# Patient Record
Sex: MALE | Race: OTHER | NOT HISPANIC OR LATINO | URBAN - METROPOLITAN AREA
[De-identification: names, ages, dates, MRNs, and addresses within clinical notes are randomized per-mention and may not be internally consistent; named-entity substitution may affect disease eponyms.]

---

## 2017-06-15 ENCOUNTER — EMERGENCY (EMERGENCY)
Facility: HOSPITAL | Age: 77
LOS: 0 days | Discharge: HOME | End: 2017-06-15
Attending: EMERGENCY MEDICINE
Payer: MEDICARE

## 2017-06-15 VITALS
RESPIRATION RATE: 18 BRPM | TEMPERATURE: 98 F | WEIGHT: 179.9 LBS | DIASTOLIC BLOOD PRESSURE: 86 MMHG | OXYGEN SATURATION: 98 % | HEART RATE: 72 BPM | SYSTOLIC BLOOD PRESSURE: 150 MMHG | HEIGHT: 68 IN

## 2017-06-15 VITALS
DIASTOLIC BLOOD PRESSURE: 91 MMHG | OXYGEN SATURATION: 100 % | TEMPERATURE: 98 F | SYSTOLIC BLOOD PRESSURE: 142 MMHG | RESPIRATION RATE: 16 BRPM | HEART RATE: 72 BPM

## 2017-06-15 DIAGNOSIS — Z98.890 OTHER SPECIFIED POSTPROCEDURAL STATES: Chronic | ICD-10-CM

## 2017-06-15 DIAGNOSIS — I10 ESSENTIAL (PRIMARY) HYPERTENSION: ICD-10-CM

## 2017-06-15 DIAGNOSIS — R07.9 CHEST PAIN, UNSPECIFIED: ICD-10-CM

## 2017-06-15 DIAGNOSIS — Z87.891 PERSONAL HISTORY OF NICOTINE DEPENDENCE: ICD-10-CM

## 2017-06-15 DIAGNOSIS — Z85.46 PERSONAL HISTORY OF MALIGNANT NEOPLASM OF PROSTATE: ICD-10-CM

## 2017-06-15 DIAGNOSIS — E11.9 TYPE 2 DIABETES MELLITUS WITHOUT COMPLICATIONS: ICD-10-CM

## 2017-06-15 DIAGNOSIS — K46.9 UNSPECIFIED ABDOMINAL HERNIA WITHOUT OBSTRUCTION OR GANGRENE: ICD-10-CM

## 2017-06-15 DIAGNOSIS — E78.5 HYPERLIPIDEMIA, UNSPECIFIED: ICD-10-CM

## 2017-06-15 LAB
ALBUMIN SERPL ELPH-MCNC: 3.6 G/DL — SIGNIFICANT CHANGE UP (ref 3.3–5)
ALP SERPL-CCNC: 43 U/L — SIGNIFICANT CHANGE UP (ref 40–120)
ALT FLD-CCNC: 27 U/L — SIGNIFICANT CHANGE UP (ref 12–78)
ANION GAP SERPL CALC-SCNC: 7 MMOL/L — SIGNIFICANT CHANGE UP (ref 5–17)
ANISOCYTOSIS BLD QL: SLIGHT — SIGNIFICANT CHANGE UP
APTT BLD: 31.2 SEC — SIGNIFICANT CHANGE UP (ref 27.5–37.4)
AST SERPL-CCNC: 20 U/L — SIGNIFICANT CHANGE UP (ref 15–37)
BASOPHILS NFR BLD AUTO: 1 % — SIGNIFICANT CHANGE UP (ref 0–2)
BILIRUB SERPL-MCNC: 0.3 MG/DL — SIGNIFICANT CHANGE UP (ref 0.2–1.2)
BUN SERPL-MCNC: 19 MG/DL — SIGNIFICANT CHANGE UP (ref 7–23)
CALCIUM SERPL-MCNC: 8.5 MG/DL — SIGNIFICANT CHANGE UP (ref 8.5–10.1)
CHLORIDE SERPL-SCNC: 111 MMOL/L — HIGH (ref 96–108)
CK MB BLD-MCNC: 0.7 % — SIGNIFICANT CHANGE UP (ref 0–3.5)
CK MB BLD-MCNC: 0.8 % — SIGNIFICANT CHANGE UP (ref 0–3.5)
CK MB CFR SERPL CALC: 1.6 NG/ML — SIGNIFICANT CHANGE UP (ref 0.5–3.6)
CK MB CFR SERPL CALC: 1.7 NG/ML — SIGNIFICANT CHANGE UP (ref 0.5–3.6)
CK SERPL-CCNC: 206 U/L — SIGNIFICANT CHANGE UP (ref 26–308)
CK SERPL-CCNC: 228 U/L — SIGNIFICANT CHANGE UP (ref 26–308)
CO2 SERPL-SCNC: 27 MMOL/L — SIGNIFICANT CHANGE UP (ref 22–31)
CREAT SERPL-MCNC: 0.93 MG/DL — SIGNIFICANT CHANGE UP (ref 0.5–1.3)
D DIMER BLD IA.RAPID-MCNC: <150 NG/ML DDU — SIGNIFICANT CHANGE UP
EOSINOPHIL NFR BLD AUTO: 3 % — SIGNIFICANT CHANGE UP (ref 0–6)
GLUCOSE SERPL-MCNC: 136 MG/DL — HIGH (ref 70–99)
HCT VFR BLD CALC: 31.9 % — LOW (ref 39–50)
HGB BLD-MCNC: 10.4 G/DL — LOW (ref 13–17)
HYPOCHROMIA BLD QL: SLIGHT — SIGNIFICANT CHANGE UP
INR BLD: 0.86 RATIO — LOW (ref 0.88–1.16)
LYMPHOCYTES # BLD AUTO: 24 % — SIGNIFICANT CHANGE UP (ref 13–44)
MCHC RBC-ENTMCNC: 26.1 PG — LOW (ref 27–34)
MCHC RBC-ENTMCNC: 32.6 GM/DL — SIGNIFICANT CHANGE UP (ref 32–36)
MCV RBC AUTO: 80.1 FL — SIGNIFICANT CHANGE UP (ref 80–100)
MONOCYTES NFR BLD AUTO: 9 % — SIGNIFICANT CHANGE UP (ref 2–14)
NEUTROPHILS NFR BLD AUTO: 57 % — SIGNIFICANT CHANGE UP (ref 43–77)
NEUTS BAND # BLD: 5 % — SIGNIFICANT CHANGE UP (ref 0–8)
PLAT MORPH BLD: NORMAL — SIGNIFICANT CHANGE UP
PLATELET # BLD AUTO: 182 K/UL — SIGNIFICANT CHANGE UP (ref 150–400)
POLYCHROMASIA BLD QL SMEAR: SLIGHT — SIGNIFICANT CHANGE UP
POTASSIUM SERPL-MCNC: 3.9 MMOL/L — SIGNIFICANT CHANGE UP (ref 3.5–5.3)
POTASSIUM SERPL-SCNC: 3.9 MMOL/L — SIGNIFICANT CHANGE UP (ref 3.5–5.3)
PROT SERPL-MCNC: 6.7 GM/DL — SIGNIFICANT CHANGE UP (ref 6–8.3)
PROTHROM AB SERPL-ACNC: 9.3 SEC — LOW (ref 9.8–12.7)
RBC # BLD: 3.98 M/UL — LOW (ref 4.2–5.8)
RBC # FLD: 14 % — SIGNIFICANT CHANGE UP (ref 11–15)
RBC BLD AUTO: ABNORMAL
SODIUM SERPL-SCNC: 145 MMOL/L — SIGNIFICANT CHANGE UP (ref 135–145)
TROPONIN I SERPL-MCNC: <.015 NG/ML — SIGNIFICANT CHANGE UP (ref 0.01–0.04)
TROPONIN I SERPL-MCNC: <.015 NG/ML — SIGNIFICANT CHANGE UP (ref 0.01–0.04)
VARIANT LYMPHS # BLD: 1 % — SIGNIFICANT CHANGE UP (ref 0–6)
WBC # BLD: 3.5 K/UL — LOW (ref 3.8–10.5)
WBC # FLD AUTO: 3.5 K/UL — LOW (ref 3.8–10.5)

## 2017-06-15 PROCEDURE — 99285 EMERGENCY DEPT VISIT HI MDM: CPT

## 2017-06-15 PROCEDURE — 71010: CPT | Mod: 26

## 2017-06-15 PROCEDURE — 93971 EXTREMITY STUDY: CPT | Mod: 26,RT

## 2017-06-15 RX ORDER — SODIUM CHLORIDE 9 MG/ML
3 INJECTION INTRAMUSCULAR; INTRAVENOUS; SUBCUTANEOUS ONCE
Qty: 0 | Refills: 0 | Status: COMPLETED | OUTPATIENT
Start: 2017-06-15 | End: 2017-06-15

## 2017-06-15 RX ORDER — ASPIRIN/CALCIUM CARB/MAGNESIUM 324 MG
325 TABLET ORAL ONCE
Qty: 0 | Refills: 0 | Status: COMPLETED | OUTPATIENT
Start: 2017-06-15 | End: 2017-06-15

## 2017-06-15 RX ORDER — NITROGLYCERIN 6.5 MG
0.4 CAPSULE, EXTENDED RELEASE ORAL
Qty: 0 | Refills: 0 | Status: DISCONTINUED | OUTPATIENT
Start: 2017-06-15 | End: 2017-06-15

## 2017-06-15 RX ADMIN — SODIUM CHLORIDE 3 MILLILITER(S): 9 INJECTION INTRAMUSCULAR; INTRAVENOUS; SUBCUTANEOUS at 18:35

## 2017-06-15 RX ADMIN — Medication 0.4 MILLIGRAM(S): at 18:05

## 2017-06-15 RX ADMIN — Medication 325 MILLIGRAM(S): at 18:05

## 2017-06-15 NOTE — ED ADULT NURSE REASSESSMENT NOTE - NS ED NURSE REASSESS COMMENT FT1
Pt to be discharged home with follow up care. IV removed from pt R AC, catheter in-tact, site covered with a pressure dressing and bleeding controlled. Pt questions about discharge answered at time of discharge. Provided pt with results of his tests and lab diagnostics that were performed during visit. Pt states understanding as demonstrated by pt teach back. Educated pt about cardiac care and follow up instructions. Pt to follow up on discharge.

## 2017-06-15 NOTE — ED PROVIDER NOTE - PROGRESS NOTE DETAILS
Pt endorsed by Dr. Correia, present for eval of CP.  Pt last stress test 1y ago, WNL.  Initial labs & imaging WNL, pending repeat CE.  Repeat CE WNL.  Pt VSS in NAD.  Discussed results and outcome of testing with the patient, given copy as well.  Patient advised to please follow up with their primary care doctor within the next 24 hours and return to the Emergency Department for worsening symptoms or any other concerns.  Patient advised that their doctor may call  to follow up on the specific results of the tests performed today in the emergency department.

## 2017-06-15 NOTE — ED PROVIDER NOTE - PHYSICAL EXAMINATION
Gen: Alert, Well appearing. NAD    Head: NC, AT, PERRL, EOMI, normal lids/conjunctiva   ENT: Bilateral TM WNL, normal hearing, patent oropharynx without erythema/exudate, uvula midline  Neck: supple, no tenderness/meningismus/JVD   Pulm: Bilateral clear BS, normal resp effort, no wheeze/stridor/retractions  CV: RRR, no M/R/G, +dist pulses   Abd: soft, NT/ND, +BS, no guarding/rebound tenderness  Mskel: + mild rt edema to groin compared to left. pulses intact  Skin: no rash   Neuro: AAOx3, no sensory/motor deficits, CN 2-12 intact

## 2017-06-15 NOTE — ED PROVIDER NOTE - OBJECTIVE STATEMENT
76yo male with pmh HTN, HL, DM, + chronic RLE edema x 2 years (worked up, told to wear compressive stockings), Prostate CA treated with seeding, psh: inguinal hernia repair, presents with intermittent Lt CP, burning, that started 2 hours ago. No SOB, palptiations. Denies recent immobilization, surgery, long trips or plane rides, hormones/OCP, leg pain or swelling or family or personal history of blood clotting disorder. + reports h/o neg stress test and last one last year.     ROS: No fever/chills. No photophobia/eye pain/changes in vision, No ear pain/sore throat/dysphagia, + chest pain, no palpitations. No SOB/cough/stridor. No abdominal pain, N/V/D, no black/bloody bm. No dysuria/frequency/discharge, No headache. No Dizziness.  No rash.  No numbness/tingling/weakness.

## 2017-06-15 NOTE — ED PROVIDER NOTE - MEDICAL DECISION MAKING DETAILS
EKG wnl. initial labs normal. pending repeat CE, ekg . Patient signed out to incoming physician.  All decisions regarding the progression of care will be made at their discretion.

## 2019-03-04 ENCOUNTER — EMERGENCY (EMERGENCY)
Facility: HOSPITAL | Age: 79
LOS: 0 days | Discharge: ANOTHER HOSPITAL | End: 2019-03-04
Attending: EMERGENCY MEDICINE
Payer: MEDICARE

## 2019-03-04 ENCOUNTER — INPATIENT (INPATIENT)
Facility: HOSPITAL | Age: 79
LOS: 1 days | Discharge: HOME | DRG: 24 | End: 2019-03-06
Attending: PSYCHIATRY & NEUROLOGY | Admitting: PSYCHIATRY & NEUROLOGY
Payer: COMMERCIAL

## 2019-03-04 ENCOUNTER — RESULT REVIEW (OUTPATIENT)
Age: 79
End: 2019-03-04

## 2019-03-04 ENCOUNTER — TRANSCRIPTION ENCOUNTER (OUTPATIENT)
Age: 79
End: 2019-03-04

## 2019-03-04 ENCOUNTER — APPOINTMENT (OUTPATIENT)
Age: 79
End: 2019-03-04

## 2019-03-04 VITALS
HEART RATE: 124 BPM | WEIGHT: 164.91 LBS | OXYGEN SATURATION: 96 % | RESPIRATION RATE: 18 BRPM | DIASTOLIC BLOOD PRESSURE: 75 MMHG | SYSTOLIC BLOOD PRESSURE: 156 MMHG | TEMPERATURE: 98 F | HEIGHT: 68 IN

## 2019-03-04 VITALS
HEART RATE: 98 BPM | WEIGHT: 164.91 LBS | HEIGHT: 66 IN | OXYGEN SATURATION: 97 % | SYSTOLIC BLOOD PRESSURE: 130 MMHG | DIASTOLIC BLOOD PRESSURE: 70 MMHG | RESPIRATION RATE: 18 BRPM

## 2019-03-04 VITALS
TEMPERATURE: 99 F | SYSTOLIC BLOOD PRESSURE: 150 MMHG | HEART RATE: 80 BPM | RESPIRATION RATE: 18 BRPM | DIASTOLIC BLOOD PRESSURE: 94 MMHG | OXYGEN SATURATION: 98 %

## 2019-03-04 DIAGNOSIS — I10 ESSENTIAL (PRIMARY) HYPERTENSION: ICD-10-CM

## 2019-03-04 DIAGNOSIS — E78.5 HYPERLIPIDEMIA, UNSPECIFIED: ICD-10-CM

## 2019-03-04 DIAGNOSIS — Z98.890 OTHER SPECIFIED POSTPROCEDURAL STATES: Chronic | ICD-10-CM

## 2019-03-04 DIAGNOSIS — I63.9 CEREBRAL INFARCTION, UNSPECIFIED: ICD-10-CM

## 2019-03-04 DIAGNOSIS — I61.9 NONTRAUMATIC INTRACEREBRAL HEMORRHAGE, UNSPECIFIED: ICD-10-CM

## 2019-03-04 LAB
ALBUMIN SERPL ELPH-MCNC: 3.2 G/DL — LOW (ref 3.3–5)
ALBUMIN SERPL ELPH-MCNC: 3.9 G/DL — SIGNIFICANT CHANGE UP (ref 3.3–5)
ALP SERPL-CCNC: 292 U/L — HIGH (ref 40–120)
ALP SERPL-CCNC: 312 U/L — HIGH (ref 40–120)
ALT FLD-CCNC: 23 U/L — SIGNIFICANT CHANGE UP (ref 10–45)
ALT FLD-CCNC: 30 U/L — SIGNIFICANT CHANGE UP (ref 12–78)
ANION GAP SERPL CALC-SCNC: 15 MMOL/L — SIGNIFICANT CHANGE UP (ref 5–17)
ANION GAP SERPL CALC-SCNC: 16 MMOL/L — SIGNIFICANT CHANGE UP (ref 5–17)
ANION GAP SERPL CALC-SCNC: 8 MMOL/L — SIGNIFICANT CHANGE UP (ref 5–17)
APTT BLD: 27.9 SEC — LOW (ref 28.5–37)
APTT BLD: 27.9 SEC — SIGNIFICANT CHANGE UP (ref 27.5–36.3)
APTT BLD: 29 SEC — SIGNIFICANT CHANGE UP (ref 27.5–36.3)
AST SERPL-CCNC: 36 U/L — SIGNIFICANT CHANGE UP (ref 10–40)
AST SERPL-CCNC: 48 U/L — HIGH (ref 15–37)
BASOPHILS # BLD AUTO: 0 K/UL — SIGNIFICANT CHANGE UP (ref 0–0.2)
BASOPHILS # BLD AUTO: 0.03 K/UL — SIGNIFICANT CHANGE UP (ref 0–0.2)
BASOPHILS NFR BLD AUTO: 0.3 % — SIGNIFICANT CHANGE UP (ref 0–2)
BASOPHILS NFR BLD AUTO: 0.3 % — SIGNIFICANT CHANGE UP (ref 0–2)
BILIRUB SERPL-MCNC: 0.3 MG/DL — SIGNIFICANT CHANGE UP (ref 0.2–1.2)
BILIRUB SERPL-MCNC: 0.3 MG/DL — SIGNIFICANT CHANGE UP (ref 0.2–1.2)
BLD GP AB SCN SERPL QL: NEGATIVE — SIGNIFICANT CHANGE UP
BLD GP AB SCN SERPL QL: SIGNIFICANT CHANGE UP
BUN SERPL-MCNC: 17 MG/DL — SIGNIFICANT CHANGE UP (ref 7–23)
BUN SERPL-MCNC: 19 MG/DL — SIGNIFICANT CHANGE UP (ref 7–23)
BUN SERPL-MCNC: 19 MG/DL — SIGNIFICANT CHANGE UP (ref 7–23)
CALCIUM SERPL-MCNC: 8.6 MG/DL — SIGNIFICANT CHANGE UP (ref 8.5–10.1)
CALCIUM SERPL-MCNC: 8.8 MG/DL — SIGNIFICANT CHANGE UP (ref 8.4–10.5)
CALCIUM SERPL-MCNC: 9.4 MG/DL — SIGNIFICANT CHANGE UP (ref 8.4–10.5)
CHLORIDE SERPL-SCNC: 101 MMOL/L — SIGNIFICANT CHANGE UP (ref 96–108)
CHLORIDE SERPL-SCNC: 95 MMOL/L — LOW (ref 96–108)
CHLORIDE SERPL-SCNC: 97 MMOL/L — SIGNIFICANT CHANGE UP (ref 96–108)
CK MB BLD-MCNC: <1.3 % — SIGNIFICANT CHANGE UP (ref 0–3.5)
CK MB CFR SERPL CALC: <1 NG/ML — SIGNIFICANT CHANGE UP (ref 0.5–3.6)
CK SERPL-CCNC: 76 U/L — SIGNIFICANT CHANGE UP (ref 26–308)
CO2 SERPL-SCNC: 21 MMOL/L — LOW (ref 22–31)
CO2 SERPL-SCNC: 22 MMOL/L — SIGNIFICANT CHANGE UP (ref 22–31)
CO2 SERPL-SCNC: 25 MMOL/L — SIGNIFICANT CHANGE UP (ref 22–31)
CREAT SERPL-MCNC: 1.47 MG/DL — HIGH (ref 0.5–1.3)
CREAT SERPL-MCNC: 1.54 MG/DL — HIGH (ref 0.5–1.3)
CREAT SERPL-MCNC: 1.64 MG/DL — HIGH (ref 0.5–1.3)
EOSINOPHIL # BLD AUTO: 0 K/UL — SIGNIFICANT CHANGE UP (ref 0–0.5)
EOSINOPHIL # BLD AUTO: 0.05 K/UL — SIGNIFICANT CHANGE UP (ref 0–0.5)
EOSINOPHIL NFR BLD AUTO: 0.6 % — SIGNIFICANT CHANGE UP (ref 0–6)
EOSINOPHIL NFR BLD AUTO: 0.6 % — SIGNIFICANT CHANGE UP (ref 0–6)
GLUCOSE SERPL-MCNC: 71 MG/DL — SIGNIFICANT CHANGE UP (ref 70–99)
GLUCOSE SERPL-MCNC: 85 MG/DL — SIGNIFICANT CHANGE UP (ref 70–99)
GLUCOSE SERPL-MCNC: 93 MG/DL — SIGNIFICANT CHANGE UP (ref 70–99)
HCT VFR BLD CALC: 26.6 % — LOW (ref 39–50)
HCT VFR BLD CALC: 28.4 % — LOW (ref 39–50)
HCT VFR BLD CALC: 30.2 % — LOW (ref 39–50)
HGB BLD-MCNC: 8.7 G/DL — LOW (ref 13–17)
HGB BLD-MCNC: 9.4 G/DL — LOW (ref 13–17)
HGB BLD-MCNC: 9.8 G/DL — LOW (ref 13–17)
IMM GRANULOCYTES NFR BLD AUTO: 0.2 % — SIGNIFICANT CHANGE UP (ref 0–1.5)
INR BLD: 1.08 RATIO — SIGNIFICANT CHANGE UP (ref 0.88–1.16)
INR BLD: 1.12 RATIO — SIGNIFICANT CHANGE UP (ref 0.88–1.16)
INR BLD: 1.14 RATIO — SIGNIFICANT CHANGE UP (ref 0.88–1.16)
LYMPHOCYTES # BLD AUTO: 1.27 K/UL — SIGNIFICANT CHANGE UP (ref 1–3.3)
LYMPHOCYTES # BLD AUTO: 1.4 K/UL — SIGNIFICANT CHANGE UP (ref 1–3.3)
LYMPHOCYTES # BLD AUTO: 14.8 % — SIGNIFICANT CHANGE UP (ref 13–44)
LYMPHOCYTES # BLD AUTO: 16.9 % — SIGNIFICANT CHANGE UP (ref 13–44)
MAGNESIUM SERPL-MCNC: 1.8 MG/DL — SIGNIFICANT CHANGE UP (ref 1.6–2.6)
MCHC RBC-ENTMCNC: 25.3 PG — LOW (ref 27–34)
MCHC RBC-ENTMCNC: 26.4 PG — LOW (ref 27–34)
MCHC RBC-ENTMCNC: 27.5 PG — SIGNIFICANT CHANGE UP (ref 27–34)
MCHC RBC-ENTMCNC: 31.1 GM/DL — LOW (ref 32–36)
MCHC RBC-ENTMCNC: 32.8 GM/DL — SIGNIFICANT CHANGE UP (ref 32–36)
MCHC RBC-ENTMCNC: 34.3 GM/DL — SIGNIFICANT CHANGE UP (ref 32–36)
MCV RBC AUTO: 80.2 FL — SIGNIFICANT CHANGE UP (ref 80–100)
MCV RBC AUTO: 80.5 FL — SIGNIFICANT CHANGE UP (ref 80–100)
MCV RBC AUTO: 81.2 FL — SIGNIFICANT CHANGE UP (ref 80–100)
MONOCYTES # BLD AUTO: 0.7 K/UL — SIGNIFICANT CHANGE UP (ref 0–0.9)
MONOCYTES # BLD AUTO: 0.72 K/UL — SIGNIFICANT CHANGE UP (ref 0–0.9)
MONOCYTES NFR BLD AUTO: 8 % — SIGNIFICANT CHANGE UP (ref 2–14)
MONOCYTES NFR BLD AUTO: 8.4 % — SIGNIFICANT CHANGE UP (ref 2–14)
NEUTROPHILS # BLD AUTO: 6 K/UL — SIGNIFICANT CHANGE UP (ref 1.8–7.4)
NEUTROPHILS # BLD AUTO: 6.51 K/UL — SIGNIFICANT CHANGE UP (ref 1.8–7.4)
NEUTROPHILS NFR BLD AUTO: 74.2 % — SIGNIFICANT CHANGE UP (ref 43–77)
NEUTROPHILS NFR BLD AUTO: 75.7 % — SIGNIFICANT CHANGE UP (ref 43–77)
NRBC # BLD: 0 /100 WBCS — SIGNIFICANT CHANGE UP (ref 0–0)
PHOSPHATE SERPL-MCNC: 4.2 MG/DL — SIGNIFICANT CHANGE UP (ref 2.5–4.5)
PLATELET # BLD AUTO: 198 K/UL — SIGNIFICANT CHANGE UP (ref 150–400)
PLATELET # BLD AUTO: 228 K/UL — SIGNIFICANT CHANGE UP (ref 150–400)
PLATELET # BLD AUTO: 249 K/UL — SIGNIFICANT CHANGE UP (ref 150–400)
POTASSIUM SERPL-MCNC: 3.8 MMOL/L — SIGNIFICANT CHANGE UP (ref 3.5–5.3)
POTASSIUM SERPL-MCNC: 4.4 MMOL/L — SIGNIFICANT CHANGE UP (ref 3.5–5.3)
POTASSIUM SERPL-MCNC: 4.5 MMOL/L — SIGNIFICANT CHANGE UP (ref 3.5–5.3)
POTASSIUM SERPL-SCNC: 3.8 MMOL/L — SIGNIFICANT CHANGE UP (ref 3.5–5.3)
POTASSIUM SERPL-SCNC: 4.4 MMOL/L — SIGNIFICANT CHANGE UP (ref 3.5–5.3)
POTASSIUM SERPL-SCNC: 4.5 MMOL/L — SIGNIFICANT CHANGE UP (ref 3.5–5.3)
PROT SERPL-MCNC: 7.4 G/DL — SIGNIFICANT CHANGE UP (ref 6–8.3)
PROT SERPL-MCNC: 7.5 GM/DL — SIGNIFICANT CHANGE UP (ref 6–8.3)
PROTHROM AB SERPL-ACNC: 12.5 SEC — SIGNIFICANT CHANGE UP (ref 10–12.9)
PROTHROM AB SERPL-ACNC: 12.6 SEC — SIGNIFICANT CHANGE UP (ref 10–12.9)
PROTHROM AB SERPL-ACNC: 13.2 SEC — HIGH (ref 10–12.9)
RBC # BLD: 3.3 M/UL — LOW (ref 4.2–5.8)
RBC # BLD: 3.54 M/UL — LOW (ref 4.2–5.8)
RBC # BLD: 3.72 M/UL — LOW (ref 4.2–5.8)
RBC # FLD: 14.2 % — SIGNIFICANT CHANGE UP (ref 10.3–14.5)
RBC # FLD: 14.4 % — SIGNIFICANT CHANGE UP (ref 10.3–14.5)
RBC # FLD: 15 % — HIGH (ref 10.3–14.5)
RH IG SCN BLD-IMP: POSITIVE — SIGNIFICANT CHANGE UP
RH IG SCN BLD-IMP: POSITIVE — SIGNIFICANT CHANGE UP
SODIUM SERPL-SCNC: 132 MMOL/L — LOW (ref 135–145)
SODIUM SERPL-SCNC: 134 MMOL/L — LOW (ref 135–145)
SODIUM SERPL-SCNC: 134 MMOL/L — LOW (ref 135–145)
TROPONIN I SERPL-MCNC: 0.08 NG/ML — HIGH (ref 0.01–0.04)
WBC # BLD: 7.4 K/UL — SIGNIFICANT CHANGE UP (ref 3.8–10.5)
WBC # BLD: 8.1 K/UL — SIGNIFICANT CHANGE UP (ref 3.8–10.5)
WBC # BLD: 8.6 K/UL — SIGNIFICANT CHANGE UP (ref 3.8–10.5)
WBC # FLD AUTO: 7.4 K/UL — SIGNIFICANT CHANGE UP (ref 3.8–10.5)
WBC # FLD AUTO: 8.1 K/UL — SIGNIFICANT CHANGE UP (ref 3.8–10.5)
WBC # FLD AUTO: 8.6 K/UL — SIGNIFICANT CHANGE UP (ref 3.8–10.5)

## 2019-03-04 PROCEDURE — 88304 TISSUE EXAM BY PATHOLOGIST: CPT | Mod: 26

## 2019-03-04 PROCEDURE — 70460 CT HEAD/BRAIN W/DYE: CPT | Mod: 26

## 2019-03-04 PROCEDURE — 99222 1ST HOSP IP/OBS MODERATE 55: CPT

## 2019-03-04 PROCEDURE — 71045 X-RAY EXAM CHEST 1 VIEW: CPT | Mod: 26

## 2019-03-04 PROCEDURE — 70496 CT ANGIOGRAPHY HEAD: CPT | Mod: 26

## 2019-03-04 PROCEDURE — 99291 CRITICAL CARE FIRST HOUR: CPT

## 2019-03-04 PROCEDURE — 0042T: CPT

## 2019-03-04 PROCEDURE — 61645 PERQ ART M-THROMBECT &/NFS: CPT | Mod: LT

## 2019-03-04 PROCEDURE — 71045 X-RAY EXAM CHEST 1 VIEW: CPT | Mod: 26,77

## 2019-03-04 PROCEDURE — 70450 CT HEAD/BRAIN W/O DYE: CPT | Mod: 26

## 2019-03-04 PROCEDURE — 70498 CT ANGIOGRAPHY NECK: CPT | Mod: 26

## 2019-03-04 PROCEDURE — 99223 1ST HOSP IP/OBS HIGH 75: CPT

## 2019-03-04 RX ORDER — AMLODIPINE BESYLATE 2.5 MG/1
0 TABLET ORAL
Qty: 0 | Refills: 0 | COMMUNITY

## 2019-03-04 RX ORDER — TERAZOSIN HYDROCHLORIDE 10 MG/1
1 CAPSULE ORAL
Qty: 0 | Refills: 0 | COMMUNITY

## 2019-03-04 RX ORDER — OXYCODONE HYDROCHLORIDE 5 MG/1
10 TABLET ORAL EVERY 4 HOURS
Qty: 0 | Refills: 0 | Status: DISCONTINUED | OUTPATIENT
Start: 2019-03-04 | End: 2019-03-06

## 2019-03-04 RX ORDER — CHLORHEXIDINE GLUCONATE 213 G/1000ML
1 SOLUTION TOPICAL
Qty: 0 | Refills: 0 | Status: DISCONTINUED | OUTPATIENT
Start: 2019-03-04 | End: 2019-03-05

## 2019-03-04 RX ORDER — METOPROLOL TARTRATE 50 MG
12.5 TABLET ORAL
Qty: 0 | Refills: 0 | Status: DISCONTINUED | OUTPATIENT
Start: 2019-03-04 | End: 2019-03-06

## 2019-03-04 RX ORDER — GABAPENTIN 400 MG/1
0 CAPSULE ORAL
Qty: 0 | Refills: 0 | COMMUNITY

## 2019-03-04 RX ORDER — NICARDIPINE HYDROCHLORIDE 30 MG/1
5 CAPSULE, EXTENDED RELEASE ORAL
Qty: 40 | Refills: 0 | Status: DISCONTINUED | OUTPATIENT
Start: 2019-03-04 | End: 2019-03-05

## 2019-03-04 RX ORDER — SODIUM CHLORIDE 5 G/100ML
1000 INJECTION, SOLUTION INTRAVENOUS
Qty: 0 | Refills: 0 | Status: DISCONTINUED | OUTPATIENT
Start: 2019-03-04 | End: 2019-03-05

## 2019-03-04 RX ORDER — SODIUM CHLORIDE 9 MG/ML
500 INJECTION, SOLUTION INTRAVENOUS ONCE
Qty: 0 | Refills: 0 | Status: COMPLETED | OUTPATIENT
Start: 2019-03-04 | End: 2019-03-04

## 2019-03-04 RX ORDER — SODIUM CHLORIDE 9 MG/ML
1000 INJECTION INTRAMUSCULAR; INTRAVENOUS; SUBCUTANEOUS
Qty: 0 | Refills: 0 | Status: DISCONTINUED | OUTPATIENT
Start: 2019-03-04 | End: 2019-03-04

## 2019-03-04 RX ORDER — DEXMEDETOMIDINE HYDROCHLORIDE IN 0.9% SODIUM CHLORIDE 4 UG/ML
0.4 INJECTION INTRAVENOUS
Qty: 200 | Refills: 0 | Status: DISCONTINUED | OUTPATIENT
Start: 2019-03-04 | End: 2019-03-04

## 2019-03-04 RX ORDER — METOPROLOL TARTRATE 50 MG
0 TABLET ORAL
Qty: 0 | Refills: 0 | COMMUNITY

## 2019-03-04 RX ORDER — SODIUM CHLORIDE 9 MG/ML
1000 INJECTION, SOLUTION INTRAVENOUS
Qty: 0 | Refills: 0 | Status: DISCONTINUED | OUTPATIENT
Start: 2019-03-04 | End: 2019-03-04

## 2019-03-04 RX ORDER — ASPIRIN/CALCIUM CARB/MAGNESIUM 324 MG
1 TABLET ORAL
Qty: 0 | Refills: 0 | COMMUNITY

## 2019-03-04 RX ORDER — METFORMIN HYDROCHLORIDE 850 MG/1
1 TABLET ORAL
Qty: 0 | Refills: 0 | COMMUNITY

## 2019-03-04 RX ORDER — PANTOPRAZOLE SODIUM 20 MG/1
40 TABLET, DELAYED RELEASE ORAL DAILY
Qty: 0 | Refills: 0 | Status: DISCONTINUED | OUTPATIENT
Start: 2019-03-04 | End: 2019-03-04

## 2019-03-04 RX ORDER — ATORVASTATIN CALCIUM 80 MG/1
80 TABLET, FILM COATED ORAL AT BEDTIME
Qty: 0 | Refills: 0 | Status: DISCONTINUED | OUTPATIENT
Start: 2019-03-04 | End: 2019-03-06

## 2019-03-04 RX ORDER — GABAPENTIN 400 MG/1
100 CAPSULE ORAL
Qty: 0 | Refills: 0 | COMMUNITY

## 2019-03-04 RX ORDER — AMLODIPINE BESYLATE 2.5 MG/1
10 TABLET ORAL DAILY
Qty: 0 | Refills: 0 | Status: DISCONTINUED | OUTPATIENT
Start: 2019-03-04 | End: 2019-03-06

## 2019-03-04 RX ORDER — OXYCODONE HYDROCHLORIDE 5 MG/1
5 TABLET ORAL EVERY 4 HOURS
Qty: 0 | Refills: 0 | Status: DISCONTINUED | OUTPATIENT
Start: 2019-03-04 | End: 2019-03-06

## 2019-03-04 RX ORDER — LOSARTAN POTASSIUM 100 MG/1
0 TABLET, FILM COATED ORAL
Qty: 0 | Refills: 0 | COMMUNITY

## 2019-03-04 RX ORDER — CHLORHEXIDINE GLUCONATE 213 G/1000ML
15 SOLUTION TOPICAL
Qty: 0 | Refills: 0 | Status: DISCONTINUED | OUTPATIENT
Start: 2019-03-04 | End: 2019-03-04

## 2019-03-04 RX ORDER — BICALUTAMIDE 50 MG/1
1 TABLET, FILM COATED ORAL
Qty: 0 | Refills: 0 | COMMUNITY

## 2019-03-04 RX ADMIN — SODIUM CHLORIDE 500 MILLILITER(S): 9 INJECTION, SOLUTION INTRAVENOUS at 14:30

## 2019-03-04 RX ADMIN — SODIUM CHLORIDE 75 MILLILITER(S): 5 INJECTION, SOLUTION INTRAVENOUS at 19:25

## 2019-03-04 RX ADMIN — CHLORHEXIDINE GLUCONATE 1 APPLICATION(S): 213 SOLUTION TOPICAL at 22:22

## 2019-03-04 NOTE — DISCHARGE NOTE ADULT - CARE PLAN
Principal Discharge DX:	CVA (cerebral vascular accident)  Goal:	Management and Prevention of Recurrence  Assessment and plan of treatment:	Continue Aspirin 81mg by mouth daily  Follow-up with Stroke Nurse Practitioner as outpatient within 5 days  Follow-up with Vascular Neurology as outpatient   Will need to Repeat CT Head scan by 3/12 and possibly start Anticoagulation based on stability of CT scan of the head after discussion with Stroke Neurology outpatient

## 2019-03-04 NOTE — DISCHARGE NOTE ADULT - CARE PROVIDER_API CALL
Ness Malagon (NP; RN)  NP in Family Health  [address] [facility], [address]  [place] [address]  Phone: [phone]  Fax: [phone]  Follow Up Time:     Jerson Merlos)  Neurology; Vascular Neurology  [address] [facility], [address] [place] [id number]  Phone: [phone]  Fax: [phone]  Follow Up Time:

## 2019-03-04 NOTE — ED ADULT TRIAGE NOTE - CHIEF COMPLAINT QUOTE
ems states, " pt was with slurred on arrival , right side weakness, unable to follow commands , hemorrhagic stroke 2 weeks ago , hx liver cancer" md Thurman In triage, pt on asa

## 2019-03-04 NOTE — PROGRESS NOTE ADULT - ASSESSMENT
Summary: 79 yo RH gentleman with hx of "previous stroke" w/o residual deficits; hx of prostate CA, and recently diagnosed metastatic liver cancer presented to OSH w/ R sided hemiplegia, and was noted to have R sided IPH. Given right sided deficits, CT perfusion study was performed, which showed L M1 occlusion. LKN 3/4 6AM. Not a candidate for tPA. Transferred to [facility] for embolectomy. s/p L M1 embolectomy. Concern for b/l MCA strokes w/ hemorrhagic conversion on R side.     NEURO:  q1h neuro checks  Stroke core measures: A1c, LDL  Start Atorvastatin 80 qHS  CT head tomorrow AM  Sedation w/ Precedex gtt    CARDS:  -160  Nicardipine gtt as needed    PULM:  Intubated  Peridex  ABG, CXR    RENAL:   Hyponatremia   Will start 2% NS  BMP Q6H    GASTRO:  NPO  Bowel regimen  ---> Stress ulcer prophylaxis:  PPI    HEME:  monitor H/H    Will get b/l LE screening dopplers given hx of metastatic cancer  ---> DVT prophylaxis: SCDs, hold anticoagulation given IPH and fresh post embolectomy    ENDO:  Goal: euglycemia  F/u A1c    ID:  Monitor for fevers    Code status:  Full code  Disposition:  ICU    This patient was at high risk of neurologic deterioration and/or death due to: hemorrhagic conversion, seizures, metastatic disease    Time spent:  45 minutes Summary: 79 yo RH gentleman with hx of "previous stroke" w/o residual deficits; hx of prostate CA, and recently diagnosed metastatic liver cancer presented to OSH w/ R sided hemiplegia, and was noted to have R sided IPH. Given right sided deficits, CT perfusion study was performed, which showed L M1 occlusion. LKN 3/4 6AM. Not a candidate for tPA. Transferred to [facility] for embolectomy. s/p L M1 embolectomy. Concern for b/l MCA strokes w/ hemorrhagic conversion on R side.     NEURO:  q1h neuro checks  Stroke core measures: A1c, LDL  Start Atorvastatin 80 qHS  CT head tomorrow AM  Sedation w/ Precedex gtt    CARDS:  -160  Nicardipine gtt as needed    PULM:  Extubated post angio 2/2 to agitation  ABG, CXR    RENAL:   Hyponatremia   Will start 2% NS  BMP Q6H    GASTRO:  NPO  Bowel regimen  ---> Stress ulcer prophylaxis:  PPI    HEME:  monitor H/H    Will get b/l LE screening dopplers given hx of metastatic cancer  ---> DVT prophylaxis: SCDs, hold anticoagulation given IPH and fresh post embolectomy    ENDO:  Goal: euglycemia  F/u A1c    ID:  Monitor for fevers    Code status:  Full code  Disposition:  ICU    This patient was at high risk of neurologic deterioration and/or death due to: hemorrhagic conversion, seizures, metastatic disease    Time spent:  45 minutes Summary: 79 yo RH gentleman with hx of "previous stroke" w/o residual deficits; hx of prostate CA, and recently diagnosed metastatic liver cancer presented to OSH w/ R sided hemiplegia, and was noted to have R sided IPH. Given right sided deficits, CT perfusion study was performed, which showed L M1 occlusion. LKN 3/4 6AM. Not a candidate for tPA. Transferred to [facility] for embolectomy. s/p L M1 embolectomy. Concern for b/l MCA strokes w/ hemorrhagic conversion on R side.     NEURO:  q1h neuro checks  Stroke core measures: A1c, LDL  Start Atorvastatin 80 qHS  CT head tomorrow AM  Sedation w/ Precedex gtt    CARDS:  -160mmHg given intracerebral hemorrhage   Nicardipine gtt as needed    PULM:    Wean to extubate  ABG, CXR    RENAL:   Hyponatremia   Will start 2% NS for cerebral edema  BMP Q6H    GASTRO:  NPO  Bowel regimen  ---> Stress ulcer prophylaxis:  PPI    HEME:  monitor H/H    Will get b/l LE screening dopplers given hx of metastatic cancer  ---> DVT prophylaxis: SCDs, hold anticoagulation given IPH and fresh post embolectomy  High risk of VTE on admission given history of malignancy    ENDO:  Goal: euglycemia  F/u A1c    ID:  Monitor for fevers    Code status:  Full code  Disposition:  ICU    This patient was at high risk of neurologic deterioration and/or death due to: hemorrhagic conversion, seizures, metastatic disease    Time spent:  45 minutes

## 2019-03-04 NOTE — ED ADULT NURSE NOTE - NSIMPLEMENTINTERV_GEN_ALL_ED
Implemented All Fall with Harm Risk Interventions:  [place] to call system. Call bell, personal items and telephone within reach. Instruct patient to call for assistance. Room bathroom lighting operational. Non-slip footwear when patient is off stretcher. Physically safe environment: no spills, clutter or unnecessary equipment. Stretcher in lowest position, wheels locked, appropriate side rails in place. Provide visual cue, wrist band, yellow gown, etc. Monitor gait and stability. Monitor for mental status changes and reorient to person, place, and time. Review medications for side effects contributing to fall risk. Reinforce activity limits and safety measures with patient and family. Provide visual clues: red socks.

## 2019-03-04 NOTE — PROGRESS NOTE ADULT - SUBJECTIVE AND OBJECTIVE BOX
HPI: 79 yo RH gentleman with hx of "previous stroke" w/o residual deficits; prostate CA, and recently (Feb 2019) informed about "stage 4 Liver CA" seeing rad-onc Dr. Weeks ([phone]) presents from OSH w/ R hemiplegia and R ICH. As per family, pt woke up in his usual state at 6AM on 3/4; then was seen again by son at 10AM with slurred speech and not moving right side, activated EMS. Pt was then transferred to [facility] as stroke rescue for R hemiplegia. On arrival pt found awake alert, severe dysarthria, intermittent moderate-severe aphasia, following some basic commands, but impaired repetition, comprehension, R facial droop, R promise-neglect, R hemiplegia. Pt unable to provide hx or ROS. Family adamantly state, pt is completely independent of his ADLs prior to today. Repeat CTH showing R sided ICH, and CTA/P with proximal L M1 occlusion. Pt excluded from tPA given R sided hemorrhage. Pt is deemed endovascular candidate and after in-depth discussion with pt's family and HCP Maria Victoria; pt was taken to urgent embolectomy of LM1 occlusion. Attempts at obtaining more information regarding pt's malignancy with Dr. Weeks's office were made. Pt to be taken to NSCU post intervention. NIHSS =18, MRS =1 (04 Mar 2019 15:59)    On admission, the patient was:  GCS:  ICH score:  NIHSS: 15    *** HIGH RISK OF DVT PRESENT ON ADMISSION ***    VITALS:  T(C): , Max: 37.1 (03-04-19 @ 12:20)  HR:  (77 - 124)  BP:  (130/70 - 158/111)  ABP: --  RR:  (18 - 18)  SpO2:  (96% - 99%)  Wt(kg): --      LABS:  Na: 132 (03-04 @ 13:37), 134 (03-04 @ 11:46)  K: 4.4 (03-04 @ 13:37), 4.5 (03-04 @ 11:46)  Cl: 95 (03-04 @ 13:37), 101 (03-04 @ 11:46)  CO2: 22 (03-04 @ 13:37), 25 (03-04 @ 11:46)  BUN: 19 (03-04 @ 13:37), 19 (03-04 @ 11:46)  Cr: 1.54 (03-04 @ 13:37), 1.64 (03-04 @ 11:46)  Glu: 85(03-04 @ 13:37), 93(03-04 @ 11:46)    Hgb: 9.8 (03-04 @ 13:37), 9.4 (03-04 @ 11:46)  Hct: 28.4 (03-04 @ 13:37), 30.2 (03-04 @ 11:46)  WBC: 8.1 (03-04 @ 13:37), 8.60 (03-04 @ 11:46)  Plt: 228 (03-04 @ 13:37), 249 (03-04 @ 11:46)  PT: 12.5 (03-04 @ 13:37)  INR: 1.08 (03-04 @ 13:37)  aPTT: 27.9 (03-04 @ 13:37), PT: 12.6 (03-04 @ 11:46)  INR: 1.12 (03-04 @ 11:46)  aPTT: 27.9 (03-04 @ 11:46)    IMAGING:   Recent imaging studies were reviewed.    MEDICATIONS:  lactated ringers. 1000 milliLiter(s) IV Continuous <Continuous>  sodium chloride 0.9%. 1000 milliLiter(s) IV Continuous <Continuous>    EXAMINATION:  General:  calm  HEENT:  MMM  Neuro:  awake, alert, oriented x 3, follows commands, moves all extremities  Cards:  RRR  Respiratory:  no respiratory distress  Adomen:  soft  Extremities:  no edema  Skin:  warm/dry HPI: 77 yo RH gentleman with hx of "previous stroke" w/o residual deficits; prostate CA, and recently (Feb 2019) informed about "stage 4 Liver CA" seeing rad-onc Dr. Weeks ([phone]) presents from OSH w/ R hemiplegia and R ICH. As per family, pt woke up in his usual state at 6AM on 3/4; then was seen again by son at 10AM with slurred speech and not moving right side, activated EMS. Pt was then transferred to [facility] as stroke rescue for R hemiplegia. On arrival pt found awake alert, severe dysarthria, intermittent moderate-severe aphasia, following some basic commands, but impaired repetition, comprehension, R facial droop, R promise-neglect, R hemiplegia. Pt unable to provide hx or ROS. Family adamantly state, pt is completely independent of his ADLs prior to today. Repeat CTH showing R sided ICH, and CTA/P with proximal L M1 occlusion. Pt excluded from tPA given R sided hemorrhage. Pt is deemed endovascular candidate and after in-depth discussion with pt's family and HCP Maria Victoria; pt was taken to urgent embolectomy of LM1 occlusion. Attempts at obtaining more information regarding pt's malignancy with Dr. Weeks's office were made. Pt to be taken to NSCU post intervention. NIHSS =18, MRS =1 (04 Mar 2019 15:59)    On admission, the patient was:  GCS: 11 [id number]  ICH score:  NIHSS: 15    *** HIGH RISK OF DVT PRESENT ON ADMISSION ***    VITALS:  T(C): , Max: 37.1 (03-04-19 @ 12:20)  HR:  (77 - 124)  BP:  (130/70 - 158/111)  ABP: --  RR:  (18 - 18)  SpO2:  (96% - 99%)  Wt(kg): --      LABS:  Na: 132 (03-04 @ 13:37), 134 (03-04 @ 11:46)  K: 4.4 (03-04 @ 13:37), 4.5 (03-04 @ 11:46)  Cl: 95 (03-04 @ 13:37), 101 (03-04 @ 11:46)  CO2: 22 (03-04 @ 13:37), 25 (03-04 @ 11:46)  BUN: 19 (03-04 @ 13:37), 19 (03-04 @ 11:46)  Cr: 1.54 (03-04 @ 13:37), 1.64 (03-04 @ 11:46)  Glu: 85(03-04 @ 13:37), 93(03-04 @ 11:46)    Hgb: 9.8 (03-04 @ 13:37), 9.4 (03-04 @ 11:46)  Hct: 28.4 (03-04 @ 13:37), 30.2 (03-04 @ 11:46)  WBC: 8.1 (03-04 @ 13:37), 8.60 (03-04 @ 11:46)  Plt: 228 (03-04 @ 13:37), 249 (03-04 @ 11:46)  PT: 12.5 (03-04 @ 13:37)  INR: 1.08 (03-04 @ 13:37)  aPTT: 27.9 (03-04 @ 13:37), PT: 12.6 (03-04 @ 11:46)  INR: 1.12 (03-04 @ 11:46)  aPTT: 27.9 (03-04 @ 11:46)    IMAGING:   Recent imaging studies were reviewed.    MEDICATIONS:  lactated ringers. 1000 milliLiter(s) IV Continuous <Continuous>  sodium chloride 0.9%. 1000 milliLiter(s) IV Continuous <Continuous>    EXAMINATION:  General:  Agitated, intubated  HEENT:  MMM  Neuro: EO spontaneously. Moving all extremities spontaneously AG (except RLE - restrained post angio),   Cards:  RRR  Respiratory:  CTAB. Anterior auscultation only.   Abdomen:  soft  Extremities:  no edema  Skin:  warm/dry HPI: 77 yo RH gentleman with hx of "previous stroke" w/o residual deficits; prostate CA, and recently (Feb 2019) informed about "stage 4 Liver CA" seeing rad-onc Dr. Weeks ([phone]) presents from OSH w/ R hemiplegia and R ICH. As per family, pt woke up in his usual state at 6AM on 3/4; then was seen again by son at 10AM with slurred speech and not moving right side, activated EMS. Pt was then transferred to [facility] as stroke rescue for R hemiplegia. On arrival pt found awake alert, severe dysarthria, intermittent moderate-severe aphasia, following some basic commands, but impaired repetition, comprehension, R facial droop, R promise-neglect, R hemiplegia. Pt unable to provide hx or ROS. Family adamantly state, pt is completely independent of his ADLs prior to today. Repeat CTH showing R sided ICH, and CTA/P with proximal L M1 occlusion. Pt excluded from tPA given R sided hemorrhage. Pt is deemed endovascular candidate and after in-depth discussion with pt's family and HCP Maria Victoria; pt was taken to urgent embolectomy of LM1 occlusion. Attempts at obtaining more information regarding pt's malignancy with Dr. Weeks's office were made. Pt to be taken to NSCU post intervention. NIHSS =18, MRS =1 (04 Mar 2019 15:59)    On admission, the patient was:  GCS: 11 [id number]  ICH score: 1  NIHSS: 15    *** HIGH RISK OF DVT PRESENT ON ADMISSION ***    VITALS:  T(C): , Max: 37.1 (03-04-19 @ 12:20)  HR:  (77 - 124)  BP:  (130/70 - 158/111)  ABP: --  RR:  (18 - 18)  SpO2:  (96% - 99%)  Wt(kg): --      LABS:  Na: 132 (03-04 @ 13:37), 134 (03-04 @ 11:46)  K: 4.4 (03-04 @ 13:37), 4.5 (03-04 @ 11:46)  Cl: 95 (03-04 @ 13:37), 101 (03-04 @ 11:46)  CO2: 22 (03-04 @ 13:37), 25 (03-04 @ 11:46)  BUN: 19 (03-04 @ 13:37), 19 (03-04 @ 11:46)  Cr: 1.54 (03-04 @ 13:37), 1.64 (03-04 @ 11:46)  Glu: 85(03-04 @ 13:37), 93(03-04 @ 11:46)    Hgb: 9.8 (03-04 @ 13:37), 9.4 (03-04 @ 11:46)  Hct: 28.4 (03-04 @ 13:37), 30.2 (03-04 @ 11:46)  WBC: 8.1 (03-04 @ 13:37), 8.60 (03-04 @ 11:46)  Plt: 228 (03-04 @ 13:37), 249 (03-04 @ 11:46)  PT: 12.5 (03-04 @ 13:37)  INR: 1.08 (03-04 @ 13:37)  aPTT: 27.9 (03-04 @ 13:37), PT: 12.6 (03-04 @ 11:46)  INR: 1.12 (03-04 @ 11:46)  aPTT: 27.9 (03-04 @ 11:46)    IMAGING:   Recent imaging studies were reviewed.    MEDICATIONS:  lactated ringers. 1000 milliLiter(s) IV Continuous <Continuous>  sodium chloride 0.9%. 1000 milliLiter(s) IV Continuous <Continuous>    EXAMINATION:  General:  Agitated, intubated  HEENT:  MMM  Neuro: EO spontaneously. Moving all extremities spontaneously AG (except RLE - restrained post angio),   Cards:  RRR  Respiratory:  CTAB. Anterior auscultation only.   Abdomen:  soft  Extremities:  no edema  Skin:  warm/dry HPI: 77 yo RH gentleman with hx of "previous stroke" w/o residual deficits; prostate CA, and recently (Feb 2019) informed about "stage 4 Liver CA" seeing rad-onc Dr. Weeks ([phone]) presents from OSH w/ R hemiplegia and R ICH. As per family, pt woke up in his usual state at 6AM on 3/4; then was seen again by son at 10AM with slurred speech and not moving right side, activated EMS. Pt was then transferred to [facility] as stroke rescue for R hemiplegia. On arrival pt found awake alert, severe dysarthria, intermittent moderate-severe aphasia, following some basic commands, but impaired repetition, comprehension, R facial droop, R promise-neglect, R hemiplegia. Pt unable to provide hx or ROS. Family adamantly state, pt is completely independent of his ADLs prior to today. Repeat CTH showing R sided ICH, and CTA/P with proximal L M1 occlusion. Pt excluded from tPA given R sided hemorrhage. Pt is deemed endovascular candidate and after in-depth discussion with pt's family and HCP Maria Victoria; pt was taken to urgent embolectomy of LM1 occlusion. Attempts at obtaining more information regarding pt's malignancy with Dr. Weeks's office were made. Pt to be taken to NSCU post intervention. NIHSS =18, MRS =1 (04 Mar 2019 15:59)    On admission, the patient was:  GCS: 11 [id number]  ICH score: 1  NIHSS: 18    *** HIGH RISK OF DVT PRESENT ON ADMISSION ***    VITALS:  T(C): , Max: 37.1 (03-04-19 @ 12:20)  HR:  (77 - 124)  BP:  (130/70 - 158/111)  ABP: --  RR:  (18 - 18)  SpO2:  (96% - 99%)  Wt(kg): --      LABS:  Na: 132 (03-04 @ 13:37), 134 (03-04 @ 11:46)  K: 4.4 (03-04 @ 13:37), 4.5 (03-04 @ 11:46)  Cl: 95 (03-04 @ 13:37), 101 (03-04 @ 11:46)  CO2: 22 (03-04 @ 13:37), 25 (03-04 @ 11:46)  BUN: 19 (03-04 @ 13:37), 19 (03-04 @ 11:46)  Cr: 1.54 (03-04 @ 13:37), 1.64 (03-04 @ 11:46)  Glu: 85(03-04 @ 13:37), 93(03-04 @ 11:46)    Hgb: 9.8 (03-04 @ 13:37), 9.4 (03-04 @ 11:46)  Hct: 28.4 (03-04 @ 13:37), 30.2 (03-04 @ 11:46)  WBC: 8.1 (03-04 @ 13:37), 8.60 (03-04 @ 11:46)  Plt: 228 (03-04 @ 13:37), 249 (03-04 @ 11:46)  PT: 12.5 (03-04 @ 13:37)  INR: 1.08 (03-04 @ 13:37)  aPTT: 27.9 (03-04 @ 13:37), PT: 12.6 (03-04 @ 11:46)  INR: 1.12 (03-04 @ 11:46)  aPTT: 27.9 (03-04 @ 11:46)    IMAGING:   Recent imaging studies were reviewed.    MEDICATIONS:  lactated ringers. 1000 milliLiter(s) IV Continuous <Continuous>  sodium chloride 0.9%. 1000 milliLiter(s) IV Continuous <Continuous>    EXAMINATION:  General:  Agitated, intubated  HEENT:  MMM  Neuro: EO spontaneously. Moving all extremities spontaneously AG (except RLE - restrained post angio),   Cards:  RRR  Respiratory:  CTAB. Anterior auscultation only.   Abdomen:  soft  Extremities:  no edema  Skin:  warm/dry

## 2019-03-04 NOTE — ED PROVIDER NOTE - OBJECTIVE STATEMENT
78M hx CVA transferred from an OSH for an intraparenchymal hemorrhage. His son noticed that he had slurred speech starting around 10 am today, then R sided weakness, diagnosed with a R intraparenchymal hemorrhage and transferred here for further evaluation.    PMH: HTN, HLD, DM, prostate ca, stage IV liver ca  All: NKDA

## 2019-03-04 NOTE — ED ADULT NURSE NOTE - OBJECTIVE STATEMENT
Pt is a 79 yo M who was transferred from [facility] c/o ICH. Per EMS, pt developed slurred speech around 10am this morning, followed by rt sided weakness. Dx with R intraparenchymal hemorrhage at [facility] and transferred for further evaluation.  Hx ischemic stroke Jan 2019, no residual weakness. On arrival, pt alert, PERRL, has expressive aphasia, rt facial droop, rt side paralysis, rt side neglect. IV r AC 20g NS lock at [facility].

## 2019-03-04 NOTE — CHART NOTE - NSCHARTNOTEFT_GEN_A_CORE
CAPRINI SCORE [CLOT] Score on Admission for     AGE RELATED RISK FACTORS                                                       MOBILITY RELATED FACTORS  [ ] Age 41-60 years                                            (1 Point)                  [ ] Bed rest                                                        (1 Point)  [ ] Age: 61-74 years                                           (2 Points)                 [ ] Plaster cast                                                   (2 Points)  [x ] Age= 75 years                                              (3 Points)                 [ ] Bed bound for more than 72 hours                 (2 Points)    DISEASE RELATED RISK FACTORS                                               GENDER SPECIFIC FACTORS  [ ] Edema in the lower extremities                       (1 Point)                  [ ] Pregnancy                                                     (1 Point)  [ ] Varicose veins                                               (1 Point)                  [ ] Post-partum < 6 weeks                                   (1 Point)             [ ] BMI > 25 Kg/m2                                            (1 Point)                  [ ] Hormonal therapy  or oral contraception          (1 Point)                 [ ] Sepsis (in the previous month)                        (1 Point)                  [ ] History of pregnancy complications                 (1 point)  [ ] Pneumonia or serious lung disease                                               [ ] Unexplained or recurrent                     (1 Point)           (in the previous month)                               (1 Point)  [ ] Abnormal pulmonary function test                     (1 Point)                 SURGERY RELATED RISK FACTORS (include planned surgeries)  [ ] Acute myocardial infarction                              (1 Point)                 [ ]  Section                                             (1 Point)  [ ] Congestive heart failure (in the previous month)  (1 Point)         [ ] Minor surgery                                                  (1 Point)   [ ] Inflammatory bowel disease                             (1 Point)                 [ ] Arthroscopic surgery                                        (2 Points)  [ ] Central venous access                                      (2 Points)                [x ] General surgery lasting more than 45 minutes   (2 Points)       [x ] Stroke (in the previous month)                          (5 Points)               [ ] Elective arthroplasty                                         (5 Points)            [x ] current or past malignancy                              (2 Points)                                                                                                       HEMATOLOGY RELATED FACTORS                                                 TRAUMA RELATED RISK FACTORS  [ ] Prior episodes of VTE                                     (3 Points)                [ ] Fracture of the hip, pelvis, or leg                       (5 Points)  [ ] Positive family history for VTE                         (3 Points)                 [ ] Acute spinal cord injury (in the previous month)  (5 Points)  [ ] Prothrombin 19411 A                                     (3 Points)                 [ ] Paralysis  (less than 1 month)                             (5 Points)  [ ] Factor V Leiden                                             (3 Points)                  [ ] Multiple Trauma within 1 month                        (5 Points)  [ ] Lupus anticoagulants                                     (3 Points)                                                           [ ] Anticardiolipin antibodies                               (3 Points)                                                       [ ] High homocysteine in the blood                      (3 Points)                                             [ ] Other congenital or acquired thrombophilia      (3 Points)                                                [ ] Heparin induced thrombocytopenia                  (3 Points)                                          Total Score [    12      ]    Risk:  Very low 0   Low 1 to 2   Moderate 3 to 4   High =5       VTE Prophylasix Recommednations:  [x ] mechanical pneumatic compression devices                                      [ ] contraindicated: _____________________  [ ] chemo prophylasix                                                                                   [ x] contraindicated ___hemorraghic stroke__________________    **** HIGH LIKELIHOOD DVT PRESENT ON ADMISSION  [x ] (please order LE dopplers within 24 hours of admission) CAPRINI SCORE [CLOT] Score on Admission for     AGE RELATED RISK FACTORS                                                       MOBILITY RELATED FACTORS  [ ] Age 41-60 years                                            (1 Point)                  [ ] Bed rest                                                        (1 Point)  [ ] Age: 61-74 years                                           (2 Points)                 [ ] Plaster cast                                                   (2 Points)  [x ] Age= 75 years                                              (3 Points)                 [ ] Bed bound for more than 72 hours                 (2 Points)    DISEASE RELATED RISK FACTORS                                               GENDER SPECIFIC FACTORS  [ ] Edema in the lower extremities                       (1 Point)                  [ ] Pregnancy                                                     (1 Point)  [ ] Varicose veins                                               (1 Point)                  [ ] Post-partum < 6 weeks                                   (1 Point)             [ ] BMI > 25 Kg/m2                                            (1 Point)                  [ ] Hormonal therapy  or oral contraception          (1 Point)                 [ ] Sepsis (in the previous month)                        (1 Point)                  [ ] History of pregnancy complications                 (1 point)  [ ] Pneumonia or serious lung disease                                               [ ] Unexplained or recurrent                     (1 Point)           (in the previous month)                               (1 Point)  [ ] Abnormal pulmonary function test                     (1 Point)                 SURGERY RELATED RISK FACTORS (include planned surgeries)  [ ] Acute myocardial infarction                              (1 Point)                 [ ]  Section                                             (1 Point)  [ ] Congestive heart failure (in the previous month)  (1 Point)         [ ] Minor surgery                                                  (1 Point)   [ ] Inflammatory bowel disease                             (1 Point)                 [ ] Arthroscopic surgery                                        (2 Points)  [ ] Central venous access                                      (2 Points)                [x ] General surgery lasting more than 45 minutes   (2 Points)       [x ] Stroke (in the previous month)                          (5 Points)               [ ] Elective arthroplasty                                         (5 Points)            [x ] current or past malignancy                              (2 Points)                                                                                                       HEMATOLOGY RELATED FACTORS                                                 TRAUMA RELATED RISK FACTORS  [ ] Prior episodes of VTE                                     (3 Points)                [ ] Fracture of the hip, pelvis, or leg                       (5 Points)  [ ] Positive family history for VTE                         (3 Points)                 [ ] Acute spinal cord injury (in the previous month)  (5 Points)  [ ] Prothrombin 41254 A                                     (3 Points)                 [ ] Paralysis  (less than 1 month)                             (5 Points)  [ ] Factor V Leiden                                             (3 Points)                  [ ] Multiple Trauma within 1 month                        (5 Points)  [ ] Lupus anticoagulants                                     (3 Points)                                                           [ ] Anticardiolipin antibodies                               (3 Points)                                                       [ ] High homocysteine in the blood                      (3 Points)                                             [ ] Other congenital or acquired thrombophilia      (3 Points)                                                [ ] Heparin induced thrombocytopenia                  (3 Points)                                          Total Score [    12      ]    Risk:  Very low 0   Low 1 to 2   Moderate 3 to 4   High =5       VTE Prophylasix Recommednations:  [x ] mechanical pneumatic compression devices                                      [ ] contraindicated: _____________________  [ ] chemo prophylasix                                                                                   [ x] contraindicated ___hemorraghic stroke_. Stroke attending._Note-In my opinion, prior hemorrhagic infarct does not contraindicate chemical DVT prophylaxis________________    **** HIGH LIKELIHOOD DVT PRESENT ON ADMISSION  [x ] (please order LE dopplers within 24 hours of admission)

## 2019-03-04 NOTE — H&P ADULT - ATTENDING COMMENTS
History as above .  Exam. Alert and attentive; speech overall fluent, but occasionally with subtle word finding pauses; followed crossed commands; mild right central facial palsy; possibly subtle dysarthria; right-sided drift of arm and leg; remainder of neurologic exam was nonfocal.  Initial CT head (3/4/19) to my eye showed possible subtle low density in the left MCA distribution. There was a subacute small RMCA infarct in the posterior frontal region with dense hemorrhagic transformation (probably PH1 or 2). CTA neck and head (3/4/19) to my eye showed a proximal-mid left M1 occlusion. Repeat CT head (3/5/19) to my eye showed possible small acute infarction in the left MCA distribution involving the posterior insular region.   Impression. In 1/19 he had a stroke but the details are not all currently available. He has a history of stage IV liver carcinoma. On 3/4/19 he developed global aphasia and right hemiplegia, due to left MCA infarction with left M1 occlusion. His successive strokes are consistent with embolic stroke of unknown source, including the possibilities of marantic endocarditis and possible hypercoagulability of malignancy. Despite a limited life expectancy, he was treated with endovascular thrombectomy to maximize his quality of life. Plan. MRI brain; TTE; for now, aspirin and chemical DVT prophylaxis; will try to establish how old his right MCA hemorrhagic infarct actually is. If the hemorrhagic component is already one month old or more, then almost definitely it will be safe to change aspirin to full dose Lovenox indefinitely. If the hemorrhagic component is more recent, we may have to delay Lovenox for an additional week or 2; PT/OT/speech therapy; may be ready for discharge within the next 24 hours or so.

## 2019-03-04 NOTE — ED ADULT NURSE NOTE - NSIMPLEMENTINTERV_GEN_ALL_ED
Implemented All Fall with Harm Risk Interventions:  [place] to call system. Call bell, personal items and telephone within reach. Instruct patient to call for assistance. Room bathroom lighting operational. Non-slip footwear when patient is off stretcher. Physically safe environment: no spills, clutter or unnecessary equipment. Stretcher in lowest position, wheels locked, appropriate side rails in place. Provide visual cue, wrist band, yellow gown, etc. Monitor gait and stability. Monitor for mental status changes and reorient to person, place, and time. Review medications for side effects contributing to fall risk. Reinforce activity limits and safety measures with patient and family. Provide visual clues: red socks.

## 2019-03-04 NOTE — DISCHARGE NOTE ADULT - PLAN OF CARE
Management and Prevention of Recurrence Continue Aspirin 81mg by mouth daily  Follow-up with Stroke Nurse Practitioner as outpatient within 5 days  Follow-up with Vascular Neurology as outpatient   Will need to Repeat CT Head scan by 3/12 and possibly start Anticoagulation based on stability of CT scan of the head after discussion with Stroke Neurology outpatient

## 2019-03-04 NOTE — CHART NOTE - NSCHARTNOTEFT_GEN_A_CORE
Interventional Neuro Radiology  Pre-Procedure Note PA-C    This is a 78y ____ hand dominant Male      HPI:      Allergies: No Known Allergies      PAST MEDICAL & SURGICAL HISTORY:  HLD (hyperlipidemia)  HTN (hypertension)  Liver cancer: stage 4  Prostate cancer  Ischemic stroke: 01/2019  HLD (hyperlipidemia)  HTN (hypertension)  Hernia  DM (diabetes mellitus)  Prostate CA  H/O hernia repair      Social History:     FAMILY HISTORY:      Current Medications: lactated ringers. 1000 milliLiter(s) IV Continuous <Continuous>      CBC                        9.8    8.1   )-----------( 228      ( 04 Mar 2019 13:37 )             28.4       BMP    132<L>  |  95<L>  |  19  ----------------------------<  85  4.4   |  22  |  1.54<H>    Ca    9.4      04 Mar 2019 13:37    Blood Bank: 0 positive available         Assessment/Plan:     This is a 78y  year old right  hand dominant Male  presents with   Patient presents to neuro-IR for selective cerebral angiography.   Procedure, goals, risks, benefits and alternatives  were discussed with patient and patient's family.  All questions were answered.  Risks include but are not limited to stroke, vessel injury, hemorrhage, and or right  groin hematoma.  Patient demonstrates understanding  of all risks involved with this procedure and wishes to continue.   Appropriate  content was obtained from patient and consent is in the patient's chart. Interventional Neuro Radiology  Pre-Procedure Note PA-C    This is a 78 year old male            Allergies: No Known Allergies  PMHX: hyperlipidemia, hypertension, Liver cancer: stage 4,prostate cancer, Ischemic stroke: 01/2019, hernia, diabetes mellitus, prostate CA  PSHX: hernia repair  Social History:   FAMILY HISTORY:  Current Medications: lactated ringers. 1000 milliLiter(s) IV Continuous       CBC                        9.8    8.1   )-----------( 228                  28.4       BMP    132<L>  |  95<L>  |  19  ----------------------------<  85  4.4   |  22  |  1.54<H>    Blood Bank: 0 positive available     Assessment/Plan:   This is a 78 year old right hand dominant male with a left MCA occlusion  Procedure, goals, risks, benefits and alternatives were discussed with patient and patient's daughter. All questions were answered. Risks include but are not limited to stroke, vessel injury, hemorrhage, and or right groin hematoma. Patient and patient's daughter demonstrates understanding of all risks involved with this procedure and wishes to continue. Appropriate content was obtained from patient's daughter and consent is in the patient's chart. Interventional Neuro Radiology  Pre-Procedure Note PA-C    This is a 78 year old right hand dominant male with a past medical history significant for hypertension, hyperlipidemia, liver cancer recently diagnosed, prostate cancer, DM, who presented to an outside emergency room with right hemiplegia and a right Intracranial hemorrhage. (which excludes patient from TPA). Patient was transported directly to Neuro IR for a selective cerebral angiography and stroke intervention.     Neuro exam: awake, alert, dysarthric, right hemineglect and right hemiplegia     Allergies: No Known Allergies  PMHX: hyperlipidemia, hypertension, Liver cancer: stage 4,prostate cancer, Ischemic stroke: 01/2019, hernia, diabetes mellitus, prostate CA  PSHX: hernia repair  Social History: non-smoker   FAMILY HISTORY: non-contributory   Current Medications: lactated ringers. 1000 milliLiter(s) IV     CBC                        9.8    8.1   )-----------( 228                  28.4       BMP    132<L>  |  95<L>  |  19  ----------------------------<  85  4.4   |  22  |  1.54<H>    Blood Bank: 0 positive available     Assessment/Plan:   This is a 78 year old right hand dominant male with a left MCA occlusion, (excluded from TPA due to right intracranial hemorrhage) patient was transported to Neuro IR for a selective cerebral angiography and stroke intervention. Procedure   goals, risks, benefits and alternatives were discussed with patient and patient's daughter. All questions were answered. Risks include but are not limited to stroke, vessel injury, hemorrhage, and or right groin hematoma. Patient and patient's daughter demonstrates understanding of all risks involved with this procedure and wishes to continue. Appropriate content was obtained from patient's daughter and consent is in the patient's chart.

## 2019-03-04 NOTE — DISCHARGE NOTE ADULT - MEDICATION SUMMARY - MEDICATIONS TO TAKE
I will START or STAY ON the medications listed below when I get home from the hospital:    aspirin 81 mg oral tablet  -- 1 tab(s) by mouth once a day  -- Indication: For Stroke    terazosin 5 mg oral tablet  -- 1 tab(s) by mouth once a day (at bedtime)  -- Indication: For Home med    gabapentin  -- 100 milligram(s) by mouth once a day x knee pain  -- Indication: For Home Med    carBAMazepine 100 mg oral tablet, chewable  -- 1 tab(s) by mouth 2 times a day  -- Indication: For Home Med    metFORMIN 1000 mg oral tablet  -- 1 tab(s) by mouth 2 times a day  -- Indication: For Diabetes    Lipitor 40 mg oral tablet  -- 1 tab(s) by mouth once a day  -- Indication: For Hyperlipidemia    losartan-hydrochlorothiazide 100mg-12.5mg oral tablet  -- 1 tab(s) by mouth once a day  -- Indication: For Hypertension    Metoprolol Tartrate 25 mg oral tablet  -- 0.5 tab(s) by mouth 2 times a day  -- Indication: For Hypertension    amLODIPine 10 mg oral tablet  -- 1 tab(s) by mouth once a day   -- It is very important that you take or use this exactly as directed.  Do not skip doses or discontinue unless directed by your doctor.  Some non-prescription drugs may aggravate your condition.  Read all labels carefully.  If a warning appears, check with your doctor before taking.    -- Indication: For Hypertension    ferrous sulfate 325 mg (65 mg elemental iron) oral tablet  -- 1 tab(s) by mouth 3 times a day  -- Indication: For Iron deficiency anemia

## 2019-03-04 NOTE — ED ADULT NURSE NOTE - PMH
Ischemic stroke  01/2019  Liver cancer  stage 4  Prostate cancer HLD (hyperlipidemia)    HTN (hypertension)    Ischemic stroke  01/2019  Liver cancer  stage 4  Prostate cancer

## 2019-03-04 NOTE — DISCHARGE NOTE ADULT - OTHER SIGNIFICANT FINDINGS
CT Head No Cont (03.05.19)  Status post left M1 thrombectomy, right posterior frontal and anterior   parietal intraparenchymal hemorrhage with edema is unchanged, there is   redemonstration of volume loss and microvascular ischemic change, left   cerebral hemisphere demonstrates no evidence for mass effect or midline   shift. Basal cisterns remain patent.    CT Head No Cont/CT Head&Neck w/ IV Cont (03.04.19)  Evolving left MCA infarct with infarct volume of approximately 86 ml,   without hemorrhagic transformation with abrupt occlusion of the left M1   middle cerebral artery segment and absence of distal MCA branches.    Posterior right frontal and anterior parietal 2 x 2.5 cm AP by TR   intraparenchymal hemorrhage, possibly right MCA infarct with hemorrhagic   transmission.    Stenosis of the distal intracranial vasculature, tortuous extracranial   vessels likely reflecting hypertension with atherosclerotic vascular   calcification without ICA hemodynamically significant origin stenosis.    Abnormal bony sclerosis in the C2 vertebral body concerning for osseous   metastasis with linear fracture deformity concerning for pathologic   fracture at the base of the odontoid.    Degenerative change with a rotary dextrocurvature and multilevel cervical   foraminal narrowing, partially seen nodular opacities and bilateral   pleural effusions dedicated chest CT may better assess.     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    CT Head No Cont (03.04.19)  Acute intraparenchymal hemorrhage centered in the right parietal lobe   measuring approximately 3.4 cm TR x 1.5 cm AP x 1.6 cm cc. There is a   small amount of surrounding vasogenic edema. There is no significant mass   effect, midline shift, or herniation pattern present.  Subcentimeter   focus of hypoattenuation within the right cerebellum, may reflect a   chronic lacunar infarct. No hydrocephalus.

## 2019-03-04 NOTE — ED PROVIDER NOTE - PHYSICAL EXAMINATION
Neuro: Alert, not answering questions appropriately, does not follow most commands or follows with inappropriate actions, slurred speech, dense R hemiplegia with no effort against gravity, left side with good effort against gravity.

## 2019-03-04 NOTE — PHARMACOTHERAPY INTERVENTION NOTE - COMMENTS
Obtained medication list from patient's daughter and patient's local pharmacy in [place]. Confirmed last taken and doses of medication with patient's daughter. Some medications were not recently filled at patient's local pharmacy in [place] and so daughter will bring in all the medications tomorrow for clarification. Updated medication history Obtained medication list from patient's daughter and patient's local pharmacy in [place]. Confirmed last taken and doses of medication with patient's daughter. Some medications were not recently filled at patient's local pharmacy in [place] (Metformin and bicalutamide) and not sure if he was still taking these drugs. Patient did not have refills for amlodipine and so had ran out and did not take drug this morning. Patient's daughter will bring in medications tomorrow for clarification. Obtained medication list from patient's daughter and patient's local pharmacy in [place]. Confirmed last taken and doses of medication with patient's daughter. Some medications were not recently filled at patient's local pharmacy in [place] (Metformin and bicalutamide) and not sure if he was still taking these drugs. Patient did not have refills for amlodipine and so had ran out and did not take drug this morning. Patient's daughter will bring in medications tomorrow for clarification.    Carbamazepine was stopped 2 days prior to admission

## 2019-03-04 NOTE — ED ADULT NURSE NOTE - PAIN RATING/NUMBER SCALE (0-10): ACTIVITY
History     Chief Complaint:  Flank pain     HPI   Tavo Santana is a 17 year old male who presents with flank pain. The patient states that he was diagnosed with a kidney stone on 05/04/18 via CT, results below. He states that he did not definitively visualize the stone, however he believes he passed it and may have seen it in the bottom of his toilet approximately three weeks ago. Since then he reports being pain free. However, 3 days ago the patient states that he felt mild throbbing in the left flank. The pain subsided, but returned this morning. The patient states that the location and type of pain present today is the same as when he was passing the kidney stone, but the pain is much less at only a 3 on the pain scale.The patient denies any pain during urination, but states he has been urinating more frequently possibly because he is drinking more water. The patient also notes being cold today but does not recall a fever.     CT Abdomen/Pelvis 05/04/2018  There is a 0.5 cm left ureterovesical junction stone  causing moderate hydronephrosis.  AMADO CLAIRE MD    Allergies:  No Known Allergies     Medications:    Colace  Osptretinoin  Methylphenidate  Oxycodone  Phenylephrine  Pseudoephedrine  Tamsulosin    Past Medical History:    Kidney Stone    Past Surgical History:    History reviewed. No pertinent surgical history.    Family History:    Family history reviewed. No pertinent family history,     Social History:  The patient was accompanied to the ED by father.  Smoking Status: Never Smoker  Smokeless Tobacco: Current user  Alcohol Use: no  Marital Status:  Single      Review of Systems   Constitutional: Positive for chills. Negative for fever.   Genitourinary: Positive for flank pain (left) and frequency. Negative for dysuria.   All other systems reviewed and are negative.    Physical Exam     Patient Vitals for the past 24 hrs:   BP Temp Temp src Pulse Resp SpO2 Height Weight   06/06/18 1404 122/53  "98.8  F (37.1  C) Temporal 74 16 97 % 1.558 m (5' 1.32\") 70.3 kg (155 lb)     Physical Exam  Eye:  Pupils are equal, round, and reactive.  Extraocular movements intact.    ENT:  No rhinorrhea.  Moist mucus membranes.  Normal tongue and tonsil.    Cardiac:  Regular rate and rhythm.  No murmurs, gallops, or rubs.    Pulmonary:  Clear to auscultation bilaterally.  No wheezes, rales, or rhonchi.    Abdomen:  Positive bowel sounds.  Abdomen is soft and non-distended, without focal tenderness.  Mild left CVA tenderness.    Musculoskeletal:  Normal movement of all extremities without evidence for deficit.    Skin:  Warm and dry without rashes.    Neurologic:  Non-focal exam without asymmetric weakness or numbness.     Psychiatric:  Normal affect with appropriate interaction with examiner.      Emergency Department Course     Imaging:  Radiology findings were communicated with the patient and father who voiced understanding of the findings.    Retroperitoneal Ultrasound  No hydronephrosis. No specific abnormality is seen.  KHUSHBU GRANADOS MD  Reading per radiology    Laboratory:  Laboratory findings were communicated with the patient and father who voiced understanding of the findings.    UA with Microscopic: pH 7.5 (H), o/w WNL    Emergency Department Course:    1412 Nursing notes and vitals reviewed.    1415  The patient provided a urine sample here in the emergency department. This was sent for laboratory testing, findings above.    1425 I performed an exam of the patient as documented above.     1438 The patient was sent for a Retroperitoneal US while in the emergency department, results above.     1533 I personally reviewed the imaging and laboratory results with the patient and his father and answered all related questions prior to discharge.    Impression & Plan      Medical Decision Making:  Tavo Santana is a 17 year old male who presents to the emergency department today for evaluation of left-sided flank pain.  " The patient has had one prior kidney stone in the past month, and when he felt a similar twinge of pain in his left flank, he became concerned that this could represent a new stone.  CT from this past month did not show any further stones in the kidney.  He has no hematuria.  His ultrasound shows no hydronephrosis.  At this point, I do not feel that this is likely to represent a significant ureterolithiasis.  Patient's pain was resolved without intervention and at this juncture, he will be discharged home with continued outpatient follow-up.  We did discuss obtaining a CT, though all parties feel that the radiation exposure is a greater risk than the likelihood of missing a stone.  He was invited back to our facility at any point for worsening of his pain or other emergent concerns.    Diagnosis:    ICD-10-CM    1. Left flank pain R10.9      Disposition:   The patient is discharged to home.    Scribe Disclosure:  I, Mira Bee, am serving as a scribe at 2:30 PM on 6/6/2018 to document services personally performed by Trierweiler, Chad A, MD based on my observations and the provider's statements to me.   EMERGENCY DEPARTMENT       Trierweiler, Chad A, MD  06/07/18 1885     0

## 2019-03-04 NOTE — H&P ADULT - NSHPPHYSICALEXAM_GEN_ALL_CORE
PHYSICAL EXAMINATION:  General: appears malnourished but, in no acute distress.  Eyes: Conjunctiva and sclera clear.  Neck: Supple, nontender  Skin: no rash, no edema noted  Lung: no respiratory distress noted  Neurologic:  - Mental Status:  Alert, awake, R hemineglect, severe dysarthria, intermittent mod-severe aphasia, follows some basic commands, (gripped hand but did not show 2 fingers), impaired comprehension, repetition  - Cranial Nerves II-XII:  blink to threat b/l, dense R facial droop,    - Motor:  0/5 on RUE/RLE; Spont moving on left.   Normal muscle bulk and tone throughout. No myoclonus or tremor.  - Sensory:  does not grimace on R  - Coordination:  unable to assess  - Gait:   unable to assess

## 2019-03-04 NOTE — DISCHARGE NOTE ADULT - HOSPITAL COURSE
79 y/o RH  male with hx of "previous stroke" w/o residual deficits; prostate CA, and recently (Feb 2019) informed about "stage 4 Liver CA" presented from OSH w/ R hemiplegia and R ICH as transfer to [facility] for stroke rescue. Initial CT head demonstrated possible subtle low density in the left MCA distribution. There was a subacute small R MCA infarct in the posterior frontal region with dense hemorrhagic transformation. CTA neck and head demonstrated a proximal-mid left M1 occlusion. On admission NIHSS =18, MRS =1. Patient s/p mechanical thrombectomy with TICI score of 3. Post-thrombectomy CT head stable. Patient started on Aspirin for secondary stroke prevention and Lipitor 80mg (w/ ). TTE demonstrated mild-moderate TR. Patient tolerated diet and assessed by PT/OT who recommended home with outpatient PT. Patient remained medically and clinically stable and is cleared for discharge home with outpatient follow-up with Stroke Neurology and Primary Care Doctor.    Impression: Global aphasia and right hemiplegia, due to left MCA infarction with left M1 occlusion. His successive strokes are consistent with embolic stroke of unknown source, including the possibilities of marantic endocarditis and possible hypercoagulability of malignancy.     Patient advised to and should:   Continue Aspirin 81mg by mouth daily  Follow-up with Stroke Nurse Practitioner as outpatient within 5 days  Follow-up with Vascular Neurology as outpatient   Will need to Repeat CT Head scan by 3/12 and possibly start Anticoagulation based on stability of CT scan of the head after discussion with Stroke Neurology outpatient 79 y/o RH  male with hx of "previous stroke" w/o residual deficits; prostate CA, and recently (Feb 2019) informed about "stage 4 Liver CA" presented from OSH w/ R hemiplegia and R ICH as transfer to [facility] for stroke rescue. Initial CT head demonstrated possible subtle low density in the left MCA distribution. There was a subacute small R MCA infarct in the posterior frontal region with dense hemorrhagic transformation. CTA neck and head demonstrated a proximal-mid left M1 occlusion. On admission NIHSS =18, MRS =1. Patient s/p mechanical thrombectomy with TICI score of 3. Post-thrombectomy CT head stable. Patient started on Aspirin for secondary stroke prevention and Lipitor 80mg (w/ ). TTE demonstrated mild-moderate TR. Patient tolerated diet and assessed by PT/OT who recommended home with outpatient PT. Patient remained medically and clinically stable and is cleared for discharge home with outpatient follow-up with Stroke Neurology and Primary Care Doctor.    Impression: Global aphasia and right hemiplegia, due to left MCA infarction with left M1 occlusion. His successive strokes are consistent with embolic stroke of unknown source, including the possibilities of marantic endocarditis and possible hypercoagulability of malignancy.     Patient advised to and should:   Continue Aspirin 81mg by mouth daily  Follow-up with Stroke Nurse Practitioner as outpatient within 5 days  - no need for high intensity statin as etiology of infarct nonatherosclerotic    PT/OT recommended no needs   Follow-up with Vascular Neurology as outpatient   Will need to Repeat CT Head scan by 3/12 and possibly start Anticoagulation based on stability of CT scan of the head after discussion with Stroke Neurology outpatient 77 y/o RH  male with hx of "previous stroke" in 1/19, details were difficult to ascertain from patient but w/o residual deficits; prostate CA, and recently (Feb 2019) informed about "stage 4 Liver CA" presented from OSH w/ R hemiplegia and R ICH as transfer to [facility] for stroke rescue. Initial CT head demonstrated possible subtle low density in the left MCA distribution. There was a subacute small R MCA infarct in the posterior frontal region with dense hemorrhagic transformation (PH1). CTA neck and head demonstrated a proximal-mid left M1 occlusion. On admission NIHSS =18, MRS =1. Patient s/p mechanical thrombectomy with TICI score of 3. Post-thrombectomy CT head stable. Patient started on Aspirin for secondary stroke prevention and Lipitor 80mg (w/ ). TTE demonstrated mild-moderate TR. Patient tolerated diet and assessed by PT/OT who recommended home with outpatient PT. Patient remained medically and clinically stable and is cleared for discharge home with outpatient follow-up with Stroke Neurology and Primary Care Doctor.    Impression: Global aphasia and right hemiplegia, due to left MCA infarction with left M1 occlusion, with significant improvement following endovascular thrombectomy. His successive strokes are consistent with embolic stroke of unknown source, including the possibilities of marantic endocarditis and possible hypercoagulability of malignancy.     Patient advised to and should:   Continue Aspirin 81mg by mouth daily  Follow-up with Stroke Nurse Practitioner as outpatient within 5 days  - no need for high intensity statin as etiology of infarct nonatherosclerotic    PT/OT recommended no needs   Follow-up with Vascular Neurology as outpatient   Will need to Repeat CT Head scan by about  3/12/19  and possibly start full dose Lovenox based on stability of CT scan of the head after discussion with Stroke Neurology outpatient

## 2019-03-04 NOTE — ED PROVIDER NOTE - PROGRESS NOTE DETAILS
Jonathan Weil, PGY2 - neurology evaluated the patient, recommend stat CTA and CT perfusion as the patient's right sided deficits do not match with the R intraparenchymal bleed, suggesting another simultaneous process. CT aware and will take the patient next. Elevated Cr from outside labs noted, risk/benefit analysis favors immediate imaging w/ contrast. Jonathan Weil, PGY2 - perfusion CT indicates L MCA stroke. Neurology determining if the patient is a candidate for intervention, requesting nsg eval given atypical presentation with both ischemic and hemorrhagic infarcts in distinct vascular territories.

## 2019-03-04 NOTE — ED ADULT NURSE REASSESSMENT NOTE - NS ED NURSE REASSESS COMMENT FT1
@1530 Pt transferred to IR. Physician in attendance.  [facility]20 [facility] Report given to VANGIE Stoll. @1530 Pt transferred to IR. Physician in attendance. RN not advised.  [facility] Report given to VANGIE Stoll.

## 2019-03-04 NOTE — ED ADULT NURSE NOTE - OBJECTIVE STATEMENT
pt BIBA for sudden onset of slurred speech and right side weakness. last normal 10 am. pt code stroke. sinus rhythm on monitor. aphasic at this time with right side immobile.

## 2019-03-04 NOTE — H&P ADULT - PMH
DM (diabetes mellitus)    Hernia    HLD (hyperlipidemia)    HLD (hyperlipidemia)    HTN (hypertension)    HTN (hypertension)    Ischemic stroke  01/2019  Liver cancer  stage 4  Prostate CA    Prostate cancer

## 2019-03-04 NOTE — ED ADULT NURSE REASSESSMENT NOTE - NS ED NURSE REASSESS COMMENT FT1
@1400 In Ct scan, Dr. Pennington, neurology at bedside.  1410 CT scan completed.  1412 CT results to Dr. Pennington.

## 2019-03-04 NOTE — ED PROVIDER NOTE - ATTENDING CONTRIBUTION TO CARE
Dr. Herrera (Attending Physician)  Pt. pw acute right sided weakness found to have right ICH; however not c/w neuro deficits, Will cta and CT perfusion since likely left MCA distribution.  Stroke team evaluated

## 2019-03-04 NOTE — CONSULT NOTE ADULT - ATTENDING COMMENTS
Pt seen and examined with resident.  Agree with resident evaluation and assessment.  Pt is s/p mechanical thrombectomy this afternoon for left embolic MCA stroke.  He was previously plegic on right.  In ICU, pt awake on ventilator, asking for tube to be removed, GALINDO with good strength.  CT today with no radiographic evidence of left MCA infarct, and with small right MCA distal hemorrhagic embolic infarct with no significant mass effect.  Pt for close observation to determine if pt will have future surgical needs.  No surgery required at present with improved exam after thrombectomy.  Family at bedside.

## 2019-03-04 NOTE — H&P ADULT - NSHPLABSRESULTS_GEN_ALL_CORE
9.8    8.1   )-----------( 228      ( 04 Mar 2019 13:37 )             28.4   03-04    132<L>  |  95<L>  |  19  ----------------------------<  85  4.4   |  22  |  1.54<H>    Ca    9.4      04 Mar 2019 13:37    TPro  7.4  /  Alb  3.9  /  TBili  0.3  /  DBili  x   /  AST  36  /  ALT  23  /  AlkPhos  292<H>  03-04    < from: CT Angio Neck w/ IV Cont (03.04.19 @ 14:32) >    IMPRESSION:    HEAD CT:    Evolving left MCA infarct with infarct volume of approximately 86 ml,   without hemorrhagic transformation with abrupt occlusion of the left M1   middle cerebral artery segment and absence of distal MCA branches.    Posterior right frontal and anterior parietal 2 x 2.5 cm AP by TR   intraparenchymal hemorrhage, possibly right MCA infarct with hemorrhagic   transmission.    Stenosis of the distal intracranial vasculature, tortuous extracranial   vessels likely reflecting hypertension with atherosclerotic vascular   calcification without ICA hemodynamically significant origin stenosis.    Abnormal bony sclerosis in the C2 vertebral body concerning for osseous   metastasis with linear fracture deformity concerning for pathologic   fracture at the base of the odontoid.    Degenerative change with a rotary dextro curvature and multilevel cervical   foraminal narrowing, partially seen nodular opacities and bilateral   pleural effusions dedicated chest CT may better assess.     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    < end of copied text >

## 2019-03-04 NOTE — CONSULT NOTE ADULT - SUBJECTIVE AND OBJECTIVE BOX
p ([id number])     HPI:  78M hx CVA transferred from an OSH for an intraparenchymal hemorrhage. His son noticed that he had slurred speech starting around 10 am today, then R sided weakness, diagnosed with a R intraparenchymal hemorrhage and transferred here for further evaluation.    PAST MEDICAL HISTORY   HLD (hyperlipidemia)  HTN (hypertension)  Liver cancer  Prostate cancer  Ischemic stroke    PAST SURGICAL HISTORY         MEDICATIONS:  Antibiotics:    Neuro:    Anticoagulation:    Other:  lactated ringers Bolus 500 milliLiter(s) IV Bolus once  lactated ringers. 1000 milliLiter(s) IV Continuous <Continuous>      SOCIAL HISTORY:   Occupation:   Marital Status:     FAMILY HISTORY:      REVIEW OF SYSTEMS:  Check here if all are normal other than Neurological []  General:  Eyes:  ENT:  Cardiac:  Respiratory:  GI:  Musculoskeletal:   Skin:  Neurologic:   Psychiatric:     PHYSICAL EXAMINATION:   T(C): 36.7 (03-04-19 @ 12:46), Max: 36.7 (03-04-19 @ 12:46)  HR: 77 (03-04-19 @ 13:08) (77 - 124)  BP: 158/103 (03-04-19 @ 13:08) (154/100 - 158/111)  RR: 18 (03-04-19 @ 12:46) (18 - 18)  SpO2: 99% (03-04-19 @ 13:08) (96% - 99%)  Wt(kg): --Height (cm): 172.72 (03-04 @ 12:46)  Weight (kg): 74.8 (03-04 @ 12:46)    General Examination:     Alert, not answering questions appropriately, does not follow most commands or follows with inappropriate actions, slurred speech, dense R hemiplegia with no effort against gravity, left side with good effort against gravity.      Neurologic Examination:             Higher functions                 Normal []               Abnormal:      Cranial Nerves (ii-xii)           Normal []              Abnormal:     Motor Exam                       Normal []              Abnormal:                               Sensory Exam                   Normal []              Abnormal:    Reflexes                            Normal []              Abnormal:     Coordination                      Normal []              Abnormal:    Other:     LABS:                        9.8    8.1   )-----------( 228      ( 04 Mar 2019 13:37 )             28.4     03-04    132<L>  |  95<L>  |  19  ----------------------------<  85  4.4   |  22  |  1.54<H>    Ca    9.4      04 Mar 2019 13:37    TPro  7.4  /  Alb  3.9  /  TBili  0.3  /  DBili  x   /  AST  36  /  ALT  23  /  AlkPhos  292<H>  03-04    PT/INR - ( 04 Mar 2019 13:37 )   PT: 12.5 sec;   INR: 1.08 ratio         PTT - ( 04 Mar 2019 13:37 )  PTT:27.9 sec      RADIOLOGY & ADDITIONAL STUDIES: p ([id number])     HPI: 78 yr old M h/o CVA earlier this year transferred from an OSH for an intraparenchymal hemorrhage. His son noticed that he had slurred speech starting around 10 am today, then R sided weakness, diagnosed with a R intraparenchymal hemorrhage and transferred here for further evaluation. He denies HA, nausea, vomiting. He is on ASA.     PAST MEDICAL HISTORY   HLD (hyperlipidemia)  HTN (hypertension)  Liver cancer  Prostate cancer  Ischemic stroke    PAST SURGICAL HISTORY         MEDICATIONS:  Antibiotics:    Neuro:    Anticoagulation:    Other:  lactated ringers Bolus 500 milliLiter(s) IV Bolus once  lactated ringers. 1000 milliLiter(s) IV Continuous <Continuous>      SOCIAL HISTORY:   Occupation:   Marital Status:     FAMILY HISTORY:      PHYSICAL EXAMINATION:   T(C): 36.7 (03-04-19 @ 12:46), Max: 36.7 (03-04-19 @ 12:46)  HR: 77 (03-04-19 @ 13:08) (77 - 124)  BP: 158/103 (03-04-19 @ 13:08) (154/100 - 158/111)  RR: 18 (03-04-19 @ 12:46) (18 - 18)  SpO2: 99% (03-04-19 @ 13:08) (96% - 99%)  Wt(kg): --Height (cm): 172.72 (03-04 @ 12:46)  Weight (kg): 74.8 (03-04 @ 12:46)    General Examination:       Neurologic Examination:           AOx1  Aphasic   FC intermittently   PERRL  R facial  R side nothing   L side spont    LABS:                        9.8    8.1   )-----------( 228      ( 04 Mar 2019 13:37 )             28.4     03-04    132<L>  |  95<L>  |  19  ----------------------------<  85  4.4   |  22  |  1.54<H>    Ca    9.4      04 Mar 2019 13:37    TPro  7.4  /  Alb  3.9  /  TBili  0.3  /  DBili  x   /  AST  36  /  ALT  23  /  AlkPhos  292<H>  03-04    PT/INR - ( 04 Mar 2019 13:37 )   PT: 12.5 sec;   INR: 1.08 ratio         PTT - ( 04 Mar 2019 13:37 )  PTT:27.9 sec      RADIOLOGY & ADDITIONAL STUDIES:

## 2019-03-04 NOTE — DISCHARGE NOTE ADULT - MEDICATION SUMMARY - MEDICATIONS TO STOP TAKING
I will STOP taking the medications listed below when I get home from the hospital:    aspirin 325 mg oral tablet  -- 1 tab(s) by mouth once a day    simvastatin 20 mg oral tablet  -- 1 tab(s) by mouth once a day (at bedtime)

## 2019-03-04 NOTE — ED PROVIDER NOTE - OBJECTIVE STATEMENT
78 years old male by ems with daughter and son c/o slurred speech right side weakness at 10:00 am this morning last seen ok 6:00 am this morning. Daughter sts pt has a hx of cva without deficits in Jan 2019 in [place] Pt just moved here

## 2019-03-04 NOTE — CHART NOTE - NSCHARTNOTEFT_GEN_A_CORE
Interventional Neuro- Radiology   Procedure Note PA-NOAH    Procedure: Selective Cerebral Angiography   Pre- Procedure Diagnosis:  Post- Procedure Diagnosis:    : Dr Jerson Merlos  Physician Assistant: Marina Esquivel PA-C    Nurse:  Radiologic Tech:  Anesthesiologist:  Sheath:    I/Os: EBL less than 10cc  IV fluids:     cc  Urine output     cc        Contrast Omnipaque 240  33cc        Antibiotics:    Vitals: BP  164/73       HR 98      Spo2 99%       Preliminary Report:  Using a 4 Fr short/long sheath to the right groin under MAC sedation via   left vertebral artery,  left common carotid artery, left external carotid artery, right vertebral artery,  right common carotid artery, right external carotid artery  a selective cerebral angiography was performed and  demonstrates ________. ( Official note to follow).  Patient tolerated procedure well, hemodynamically stable, no change in neurological status compared to baseline.  Results discussed with neurosurgery, patient and patient's  family.  Right groin sheath was removed,  manual compression held to hemostasis  for  21 minutes, no active bleeding, no hematoma, quick clot and safeguard balloon dressing applied at _____h. Interventional Neuro- Radiology   Procedure Note PA-C    Procedure: Selective Cerebral Angiography and stroke intervention   Pre- Procedure Diagnosis:  Left MCA infarction   Post- Procedure Diagnosis: Left MCA infarction, distal M1 s/post thrombectomy TICI 3 reperfusion     : Dr Jerson Merlos  Physician Assistant: Marina Esquivel PA-C    Nurse:                    Erin Hawkins RN  Radiologic Tech:    Sonja Ramey LRT  Anesthesiologist:    Dr Anita Jones  Sheath:                  6 Pashto Neuron Max    I/Os: EBL less than 10cc  IV fluids: 900cc  Urine output due to void  Contrast Omnipaque 240  33cc        Antibiotics: Ancef 2 grams         Nicardipine 3mg Left ICA     Vitals: BP  164/73       HR 98      Spo2 99%       Preliminary Report:  Using a 6 Pashto Neuron Max sheath to the right groin under general sedation via left common carotid artery, left internal carotid artery a selective cerebral angiography was performed and demonstrated a left MCA, distal M1 occlusion. Patient underwent successful stroke intervention with Solumbra technique with a TICI 3 resultant. Official note to follow.  Patient tolerated procedure well, hemodynamically stable, no change in neurological status compared to baseline. Results discussed with neuro ICU team and patient's family. Right groin sheath was removed, a star close device and manual compression held to hemostasis for 20 minutes, no active bleeding, no hematoma, quick clot and safeguard balloon dressing applied at 1640. Disposition Neuro ICU bed 2. Stat labs at 1730 hours and 2330 hours.

## 2019-03-04 NOTE — H&P ADULT - ASSESSMENT
77 yo RH gentleman with hx of "previous stroke" w/o residual deficits; prostate CA, and recently (Feb 2019) informed about "stage 4 Liver CA" seeing rad-onc Dr. Weeks ([phone]) presents from OSH w/ R hemiplegia and R ICH as stroke rescue. Found to have R hemiplegia with proximal L M1 occlusion; CTP with mismatch of 86ml; taken to emergent endovascular thrombectomy after stroke team's in depth discussion with interventional team and pt's family. Pt will be under NSCU care after procedure.     [] Admit to NSCU , signed out to NSCU team    [x] NSx on-board - no acute surgical intervention  [] Repeat CTH in 24hrs or prn for worsening mental status or neuro exam  [] q1 neuro checks, vitals  [] hold ASA, lovenox, use mechanical DVT prophylaxis for now  [] Telemetry Monitoring  [] MRI brain w/ and w/o cont to look for underlying lesions, malformations.   [] HgA1c, Lipid profile  [] PT/OT  [] TTE  [] obtain collateral regarding pt's malignancies and PMHx    Care as per NSCU    d/w stroke attending Dr. Raya 79 yo RH gentleman with hx of "previous stroke" w/o residual deficits; prostate CA, and recently (Feb 2019) informed about "stage 4 Liver CA" seeing rad-onc Dr. Weeks ([phone]) presents from OSH w/ R hemiplegia and R ICH as stroke rescue. Found to have R hemiplegia with proximal L M1 occlusion; CTP with mismatch of 86ml; taken to emergent endovascular thrombectomy after stroke team's in depth discussion with interventional team and pt's family. Pt will be under NSCU care after procedure. NIHSS =18, MRS =1    Impression: R ICH likely hemorrhagic conversion of rMCA ischemic stroke; and simultaneous LMCA stroke manifesting with R hemiplegia/ neglect and dysarthria; etiology is unknown at this time but likely hypercoagulable state from underlying malignancy(s)    [] Admit to NSCU , signed out to NSCU team    [x] NSx on-board - no acute surgical intervention  [] Repeat CTH in 24hrs or prn for worsening mental status or neuro exam  [] q1 neuro checks, vitals  [] hold ASA, lovenox, use mechanical DVT prophylaxis for now  [] Telemetry Monitoring  [] MRI brain w/ and w/o cont to look for underlying lesions, malformations.   [] HgA1c, Lipid profile  [] PT/OT once stable  [] TTE  [] obtain collateral regarding pt's malignancies and PMHx    Care as per NSCU    d/w stroke attending Dr. Raya  signed out to NSCU attending Dr. Keith and fellow.

## 2019-03-04 NOTE — ED PROVIDER NOTE - PROGRESS NOTE DETAILS
Transfer cent is notified and accepts pt by Dr. Raya neuro. Dr. Carmen ed attending is also notified and accepts pt.

## 2019-03-04 NOTE — H&P ADULT - HISTORY OF PRESENT ILLNESS
77 yo RH gentleman with hx of "previous stroke" w/o residual deficits; prostate CA, and recently (Feb 2019) informed about "stage 4 Liver CA" seeing rad-onc Dr. Weeks ([phone]) presents from OSH w/ R hemiplegia and R ICH. As per family, pt woke up in his usual state at 6AM on 3/4; then was seen again by son at 10AM with slurred speech and not moving right side, activated EMS. Pt was then transferred to [facility] as stroke rescue for R hemiplegia. On arrival pt found awake alert, severe dysarthria, intermittent moderate-severe aphasia, following some basic commands, but impaired repetition, comprehension, R facial droop, R promise-neglect, R hemiplegia. Pt unable to provide hx or ROS. Family adamantly state, pt is completely independent of his ADLs prior to today. Repeat CTH showing R sided ICH, and CTA/P with proximal L M1 occlusion 79 yo RH gentleman with hx of "previous stroke" w/o residual deficits; prostate CA, and recently (Feb 2019) informed about "stage 4 Liver CA" seeing rad-onc Dr. Weeks ([phone]) presents from OSH w/ R hemiplegia and R ICH. As per family, pt woke up in his usual state at 6AM on 3/4; then was seen again by son at 10AM with slurred speech and not moving right side, activated EMS. Pt was then transferred to [facility] as stroke rescue for R hemiplegia. On arrival pt found awake alert, severe dysarthria, intermittent moderate-severe aphasia, following some basic commands, but impaired repetition, comprehension, R facial droop, R promise-neglect, R hemiplegia. Pt unable to provide hx or ROS. Family adamantly state, pt is completely independent of his ADLs prior to today. Repeat CTH showing R sided ICH, and CTA/P with proximal L M1 occlusion. Pt excluded from tPA given R sided hemorrhage. Pt is deemed endovascular candidate and after in-depth discussion with pt's family and HCP Maria Victoira; pt was taken to urgent embolectomy of LM1 occlusion. Attempts at obtaining more information regarding pt's malignancy with Dr. Weeks's office were made. Pt to be taken to NSCU after admitted under stroke Neurology.   NIHSS =18, MRS =1 79 yo RH gentleman with hx of "previous stroke" w/o residual deficits; prostate CA, and recently (Feb 2019) informed about "stage 4 Liver CA" seeing rad-onc Dr. Weeks ([phone]) presents from OSH w/ R hemiplegia and R ICH. As per family, pt woke up in his usual state at 6AM on 3/4; then was seen again by son at 10AM with slurred speech and not moving right side, activated EMS. Pt was then transferred to [facility] as stroke rescue for R hemiplegia. On arrival pt found awake alert, severe dysarthria, intermittent moderate-severe aphasia, following some basic commands, but impaired repetition, comprehension, R facial droop, R promise-neglect, R hemiplegia. Pt unable to provide hx or ROS. Family adamantly state, pt is completely independent of his ADLs prior to today. Repeat CTH showing R sided ICH, and CTA/P with proximal L M1 occlusion. Pt excluded from tPA given R sided hemorrhage. Pt is deemed endovascular candidate and after in-depth discussion with pt's family and HCP Maria Victoria; pt was taken to urgent embolectomy of LM1 occlusion. Attempts at obtaining more information regarding pt's malignancy with Dr. Weeks's office were made. Pt to be taken to NSCU after admitted under stroke Neurology. NIHSS =18, MRS =1

## 2019-03-04 NOTE — CONSULT NOTE ADULT - ASSESSMENT
Ti Balderrama  78M with small R hemorrhagic conversion of stroke and evolving L stroke, not no any AC.  - stroke neurology  - repeat CTH in 4h and again in 24h  - keppra 500mg BID  - if all repeat imaging stable, no further neurosurgical intervention, repeat prn Ti Balderrama:  78 yr old M h/o CVA earlier this year transferred from an OSH for an intraparenchymal hemorrhage. His son noticed that he had slurred speech starting around 10 am today, then R sided weakness, diagnosed with a R intraparenchymal hemorrhage and transferred here for further evaluation. He is on ASA.     Plan:  - No acute neurosurgical intervention at this time  - stroke neurology evaluation and management   - Hold ASA  - repeat CTH in 4h and again in 24h  - Keppra 500mg BID  - if all repeat imaging stable, no further neurosurgical intervention, repeat prn, can follow-up in the office with Dr. Mlaloy 1-2 weeks after discharge

## 2019-03-05 LAB
ANION GAP SERPL CALC-SCNC: 15 MMOL/L — SIGNIFICANT CHANGE UP (ref 5–17)
ANION GAP SERPL CALC-SCNC: 16 MMOL/L — SIGNIFICANT CHANGE UP (ref 5–17)
ANION GAP SERPL CALC-SCNC: 16 MMOL/L — SIGNIFICANT CHANGE UP (ref 5–17)
APTT BLD: 27.5 SEC — SIGNIFICANT CHANGE UP (ref 27.5–36.3)
APTT BLD: >200 SEC — CRITICAL HIGH (ref 27.5–36.3)
BASOPHILS # BLD AUTO: 0 K/UL — SIGNIFICANT CHANGE UP (ref 0–0.2)
BASOPHILS NFR BLD AUTO: 0.2 % — SIGNIFICANT CHANGE UP (ref 0–2)
BUN SERPL-MCNC: 14 MG/DL — SIGNIFICANT CHANGE UP (ref 7–23)
BUN SERPL-MCNC: 15 MG/DL — SIGNIFICANT CHANGE UP (ref 7–23)
BUN SERPL-MCNC: 16 MG/DL — SIGNIFICANT CHANGE UP (ref 7–23)
CALCIUM SERPL-MCNC: 8.6 MG/DL — SIGNIFICANT CHANGE UP (ref 8.4–10.5)
CALCIUM SERPL-MCNC: 8.7 MG/DL — SIGNIFICANT CHANGE UP (ref 8.4–10.5)
CALCIUM SERPL-MCNC: 8.9 MG/DL — SIGNIFICANT CHANGE UP (ref 8.4–10.5)
CHLORIDE SERPL-SCNC: 100 MMOL/L — SIGNIFICANT CHANGE UP (ref 96–108)
CHLORIDE SERPL-SCNC: 104 MMOL/L — SIGNIFICANT CHANGE UP (ref 96–108)
CHLORIDE SERPL-SCNC: 98 MMOL/L — SIGNIFICANT CHANGE UP (ref 96–108)
CHOLEST SERPL-MCNC: 217 MG/DL — HIGH (ref 10–199)
CO2 SERPL-SCNC: 21 MMOL/L — LOW (ref 22–31)
CREAT SERPL-MCNC: 1.44 MG/DL — HIGH (ref 0.5–1.3)
CREAT SERPL-MCNC: 1.47 MG/DL — HIGH (ref 0.5–1.3)
CREAT SERPL-MCNC: 1.5 MG/DL — HIGH (ref 0.5–1.3)
EOSINOPHIL # BLD AUTO: 0 K/UL — SIGNIFICANT CHANGE UP (ref 0–0.5)
EOSINOPHIL NFR BLD AUTO: 0.4 % — SIGNIFICANT CHANGE UP (ref 0–6)
GLUCOSE BLDC GLUCOMTR-MCNC: 101 MG/DL — HIGH (ref 70–99)
GLUCOSE BLDC GLUCOMTR-MCNC: 104 MG/DL — HIGH (ref 70–99)
GLUCOSE BLDC GLUCOMTR-MCNC: 113 MG/DL — HIGH (ref 70–99)
GLUCOSE BLDC GLUCOMTR-MCNC: 124 MG/DL — HIGH (ref 70–99)
GLUCOSE BLDC GLUCOMTR-MCNC: 62 MG/DL — LOW (ref 70–99)
GLUCOSE BLDC GLUCOMTR-MCNC: 89 MG/DL — SIGNIFICANT CHANGE UP (ref 70–99)
GLUCOSE SERPL-MCNC: 58 MG/DL — LOW (ref 70–99)
GLUCOSE SERPL-MCNC: 64 MG/DL — LOW (ref 70–99)
GLUCOSE SERPL-MCNC: 78 MG/DL — SIGNIFICANT CHANGE UP (ref 70–99)
HBA1C BLD-MCNC: 6.3 % — HIGH (ref 4–5.6)
HCT VFR BLD CALC: 26.2 % — LOW (ref 39–50)
HDLC SERPL-MCNC: 50 MG/DL — SIGNIFICANT CHANGE UP
HGB BLD-MCNC: 9 G/DL — LOW (ref 13–17)
INR BLD: 1.1 RATIO — SIGNIFICANT CHANGE UP (ref 0.88–1.16)
INR BLD: 1.39 RATIO — HIGH (ref 0.88–1.16)
LIPID PNL WITH DIRECT LDL SERPL: 132 MG/DL — HIGH
LYMPHOCYTES # BLD AUTO: 0.7 K/UL — LOW (ref 1–3.3)
LYMPHOCYTES # BLD AUTO: 8 % — LOW (ref 13–44)
MAGNESIUM SERPL-MCNC: 1.8 MG/DL — SIGNIFICANT CHANGE UP (ref 1.6–2.6)
MCHC RBC-ENTMCNC: 27.4 PG — SIGNIFICANT CHANGE UP (ref 27–34)
MCHC RBC-ENTMCNC: 34.2 GM/DL — SIGNIFICANT CHANGE UP (ref 32–36)
MCV RBC AUTO: 80.3 FL — SIGNIFICANT CHANGE UP (ref 80–100)
MONOCYTES # BLD AUTO: 0.6 K/UL — SIGNIFICANT CHANGE UP (ref 0–0.9)
MONOCYTES NFR BLD AUTO: 6.6 % — SIGNIFICANT CHANGE UP (ref 2–14)
NEUTROPHILS # BLD AUTO: 7.2 K/UL — SIGNIFICANT CHANGE UP (ref 1.8–7.4)
NEUTROPHILS NFR BLD AUTO: 84.8 % — HIGH (ref 43–77)
PHOSPHATE SERPL-MCNC: 4.2 MG/DL — SIGNIFICANT CHANGE UP (ref 2.5–4.5)
PLATELET # BLD AUTO: 203 K/UL — SIGNIFICANT CHANGE UP (ref 150–400)
POTASSIUM SERPL-MCNC: 3.8 MMOL/L — SIGNIFICANT CHANGE UP (ref 3.5–5.3)
POTASSIUM SERPL-MCNC: 3.8 MMOL/L — SIGNIFICANT CHANGE UP (ref 3.5–5.3)
POTASSIUM SERPL-MCNC: 3.9 MMOL/L — SIGNIFICANT CHANGE UP (ref 3.5–5.3)
POTASSIUM SERPL-SCNC: 3.8 MMOL/L — SIGNIFICANT CHANGE UP (ref 3.5–5.3)
POTASSIUM SERPL-SCNC: 3.8 MMOL/L — SIGNIFICANT CHANGE UP (ref 3.5–5.3)
POTASSIUM SERPL-SCNC: 3.9 MMOL/L — SIGNIFICANT CHANGE UP (ref 3.5–5.3)
PROTHROM AB SERPL-ACNC: 12.7 SEC — SIGNIFICANT CHANGE UP (ref 10–12.9)
PROTHROM AB SERPL-ACNC: 16.1 SEC — HIGH (ref 10–12.9)
RBC # BLD: 3.27 M/UL — LOW (ref 4.2–5.8)
RBC # FLD: 14.3 % — SIGNIFICANT CHANGE UP (ref 10.3–14.5)
SODIUM SERPL-SCNC: 135 MMOL/L — SIGNIFICANT CHANGE UP (ref 135–145)
SODIUM SERPL-SCNC: 137 MMOL/L — SIGNIFICANT CHANGE UP (ref 135–145)
SODIUM SERPL-SCNC: 140 MMOL/L — SIGNIFICANT CHANGE UP (ref 135–145)
T3 SERPL-MCNC: 75 NG/DL — LOW (ref 80–200)
T4 AB SER-ACNC: 8.2 UG/DL — SIGNIFICANT CHANGE UP (ref 4.6–12)
TOTAL CHOLESTEROL/HDL RATIO MEASUREMENT: 4.3 RATIO — SIGNIFICANT CHANGE UP (ref 3.4–9.6)
TRIGL SERPL-MCNC: 173 MG/DL — HIGH (ref 10–149)
TSH SERPL-MCNC: 2.09 UIU/ML — SIGNIFICANT CHANGE UP (ref 0.27–4.2)
WBC # BLD: 8.5 K/UL — SIGNIFICANT CHANGE UP (ref 3.8–10.5)
WBC # FLD AUTO: 8.5 K/UL — SIGNIFICANT CHANGE UP (ref 3.8–10.5)

## 2019-03-05 PROCEDURE — 93306 TTE W/DOPPLER COMPLETE: CPT | Mod: 26

## 2019-03-05 PROCEDURE — 70450 CT HEAD/BRAIN W/O DYE: CPT | Mod: 26

## 2019-03-05 PROCEDURE — 99231 SBSQ HOSP IP/OBS SF/LOW 25: CPT

## 2019-03-05 PROCEDURE — 93970 EXTREMITY STUDY: CPT | Mod: 26

## 2019-03-05 PROCEDURE — 99233 SBSQ HOSP IP/OBS HIGH 50: CPT

## 2019-03-05 RX ORDER — ENOXAPARIN SODIUM 100 MG/ML
40 INJECTION SUBCUTANEOUS DAILY
Qty: 0 | Refills: 0 | Status: DISCONTINUED | OUTPATIENT
Start: 2019-03-05 | End: 2019-03-06

## 2019-03-05 RX ORDER — INSULIN LISPRO 100/ML
VIAL (ML) SUBCUTANEOUS EVERY 6 HOURS
Qty: 0 | Refills: 0 | Status: DISCONTINUED | OUTPATIENT
Start: 2019-03-05 | End: 2019-03-06

## 2019-03-05 RX ORDER — NICARDIPINE HYDROCHLORIDE 30 MG/1
5 CAPSULE, EXTENDED RELEASE ORAL
Qty: 40 | Refills: 0 | Status: DISCONTINUED | OUTPATIENT
Start: 2019-03-05 | End: 2019-03-05

## 2019-03-05 RX ORDER — METFORMIN HYDROCHLORIDE 850 MG/1
0 TABLET ORAL
Qty: 0 | Refills: 0 | COMMUNITY

## 2019-03-05 RX ORDER — ASPIRIN/CALCIUM CARB/MAGNESIUM 324 MG
81 TABLET ORAL DAILY
Qty: 0 | Refills: 0 | Status: DISCONTINUED | OUTPATIENT
Start: 2019-03-05 | End: 2019-03-06

## 2019-03-05 RX ORDER — FERROUS SULFATE 325(65) MG
325 TABLET ORAL THREE TIMES A DAY
Qty: 0 | Refills: 0 | Status: DISCONTINUED | OUTPATIENT
Start: 2019-03-05 | End: 2019-03-06

## 2019-03-05 RX ORDER — LOSARTAN POTASSIUM 100 MG/1
100 TABLET, FILM COATED ORAL DAILY
Qty: 0 | Refills: 0 | Status: DISCONTINUED | OUTPATIENT
Start: 2019-03-05 | End: 2019-03-06

## 2019-03-05 RX ORDER — GABAPENTIN 400 MG/1
100 CAPSULE ORAL DAILY
Qty: 0 | Refills: 0 | Status: DISCONTINUED | OUTPATIENT
Start: 2019-03-05 | End: 2019-03-06

## 2019-03-05 RX ORDER — CARBAMAZEPINE 200 MG
100 TABLET ORAL
Qty: 0 | Refills: 0 | Status: DISCONTINUED | OUTPATIENT
Start: 2019-03-05 | End: 2019-03-06

## 2019-03-05 RX ORDER — BICALUTAMIDE 50 MG/1
0 TABLET, FILM COATED ORAL
Qty: 0 | Refills: 0 | COMMUNITY

## 2019-03-05 RX ORDER — HYDROCHLOROTHIAZIDE 25 MG
12.5 TABLET ORAL DAILY
Qty: 0 | Refills: 0 | Status: DISCONTINUED | OUTPATIENT
Start: 2019-03-05 | End: 2019-03-06

## 2019-03-05 RX ORDER — DEXTROSE 50 % IN WATER 50 %
25 SYRINGE (ML) INTRAVENOUS ONCE
Qty: 0 | Refills: 0 | Status: COMPLETED | OUTPATIENT
Start: 2019-03-05 | End: 2019-03-05

## 2019-03-05 RX ADMIN — NICARDIPINE HYDROCHLORIDE 25 MG/HR: 30 CAPSULE, EXTENDED RELEASE ORAL at 03:44

## 2019-03-05 RX ADMIN — Medication 100 MILLIGRAM(S): at 21:47

## 2019-03-05 RX ADMIN — Medication 81 MILLIGRAM(S): at 19:42

## 2019-03-05 RX ADMIN — Medication 12.5 MILLIGRAM(S): at 18:11

## 2019-03-05 RX ADMIN — Medication 325 MILLIGRAM(S): at 21:46

## 2019-03-05 RX ADMIN — ATORVASTATIN CALCIUM 80 MILLIGRAM(S): 80 TABLET, FILM COATED ORAL at 21:46

## 2019-03-05 RX ADMIN — Medication 325 MILLIGRAM(S): at 15:53

## 2019-03-05 RX ADMIN — LOSARTAN POTASSIUM 100 MILLIGRAM(S): 100 TABLET, FILM COATED ORAL at 12:38

## 2019-03-05 RX ADMIN — ENOXAPARIN SODIUM 40 MILLIGRAM(S): 100 INJECTION SUBCUTANEOUS at 19:43

## 2019-03-05 RX ADMIN — GABAPENTIN 100 MILLIGRAM(S): 400 CAPSULE ORAL at 12:38

## 2019-03-05 RX ADMIN — Medication 12.5 MILLIGRAM(S): at 18:10

## 2019-03-05 RX ADMIN — Medication 25 MILLILITER(S): at 04:06

## 2019-03-05 NOTE — PROGRESS NOTE ADULT - ASSESSMENT
78M with stage IV liver cancer, recently diagnosed, and recent stroke in right MCA territory with hemorrhagic conversion, now with left MCA distal M1 occlusion, successfully treated with endovascular therapy, POD#1, doing well, and back to near normal exam. NIHSS improved from 18 to 1.  1. Continue neuro checks, may liberalize frequency and transfer to stroke unit.  2. 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 Discussed with Dr. Libman timing and decision of heparin drip first then transition to Lovenox, or just start Lovenox. Risks and benefits discussed with family.  3. PT/OT evals. Passed dysphagia. Transition to puree and then regular as tolerated. Check CT head. No MRI as he has an AICD.

## 2019-03-05 NOTE — PROGRESS NOTE ADULT - ASSESSMENT
Summary: 77 yo RH gentleman with hx of "previous stroke" w/o residual deficits; hx of prostate CA, and recently diagnosed metastatic liver cancer presented to OSH w/ R sided hemiplegia, and was noted to have R sided IPH. Given right sided deficits, CT perfusion study was performed, which showed L M1 occlusion. LKN 3/4 6AM. Not a candidate for tPA. Transferred to [facility] for embolectomy. s/p L M1 embolectomy. Concern for b/l MCA strokes w/ hemorrhagic conversion on R side.     NEURO:  q1h neuro checks  Stroke core measures: A1c, LDL  Start Atorvastatin 80 qHS  CT head tomorrow AM  Sedation w/ Precedex gtt    CARDS:  -160mmHg given intracerebral hemorrhage   Nicardipine gtt as needed    PULM:    Wean to extubate  ABG, CXR    RENAL:   Hyponatremia   Will start 2% NS for cerebral edema  BMP Q6H    GASTRO:  NPO  Bowel regimen  ---> Stress ulcer prophylaxis:  PPI    HEME:  monitor H/H    Will get b/l LE screening dopplers given hx of metastatic cancer  ---> DVT prophylaxis: SCDs, hold anticoagulation given IPH and fresh post embolectomy  High risk of VTE on admission given history of malignancy    ENDO:  Goal: euglycemia  F/u A1c    ID:  Monitor for fevers    Code status:  Full code  Disposition:  ICU    This patient was at high risk of neurologic deterioration and/or death due to: hemorrhagic conversion, seizures, metastatic disease    Time spent:  45 minutes Summary: 79 yo RH gentleman with hx of "previous stroke" w/o residual deficits; hx of prostate CA, and recently diagnosed metastatic liver cancer presented to OSH w/ R sided hemiplegia, and was noted to have R sided IPH. Given right sided deficits, CT perfusion study was performed, which showed L M1 occlusion. LKN 3/4 6AM. Not a candidate for tPA. Transferred to [facility] for embolectomy. s/p L M1 embolectomy. Concern for b/l MCA strokes w/ hemorrhagic conversion on R side.     NEURO:  q1h neuro checks  Stroke core measures: A1c, LDL  Start Atorvastatin 80 qHS  CT head AM      CARDS:  -160mmHg given intracerebral hemorrhage   ?Liberalize BP  Continued on home medications of amlodipine 10 and metoprolol 12.5 BID  Nicardipine gtt as needed    PULM:    Extubated 3/4    RENAL:   Goal: Eunatremia  On 2% NS for cerebral edema  BMP Q6H    GASTRO:  Dysphagia screen and advance diet as tolerated  Bowel regimen  ---> Stress ulcer prophylaxis:  Not indicated    HEME:    Will get b/l LE screening dopplers given hx of metastatic cancer  ---> DVT prophylaxis: SCDs, hold anticoagulation given IPH and fresh post embolectomy  High risk of VTE on admission given history of malignancy    ENDO:  Goal: euglycemia  F/u A1c    ID:  Monitor for fevers    Code status:  Full code  Disposition:  ICU    This patient was at high risk of neurologic deterioration and/or death due to: hemorrhagic conversion, seizures, metastatic disease    Time spent:  45 minutes Summary: 77 yo RH gentleman with hx of "previous stroke" w/o residual deficits; hx of prostate CA, and recently diagnosed metastatic liver cancer presented to OSH w/ R sided hemiplegia, and was noted to have R sided IPH. Given right sided deficits, CT perfusion study was performed, which showed L M1 occlusion. LKN 3/4 6AM. Not a candidate for tPA. Transferred to [facility] for embolectomy. s/p L M1 embolectomy. Concern for b/l MCA strokes w/ hemorrhagic conversion on R side.     NEURO:  q1h neuro checks  Stroke core measures: A1c, LDL  Start Atorvastatin 80 qHS  CT head AM      CARDS:  -160mmHg given intracerebral hemorrhage   ?Liberalize BP  Continued on home medications of amlodipine 10 and metoprolol 12.5 BID  Nicardipine gtt as needed    PULM:    Extubated 3/4    RENAL:   ?SUMAN vs CKD  - Monitor  Goal: Eunatremia  On 2% NS for cerebral edema  BMP Q6H    GASTRO:  Dysphagia screen and advance diet as tolerated  Bowel regimen  ---> Stress ulcer prophylaxis:  Not indicated    HEME:    Will get b/l LE screening dopplers given hx of metastatic cancer  ---> DVT prophylaxis: SCDs, hold anticoagulation given IPH and fresh post embolectomy  High risk of VTE on admission given history of malignancy    ENDO:  Goal: euglycemia  F/u A1c    ID:  Monitor for fevers    Code status:  Full code  Disposition:  ICU    This patient was at high risk of neurologic deterioration and/or death due to: hemorrhagic conversion, seizures, metastatic disease    Time spent:  45 minutes Summary: 77 yo RH gentleman with hx of "previous stroke" w/o residual deficits; hx of prostate CA, and recently diagnosed metastatic liver cancer presented to OSH w/ R sided hemiplegia, and was noted to have R sided IPH. Given right sided deficits, CT perfusion study was performed, which showed L M1 occlusion. LKN 3/4 6AM. Not a candidate for tPA. Transferred to [facility] for embolectomy. s/p L M1 embolectomy. Concern for b/l MCA strokes w/ hemorrhagic conversion on R side.     NEURO:  q1h neuro checks  Stroke core measures: A1c 6.3,   On Atorvastatin 80 qHS  CT head AM - stable heme  MRI when able  OOB/PT/OT    CARDS:    -180  Continued on home medications of amlodipine 10 and metoprolol 12.5 BID  Will restart home meds of losartan 100 daily and HCTZ 12.5 daily  Wean off nicardipine gtt    PULM:    Extubated 3/4    RENAL:   ?SUMAN vs CKD  - Monitor  Goal: Eunatremia  DC 2% and florinef    GASTRO:  Advance diet as tolerated  Bowel regimen  ---> Stress ulcer prophylaxis:  Not indicated    HEME:    Will get b/l LE screening dopplers given hx of metastatic cancer  ---> DVT prophylaxis: SCDs, hold anticoagulation given IPH and fresh post embolectomy  High risk of VTE on admission given history of malignancy    ENDO:  Goal: euglycemia  A1c 6.3    ID:  Monitor for fevers    Code status:  Full code  Disposition:  ICU    This patient was at high risk of neurologic deterioration and/or death due to: hemorrhagic conversion, seizures, metastatic disease    Time spent:  45 minutes Summary: 77 yo RH gentleman with hx of "previous stroke" w/o residual deficits; hx of prostate CA, and recently diagnosed metastatic liver cancer presented to OSH w/ R sided hemiplegia, and was noted to have R sided IPH. Given right sided deficits, CT perfusion study was performed, which showed L M1 occlusion. LKN 3/4 6AM. Not a candidate for tPA. Transferred to [facility] for embolectomy. s/p L M1 embolectomy. Concern for b/l MCA strokes w/ hemorrhagic conversion on R side.     NEURO:  q2h neuro checks  Stroke core measures: A1c 6.3,   On Atorvastatin 80 qHS  CT head AM - stable heme  Restart home med of Carbamazepine 100 BID  MRI when able  OOB/PT/OT    CARDS:    -180  Continued on home medications of amlodipine 10 and metoprolol 12.5 BID  Will restart home meds of losartan 100 daily and HCTZ 12.5 daily  Wean off nicardipine gtt    PULM:    Extubated 3/4  Incentive spirometry if able    RENAL:   ?SUMAN vs CKD  - Monitor  Goal: Eunatremia  DC 2% and florinef    GASTRO:  Advance diet as tolerated  Bowel regimen  ---> Stress ulcer prophylaxis:  Not indicated    HEME:    Will get b/l LE screening dopplers given hx of metastatic cancer  ---> DVT prophylaxis: SCDs, hold anticoagulation given IPH and fresh post embolectomy  High risk of VTE on admission given history of malignancy    ENDO:  Goal: euglycemia  A1c 6.3    ID:  Monitor for fevers    Code status:  Full code  Disposition:  Stroke floor    This patient was at high risk of neurologic deterioration and/or death due to: hemorrhagic conversion, seizures, metastatic disease    Time spent:  45 minutes Summary: 77 yo RH gentleman with hx of "previous stroke" w/o residual deficits; hx of prostate CA, and recently diagnosed metastatic liver cancer presented to OSH w/ R sided hemiplegia, and was noted to have R sided IPH. Given right sided deficits, CT perfusion study was performed, which showed L M1 occlusion. LKN 3/4 6AM. Not a candidate for tPA. Transferred to [facility] for embolectomy. s/p L M1 embolectomy. Concern for b/l MCA strokes w/ hemorrhagic conversion on R side.     NEURO:  q2h neuro checks  Stroke core measures: A1c 6.3,   On Atorvastatin 80 qHS  CT head AM - stable heme  Restart home med of Carbamazepine 100 BID  MRI when able  OOB/PT/OT    CARDS:    -180  Continued on home medications of amlodipine 10 and metoprolol 12.5 BID  Will restart home meds of losartan 100 daily and HCTZ 12.5 daily  Wean off nicardipine gtt    PULM:    Extubated 3/4  Incentive spirometry if able    RENAL:   ?SUMAN vs CKD  - Monitor  Goal: Eunatremia  DC 2% and florinef    GASTRO:  Advance diet as tolerated  Bowel regimen  ---> Stress ulcer prophylaxis:  Not indicated    HEME:    Will get b/l LE screening dopplers given hx of metastatic cancer  ---> DVT prophylaxis: SCDs, hold anticoagulation given IPH and fresh post embolectomy  High risk of VTE on admission given history of malignancy    ENDO:  Goal: euglycemia  A1c 6.3    ID:  Monitor for fevers    Code status:  Full code  Disposition:  Stroke floor

## 2019-03-05 NOTE — PROGRESS NOTE ADULT - SUBJECTIVE AND OBJECTIVE BOX
HPI: 77 yo RH gentleman with hx of "previous stroke" w/o residual deficits; prostate CA, and recently (Feb 2019) informed about "stage 4 Liver CA" seeing rad-onc Dr. Weeks ([phone]) presents from OSH w/ R hemiplegia and R ICH. As per family, pt woke up in his usual state at 6AM on 3/4; then was seen again by son at 10AM with slurred speech and not moving right side, activated EMS. Pt was then transferred to [facility] as stroke rescue for R hemiplegia. On arrival pt found awake alert, severe dysarthria, intermittent moderate-severe aphasia, following some basic commands, but impaired repetition, comprehension, R facial droop, R promise-neglect, R hemiplegia. Pt unable to provide hx or ROS. Family adamantly state, pt is completely independent of his ADLs prior to today. Repeat CTH showing R sided ICH, and CTA/P with proximal L M1 occlusion. Pt excluded from tPA given R sided hemorrhage. Pt is deemed endovascular candidate and after in-depth discussion with pt's family and HCP Maria Victoria; pt was taken to urgent embolectomy of LM1 occlusion. Attempts at obtaining more information regarding pt's malignancy with Dr. Weeks's office were made. Pt to be taken to NSCU post intervention. NIHSS =18, MRS =1 (04 Mar 2019 15:59)    On admission, the patient was:  GCS: 11 [id number]  ICH score: 1  NIHSS: 18          EXAMINATION:  General:  Agitated, intubated  HEENT:  MMM  Neuro: EO spontaneously. Moving all extremities spontaneously AG   Cards:  RRR  Respiratory:  CTAB. Anterior auscultation only.   Abdomen:  soft  Extremities:  no edema  Skin:  warm/dry HPI: 77 yo RH gentleman with hx of "previous stroke" w/o residual deficits; prostate CA, and recently (Feb 2019) informed about "stage 4 Liver CA" seeing rad-onc Dr. Weeks ([phone]) presents from OSH w/ R hemiplegia and R ICH. As per family, pt woke up in his usual state at 6AM on 3/4; then was seen again by son at 10AM with slurred speech and not moving right side, activated EMS. Pt was then transferred to [facility] as stroke rescue for R hemiplegia. On arrival pt found awake alert, severe dysarthria, intermittent moderate-severe aphasia, following some basic commands, but impaired repetition, comprehension, R facial droop, R promise-neglect, R hemiplegia. Pt unable to provide hx or ROS. Family adamantly state, pt is completely independent of his ADLs prior to today. Repeat CTH showing R sided ICH, and CTA/P with proximal L M1 occlusion. Pt excluded from tPA given R sided hemorrhage. Pt is deemed endovascular candidate and after in-depth discussion with pt's family and HCP Maria Victoria; pt was taken to urgent embolectomy of LM1 occlusion. Attempts at obtaining more information regarding pt's malignancy with Dr. Weeks's office were made. Pt to be taken to NSCU post intervention. NIHSS =18, MRS =1 (04 Mar 2019 15:59)    On admission, the patient was:  GCS: 11 [id number]  ICH score: 1  NIHSS: 18    HOSPITAL COURSE:  3/4: s/p L M1 embolectomy. Extubated emergently in NSICU 2/2 to agitation.     VITALS/DATA/ORDERS: [x] Reviewed      EXAMINATION:  General: Well developed, well nourished man in NAD.   HEENT:  MMM  Neuro: EO spontaneously. Moving all extremities spontaneously AG   Cards:  RRR  Respiratory:  CTAB. Anterior auscultation only.   Abdomen:  soft  Extremities:  no edema  Skin:  warm/dry HPI: 77 yo RH gentleman with hx of "previous stroke" w/o residual deficits; prostate CA, and recently (Feb 2019) informed about "stage 4 Liver CA" seeing rad-onc Dr. Weeks ([phone]) presents from OSH w/ R hemiplegia and R ICH. As per family, pt woke up in his usual state at 6AM on 3/4; then was seen again by son at 10AM with slurred speech and not moving right side, activated EMS. Pt was then transferred to [facility] as stroke rescue for R hemiplegia. On arrival pt found awake alert, severe dysarthria, intermittent moderate-severe aphasia, following some basic commands, but impaired repetition, comprehension, R facial droop, R promise-neglect, R hemiplegia. Pt unable to provide hx or ROS. Family adamantly state, pt is completely independent of his ADLs prior to today. Repeat CTH showing R sided ICH, and CTA/P with proximal L M1 occlusion. Pt excluded from tPA given R sided hemorrhage. Pt is deemed endovascular candidate and after in-depth discussion with pt's family and HCP Maria Victoria; pt was taken to urgent embolectomy of LM1 occlusion. Attempts at obtaining more information regarding pt's malignancy with Dr. Weeks's office were made. Pt to be taken to NSCU post intervention. NIHSS =18, MRS =1 (04 Mar 2019 15:59)    On admission, the patient was:  GCS: 11 [id number]  ICH score: 1  NIHSS: 18    HOSPITAL COURSE:  3/4: s/p L M1 embolectomy. Extubated emergently in NSICU 2/2 to agitation.     VITALS/DATA/ORDERS: [x] Reviewed      EXAMINATION:  General: Well developed, well nourished man in NAD.   HEENT:  MMM  Neuro: EO spontaneously, awake, alert, fully oriented, fluent speech, able to name, follows, PERRL, face symmetric, mild dysarthria, right arm drift with 4+/5 strength, full strength on the left, right leg knee extension 4+/5 but otherwise 5/5, left leg 5/5  Cards:  RRR  Respiratory:  CTAB. Anterior auscultation only.   Abdomen:  soft  Extremities:  no edema  Skin:  warm/dry

## 2019-03-05 NOTE — PROGRESS NOTE ADULT - SUBJECTIVE AND OBJECTIVE BOX
The patient was seen and examined by me at around 8am. Doing well. No complaint.    ICU Vital Signs Last 24 Hrs  T(C): 36.8 (05 Mar 2019 07:00), Max: 36.8 (05 Mar 2019 03:00)  T(F): 98.2 (05 Mar 2019 07:00), Max: 98.2 (05 Mar 2019 03:00)  HR: 99 (05 Mar 2019 14:30) (78 - 116)  BP: --  BP(mean): --  ABP: 151/67 (05 Mar 2019 14:30) (93/50 - 180/92)  ABP(mean): 96 (05 Mar 2019 14:30) (64 - 103)  RR: 21 (05 Mar 2019 14:30) (13 - 29)  SpO2: 99% (05 Mar 2019 14:30) (90% - 100%)    Neuro: Awake and alert, oriented x3, fluent, normal naming and repetition, follows 3 step commands, mild dysarthria. Extraocular movements intact, no nystagmus, visual fields full, face symmetric, tongue midline. No drift, 5/5 power x 4 extremities. Normal sensation to LT. Normal finger-to-nose and rapid alternating movements.  Ext: No groin hematoma, c/d/i. 2+DP/PT pulses.                          9.0    8.5   )-----------( 203      ( 04 Mar 2019 23:42 )             26.2   03-05    140  |  104  |  14  ----------------------------<  78  3.9   |  21<L>  |  1.50<H>    Ca    8.6      05 Mar 2019 10:04  Phos  4.2     03-04  Mg     1.8     03-04    TPro  7.4  /  Alb  3.9  /  TBili  0.3  /  DBili  x   /  AST  36  /  ALT  23  /  AlkPhos  292<H>  03-04

## 2019-03-05 NOTE — PROGRESS NOTE ADULT - SUBJECTIVE AND OBJECTIVE BOX
Vital Signs Last 24 Hrs  T(C): 36.6 (04 Mar 2019 23:00), Max: 37.1 (04 Mar 2019 12:20)  T(F): 97.9 (04 Mar 2019 23:00), Max: 98.7 (04 Mar 2019 12:20)  HR: 93 (05 Mar 2019 00:00) (77 - 124)  BP: 158/103 (04 Mar 2019 16:00) (130/70 - 158/111)  BP(mean): --  RR: 20 (05 Mar 2019 00:00) (15 - 21)  SpO2: 92% (05 Mar 2019 00:00) (92% - 100%)    EXAM  Awake, Ox1 (name), perseverates, mild WFDs, FC, LUE 5/5, RUE 4+/5, LLE 5/5, RLE 4+/5

## 2019-03-05 NOTE — PHARMACOTHERAPY INTERVENTION NOTE - COMMENTS
per patient's family members patient takes metformin. The bicalutamide he took once and not anymore. Informed them it was a chemotherapy agent.

## 2019-03-06 VITALS
DIASTOLIC BLOOD PRESSURE: 80 MMHG | SYSTOLIC BLOOD PRESSURE: 146 MMHG | OXYGEN SATURATION: 95 % | RESPIRATION RATE: 17 BRPM | TEMPERATURE: 98 F | HEART RATE: 75 BPM

## 2019-03-06 LAB
ANION GAP SERPL CALC-SCNC: 14 MMOL/L — SIGNIFICANT CHANGE UP (ref 5–17)
BASOPHILS # BLD AUTO: 0 K/UL — SIGNIFICANT CHANGE UP (ref 0–0.2)
BASOPHILS NFR BLD AUTO: 0.2 % — SIGNIFICANT CHANGE UP (ref 0–2)
BUN SERPL-MCNC: 14 MG/DL — SIGNIFICANT CHANGE UP (ref 7–23)
CALCIUM SERPL-MCNC: 8.7 MG/DL — SIGNIFICANT CHANGE UP (ref 8.4–10.5)
CHLORIDE SERPL-SCNC: 101 MMOL/L — SIGNIFICANT CHANGE UP (ref 96–108)
CO2 SERPL-SCNC: 21 MMOL/L — LOW (ref 22–31)
CREAT SERPL-MCNC: 1.6 MG/DL — HIGH (ref 0.5–1.3)
EOSINOPHIL # BLD AUTO: 0.1 K/UL — SIGNIFICANT CHANGE UP (ref 0–0.5)
EOSINOPHIL NFR BLD AUTO: 1.7 % — SIGNIFICANT CHANGE UP (ref 0–6)
GLUCOSE BLDC GLUCOMTR-MCNC: 106 MG/DL — HIGH (ref 70–99)
GLUCOSE BLDC GLUCOMTR-MCNC: 112 MG/DL — HIGH (ref 70–99)
GLUCOSE BLDC GLUCOMTR-MCNC: 112 MG/DL — HIGH (ref 70–99)
GLUCOSE SERPL-MCNC: 105 MG/DL — HIGH (ref 70–99)
HCT VFR BLD CALC: 24.5 % — LOW (ref 39–50)
HGB BLD-MCNC: 8.7 G/DL — LOW (ref 13–17)
LYMPHOCYTES # BLD AUTO: 1 K/UL — SIGNIFICANT CHANGE UP (ref 1–3.3)
LYMPHOCYTES # BLD AUTO: 17.2 % — SIGNIFICANT CHANGE UP (ref 13–44)
MCHC RBC-ENTMCNC: 28.9 PG — SIGNIFICANT CHANGE UP (ref 27–34)
MCHC RBC-ENTMCNC: 35.6 GM/DL — SIGNIFICANT CHANGE UP (ref 32–36)
MCV RBC AUTO: 81.2 FL — SIGNIFICANT CHANGE UP (ref 80–100)
MONOCYTES # BLD AUTO: 0.5 K/UL — SIGNIFICANT CHANGE UP (ref 0–0.9)
MONOCYTES NFR BLD AUTO: 9.4 % — SIGNIFICANT CHANGE UP (ref 2–14)
NEUTROPHILS # BLD AUTO: 4 K/UL — SIGNIFICANT CHANGE UP (ref 1.8–7.4)
NEUTROPHILS NFR BLD AUTO: 71.5 % — SIGNIFICANT CHANGE UP (ref 43–77)
PLATELET # BLD AUTO: 197 K/UL — SIGNIFICANT CHANGE UP (ref 150–400)
POTASSIUM SERPL-MCNC: 3.9 MMOL/L — SIGNIFICANT CHANGE UP (ref 3.5–5.3)
POTASSIUM SERPL-SCNC: 3.9 MMOL/L — SIGNIFICANT CHANGE UP (ref 3.5–5.3)
RBC # BLD: 3.01 M/UL — LOW (ref 4.2–5.8)
RBC # FLD: 14.2 % — SIGNIFICANT CHANGE UP (ref 10.3–14.5)
SODIUM SERPL-SCNC: 136 MMOL/L — SIGNIFICANT CHANGE UP (ref 135–145)
WBC # BLD: 5.6 K/UL — SIGNIFICANT CHANGE UP (ref 3.8–10.5)
WBC # FLD AUTO: 5.6 K/UL — SIGNIFICANT CHANGE UP (ref 3.8–10.5)

## 2019-03-06 PROCEDURE — 97161 PT EVAL LOW COMPLEX 20 MIN: CPT

## 2019-03-06 PROCEDURE — C1769: CPT

## 2019-03-06 PROCEDURE — 85610 PROTHROMBIN TIME: CPT

## 2019-03-06 PROCEDURE — 70496 CT ANGIOGRAPHY HEAD: CPT

## 2019-03-06 PROCEDURE — C1887: CPT

## 2019-03-06 PROCEDURE — 86900 BLOOD TYPING SEROLOGIC ABO: CPT

## 2019-03-06 PROCEDURE — 93306 TTE W/DOPPLER COMPLETE: CPT

## 2019-03-06 PROCEDURE — C1757: CPT

## 2019-03-06 PROCEDURE — 94002 VENT MGMT INPAT INIT DAY: CPT

## 2019-03-06 PROCEDURE — 84443 ASSAY THYROID STIM HORMONE: CPT

## 2019-03-06 PROCEDURE — 84480 ASSAY TRIIODOTHYRONINE (T3): CPT

## 2019-03-06 PROCEDURE — 88304 TISSUE EXAM BY PATHOLOGIST: CPT

## 2019-03-06 PROCEDURE — C1760: CPT

## 2019-03-06 PROCEDURE — C1894: CPT

## 2019-03-06 PROCEDURE — 84436 ASSAY OF TOTAL THYROXINE: CPT

## 2019-03-06 PROCEDURE — 85027 COMPLETE CBC AUTOMATED: CPT

## 2019-03-06 PROCEDURE — 86901 BLOOD TYPING SEROLOGIC RH(D): CPT

## 2019-03-06 PROCEDURE — 86850 RBC ANTIBODY SCREEN: CPT

## 2019-03-06 PROCEDURE — 84484 ASSAY OF TROPONIN QUANT: CPT

## 2019-03-06 PROCEDURE — 82962 GLUCOSE BLOOD TEST: CPT

## 2019-03-06 PROCEDURE — 93970 EXTREMITY STUDY: CPT

## 2019-03-06 PROCEDURE — 70450 CT HEAD/BRAIN W/O DYE: CPT

## 2019-03-06 PROCEDURE — 83036 HEMOGLOBIN GLYCOSYLATED A1C: CPT

## 2019-03-06 PROCEDURE — 76380 CAT SCAN FOLLOW-UP STUDY: CPT | Mod: XU

## 2019-03-06 PROCEDURE — 83735 ASSAY OF MAGNESIUM: CPT

## 2019-03-06 PROCEDURE — 97166 OT EVAL MOD COMPLEX 45 MIN: CPT

## 2019-03-06 PROCEDURE — 80053 COMPREHEN METABOLIC PANEL: CPT

## 2019-03-06 PROCEDURE — 84100 ASSAY OF PHOSPHORUS: CPT

## 2019-03-06 PROCEDURE — 61645 PERQ ART M-THROMBECT &/NFS: CPT

## 2019-03-06 PROCEDURE — 80048 BASIC METABOLIC PNL TOTAL CA: CPT

## 2019-03-06 PROCEDURE — 99233 SBSQ HOSP IP/OBS HIGH 50: CPT

## 2019-03-06 PROCEDURE — 71045 X-RAY EXAM CHEST 1 VIEW: CPT

## 2019-03-06 PROCEDURE — 0042T: CPT

## 2019-03-06 PROCEDURE — 85730 THROMBOPLASTIN TIME PARTIAL: CPT

## 2019-03-06 PROCEDURE — 70498 CT ANGIOGRAPHY NECK: CPT

## 2019-03-06 PROCEDURE — 99291 CRITICAL CARE FIRST HOUR: CPT

## 2019-03-06 PROCEDURE — 80061 LIPID PANEL: CPT

## 2019-03-06 RX ORDER — ATORVASTATIN CALCIUM 80 MG/1
1 TABLET, FILM COATED ORAL
Qty: 30 | Refills: 0
Start: 2019-03-06 | End: 2019-04-04

## 2019-03-06 RX ORDER — ASPIRIN/CALCIUM CARB/MAGNESIUM 324 MG
1 TABLET ORAL
Qty: 0 | Refills: 0 | COMMUNITY

## 2019-03-06 RX ORDER — AMLODIPINE BESYLATE 2.5 MG/1
1 TABLET ORAL
Qty: 30 | Refills: 0 | OUTPATIENT
Start: 2019-03-06 | End: 2019-04-04

## 2019-03-06 RX ORDER — CARBAMAZEPINE 200 MG
1 TABLET ORAL
Qty: 0 | Refills: 0 | COMMUNITY

## 2019-03-06 RX ORDER — ASPIRIN/CALCIUM CARB/MAGNESIUM 324 MG
1 TABLET ORAL
Qty: 30 | Refills: 0 | OUTPATIENT
Start: 2019-03-06 | End: 2019-04-04

## 2019-03-06 RX ORDER — SIMVASTATIN 20 MG/1
1 TABLET, FILM COATED ORAL
Qty: 0 | Refills: 0 | COMMUNITY

## 2019-03-06 RX ORDER — AMLODIPINE BESYLATE 2.5 MG/1
1 TABLET ORAL
Qty: 30 | Refills: 0
Start: 2019-03-06

## 2019-03-06 RX ORDER — ATORVASTATIN CALCIUM 80 MG/1
1 TABLET, FILM COATED ORAL
Qty: 30 | Refills: 0 | OUTPATIENT
Start: 2019-03-06 | End: 2019-04-04

## 2019-03-06 RX ORDER — AMLODIPINE BESYLATE 2.5 MG/1
10 TABLET ORAL
Qty: 0 | Refills: 0 | COMMUNITY

## 2019-03-06 RX ORDER — ASPIRIN/CALCIUM CARB/MAGNESIUM 324 MG
1 TABLET ORAL
Qty: 30 | Refills: 0
Start: 2019-03-06 | End: 2019-04-04

## 2019-03-06 RX ORDER — CARBAMAZEPINE 200 MG
1 TABLET ORAL
Qty: 0 | Refills: 0 | DISCHARGE
Start: 2019-03-06

## 2019-03-06 RX ADMIN — Medication 100 MILLIGRAM(S): at 18:02

## 2019-03-06 RX ADMIN — Medication 12.5 MILLIGRAM(S): at 05:43

## 2019-03-06 RX ADMIN — Medication 325 MILLIGRAM(S): at 05:43

## 2019-03-06 RX ADMIN — Medication 325 MILLIGRAM(S): at 13:00

## 2019-03-06 RX ADMIN — Medication 12.5 MILLIGRAM(S): at 18:02

## 2019-03-06 RX ADMIN — Medication 81 MILLIGRAM(S): at 13:00

## 2019-03-06 RX ADMIN — Medication 100 MILLIGRAM(S): at 05:43

## 2019-03-06 RX ADMIN — LOSARTAN POTASSIUM 100 MILLIGRAM(S): 100 TABLET, FILM COATED ORAL at 05:43

## 2019-03-06 RX ADMIN — AMLODIPINE BESYLATE 10 MILLIGRAM(S): 2.5 TABLET ORAL at 05:43

## 2019-03-06 RX ADMIN — GABAPENTIN 100 MILLIGRAM(S): 400 CAPSULE ORAL at 13:00

## 2019-03-06 RX ADMIN — ENOXAPARIN SODIUM 40 MILLIGRAM(S): 100 INJECTION SUBCUTANEOUS at 13:00

## 2019-03-06 NOTE — OCCUPATIONAL THERAPY INITIAL EVALUATION ADULT - ADDITIONAL COMMENTS
Pt lives in prvt home 2-[address], bed/bath on first floor, pta independent in all adl's, without device.

## 2019-03-06 NOTE — PHYSICAL THERAPY INITIAL EVALUATION ADULT - THERAPY FREQUENCY, PT EVAL
Pt has no acute skilled PT needs at this time, D/C from program, ambulation aide will follow up appropriately, pt verbalized understanding.

## 2019-03-06 NOTE — PROGRESS NOTE ADULT - SUBJECTIVE AND OBJECTIVE BOX
Patient seen and examined by me. Doing well.    Vital Signs Last 24 Hrs  T(C): 36.8 (06 Mar 2019 07:21), Max: 36.9 (05 Mar 2019 11:00)  T(F): 98.2 (06 Mar 2019 07:21), Max: 98.4 (05 Mar 2019 11:00)  HR: 61 (06 Mar 2019 06:00) (61 - 101)  BP: 153/84 (06 Mar 2019 06:00) (130/97 - 157/92)  BP(mean): 1 (06 Mar 2019 06:00) (1 - 108)  RR: 16 (06 Mar 2019 06:00) (13 - 29)  SpO2: 96% (06 Mar 2019 06:00) (90% - 100%)    Neuro: Awake and alert, oriented x3, fluent, normal naming and repetition, follows 3 step commands. Extraocular movements intact, no nystagmus, visual fields full, face symmetric, tongue midline. Slight right arm drift, 5/5 power x 4 extremities. Normal sensation to LT. Normal finger-to-nose and rapid alternating movements.  Ext: R groin no hematoma, C/D/I, + DP                          8.7    5.6   )-----------( 197      ( 06 Mar 2019 02:52 )             24.5   03-06    136  |  101  |  14  ----------------------------<  105<H>  3.9   |  21<L>  |  1.60<H>    Ca    8.7      06 Mar 2019 02:52  Phos  4.2     03-04  Mg     1.8     03-04    TPro  7.4  /  Alb  3.9  /  TBili  0.3  /  DBili  x   /  AST  36  /  ALT  23  /  AlkPhos  292<H>  03-04

## 2019-03-06 NOTE — OCCUPATIONAL THERAPY INITIAL EVALUATION ADULT - PERTINENT HX OF CURRENT PROBLEM, REHAB EVAL
79 yo RH gentleman with hx of "previous stroke" w/o residual deficits; prostate CA, and recently (Feb 2019) informed about "stage 4 Liver CA" seeing rad-onc Dr. Weeks ([phone]) presents from OSH w/ R hemiplegia and R ICH. As per family, pt woke up in his usual state at 6AM on 3/4; then was seen again by son at 10AM with slurred speech and not moving right side, activated EMS. Pt was then transferred to [facility] as stroke rescue for R hemiplegia.

## 2019-03-06 NOTE — DIETITIAN INITIAL EVALUATION ADULT. - ADHERENCE
good/Tries to watch carbohydrate intake, eats 3 meals/day, checks BG 2 times/day, range in 120s, states A1c usually around 6.3, current A1c 6.3. declined written diet education.

## 2019-03-06 NOTE — PROGRESS NOTE ADULT - ASSESSMENT
78M with stage IV liver cancer, recently diagnosed, and recent stroke in right MCA territory with hemorrhagic conversion, now with left MCA distal M1 occlusion, successfully treated with endovascular therapy, POD#2, doing well, and back to near normal exam. NIHSS improved from 18 to 1. Patient has no complaints  1. May transfer out of stroke unit  2. 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 Will need heparin drip first then transition to Lovenox, or just start Lovenox. Risks and benefits discussed with family.  3. OOB as tolerated

## 2019-03-06 NOTE — PROGRESS NOTE ADULT - ATTENDING COMMENTS
Pt seen and examined with resident this afternoon.  He is awake and conversant, GALINDO well.  CT today with no change in right temporoparietal bleed and no left infarction.  Pt is doing well.  No neurosurgical intervention at this time.  Await pathology of thrombus.
Good cough, good tidal volumes, extubated.    No stridor, good air entry.    Dysarthric but following commands, moves all limbs at least antigravity (RLE restrained due to groin puncture); no groin hematoma, good distal pulses    Updated daughter and son
SUMMARY:   78 year-old right-handed [place] man with stroke risk factors of prior stroke without residual deficits and metastatic malignancy who was last seen well on 3/4/19 at 6AM and then subsequently found to have slurred speech and right sided weakness by son at 10AM. He was taken to [facility] where he was noted to have severe dysarthria, intermittent moderate-severe aphasia, right facial droop, right promise-neglect and right hemiplegia. Imaging revealed small right frontal and anterior parietal hemorrhage. He was transferred to [facility] ED, where imaging revealed stable right-sided hemorrhage, left M1 occlusion and significant perfusion mismatch. He was excluded from IV tPA administration due to intracerebral hemorrhage but taken for endovascular thrombectomy with resultant TICI 3 flow.     ADMISSION SCORES:  NIHSS =1 8, MRS = 1, ICH = 1    EXAMINATION:  Awake, alert, fully oriented, fluent speech, able to name, follows, PERRL, face symmetric, mild dysarthria, right arm drift with 4+/5 strength, full strength on the left, right leg knee extension 4+/5 but otherwise 5/5    R frontal/anterior parietal intracerebral hemorrhage and left M1 clot status post thrombectomy  - Stroke core measures/work-up: optimize cholesterol and glycemic control; TTE; telemetry; MRI when able  - Secondary stroke prevention: statin, ASA when cleared by stroke  - PT/OT/OOB/mobilize  - Passed dysphagia screen, advance diet as tolerated  - -180mmHg as intracerebral hemorrhage stable  - Oncology f/u  - High risk for VTE on admission given metastatic cancer, start VTE chemoprophylaxis as intracerebral hemorrhage stable    Updated patient, daughter and son at bedside in patient's preferred language of English.
agree with above; ROS otherwise negative

## 2019-03-06 NOTE — PHYSICAL THERAPY INITIAL EVALUATION ADULT - PRECAUTIONS/LIMITATIONS, REHAB EVAL
Repeat CTH showing R sided ICH, and CTA/P with proximal L M1 occlusion. Pt excluded from tPA given R sided hemorrhage. Pt is deemed endovascular candidate and after in-depth discussion with pt's family and HCP Maria Victoria; pt was taken to urgent embolectomy of LM1 occlusion./no known precautions/limitations

## 2019-03-06 NOTE — DIETITIAN INITIAL EVALUATION ADULT. - PERTINENT MEDS FT
MEDICATIONS  (STANDING):  amLODIPine   Tablet 10 milliGRAM(s) Oral daily  aspirin  chewable 81 milliGRAM(s) Oral daily  atorvastatin 80 milliGRAM(s) Oral at bedtime  carBAMazepine Chewable 100 milliGRAM(s) Chew two times a day  enoxaparin Injectable 40 milliGRAM(s) SubCutaneous daily  ferrous    sulfate 325 milliGRAM(s) Oral three times a day  gabapentin 100 milliGRAM(s) Oral daily  hydrochlorothiazide 12.5 milliGRAM(s) Oral daily  insulin lispro (HumaLOG) corrective regimen sliding scale   SubCutaneous every 6 hours  losartan 100 milliGRAM(s) Oral daily  metoprolol tartrate 12.5 milliGRAM(s) Oral two times a day    MEDICATIONS  (PRN):  oxyCODONE    IR 5 milliGRAM(s) Oral every 4 hours PRN Moderate Pain (4 - 6)  oxyCODONE    IR 10 milliGRAM(s) Oral every 4 hours PRN Severe Pain (7 - 10)

## 2019-03-06 NOTE — PROGRESS NOTE ADULT - ASSESSMENT
77 yo RH gentleman with hx of "previous stroke" w/o residual deficits; prostate CA, and recently (Feb 2019) informed about "stage 4 Liver CA" seeing rad-onc Dr. Weeks ([phone]) presented from OSH w/ R hemiplegia and R ICH as stroke rescue. Initial CT head (3/4/19) to my eye showed possible subtle low density in the left MCA distribution. There was a subacute small RMCA infarct in the posterior frontal region with dense hemorrhagic transformation (probably PH1 or 2). CTA neck and head (3/4/19) to my eye showed a proximal-mid left M1 occlusion. Repeat CT head (3/5/19) to my eye showed possible small acute infarction in the left MCA distribution involving the posterior insular region.  He was successfully treated with endovascular therapy.On admission NIHSS =18, MRS =1.    Impression. In 1/19 he had a stroke but the details are not all currently available. He has a history of stage IV liver carcinoma. On 3/4/19 he developed global aphasia and right hemiplegia, due to left MCA infarction with left M1 occlusion. His successive strokes are consistent with embolic stroke of unknown source, including the possibilities of marantic endocarditis and possible hypercoagulability of malignancy.    NEURO: Neurologically? Continue close monitoring for neurologic deterioration, permissive HTN to gradual normotension, - on atorvastatin 80mg QHS , MRI deferred. Physical therapy/OT evaluations pending    ANTITHROMBOTIC THERAPY:     PULMONARY: CXR clear, protecting airway, saturating well     CARDIOVASCULAR: check TTE, cardiac monitoring                              SBP goal:     GASTROINTESTINAL:  dysphagia screen       Diet:     RENAL: BUN/Cr stable, good urine output      Na Goal: Greater than 135     Jackson:    HEMATOLOGY: H/H stable, Platelets normal      DVT ppx: Heparin s.c [] LMWH []     ID: afebrile, no leukocytosis     OTHER:     DISPOSITION: Rehab or home depending on PT eval once stable and workup is complete      CORE MEASURES:        Admission NIHSS: 18     TPA: [] YES [x] NO      LDL/HDL: 132/50     Depression Screen: p     Statin Therapy: yes     Dysphagia Screen: [x] PASS [] FAIL     Smoking [] YES [x] NO      Afib [] YES [x] NO     Stroke Education [x] YES [] NO 77 yo RH gentleman with hx of "previous stroke" w/o residual deficits; prostate CA, and recently (Feb 2019) informed about "stage 4 Liver CA" seeing rad-onc Dr. Weeks ([phone]) presented from OSH w/ R hemiplegia and R ICH as stroke rescue. Initial CT head (3/4/19) to my eye showed possible subtle low density in the left MCA distribution. There was a subacute small RMCA infarct in the posterior frontal region with dense hemorrhagic transformation (probably PH1 or 2). CTA neck and head (3/4/19) to my eye showed a proximal-mid left M1 occlusion. Repeat CT head (3/5/19) to my eye showed possible small acute infarction in the left MCA distribution involving the posterior insular region.  He was successfully treated with endovascular therapy.On admission NIHSS =18, MRS =1.    Impression. In 1/19 he had a stroke but the details are not all currently available. He has a history of stage IV liver carcinoma. On 3/4/19 he developed global aphasia and right hemiplegia, due to left MCA infarction with left M1 occlusion. His successive strokes are consistent with embolic stroke of unknown source, including the possibilities of marantic endocarditis and possible hypercoagulability of malignancy.    NEURO: Neurologically? Continue close monitoring for neurologic deterioration, permissive HTN to gradual normotension, - on atorvastatin 80mg QHS , MRI deferred. Physical therapy/OT evaluations pending    ANTITHROMBOTIC THERAPY If the hemorrhagic component is already one month old or more, then almost definitely it will be safe to change aspirin to full dose Lovenox indefinitely. If the hemorrhagic component is more recent, we may have to delay Lovenox for an additional week or 2    PULMONARY: CXR unchanged mild pulmonary edema with trace left pleural effusion, protecting airway, saturating well     CARDIOVASCULAR: TTE EF 65% Mild-moderate tricuspid regurgitation, small pericardial effusion, cardiac monitoring                              SBP goal: Permissive HTN to gradual normotension    GASTROINTESTINAL:  dysphagia screen passed     Diet: Regular    RENAL: BUN/Cr elevated likely SUMAN, good urine output      Na Goal: Greater than 135     Jackson: no    HEMATOLOGY: H/H shows anemia without acute change, Platelets 197     DVT ppx: LMWH    ID: afebrile, no leukocytosis     OTHER:     DISPOSITION: Rehab or home depending on PT eval once stable and workup is complete    CORE MEASURES:        Admission NIHSS: 18     TPA: [] YES [x] NO      LDL/HDL: 132/50     Depression Screen: p     Statin Therapy: yes     Dysphagia Screen: [x] PASS [] FAIL     Smoking [] YES [x] NO      Afib [] YES [x] NO     Stroke Education [x] YES [] NO 79 yo RH gentleman with hx of "previous stroke" w/o residual deficits; prostate CA, and recently (Feb 2019) informed about "stage 4 Liver CA" seeing rad-onc Dr. Weeks ([phone]) presented from OSH w/ R hemiplegia and R ICH as stroke rescue. Initial CT head (3/4/19) to my eye showed possible subtle low density in the left MCA distribution. There was a subacute small RMCA infarct in the posterior frontal region with dense hemorrhagic transformation (probably PH1 or 2). CTA neck and head (3/4/19) to my eye showed a proximal-mid left M1 occlusion. Repeat CT head (3/5/19) to my eye showed possible small acute infarction in the left MCA distribution involving the posterior insular region.  He was successfully treated with endovascular therapy.On admission NIHSS =18, MRS =1.    Impression. In 1/19 he had a stroke but the details are not all currently available. He has a history of stage IV liver carcinoma. On 3/4/19 he developed global aphasia and right hemiplegia, due to left MCA infarction with left M1 occlusion. His successive strokes are consistent with embolic stroke of unknown source, including the possibilities of marantic endocarditis and possible hypercoagulability of malignancy.    NEURO: Neurologically without acute change, permissive HTN to gradual normotension, - on atorvastatin 40mg QHS no need for high intensity statin as etiology nonatherosclerotic, MRI deferred as would not . Physical therapy/OT recommended home with no needs.    ANTITHROMBOTIC THERAPY: ASA for now, plan to start full dose Lovenox in setting of hypercoagulable state pending repeat stable CTH 1 week from admission on 3/12    PULMONARY: CXR unchanged mild pulmonary edema with trace left pleural effusion, protecting airway, saturating well     CARDIOVASCULAR: TTE EF 65% Mild-moderate tricuspid regurgitation, small pericardial effusion, cardiac monitoring                              SBP goal: Permissive HTN to gradual normotension    GASTROINTESTINAL:  dysphagia screen passed     Diet: Regular    RENAL: BUN/Cr elevated likely SUMAN, good urine output      Na Goal: Greater than 135     Jackson: no    HEMATOLOGY: H/H shows anemia without acute change, Platelets 197     DVT ppx: LMWH    ID: afebrile, no leukocytosis     OTHER: Plan endorsed to patient and family at bedside in detail, all questions and concerns addressed.     DISPOSITION: Home with close outpatient follow up.     CORE MEASURES:        Admission NIHSS: 18     TPA: [] YES [x] NO      LDL/HDL: 132/50     Depression Screen: 0     Statin Therapy: yes     Dysphagia Screen: [x] PASS [] FAIL     Smoking [] YES [x] NO      Afib [] YES [x] NO     Stroke Education [x] YES [] NO 77 yo RH gentleman with hx of "previous stroke" w/o residual deficits, probably occurred in mid 1/19 with vague visual and gait difficulty; prostate CA, and recently (Feb 2019) informed about "stage 4 Liver CA" seeing rad-onc Dr. Weeks ([phone]) presented from OSH w/ R hemiplegia and and probably subacute RMCA hemorrhagic infarct (probably corresponding to his stroke in 1/19) as stroke rescue. Initial CT head (3/4/19)  showed possible subtle low density in the left MCA distribution. There was a subacute small RMCA infarct in the posterior frontal region with dense hemorrhagic transformation (probably PH1). CTA neck and head (3/4/19) to my eye showed a proximal-mid left M1 occlusion. Repeat CT head (3/5/19) to my eye showed possible small acute infarction in the left MCA distribution involving the posterior insular region.  He was successfully treated with endovascular therapy. On admission NIHSS =18, MRS =1.    Impression. In 1/19 he had a stroke, probably RMCA but the details are not all currently available. He has a history of stage IV liver carcinoma. On 3/4/19 he developed global aphasia and right hemiplegia, due to left MCA infarction with left M1 occlusion. His successive strokes are consistent with embolic stroke of unknown source, including the possibilities of marantic endocarditis and possible hypercoagulability of malignancy.    NEURO: Neurologically without acute change and doing well, permissive HTN to gradual normotension, - on atorvastatin 40mg QHS no need for high intensity statin as etiology nonatherosclerotic, MRI deferred as would not . Physical therapy/OT recommended home with no needs.    ANTITHROMBOTIC THERAPY: ASA for now, plan to start full dose Lovenox in setting of hypercoagulable state pending repeat stable CTH (given what appears to be a subacute RMCA hemorrhagic infarct) 1 week from admission on 3/12    PULMONARY: CXR unchanged mild pulmonary edema with trace left pleural effusion, protecting airway, saturating well     CARDIOVASCULAR: TTE EF 65% Mild-moderate tricuspid regurgitation, small pericardial effusion, cardiac monitoring                              SBP goal: Permissive HTN to gradual normotension    GASTROINTESTINAL:  dysphagia screen passed     Diet: Regular    RENAL: BUN/Cr elevated likely SUMAN, good urine output      Na Goal: Greater than 135     Jackson: no    HEMATOLOGY: H/H shows anemia without acute change, Platelets 197     DVT ppx: LMWH    ID: afebrile, no leukocytosis     OTHER: Plan endorsed to patient and family at bedside in detail, all questions and concerns addressed.     DISPOSITION: Home with close outpatient follow up.     CORE MEASURES:        Admission NIHSS: 18     TPA: [] YES [x] NO      LDL/HDL: 132/50     Depression Screen: 0     Statin Therapy: yes     Dysphagia Screen: [x] PASS [] FAIL     Smoking [] YES [x] NO      Afib [] YES [x] NO     Stroke Education [x] YES [] NO

## 2019-03-06 NOTE — PROGRESS NOTE ADULT - SUBJECTIVE AND OBJECTIVE BOX
THE PATIENT WAS SEEN AND EXAMINED BY ME WITH THE HOUSESTAFF AND STROKE TEAM DURING MORNING ROUNDS.   HPI:  79 yo RH gentleman with hx of "previous stroke" w/o residual deficits; prostate CA, and recently (Feb 2019) informed about "stage 4 Liver CA" seeing rad-onc Dr. Weeks ([phone]) presented from OSH w/ R hemiplegia and R ICH. As per family, pt woke up in his usual state at 6AM on 3/4; then was seen again by son at 10AM with slurred speech and not moving right side, activated EMS. Pt was then transferred to [facility] as stroke rescue for R hemiplegia. On arrival pt found awake alert, severe dysarthria, intermittent moderate-severe aphasia, following some basic commands, but impaired repetition, comprehension, R facial droop, R promise-neglect, R hemiplegia. Pt unable to provide hx or ROS. Family adamantly state, pt is completely independent of his ADLs prior to today. Repeat CTH showing R sided ICH, and CTA/P with proximal L M1 occlusion. Pt excluded from tPA given R sided hemorrhage.  left MCA distal M1 occlusion, successfully treated with endovascular therapy,Pt to be taken to [facility] after admitted under stroke Neurology. NIHSS =18, MRS =1.    SUBJECTIVE: No events overnight.  No new neurologic complaints.      amLODIPine   Tablet 10 milliGRAM(s) Oral daily  aspirin  chewable 81 milliGRAM(s) Oral daily  atorvastatin 80 milliGRAM(s) Oral at bedtime  carBAMazepine Chewable 100 milliGRAM(s) Chew two times a day  enoxaparin Injectable 40 milliGRAM(s) SubCutaneous daily  ferrous    sulfate 325 milliGRAM(s) Oral three times a day  gabapentin 100 milliGRAM(s) Oral daily  hydrochlorothiazide 12.5 milliGRAM(s) Oral daily  insulin lispro (HumaLOG) corrective regimen sliding scale   SubCutaneous every 6 hours  losartan 100 milliGRAM(s) Oral daily  metoprolol tartrate 12.5 milliGRAM(s) Oral two times a day  oxyCODONE    IR 5 milliGRAM(s) Oral every 4 hours PRN  oxyCODONE    IR 10 milliGRAM(s) Oral every 4 hours PRN    PHYSICAL EXAM:   Vital Signs Last 24 Hrs  T(C): 36.8 (06 Mar 2019 07:21), Max: 36.9 (05 Mar 2019 11:00)  T(F): 98.2 (06 Mar 2019 07:21), Max: 98.4 (05 Mar 2019 11:00)  HR: 61 (06 Mar 2019 06:00) (61 - 101)  BP: 153/84 (06 Mar 2019 06:00) (130/97 - 157/92)  BP(mean): 1 (06 Mar 2019 06:00) (1 - 108)  RR: 16 (06 Mar 2019 06:00) (13 - 29)  SpO2: 96% (06 Mar 2019 06:00) (90% - 100%)    General: No acute distress  HEENT: EOM intact, visual fields full  Abdomen: Soft, nontender, nondistended   Extremities: No edema    NEUROLOGICAL EXAM:  Mental status: Awake, alert, oriented x3, no aphasia, no neglect, normal memory   Cranial Nerves: No facial asymmetry, no nystagmus, no dysarthria,  tongue midline  Motor exam: Normal tone, no drift, 5/5 RUE, 5/5 RLE, 5/5 LUE, 5/5 LLE, normal fine finger movements.  Sensation: Intact to light touch   Coordination/ Gait: No dysmetria, RJ intact and symmetric bilaterally    LABS:                        8.7    5.6   )-----------( 197      ( 06 Mar 2019 02:52 )             24.5    03-06    136  |  101  |  14  ----------------------------<  105<H>  3.9   |  21<L>  |  1.60<H>    Ca    8.7      06 Mar 2019 02:52  Phos  4.2     03-04  Mg     1.8     03-04    TPro  7.4  /  Alb  3.9  /  TBili  0.3  /  DBili  x   /  AST  36  /  ALT  23  /  AlkPhos  292<H>  03-04  PT/INR - ( 05 Mar 2019 03:50 )   PT: 12.7 sec;   INR: 1.10 ratio         PTT - ( 05 Mar 2019 03:50 )  PTT:27.5 sec  Hemoglobin A1C, Whole Blood: 6.3 % (03-04 @ 22:39)      IMAGING: Reviewed by me. THE PATIENT WAS SEEN AND EXAMINED BY ME WITH THE HOUSESTAFF AND STROKE TEAM DURING MORNING ROUNDS.   HPI:  77 yo RH gentleman with hx of "previous stroke" w/o residual deficits; prostate CA, and recently (Feb 2019) informed about "stage 4 Liver CA" seeing rad-onc Dr. Weeks ([phone]) presented from OSH w/ R hemiplegia and R ICH. As per family, pt woke up in his usual state at 6AM on 3/4; then was seen again by son at 10AM with slurred speech and not moving right side, activated EMS. Pt was then transferred to [facility] as stroke rescue for R hemiplegia. On arrival pt found awake alert, severe dysarthria, intermittent moderate-severe aphasia, following some basic commands, but impaired repetition, comprehension, R facial droop, R promise-neglect, R hemiplegia. Pt unable to provide hx or ROS. Family adamantly state, pt is completely independent of his ADLs prior to today. Repeat CTH showing R sided ICH, and CTA/P showed left MCA distal M1 occlusion. He was successfully treated with endovascular therapy. NIHSS =18, MRS =1.    SUBJECTIVE: No events overnight.  No new neurologic complaints.      amLODIPine   Tablet 10 milliGRAM(s) Oral daily  aspirin  chewable 81 milliGRAM(s) Oral daily  atorvastatin 80 milliGRAM(s) Oral at bedtime  carBAMazepine Chewable 100 milliGRAM(s) Chew two times a day  enoxaparin Injectable 40 milliGRAM(s) SubCutaneous daily  ferrous    sulfate 325 milliGRAM(s) Oral three times a day  gabapentin 100 milliGRAM(s) Oral daily  hydrochlorothiazide 12.5 milliGRAM(s) Oral daily  insulin lispro (HumaLOG) corrective regimen sliding scale   SubCutaneous every 6 hours  losartan 100 milliGRAM(s) Oral daily  metoprolol tartrate 12.5 milliGRAM(s) Oral two times a day  oxyCODONE    IR 5 milliGRAM(s) Oral every 4 hours PRN  oxyCODONE    IR 10 milliGRAM(s) Oral every 4 hours PRN    PHYSICAL EXAM:   Vital Signs Last 24 Hrs  T(C): 36.8 (06 Mar 2019 07:21), Max: 36.9 (05 Mar 2019 11:00)  T(F): 98.2 (06 Mar 2019 07:21), Max: 98.4 (05 Mar 2019 11:00)  HR: 61 (06 Mar 2019 06:00) (61 - 101)  BP: 153/84 (06 Mar 2019 06:00) (130/97 - 157/92)  BP(mean): 1 (06 Mar 2019 06:00) (1 - 108)  RR: 16 (06 Mar 2019 06:00) (13 - 29)  SpO2: 96% (06 Mar 2019 06:00) (90% - 100%)    General: No acute distress  HEENT: EOM intact, visual fields full  Abdomen: Soft, nontender, nondistended   Extremities: No edema    NEUROLOGICAL EXAM:  Mental status: Alert and attentive; speech overall fluent, but occasionally with subtle word finding pauses, followed crossed commands  Cranial Nerves: mild right central facial palsy; possibly subtle dysarthria, no nystagmus, tongue midline  Motor exam: Right-sided drift of arm and leg, 5/5 LUE, 5/5 LLE  Sensation: Intact to light touch   Coordination/ Gait: No dysmetria, gait deferred  LABS:                        8.7    5.6   )-----------( 197      ( 06 Mar 2019 02:52 )             24.5    03-06    136  |  101  |  14  ----------------------------<  105<H>  3.9   |  21<L>  |  1.60<H>    Ca    8.7      06 Mar 2019 02:52  Phos  4.2     03-04  Mg     1.8     03-04    TPro  7.4  /  Alb  3.9  /  TBili  0.3  /  DBili  x   /  AST  36  /  ALT  23  /  AlkPhos  292<H>  03-04  PT/INR - ( 05 Mar 2019 03:50 )   PT: 12.7 sec;   INR: 1.10 ratio      PTT - ( 05 Mar 2019 03:50 )  PTT:27.5 sec  Hemoglobin A1C, Whole Blood: 6.3 % (03-04 @ 22:39)    IMAGING: Reviewed by me.     CT Head No Cont (03.05.19)  Status post left M1 thrombectomy, right posterior frontal and anterior   parietal intraparenchymal hemorrhage with edema is unchanged, there is   redemonstration of volume loss and microvascular ischemic change, left   cerebral hemisphere demonstrates no evidence for mass effect or midline   shift. Basal cisterns remain patent.    CT Head No Cont/CT Head&Neck w/ IV Cont (03.04.19)  Evolving left MCA infarct with infarct volume of approximately 86 ml,   without hemorrhagic transformation with abrupt occlusion of the left M1   middle cerebral artery segment and absence of distal MCA branches.    Posterior right frontal and anterior parietal 2 x 2.5 cm AP by TR   intraparenchymal hemorrhage, possibly right MCA infarct with hemorrhagic   transmission.    Stenosis of the distal intracranial vasculature, tortuous extracranial   vessels likely reflecting hypertension with atherosclerotic vascular   calcification without ICA hemodynamically significant origin stenosis.    Abnormal bony sclerosis in the C2 vertebral body concerning for osseous   metastasis with linear fracture deformity concerning for pathologic   fracture at the base of the odontoid.    Degenerative change with a rotary dextrocurvature and multilevel cervical   foraminal narrowing, partially seen nodular opacities and bilateral   pleural effusions dedicated chest CT may better assess.     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    CT Head No Cont (03.04.19)  Acute intraparenchymal hemorrhage centered in the right parietal lobe   measuring approximately 3.4 cm TR x 1.5 cm AP x 1.6 cm cc. There is a   small amount of surrounding vasogenic edema. There is no significant mass   effect, midline shift, or herniation pattern present.  Subcentimeter   focus of hypoattenuation within the right cerebellum, may reflect a   chronic lacunar infarct. No hydrocephalus. THE PATIENT WAS SEEN AND EXAMINED BY ME WITH THE HOUSESTAFF AND STROKE TEAM DURING MORNING ROUNDS.   HPI:  79 yo RH gentleman with hx of "previous stroke" w/o residual deficits; prostate CA, and recently (Feb 2019) informed about "stage 4 Liver CA" seeing rad-onc Dr. Weeks ([phone]) presented from OSH w/ R hemiplegia and R ICH. As per family, pt woke up in his usual state at 6AM on 3/4; then was seen again by son at 10AM with slurred speech and not moving right side, activated EMS. Pt was then transferred to [facility] as stroke rescue for R hemiplegia. On arrival pt found awake alert, severe dysarthria, intermittent moderate-severe aphasia, following some basic commands, but impaired repetition, comprehension, R facial droop, R promise-neglect, R hemiplegia. Pt unable to provide hx or ROS. Family adamantly state, pt is completely independent of his ADLs prior to today. Repeat CTH showing R sided ICH, and CTA/P showed left MCA distal M1 occlusion. He was successfully treated with endovascular therapy. NIHSS =18, MRS =1.    SUBJECTIVE: No events overnight.  No new neurologic complaints.      amLODIPine   Tablet 10 milliGRAM(s) Oral daily  aspirin  chewable 81 milliGRAM(s) Oral daily  atorvastatin 80 milliGRAM(s) Oral at bedtime  carBAMazepine Chewable 100 milliGRAM(s) Chew two times a day  enoxaparin Injectable 40 milliGRAM(s) SubCutaneous daily  ferrous    sulfate 325 milliGRAM(s) Oral three times a day  gabapentin 100 milliGRAM(s) Oral daily  hydrochlorothiazide 12.5 milliGRAM(s) Oral daily  insulin lispro (HumaLOG) corrective regimen sliding scale   SubCutaneous every 6 hours  losartan 100 milliGRAM(s) Oral daily  metoprolol tartrate 12.5 milliGRAM(s) Oral two times a day  oxyCODONE    IR 5 milliGRAM(s) Oral every 4 hours PRN  oxyCODONE    IR 10 milliGRAM(s) Oral every 4 hours PRN    PHYSICAL EXAM:   Vital Signs Last 24 Hrs  T(C): 36.8 (06 Mar 2019 07:21), Max: 36.9 (05 Mar 2019 11:00)  T(F): 98.2 (06 Mar 2019 07:21), Max: 98.4 (05 Mar 2019 11:00)  HR: 61 (06 Mar 2019 06:00) (61 - 101)  BP: 153/84 (06 Mar 2019 06:00) (130/97 - 157/92)  BP(mean): 1 (06 Mar 2019 06:00) (1 - 108)  RR: 16 (06 Mar 2019 06:00) (13 - 29)  SpO2: 96% (06 Mar 2019 06:00) (90% - 100%)    General: No acute distress  HEENT: EOM intact, visual fields full  Abdomen: Soft, nontender, nondistended   Extremities: No edema    NEUROLOGICAL EXAM:  Mental status: Alert and attentive; speech overall fluent, followed crossed commands  Cranial Nerves: mild right central facial palsy; possibly subtle dysarthria, no nystagmus, tongue midline  Motor exam: Right-sided drift of arm and leg, 5/5 LUE, 5/5 LLE  Sensation: Intact to light touch   Coordination/ Gait: No dysmetria, gait deferred  LABS:                        8.7    5.6   )-----------( 197      ( 06 Mar 2019 02:52 )             24.5    03-06    136  |  101  |  14  ----------------------------<  105<H>  3.9   |  21<L>  |  1.60<H>    Ca    8.7      06 Mar 2019 02:52  Phos  4.2     03-04  Mg     1.8     03-04    TPro  7.4  /  Alb  3.9  /  TBili  0.3  /  DBili  x   /  AST  36  /  ALT  23  /  AlkPhos  292<H>  03-04  PT/INR - ( 05 Mar 2019 03:50 )   PT: 12.7 sec;   INR: 1.10 ratio      PTT - ( 05 Mar 2019 03:50 )  PTT:27.5 sec  Hemoglobin A1C, Whole Blood: 6.3 % (03-04 @ 22:39)    IMAGING: Reviewed by me.     CT Head No Cont (03.05.19)  Status post left M1 thrombectomy, right posterior frontal and anterior   parietal intraparenchymal hemorrhage with edema is unchanged, there is   redemonstration of volume loss and microvascular ischemic change, left   cerebral hemisphere demonstrates no evidence for mass effect or midline   shift. Basal cisterns remain patent.    CT Head No Cont/CT Head&Neck w/ IV Cont (03.04.19)  Evolving left MCA infarct with infarct volume of approximately 86 ml,   without hemorrhagic transformation with abrupt occlusion of the left M1   middle cerebral artery segment and absence of distal MCA branches.    Posterior right frontal and anterior parietal 2 x 2.5 cm AP by TR   intraparenchymal hemorrhage, possibly right MCA infarct with hemorrhagic   transmission.    Stenosis of the distal intracranial vasculature, tortuous extracranial   vessels likely reflecting hypertension with atherosclerotic vascular   calcification without ICA hemodynamically significant origin stenosis.    Abnormal bony sclerosis in the C2 vertebral body concerning for osseous   metastasis with linear fracture deformity concerning for pathologic   fracture at the base of the odontoid.    Degenerative change with a rotary dextrocurvature and multilevel cervical   foraminal narrowing, partially seen nodular opacities and bilateral   pleural effusions dedicated chest CT may better assess.     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    CT Head No Cont (03.04.19)  Acute intraparenchymal hemorrhage centered in the right parietal lobe   measuring approximately 3.4 cm TR x 1.5 cm AP x 1.6 cm cc. There is a   small amount of surrounding vasogenic edema. There is no significant mass   effect, midline shift, or herniation pattern present.  Subcentimeter   focus of hypoattenuation within the right cerebellum, may reflect a   chronic lacunar infarct. No hydrocephalus. THE PATIENT WAS SEEN AND EXAMINED BY ME WITH THE HOUSESTAFF AND STROKE TEAM DURING MORNING ROUNDS.   HPI:  79 yo RH gentleman with hx of "previous stroke" w/o residual deficits; prostate CA, and recently (Feb 2019) informed about "stage 4 Liver CA" seeing rad-onc Dr. Weeks ([phone]) presented from OSH w/ R hemiplegia and R ICH. As per family, pt woke up in his usual state at 6AM on 3/4; then was seen again by son at 10AM with slurred speech and not moving right side, activated EMS. Pt was then transferred to [facility] as stroke rescue for R hemiplegia. On arrival pt found awake alert, severe dysarthria, intermittent moderate-severe aphasia, following some basic commands, but impaired repetition, comprehension, R facial droop, R promise-neglect, R hemiplegia. Pt unable to provide hx or ROS. Family adamantly state, pt is completely independent of his ADLs prior to today. Repeat CTH showing R sided hemorrhagic infarct, and CTA/P showed left MCA distal M1 occlusion. He was successfully treated with endovascular therapy. NIHSS =18, MRS =1.    SUBJECTIVE: No events overnight.  No new neurologic complaints.      amLODIPine   Tablet 10 milliGRAM(s) Oral daily  aspirin  chewable 81 milliGRAM(s) Oral daily  atorvastatin 80 milliGRAM(s) Oral at bedtime  carBAMazepine Chewable 100 milliGRAM(s) Chew two times a day  enoxaparin Injectable 40 milliGRAM(s) SubCutaneous daily  ferrous    sulfate 325 milliGRAM(s) Oral three times a day  gabapentin 100 milliGRAM(s) Oral daily  hydrochlorothiazide 12.5 milliGRAM(s) Oral daily  insulin lispro (HumaLOG) corrective regimen sliding scale   SubCutaneous every 6 hours  losartan 100 milliGRAM(s) Oral daily  metoprolol tartrate 12.5 milliGRAM(s) Oral two times a day  oxyCODONE    IR 5 milliGRAM(s) Oral every 4 hours PRN  oxyCODONE    IR 10 milliGRAM(s) Oral every 4 hours PRN    PHYSICAL EXAM:   Vital Signs Last 24 Hrs  T(C): 36.8 (06 Mar 2019 07:21), Max: 36.9 (05 Mar 2019 11:00)  T(F): 98.2 (06 Mar 2019 07:21), Max: 98.4 (05 Mar 2019 11:00)  HR: 61 (06 Mar 2019 06:00) (61 - 101)  BP: 153/84 (06 Mar 2019 06:00) (130/97 - 157/92)  BP(mean): 1 (06 Mar 2019 06:00) (1 - 108)  RR: 16 (06 Mar 2019 06:00) (13 - 29)  SpO2: 96% (06 Mar 2019 06:00) (90% - 100%)    General: No acute distress  HEENT: EOM intact, visual fields full  Abdomen: Soft, nontender, nondistended   Extremities: No edema    NEUROLOGICAL EXAM:  Mental status: Alert and attentive; speech overall fluent, followed crossed commands  Cranial Nerves: mild right central facial palsy; possibly subtle dysarthria, no nystagmus, tongue midline  Motor exam: Right-sided drift of arm and leg, 5/5 LUE, 5/5 LLE  Sensation: Intact to light touch   Coordination/ Gait: No dysmetria, gait deferred  LABS:                        8.7    5.6   )-----------( 197      ( 06 Mar 2019 02:52 )             24.5    03-06    136  |  101  |  14  ----------------------------<  105<H>  3.9   |  21<L>  |  1.60<H>    Ca    8.7      06 Mar 2019 02:52  Phos  4.2     03-04  Mg     1.8     03-04    TPro  7.4  /  Alb  3.9  /  TBili  0.3  /  DBili  x   /  AST  36  /  ALT  23  /  AlkPhos  292<H>  03-04  PT/INR - ( 05 Mar 2019 03:50 )   PT: 12.7 sec;   INR: 1.10 ratio      PTT - ( 05 Mar 2019 03:50 )  PTT:27.5 sec  Hemoglobin A1C, Whole Blood: 6.3 % (03-04 @ 22:39)    IMAGING: Reviewed by me.     CT Head No Cont (03.05.19)  Status post left M1 thrombectomy, right posterior frontal and anterior   parietal intraparenchymal hemorrhage with edema is unchanged, there is   redemonstration of volume loss and microvascular ischemic change, left   cerebral hemisphere demonstrates no evidence for mass effect or midline   shift. Basal cisterns remain patent.    CT Head No Cont/CT Head&Neck w/ IV Cont (03.04.19)  Evolving left MCA infarct with infarct volume of approximately 86 ml,   without hemorrhagic transformation with abrupt occlusion of the left M1   middle cerebral artery segment and absence of distal MCA branches.    Posterior right frontal and anterior parietal 2 x 2.5 cm AP by TR   intraparenchymal hemorrhage, possibly right MCA infarct with hemorrhagic   transmission.    Stenosis of the distal intracranial vasculature, tortuous extracranial   vessels likely reflecting hypertension with atherosclerotic vascular   calcification without ICA hemodynamically significant origin stenosis.    Abnormal bony sclerosis in the C2 vertebral body concerning for osseous   metastasis with linear fracture deformity concerning for pathologic   fracture at the base of the odontoid.    Degenerative change with a rotary dextrocurvature and multilevel cervical   foraminal narrowing, partially seen nodular opacities and bilateral   pleural effusions dedicated chest CT may better assess.     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    CT Head No Cont (03.04.19)  Acute intraparenchymal hemorrhage centered in the right parietal lobe   measuring approximately 3.4 cm TR x 1.5 cm AP x 1.6 cm cc. There is a   small amount of surrounding vasogenic edema. There is no significant mass   effect, midline shift, or herniation pattern present.  Subcentimeter   focus of hypoattenuation within the right cerebellum, may reflect a   chronic lacunar infarct. No hydrocephalus.

## 2019-03-06 NOTE — DIETITIAN INITIAL EVALUATION ADULT. - OTHER INFO
Pt seen for:   Adm dx:     GI issues:   Last BM:     Food allergies/Intolerances:    Vit/supplement PTA: Pt seen for: Stroke Unit length of stay   Adm dx:  ICH, M1 occlusion S/P thrombectomy  GI issues: no N/V  Last BM: 3/4    Food allergies/Intolerances: none   Vit/supplement PTA: NKFA

## 2019-03-06 NOTE — PHYSICAL THERAPY INITIAL EVALUATION ADULT - ADDITIONAL COMMENTS
PTA pt lived in [place], came to [place] for health care services. Pt will be D/C to son's home, 3 steps to enter +HR, no steps inside, family will always be around to assist as needed, pt was ambulating I without AD, owns RW from prior CVA however does not use.

## 2019-03-06 NOTE — PHARMACOTHERAPY INTERVENTION NOTE - COMMENTS
patient's daughter and son made aware regarding new medication atorvastatin replacement of simvastatin. Daughter educated regarding side effects of muscle pain, avoiding grapefruit juice and following up for blood work to evaluate whether efficacious. Also to follow up with prescriber regarding need for carbamazepine since unclear if therapy is working.

## 2019-03-06 NOTE — OCCUPATIONAL THERAPY INITIAL EVALUATION ADULT - PRECAUTIONS/LIMITATIONS, REHAB EVAL
continue: Repeat CTH showing R sided ICH, and CTA/P with proximal L M1 occlusion. Pt excluded from tPA given R sided hemorrhage. Pt is deemed endovascular candidate and after in-depth discussion with pt's family and HCP Maria Victoria; pt was taken to urgent embolectomy of LM1 occlusion./no known precautions/limitations

## 2019-03-06 NOTE — PHYSICAL THERAPY INITIAL EVALUATION ADULT - PERTINENT HX OF CURRENT PROBLEM, REHAB EVAL
Pt is a 77 yo RH gentleman with hx of "previous stroke" w/o residual deficits; prostate CA, and recently (Feb 2019) informed about "stage 4 Liver CA" seeing rad-onc Dr. Weeks ([phone]) presents from OSH w/ R hemiplegia and R ICH. As per family, pt woke up in his usual state at 6AM on 3/4; then was seen again by son at 10AM with slurred speech and not moving right side, activated EMS. Pt was then transferred to [facility] as stroke rescue for R hemiplegia.

## 2019-03-06 NOTE — DIETITIAN INITIAL EVALUATION ADULT. - ENERGY NEEDS
Ht: 68"  Wt: 164  BMI: 25.1 kg/m2   IBW:154  (+/-10%)     within IBW  Edema: none         Skin: no pressure injuries documented

## 2019-03-07 LAB — SURGICAL PATHOLOGY STUDY: SIGNIFICANT CHANGE UP

## 2019-03-08 ENCOUNTER — OTHER (OUTPATIENT)
Age: 79
End: 2019-03-08

## 2019-03-08 ENCOUNTER — APPOINTMENT (OUTPATIENT)
Dept: NEUROLOGY | Facility: CLINIC | Age: 79
End: 2019-03-08

## 2019-03-08 PROBLEM — Z00.00 ENCOUNTER FOR PREVENTIVE HEALTH EXAMINATION: Status: ACTIVE | Noted: 2019-03-08

## 2019-03-08 PROBLEM — I10 ESSENTIAL (PRIMARY) HYPERTENSION: Chronic | Status: ACTIVE | Noted: 2019-03-04

## 2019-03-08 PROBLEM — C22.9 MALIGNANT NEOPLASM OF LIVER, NOT SPECIFIED AS PRIMARY OR SECONDARY: Chronic | Status: ACTIVE | Noted: 2019-03-04

## 2019-03-10 ENCOUNTER — RESULT CHARGE (OUTPATIENT)
Age: 79
End: 2019-03-10

## 2019-03-11 ENCOUNTER — APPOINTMENT (OUTPATIENT)
Dept: NEUROLOGY | Facility: CLINIC | Age: 79
End: 2019-03-11
Payer: MEDICARE

## 2019-03-11 VITALS
HEART RATE: 104 BPM | WEIGHT: 152 LBS | BODY MASS INDEX: 24.43 KG/M2 | HEIGHT: 66 IN | SYSTOLIC BLOOD PRESSURE: 120 MMHG | DIASTOLIC BLOOD PRESSURE: 75 MMHG

## 2019-03-11 DIAGNOSIS — Z87.891 PERSONAL HISTORY OF NICOTINE DEPENDENCE: ICD-10-CM

## 2019-03-11 PROCEDURE — 99496 TRANSJ CARE MGMT HIGH F2F 7D: CPT

## 2019-03-11 RX ORDER — METOPROLOL TARTRATE 25 MG/1
25 TABLET, FILM COATED ORAL
Refills: 0 | Status: ACTIVE | COMMUNITY

## 2019-03-11 RX ORDER — METFORMIN HYDROCHLORIDE 1000 MG/1
1000 TABLET, COATED ORAL
Refills: 0 | Status: ACTIVE | COMMUNITY

## 2019-03-11 RX ORDER — ATORVASTATIN CALCIUM 40 MG/1
40 TABLET, FILM COATED ORAL
Qty: 30 | Refills: 0 | Status: ACTIVE | COMMUNITY
Start: 2019-03-07

## 2019-03-11 RX ORDER — AMLODIPINE BESYLATE 10 MG/1
10 TABLET ORAL
Qty: 30 | Refills: 0 | Status: ACTIVE | COMMUNITY
Start: 2019-03-07

## 2019-03-11 RX ORDER — CARBAMAZEPINE 100 MG/1
100 TABLET, CHEWABLE ORAL
Refills: 0 | Status: ACTIVE | COMMUNITY

## 2019-03-12 ENCOUNTER — OTHER (OUTPATIENT)
Age: 79
End: 2019-03-12

## 2019-03-14 ENCOUNTER — OUTPATIENT (OUTPATIENT)
Dept: OUTPATIENT SERVICES | Facility: HOSPITAL | Age: 79
LOS: 1 days | End: 2019-03-14
Payer: COMMERCIAL

## 2019-03-14 ENCOUNTER — APPOINTMENT (OUTPATIENT)
Dept: CT IMAGING | Facility: CLINIC | Age: 79
End: 2019-03-14
Payer: MEDICARE

## 2019-03-14 ENCOUNTER — OTHER (OUTPATIENT)
Age: 79
End: 2019-03-14

## 2019-03-14 DIAGNOSIS — Z98.890 OTHER SPECIFIED POSTPROCEDURAL STATES: Chronic | ICD-10-CM

## 2019-03-14 DIAGNOSIS — Z00.8 ENCOUNTER FOR OTHER GENERAL EXAMINATION: ICD-10-CM

## 2019-03-14 PROCEDURE — 70450 CT HEAD/BRAIN W/O DYE: CPT

## 2019-03-14 PROCEDURE — 70450 CT HEAD/BRAIN W/O DYE: CPT | Mod: 26

## 2019-03-19 ENCOUNTER — APPOINTMENT (OUTPATIENT)
Dept: NEUROLOGY | Facility: CLINIC | Age: 79
End: 2019-03-19
Payer: MEDICARE

## 2019-03-19 VITALS
HEART RATE: 78 BPM | RESPIRATION RATE: 15 BRPM | DIASTOLIC BLOOD PRESSURE: 71 MMHG | BODY MASS INDEX: 25.71 KG/M2 | SYSTOLIC BLOOD PRESSURE: 108 MMHG | TEMPERATURE: 97.8 F | HEIGHT: 66 IN | OXYGEN SATURATION: 94 % | WEIGHT: 160 LBS

## 2019-03-19 PROCEDURE — 99215 OFFICE O/P EST HI 40 MIN: CPT

## 2019-03-19 NOTE — REVIEW OF SYSTEMS
[Depression] : depression [Fever] : no fever [Chills] : no chills [Feeling Poorly] : not feeling poorly [Feeling Tired] : not feeling tired [Suicidal] : not suicidal [Anxiety] : no anxiety [Confused or Disoriented] : no confusion [Memory Lapses or Loss] : no memory loss [Decr. Concentrating Ability] : no decrease in concentrating ability [Difficulty with Language] : no ~M difficulty with language [Changed Thought Patterns] : no change in thought patterns [Repeating Questions] : no repeated questioning about recent events [Facial Weakness] : no facial weakness [Arm Weakness] : no arm weakness [Hand Weakness] : no hand weakness [Leg Weakness] : no leg weakness [Poor Coordination] : good coordination [Difficulty Writing] : no difficulty writing [Difficulties in Speech] : no speech difficulties [Numbness] : no numbness [Tingling] : no tingling [Seizures] : no convulsions [Dizziness] : no dizziness [Fainting] : no fainting [Lightheadedness] : no lightheadedness [Vertigo] : no vertigo [Tension Headache] : no tension-type headache [Difficulty Walking] : no difficulty walking [Inability to Walk] : able to walk [Ataxia] : no ataxia [Earache] : no earache [Loss Of Hearing] : no hearing loss [Chest Pain] : no chest pain [Palpitations] : no palpitations [Shortness Of Breath] : no shortness of breath [Wheezing] : no wheezing [Cough] : no cough [Abdominal Pain] : no abdominal pain [Vomiting] : no vomiting [Constipation] : no constipation [Diarrhea] : no diarrhea [Dysuria] : no dysuria [Incontinence] : no incontinence [Joint Pain] : no joint pain [Joint Swelling] : no joint swelling [Joint Stiffness] : no joint stiffness [Easy Bleeding] : no tendency for easy bleeding [Easy Bruising] : no tendency for easy bruising

## 2019-03-19 NOTE — PHYSICAL EXAM
[General Appearance - Alert] : alert [General Appearance - Well Nourished] : well nourished [General Appearance - Well Developed] : well developed [Oriented To Time, Place, And Person] : oriented to person, place, and time [Mood] : the mood was normal [Affect] : the affect was normal [Person] : oriented to person [Place] : oriented to place [Time] : oriented to time [Concentration Intact] : normal concentrating ability [Visual Intact] : visual attention was ~T not ~L decreased [Naming Objects] : no difficulty naming common objects [Repeating Phrases] : no difficulty repeating a phrase [Fluency] : fluency intact [Writing A Sentence] : no difficulty writing a sentence [Comprehension] : comprehension intact [Reading] : reading intact [Past History] : adequate knowledge of personal past history [Cranial Nerves Optic (II)] : visual acuity intact bilaterally,  visual fields full to confrontation, pupils equal round and reactive to light [Cranial Nerves Oculomotor (III)] : extraocular motion intact [Cranial Nerves Trigeminal (V)] : facial sensation intact symmetrically [Cranial Nerves Facial (VII)] : face symmetrical [Cranial Nerves Vestibulocochlear (VIII)] : hearing was intact bilaterally [Cranial Nerves Glossopharyngeal (IX)] : tongue and palate midline [Cranial Nerves Accessory (XI - Cranial And Spinal)] : head turning and shoulder shrug symmetric [Cranial Nerves Hypoglossal (XII)] : there was no tongue deviation with protrusion [Motor Tone] : muscle tone was normal in all four extremities [Motor Strength] : muscle strength was normal in all four extremities [No Muscle Atrophy] : normal bulk in all four extremities [Motor Handedness Right-Handed] : the patient is right hand dominant [Sensation Tactile Decrease] : light touch was intact [Balance] : balance was intact [Extraocular Movements] : extraocular movements were intact [Full Visual Field] : full visual field [Hearing Threshold Finger Rub Not Borden] : hearing was normal [Neck Appearance] : the appearance of the neck was normal [] : no respiratory distress [Heart Rate And Rhythm] : heart rate was normal and rhythm regular [Abdomen Soft] : soft [Abnormal Walk] : normal gait [Involuntary Movements] : no involuntary movements were seen [Musculoskeletal - Swelling] : no joint swelling seen [Skin Color & Pigmentation] : normal skin color and pigmentation [Skin Turgor] : normal skin turgor [Paresis Pronator Drift Right-Sided] : no pronator drift on the right [Paresis Pronator Drift Left-Sided] : no pronator drift on the left [Motor Strength Upper Extremities Bilaterally] : strength was normal in both upper extremities [Motor Strength Lower Extremities Bilaterally] : strength was normal in both lower extremities [Dysdiadochokinesia Bilaterally] : not present [Coordination - Dysmetria Impaired Finger-to-Nose Bilateral] : not present

## 2019-03-19 NOTE — DISCUSSION/SUMMARY
[FreeTextEntry1] : Mr. Balderrama is a 78 year old man with hx of "previous stroke" w/o residual deficits; prostate CA, and recently (Feb 2019) informed about "stage 4 Liver CA" now 2 weeks s/p Left MCA infarction, distal M1 s/post thrombectomy TICI 3 with full neurological recovery. His hospital course was uncomplicated and he was discharged home on ASA with the plan to start him on FD Lovenox in the setting of hypercoagulable state given his recent diagnosis of liver cancer. His repeat CT scan on 3/14 was stable and we will start him on Lovenox today and discontinue the ASA. We discussed the risks of increasing his ICH on Lovenox versus the risk of recurrent stroke off Lovenox given his malignancy. Lovenox teaching done with patient and son. I will see him again in the office in 3 weeks after another repeat CT scan. If he is stable we will change him from 60 mg bid to 90 mg SQ daily. All of their questions and concerns were addressed.

## 2019-03-19 NOTE — HISTORY OF PRESENT ILLNESS
[FreeTextEntry1] : Mr. Balderrama is a 78 year old man with hx of "previous stroke" w/o residual deficits; prostate CA, and recently (Feb 2019) informed about "stage 4 Liver CA" seeing rad-onc Dr. Weeks ([phone]), who presented from an OSH w/ R hemiplegia and R ICH. As per family, pt woke up in his usual state at 6AM on 3/4; then was seen again by son at 10AM with slurred speech and not moving right side. He activated EMS. Pt was then transferred to [facility] as stroke rescue for R hemiplegia. Repeat CTH showed R sided hemorrhagic infarct with hemorrhagic conversion, and CTA/P showed left MCA distal M1 occlusion. He was successfully treated with endovascular therapy and his neurological exam improved to normal baseline. Patient was discharged to home on 3/6. He comes in today for a follow up visit. He is doing exceptionally well with no residual deficits. He is currently on ASA for secondary stroke prevention. He denies any interval TIA/Stroke symptoms. 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 CT head from 3/14/19 shows a resolving right posterior frontal hemorrhagic transformation of a chronic ischemic stroke and no left sided stroke.

## 2019-04-01 ENCOUNTER — FORM ENCOUNTER (OUTPATIENT)
Age: 79
End: 2019-04-01

## 2019-04-02 ENCOUNTER — APPOINTMENT (OUTPATIENT)
Dept: NEUROLOGY | Facility: CLINIC | Age: 79
End: 2019-04-02
Payer: MEDICARE

## 2019-04-02 ENCOUNTER — OUTPATIENT (OUTPATIENT)
Dept: OUTPATIENT SERVICES | Facility: HOSPITAL | Age: 79
LOS: 1 days | End: 2019-04-02
Payer: COMMERCIAL

## 2019-04-02 VITALS
RESPIRATION RATE: 16 BRPM | SYSTOLIC BLOOD PRESSURE: 131 MMHG | OXYGEN SATURATION: 96 % | HEIGHT: 66 IN | DIASTOLIC BLOOD PRESSURE: 81 MMHG | BODY MASS INDEX: 25.71 KG/M2 | TEMPERATURE: 97.7 F | WEIGHT: 160 LBS | HEART RATE: 107 BPM

## 2019-04-02 DIAGNOSIS — I63.511 CEREBRAL INFARCTION DUE TO UNSPECIFIED OCCLUSION OR STENOSIS OF RIGHT MIDDLE CEREBRAL ARTERY: ICD-10-CM

## 2019-04-02 DIAGNOSIS — Z98.890 OTHER SPECIFIED POSTPROCEDURAL STATES: Chronic | ICD-10-CM

## 2019-04-02 PROCEDURE — 70450 CT HEAD/BRAIN W/O DYE: CPT | Mod: 26

## 2019-04-02 PROCEDURE — 70450 CT HEAD/BRAIN W/O DYE: CPT

## 2019-04-02 PROCEDURE — 99215 OFFICE O/P EST HI 40 MIN: CPT

## 2019-04-02 NOTE — REVIEW OF SYSTEMS
[Fever] : no fever [Chills] : no chills [Feeling Poorly] : not feeling poorly [Feeling Tired] : not feeling tired [Suicidal] : not suicidal [Anxiety] : no anxiety [Depression] : no depression [Confused or Disoriented] : no confusion [Memory Lapses or Loss] : no memory loss [Decr. Concentrating Ability] : no decrease in concentrating ability [Difficulty with Language] : no ~M difficulty with language [Changed Thought Patterns] : no change in thought patterns [Repeating Questions] : no repeated questioning about recent events [Facial Weakness] : no facial weakness [Arm Weakness] : no arm weakness [Hand Weakness] : no hand weakness [Leg Weakness] : no leg weakness [Poor Coordination] : good coordination [Difficulty Writing] : no difficulty writing [Difficulties in Speech] : no speech difficulties [Numbness] : no numbness [Tingling] : no tingling [Seizures] : no convulsions [Dizziness] : no dizziness [Fainting] : no fainting [Lightheadedness] : no lightheadedness [Vertigo] : no vertigo [Tension Headache] : no tension-type headache [Difficulty Walking] : no difficulty walking [Inability to Walk] : able to walk [Ataxia] : no ataxia [Eye Pain] : no eye pain [Earache] : no earache [Loss Of Hearing] : no hearing loss [Chest Pain] : no chest pain [Palpitations] : no palpitations [Shortness Of Breath] : no shortness of breath [Wheezing] : no wheezing [Cough] : no cough [Abdominal Pain] : no abdominal pain [Vomiting] : no vomiting [Dysuria] : no dysuria [Incontinence] : no incontinence [Joint Swelling] : no joint swelling [Joint Stiffness] : no joint stiffness [Easy Bleeding] : no tendency for easy bleeding [Easy Bruising] : no tendency for easy bruising

## 2019-04-02 NOTE — HISTORY OF PRESENT ILLNESS
[FreeTextEntry1] : Mr. Balderrama is a 78 year old man with hx of "previous stroke" w/o residual deficits; prostate CA, and recently (Feb 2019) informed about "stage 4 Liver CA" seeing rad-onc Dr. Weeks ([phone]), who presented from an OSH w/ R hemiplegia and R ICH. As per family, pt woke up in his usual state at 6AM on 3/4; then was seen again by son at 10AM with slurred speech and not moving right side. He activated EMS. Pt was then transferred to [facility] as stroke rescue for R hemiplegia. Repeat CTH showed R sided hemorrhagic infarct with hemorrhagic conversion, and CTA/P showed left MCA distal M1 occlusion. He was successfully treated with endovascular therapy and his neurological exam improved to normal baseline. Patient was discharged to home on 3/6. He comes in today for a follow up visit. He is doing exceptionally well with no residual deficits. He is currently on Lovenox 60 mg BID for stroke prevention. He denies any interval TIA/Stroke symptoms. Of note he started treatment for his liver cancer and doing well.

## 2019-04-02 NOTE — PHYSICAL EXAM
[General Appearance - Alert] : alert [General Appearance - Well Nourished] : well nourished [General Appearance - Well Developed] : well developed [Oriented To Time, Place, And Person] : oriented to person, place, and time [Affect] : the affect was normal [Mood] : the mood was normal [Person] : oriented to person [Place] : oriented to place [Time] : oriented to time [Short Term Intact] : short term memory intact [Concentration Intact] : normal concentrating ability [Visual Intact] : visual attention was ~T not ~L decreased [Naming Objects] : no difficulty naming common objects [Repeating Phrases] : no difficulty repeating a phrase [Writing A Sentence] : no difficulty writing a sentence [Fluency] : fluency intact [Comprehension] : comprehension intact [Reading] : reading intact [Past History] : adequate knowledge of personal past history [Cranial Nerves Optic (II)] : visual acuity intact bilaterally,  visual fields full to confrontation, pupils equal round and reactive to light [Cranial Nerves Oculomotor (III)] : extraocular motion intact [Cranial Nerves Trigeminal (V)] : facial sensation intact symmetrically [Cranial Nerves Facial (VII)] : face symmetrical [Cranial Nerves Vestibulocochlear (VIII)] : hearing was intact bilaterally [Cranial Nerves Glossopharyngeal (IX)] : tongue and palate midline [Cranial Nerves Accessory (XI - Cranial And Spinal)] : head turning and shoulder shrug symmetric [Cranial Nerves Hypoglossal (XII)] : there was no tongue deviation with protrusion [Motor Tone] : muscle tone was normal in all four extremities [Motor Strength] : muscle strength was normal in all four extremities [No Muscle Atrophy] : normal bulk in all four extremities [Motor Handedness Right-Handed] : the patient is right hand dominant [Sensation Tactile Decrease] : light touch was intact [Balance] : balance was intact [Extraocular Movements] : extraocular movements were intact [Optic Disc Abnormality] : the optic disc were normal in size and color [Full Visual Field] : full visual field [Neck Appearance] : the appearance of the neck was normal [] : no respiratory distress [Heart Rate And Rhythm] : heart rate was normal and rhythm regular [Edema] : there was no peripheral edema [Abdomen Soft] : soft [Abnormal Walk] : normal gait [Involuntary Movements] : no involuntary movements were seen [Musculoskeletal - Swelling] : no joint swelling seen [Skin Color & Pigmentation] : normal skin color and pigmentation [Skin Turgor] : normal skin turgor [Paresis Pronator Drift Right-Sided] : no pronator drift on the right [Paresis Pronator Drift Left-Sided] : no pronator drift on the left [Motor Strength Upper Extremities Bilaterally] : strength was normal in both upper extremities [Motor Strength Lower Extremities Bilaterally] : strength was normal in both lower extremities [Romberg's Sign] : Romberg's sign was negtive [Dysdiadochokinesia Bilaterally] : not present [Coordination - Dysmetria Impaired Finger-to-Nose Bilateral] : not present

## 2019-04-10 ENCOUNTER — OUTPATIENT (OUTPATIENT)
Dept: OUTPATIENT SERVICES | Facility: HOSPITAL | Age: 79
LOS: 1 days | End: 2019-04-10
Payer: MEDICARE

## 2019-04-10 ENCOUNTER — APPOINTMENT (OUTPATIENT)
Dept: INTERVENTIONAL RADIOLOGY/VASCULAR | Facility: HOSPITAL | Age: 79
End: 2019-04-10
Payer: MEDICARE

## 2019-04-10 ENCOUNTER — RESULT REVIEW (OUTPATIENT)
Age: 79
End: 2019-04-10

## 2019-04-10 DIAGNOSIS — Z98.890 OTHER SPECIFIED POSTPROCEDURAL STATES: Chronic | ICD-10-CM

## 2019-04-10 PROCEDURE — 88341 IMHCHEM/IMCYTCHM EA ADD ANTB: CPT

## 2019-04-10 PROCEDURE — 76942 ECHO GUIDE FOR BIOPSY: CPT | Mod: 26

## 2019-04-10 PROCEDURE — 88342 IMHCHEM/IMCYTCHM 1ST ANTB: CPT

## 2019-04-10 PROCEDURE — 47000 NEEDLE BIOPSY OF LIVER PERQ: CPT

## 2019-04-10 PROCEDURE — 88173 CYTOPATH EVAL FNA REPORT: CPT

## 2019-04-10 PROCEDURE — 99152 MOD SED SAME PHYS/QHP 5/>YRS: CPT

## 2019-04-10 PROCEDURE — 88307 TISSUE EXAM BY PATHOLOGIST: CPT

## 2019-04-10 PROCEDURE — 88305 TISSUE EXAM BY PATHOLOGIST: CPT

## 2019-04-10 PROCEDURE — 76942 ECHO GUIDE FOR BIOPSY: CPT

## 2019-04-12 LAB
NON-GYNECOLOGICAL CYTOLOGY STUDY: SIGNIFICANT CHANGE UP
SURGICAL PATHOLOGY STUDY: SIGNIFICANT CHANGE UP

## 2019-05-21 ENCOUNTER — APPOINTMENT (OUTPATIENT)
Dept: NEUROLOGY | Facility: CLINIC | Age: 79
End: 2019-05-21
Payer: MEDICARE

## 2019-05-21 VITALS
BODY MASS INDEX: 22.5 KG/M2 | DIASTOLIC BLOOD PRESSURE: 66 MMHG | HEIGHT: 66 IN | WEIGHT: 140 LBS | HEART RATE: 86 BPM | SYSTOLIC BLOOD PRESSURE: 100 MMHG

## 2019-05-21 DIAGNOSIS — E78.5 HYPERLIPIDEMIA, UNSPECIFIED: ICD-10-CM

## 2019-05-21 DIAGNOSIS — Z78.9 OTHER SPECIFIED HEALTH STATUS: ICD-10-CM

## 2019-05-21 DIAGNOSIS — Z83.3 FAMILY HISTORY OF DIABETES MELLITUS: ICD-10-CM

## 2019-05-21 DIAGNOSIS — Z83.438 FAMILY HISTORY OF OTHER DISORDER OF LIPOPROTEIN METABOLISM AND OTHER LIPIDEMIA: ICD-10-CM

## 2019-05-21 DIAGNOSIS — I63.411 CEREBRAL INFARCTION DUE TO EMBOLISM OF RIGHT MIDDLE CEREBRAL ARTERY: ICD-10-CM

## 2019-05-21 DIAGNOSIS — Z82.49 FAMILY HISTORY OF ISCHEMIC HEART DISEASE AND OTHER DISEASES OF THE CIRCULATORY SYSTEM: ICD-10-CM

## 2019-05-21 DIAGNOSIS — Z82.3 FAMILY HISTORY OF STROKE: ICD-10-CM

## 2019-05-21 DIAGNOSIS — I10 ESSENTIAL (PRIMARY) HYPERTENSION: ICD-10-CM

## 2019-05-21 PROCEDURE — 99215 OFFICE O/P EST HI 40 MIN: CPT

## 2019-05-21 RX ORDER — ASPIRIN 81 MG/1
81 TABLET, COATED ORAL
Refills: 0 | Status: DISCONTINUED | COMMUNITY
End: 2019-05-21

## 2019-05-21 RX ORDER — FERROUS SULFATE 220 (44)/5
220 (44 FE) SOLUTION, ORAL ORAL
Refills: 0 | Status: DISCONTINUED | COMMUNITY
End: 2019-05-21

## 2019-05-21 RX ORDER — ONDANSETRON 8 MG/1
8 TABLET ORAL
Qty: 40 | Refills: 0 | Status: ACTIVE | COMMUNITY
Start: 2019-03-20

## 2019-05-21 RX ORDER — GABAPENTIN 100 MG/1
100 CAPSULE ORAL
Refills: 0 | Status: DISCONTINUED | COMMUNITY
End: 2019-05-21

## 2019-05-21 RX ORDER — LOSARTAN POTASSIUM 100 MG/1
100 TABLET, FILM COATED ORAL
Refills: 0 | Status: DISCONTINUED | COMMUNITY
End: 2019-05-21

## 2019-05-31 ENCOUNTER — APPOINTMENT (OUTPATIENT)
Dept: HEART AND VASCULAR | Facility: CLINIC | Age: 79
End: 2019-05-31
Payer: MEDICARE

## 2019-05-31 DIAGNOSIS — I38 ENDOCARDITIS, VALVE UNSPECIFIED: ICD-10-CM

## 2019-05-31 PROCEDURE — 99204 OFFICE O/P NEW MOD 45 MIN: CPT

## 2019-05-31 NOTE — PHYSICAL EXAM
[FreeTextEntry1] : General exam: Sitting in the chair and does not appear to be in distress\par Carotid bruits: None\par CVS: S1-S2 present\par RS: CTA B\par Skin: No lesion noted on visible skin \par \par Neuro exam: \par MS: Alert, awake and is oriented to time, place and person with normal attention span, normal recent and remote memory\par Language: Fluent speech with intact comprehension, with intact naming and repetition, no right-left confusion, no finger agnosia and no apraxia. Normal fund of knowledge. No dysarthria. \par Cr.N.: Pupils bilaterally 3-4 mm in size, equal, round and reactive to light, fundus examination was performed but I was unable to locate the discs due to technical difficulties including poor fixation and small-sized pupils, intact visual fields to confrontation, extraocular movements intact without any diplopia, no ptosis, no nystagmus, face appears to be symmetric with intact facial sensations, no hearing loss to rubbing fingers, tongue is in the midline, uvula elevates in the midline and without any drooping of the soft palate, normal shrugging of the shoulders bilaterally.\par \par Motor: \par Tone - Normal\par Bulk - No atrophy\par Power - 5/5 all over without any pronator drift; except RUE 5-/5 with slightly reduced fine finger movements on the right\par DTRs - +2 bilaterally but slightly brisker on the right\par Plantars: Bilaterally flexor\par Sensory: Intact to light touch, no sensory extinction. \par Cerebellar: No ataxia on finger-nose-finger and knee-heel-shin testing, as well as without any dysdiadochokinesia\par Gait: Normal casual gait with normal stride and length and was able to perform toe, heel and tandem walking with minimal assistance\par Romberg's sign: Negative

## 2019-05-31 NOTE — HISTORY OF PRESENT ILLNESS
[FreeTextEntry1] : 80 Y/O R handed man with vascular risk factors of HTN, DM II, HPLD and stage IV liver CA - on radiation is seen in the vascular neurology office for evaluation and management of stroke leading to hospital admission in 3/19.\par \par On 3/4/19, he initially presented to OSH for acute onset of right side weakness and language disturbance. He was not a candidate for IV tPA as CT brain on admission showed R MCA distribution with hemorrhagic transformation. He was transferred to [facility] and was treated with mechanical embolectomy. Reports to be on aspirin before his stroke in 3/19. Denies any family history of stroke at young age. Denies any personal or family history of DVT/PEs. He was discharged home after work up in 3/19. He was evaluated by Dr. Merlos as outpatient and was started on therapeutic anticoagulation with s/c Lovenox. \par \par Currently he denies any focal neurological symptoms or deficits but reports to be generalized weakness. His current post-stroke mRS is reported to be 3 as he need some assistance in performing his ADLs - contributed by generalized weakness. Denies any episodes of focal neurological symptoms concerning for stroke or TIA since his discharge from the hospital. Reports compliance with his medications and denies any significant side effects. \par \par ROS: All negative except documented in the history of present illness. \par \par Workup:\par CT brain (3/4/19): Acute to early subacute right MCA distribution infarct with hemorrhagic transformation, no early changes of ischemia in the left MCA distribution\par CTA head and neck and CT perfusion (3/4/19): Left MCA distal M1 occlusion with good laptomeningeal collateral circulation distal to the occlusion on this single phase CTA. CT perfusion did not show any evidence of cord infarct and showed 86 mL of minimally hypoperfused brain tissue with mismatch ratio being infinite\par CT brain (3/5/19): Expected evolution of right MCA distribution infarct with stable hemorrhagic transformation\par CT brain (4/2/19): Subacute left MCA distribution infarct involving basal ganglia, expected evolution and significant resolution of right MCA distribution infarct with hemorrhagic transformation\par LDL: 132 (3/19)\par HbA1c: 6.3 (3/19)\par TTE: Normal mitral valve, normal left atrium, normal left medical systolic function, no segmental wall motion abnormalities\par \par Home medications:\par Reviewed with the patient/available family member and updated as appropriate.

## 2019-05-31 NOTE — REVIEW OF SYSTEMS
[Recent Weight Loss (___ Lbs)] : recent [unfilled] ~Ulb weight loss [As Noted in HPI] : as noted in HPI [Negative] : Heme/Lymph [Suicidal] : not suicidal [Anxiety] : no anxiety [Depression] : no depression [FreeTextEntry2] : Weight loss - 20-30 lbs in last 6 months  [de-identified] : Denies any homicidal ideation

## 2019-06-02 RX ORDER — ENOXAPARIN SODIUM 100 MG/ML
90 INJECTION SUBCUTANEOUS
Qty: 0 | Refills: 0 | DISCHARGE
Start: 2019-06-02

## 2019-06-03 ENCOUNTER — FORM ENCOUNTER (OUTPATIENT)
Age: 79
End: 2019-06-03

## 2019-06-03 VITALS
DIASTOLIC BLOOD PRESSURE: 86 MMHG | OXYGEN SATURATION: 98 % | TEMPERATURE: 98 F | SYSTOLIC BLOOD PRESSURE: 143 MMHG | HEART RATE: 81 BPM | RESPIRATION RATE: 16 BRPM

## 2019-06-03 RX ORDER — TERAZOSIN HYDROCHLORIDE 10 MG/1
1 CAPSULE ORAL
Qty: 0 | Refills: 0 | DISCHARGE

## 2019-06-03 RX ORDER — FERROUS SULFATE 325(65) MG
1 TABLET ORAL
Qty: 0 | Refills: 0 | DISCHARGE

## 2019-06-03 RX ORDER — GABAPENTIN 400 MG/1
100 CAPSULE ORAL
Qty: 0 | Refills: 0 | DISCHARGE

## 2019-06-03 RX ORDER — CHLORHEXIDINE GLUCONATE 213 G/1000ML
1 SOLUTION TOPICAL ONCE
Refills: 0 | Status: DISCONTINUED | OUTPATIENT
Start: 2019-06-04 | End: 2019-06-05

## 2019-06-03 RX ORDER — LOSARTAN/HYDROCHLOROTHIAZIDE 100MG-25MG
1 TABLET ORAL
Qty: 0 | Refills: 0 | DISCHARGE

## 2019-06-03 NOTE — H&P ADULT - NSICDXPASTMEDICALHX_GEN_ALL_CORE_FT
PAST MEDICAL HISTORY:  DM (diabetes mellitus)     Hernia     HLD (hyperlipidemia)     HLD (hyperlipidemia)     HTN (hypertension)     HTN (hypertension)     Ischemic stroke 01/2019    Liver cancer stage 4    Prostate CA     Prostate cancer

## 2019-06-03 NOTE — H&P ADULT - HISTORY OF PRESENT ILLNESS
79 y/o  male with a PMhx of HTN, DM (diet controlled), hyperlipidemia, prostate CA, stage 4 Liver CA (radiation therapy completed 5/24, Oncologist: Dr. Weeks), recent left MCA distribution stroke 3/2019 and asymptomatic/incidental right MCA distribution infarct with hemorrhagic transformation s/p endovascular thrombectomy 3/2019 (on Lovenox daily—last dose 6/2/19) presents for chemo-infusion under general anesthesia. Patient reports he is doing well overall, admits to occasional nausea and dyspepsia after meals but denies any abdominal pain, fever, chills, chest pain, SOB, weakness, diarrhea.    Patient now presents for chemo-infusion under general anesthesia.     Of note: Cardiology to be consulted morning of procedure for clearance. Echo ordered per discussion with JOSE Mills. During CVA work up; patient’s Echo 4/19 revealed Normal mitral valve, normal left atrium, normal left medical systolic function, no segmental wall motion abnormalities. Per note of Neurologist Dr. 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

## 2019-06-03 NOTE — H&P ADULT - NSHPLABSRESULTS_GEN_ALL_CORE
10.8   3.30  )-----------( 111      ( 04 Jun 2019 07:50 )             34.2   06-04    133<L>  |  96  |  13  ----------------------------<  114<H>  4.8   |  26  |  1.43<H>    Ca    9.9      04 Jun 2019 07:50    TPro  8.1  /  Alb  4.3  /  TBili  0.2  /  DBili  x   /  AST  28  /  ALT  13  /  AlkPhos  240<H>  06-04  PT/INR - ( 04 Jun 2019 07:50 )   PT: 10.5 sec;   INR: 0.93          PTT - ( 04 Jun 2019 07:50 )  PTT:31.5 sec

## 2019-06-03 NOTE — H&P ADULT - RS GEN PE MLT RESP DETAILS PC
no rhonchi/respirations non-labored/breath sounds equal/good air movement/clear to auscultation bilaterally/no subcutaneous emphysema/airway patent/normal/no chest wall tenderness/no intercostal retractions/no rales/no wheezes

## 2019-06-03 NOTE — H&P ADULT - ASSESSMENT
77 y/o  male with a PMhx of HTN, DM (diet controlled), hyperlipidemia, prostate CA, stage 4 Liver CA (radiation therapy completed 5/24, Oncologist: Dr. Weeks), recent left MCA distribution stroke 3/2019 and asymptomatic/incidental right MCA distribution infarct with hemorrhagic transformation s/p endovascular thrombectomy 3/2019 (on Lovenox daily—last dose 6/2/19) presents for chemo-infusion under general anesthesia. Patient reports he is doing well overall, admits to occasional nausea and dyspepsia after meals but denies any abdominal pain, fever, chills, chest pain, SOB, weakness, diarrhea.  Patient now presents for chemo-infusion under general anesthesia  Pt. to get cardiac clearance prior to procedure. ECHO to be performed prior to procedure.  Pt. to be consented by Dr. Jimenez;s team. 77 y/o  male with a PMhx of HTN, DM (diet controlled), hyperlipidemia, prostate CA, stage 4 Liver CA (radiation therapy completed 5/24, Oncologist: Dr. Weeks), recent left MCA distribution stroke 3/2019 and asymptomatic/incidental right MCA distribution infarct with hemorrhagic transformation s/p endovascular thrombectomy 3/2019 (on Lovenox daily—last dose 6/2/19) presents for chemo-infusion under general anesthesia. Patient reports he is doing well overall, admits to occasional nausea and dyspepsia after meals but denies any abdominal pain, fever, chills, chest pain, SOB, weakness, diarrhea.  Patient now presents for chemo-infusion under general anesthesia  Pt. to get cardiac clearance prior to procedure. ECHO to be performed prior to procedure.  Pt. to be consented by Dr. Jimenez;s team.   CKD stage 3; Cr. 1.43 today; Cr. 1.6 03/2019; IV NS @ 75 cc/hr pre- procedure. 77 y/o  male with a PMhx of HTN, DM (diet controlled), hyperlipidemia, prostate CA, stage 4 Liver CA (radiation therapy completed 5/24, Oncologist: Dr. Weeks), recent left MCA distribution stroke 3/2019 and asymptomatic/incidental right MCA distribution infarct with hemorrhagic transformation s/p endovascular thrombectomy 3/2019 (on Lovenox daily—last dose 6/2/19) presents for chemo-infusion under general anesthesia. Patient reports he is doing well overall, admits to occasional nausea and dyspepsia after meals but denies any abdominal pain, fever, chills, chest pain, SOB, weakness, diarrhea.  Patient now presents for chemo-infusion under general anesthesia  Pt. to get cardiac clearance prior to procedure. EKG: NSR @ 74 bpm; RBBB; non-specific ST-T changes. Echocardiogram from this morning reviewed: EF 71%, trace AI, mild MR, mild TR; pt. is cleared by cardiology Dr. Glez for catheter guided chemo-infusion without the need of any further cardiac work up  Pt. to be consented by Dr. Jimenez;s team.   CKD stage 3; Cr. 1.43 today; Cr. 1.6 03/2019; IV NS @ 75 cc/hr pre- procedure.   Of note: pt. stopped taking metoprolol 12.5 mg daily 1 week ago as he ran out of meds.

## 2019-06-04 ENCOUNTER — INPATIENT (INPATIENT)
Facility: HOSPITAL | Age: 79
LOS: 0 days | Discharge: HOME | DRG: 847 | End: 2019-06-05
Attending: RADIOLOGY | Admitting: RADIOLOGY
Payer: MEDICARE

## 2019-06-04 DIAGNOSIS — Z98.890 OTHER SPECIFIED POSTPROCEDURAL STATES: Chronic | ICD-10-CM

## 2019-06-04 PROBLEM — I38 ENDOCARDITIS, VALVE: Status: ACTIVE | Noted: 2019-06-04

## 2019-06-04 LAB
ALBUMIN SERPL ELPH-MCNC: 4.3 G/DL — SIGNIFICANT CHANGE UP (ref 3.3–5)
ALP SERPL-CCNC: 240 U/L — HIGH (ref 40–120)
ALT FLD-CCNC: 13 U/L — SIGNIFICANT CHANGE UP (ref 10–45)
ANION GAP SERPL CALC-SCNC: 11 MMOL/L — SIGNIFICANT CHANGE UP (ref 5–17)
APTT BLD: 31.5 SEC — SIGNIFICANT CHANGE UP (ref 27.5–36.3)
AST SERPL-CCNC: 28 U/L — SIGNIFICANT CHANGE UP (ref 10–40)
BILIRUB SERPL-MCNC: 0.2 MG/DL — SIGNIFICANT CHANGE UP (ref 0.2–1.2)
BUN SERPL-MCNC: 13 MG/DL — SIGNIFICANT CHANGE UP (ref 7–23)
CALCIUM SERPL-MCNC: 9.9 MG/DL — SIGNIFICANT CHANGE UP (ref 8.4–10.5)
CHLORIDE SERPL-SCNC: 96 MMOL/L — SIGNIFICANT CHANGE UP (ref 96–108)
CO2 SERPL-SCNC: 26 MMOL/L — SIGNIFICANT CHANGE UP (ref 22–31)
CREAT SERPL-MCNC: 1.43 MG/DL — HIGH (ref 0.5–1.3)
GLUCOSE BLDC GLUCOMTR-MCNC: 155 MG/DL — HIGH (ref 70–99)
GLUCOSE BLDC GLUCOMTR-MCNC: 172 MG/DL — HIGH (ref 70–99)
GLUCOSE BLDC GLUCOMTR-MCNC: 198 MG/DL — HIGH (ref 70–99)
GLUCOSE SERPL-MCNC: 114 MG/DL — HIGH (ref 70–99)
HCT VFR BLD CALC: 34.2 % — LOW (ref 39–50)
HGB BLD-MCNC: 10.8 G/DL — LOW (ref 13–17)
INR BLD: 0.93 — SIGNIFICANT CHANGE UP (ref 0.88–1.16)
MCHC RBC-ENTMCNC: 27.6 PG — SIGNIFICANT CHANGE UP (ref 27–34)
MCHC RBC-ENTMCNC: 31.6 GM/DL — LOW (ref 32–36)
MCV RBC AUTO: 87.2 FL — SIGNIFICANT CHANGE UP (ref 80–100)
NRBC # BLD: 0 /100 WBCS — SIGNIFICANT CHANGE UP (ref 0–0)
PLATELET # BLD AUTO: 111 K/UL — LOW (ref 150–400)
POTASSIUM SERPL-MCNC: 4.8 MMOL/L — SIGNIFICANT CHANGE UP (ref 3.5–5.3)
POTASSIUM SERPL-SCNC: 4.8 MMOL/L — SIGNIFICANT CHANGE UP (ref 3.5–5.3)
PROT SERPL-MCNC: 8.1 G/DL — SIGNIFICANT CHANGE UP (ref 6–8.3)
PROTHROM AB SERPL-ACNC: 10.5 SEC — SIGNIFICANT CHANGE UP (ref 10–12.9)
RBC # BLD: 3.92 M/UL — LOW (ref 4.2–5.8)
RBC # FLD: 17 % — HIGH (ref 10.3–14.5)
SODIUM SERPL-SCNC: 133 MMOL/L — LOW (ref 135–145)
WBC # BLD: 3.3 K/UL — LOW (ref 3.8–10.5)
WBC # FLD AUTO: 3.3 K/UL — LOW (ref 3.8–10.5)

## 2019-06-04 PROCEDURE — 75726 ARTERY X-RAYS ABDOMEN: CPT | Mod: 26

## 2019-06-04 PROCEDURE — 93306 TTE W/DOPPLER COMPLETE: CPT | Mod: 26

## 2019-06-04 PROCEDURE — 36248 INS CATH ABD/L-EXT ART ADDL: CPT

## 2019-06-04 PROCEDURE — 93010 ELECTROCARDIOGRAM REPORT: CPT

## 2019-06-04 PROCEDURE — 99214 OFFICE O/P EST MOD 30 MIN: CPT

## 2019-06-04 PROCEDURE — 75774 ARTERY X-RAY EACH VESSEL: CPT | Mod: 26

## 2019-06-04 PROCEDURE — 36247 INS CATH ABD/L-EXT ART 3RD: CPT

## 2019-06-04 RX ORDER — GEMCITABINE 38 MG/ML
1000 INJECTION, SOLUTION INTRAVENOUS ONCE
Refills: 0 | Status: DISCONTINUED | OUTPATIENT
Start: 2019-06-04 | End: 2019-06-05

## 2019-06-04 RX ORDER — SODIUM CHLORIDE 9 MG/ML
1000 INJECTION INTRAMUSCULAR; INTRAVENOUS; SUBCUTANEOUS
Refills: 0 | Status: DISCONTINUED | OUTPATIENT
Start: 2019-06-04 | End: 2019-06-05

## 2019-06-04 RX ORDER — DEXAMETHASONE 0.5 MG/5ML
16 ELIXIR ORAL ONCE
Refills: 0 | Status: DISCONTINUED | OUTPATIENT
Start: 2019-06-04 | End: 2019-06-05

## 2019-06-04 RX ORDER — OXALIPLATIN 5 MG/ML
100 INJECTION, SOLUTION INTRAVENOUS ONCE
Refills: 0 | Status: DISCONTINUED | OUTPATIENT
Start: 2019-06-04 | End: 2019-06-05

## 2019-06-04 RX ORDER — AMLODIPINE BESYLATE 2.5 MG/1
10 TABLET ORAL DAILY
Refills: 0 | Status: DISCONTINUED | OUTPATIENT
Start: 2019-06-04 | End: 2019-06-05

## 2019-06-04 RX ORDER — GLUCAGON INJECTION, SOLUTION 0.5 MG/.1ML
1 INJECTION, SOLUTION SUBCUTANEOUS ONCE
Refills: 0 | Status: DISCONTINUED | OUTPATIENT
Start: 2019-06-04 | End: 2019-06-05

## 2019-06-04 RX ORDER — ONDANSETRON 8 MG/1
4 TABLET, FILM COATED ORAL EVERY 4 HOURS
Refills: 0 | Status: DISCONTINUED | OUTPATIENT
Start: 2019-06-04 | End: 2019-06-05

## 2019-06-04 RX ORDER — MORPHINE SULFATE 50 MG/1
4 CAPSULE, EXTENDED RELEASE ORAL EVERY 4 HOURS
Refills: 0 | Status: DISCONTINUED | OUTPATIENT
Start: 2019-06-04 | End: 2019-06-05

## 2019-06-04 RX ORDER — SODIUM CHLORIDE 9 MG/ML
1000 INJECTION, SOLUTION INTRAVENOUS
Refills: 0 | Status: DISCONTINUED | OUTPATIENT
Start: 2019-06-04 | End: 2019-06-05

## 2019-06-04 RX ORDER — DEXTROSE 50 % IN WATER 50 %
25 SYRINGE (ML) INTRAVENOUS ONCE
Refills: 0 | Status: DISCONTINUED | OUTPATIENT
Start: 2019-06-04 | End: 2019-06-05

## 2019-06-04 RX ORDER — ONDANSETRON 8 MG/1
16 TABLET, FILM COATED ORAL ONCE
Refills: 0 | Status: DISCONTINUED | OUTPATIENT
Start: 2019-06-04 | End: 2019-06-05

## 2019-06-04 RX ORDER — INSULIN LISPRO 100/ML
VIAL (ML) SUBCUTANEOUS
Refills: 0 | Status: DISCONTINUED | OUTPATIENT
Start: 2019-06-04 | End: 2019-06-05

## 2019-06-04 RX ORDER — PACLITAXEL 100 MG/20ML
100 INJECTION, POWDER, LYOPHILIZED, FOR SUSPENSION INTRAVENOUS ONCE
Refills: 0 | Status: DISCONTINUED | OUTPATIENT
Start: 2019-06-04 | End: 2019-06-04

## 2019-06-04 RX ORDER — METOPROLOL TARTRATE 50 MG
0.5 TABLET ORAL
Qty: 0 | Refills: 0 | DISCHARGE

## 2019-06-04 RX ORDER — DEXTROSE 50 % IN WATER 50 %
15 SYRINGE (ML) INTRAVENOUS ONCE
Refills: 0 | Status: DISCONTINUED | OUTPATIENT
Start: 2019-06-04 | End: 2019-06-05

## 2019-06-04 RX ORDER — DOXAZOSIN MESYLATE 4 MG
4 TABLET ORAL AT BEDTIME
Refills: 0 | Status: DISCONTINUED | OUTPATIENT
Start: 2019-06-04 | End: 2019-06-05

## 2019-06-04 RX ORDER — METOPROLOL TARTRATE 50 MG
12.5 TABLET ORAL
Refills: 0 | Status: DISCONTINUED | OUTPATIENT
Start: 2019-06-04 | End: 2019-06-05

## 2019-06-04 RX ORDER — CEFAZOLIN SODIUM 1 G
1000 VIAL (EA) INJECTION EVERY 8 HOURS
Refills: 0 | Status: DISCONTINUED | OUTPATIENT
Start: 2019-06-04 | End: 2019-06-05

## 2019-06-04 RX ORDER — BENZOCAINE AND MENTHOL 5; 1 G/100ML; G/100ML
1 LIQUID ORAL
Refills: 0 | Status: DISCONTINUED | OUTPATIENT
Start: 2019-06-04 | End: 2019-06-05

## 2019-06-04 RX ORDER — DEXTROSE 50 % IN WATER 50 %
12.5 SYRINGE (ML) INTRAVENOUS ONCE
Refills: 0 | Status: DISCONTINUED | OUTPATIENT
Start: 2019-06-04 | End: 2019-06-05

## 2019-06-04 RX ORDER — PACLITAXEL 100 MG/20ML
100 INJECTION, POWDER, LYOPHILIZED, FOR SUSPENSION INTRAVENOUS ONCE
Refills: 0 | Status: DISCONTINUED | OUTPATIENT
Start: 2019-06-04 | End: 2019-06-05

## 2019-06-04 RX ORDER — CARBAMAZEPINE 200 MG
100 TABLET ORAL
Refills: 0 | Status: DISCONTINUED | OUTPATIENT
Start: 2019-06-04 | End: 2019-06-05

## 2019-06-04 RX ORDER — FLUOROURACIL 50 MG/ML
1000 INJECTION, SOLUTION INTRAVENOUS ONCE
Refills: 0 | Status: DISCONTINUED | OUTPATIENT
Start: 2019-06-04 | End: 2019-06-05

## 2019-06-04 RX ORDER — ATORVASTATIN CALCIUM 80 MG/1
40 TABLET, FILM COATED ORAL AT BEDTIME
Refills: 0 | Status: DISCONTINUED | OUTPATIENT
Start: 2019-06-04 | End: 2019-06-05

## 2019-06-04 RX ADMIN — ATORVASTATIN CALCIUM 40 MILLIGRAM(S): 80 TABLET, FILM COATED ORAL at 21:16

## 2019-06-04 RX ADMIN — Medication 4 MILLIGRAM(S): at 21:20

## 2019-06-04 RX ADMIN — Medication 12.5 MILLIGRAM(S): at 21:15

## 2019-06-04 RX ADMIN — SODIUM CHLORIDE 75 MILLILITER(S): 9 INJECTION INTRAMUSCULAR; INTRAVENOUS; SUBCUTANEOUS at 17:12

## 2019-06-04 RX ADMIN — Medication 1: at 18:31

## 2019-06-04 RX ADMIN — Medication 100 MILLIGRAM(S): at 21:15

## 2019-06-04 RX ADMIN — Medication 1: at 22:38

## 2019-06-04 RX ADMIN — BENZOCAINE AND MENTHOL 1 LOZENGE: 5; 1 LIQUID ORAL at 22:37

## 2019-06-04 RX ADMIN — Medication 100 MILLIGRAM(S): at 22:38

## 2019-06-04 NOTE — PHYSICAL EXAM
[Alert] : alert [No Acute Distress] : no acute distress [PERRL] : pupils equal, round and reactive to light [No Neck Mass] : no neck mass was observed [Normal Hearing] : hearing was normal [No Respiratory Distress] : no respiratory distress [Normal Rate] : heart rate was normal  [Regular Rhythm] : with a regular rhythm [Normal Bowel Sounds] : normal bowel sounds [No Edema] : there was no peripheral edema [Not Tender] : non-tender [Not Distended] : not distended [No Rash] : no rash [No Skin Lesions] : no skin lesions [No Sensory Deficits] : the sensory exam was normal to light touch and pinprick [Oriented x3] : oriented to person, place, and time [de-identified] : walks with cane

## 2019-06-04 NOTE — CONSULT NOTE ADULT - SUBJECTIVE AND OBJECTIVE BOX
Patient is a 79y old  Male who presents with a chief complaint of Chemo infusion (03 Jun 2019 11:55)    77 y/o M PMHx as listed below presents for an elective catheter directed chemo-infusion by Dr. Jimenez today.   Cardiology consulted for pre-op risk stratification.     Patient's cardiac risk factors are: HTN, HLD and DM.   He was recently told that he had a heart attack which was never worked up. This was in [place] around Dec 2018. His family members note that the reason he did not go for a cardiac cath is because of his known CKD at the time. Since then he has had 2 strokes. Second stroke treated at [facility], on Lovenox, no tpA required thrombectomy at the time, followed by Dr. Merlos.     In terms of his physical activity, he does not feel inhibited by SOB or chest pain. Denies any history of angina. He can walk 2 flights of stairs. Occassional he gets right knee pain but does not stop for anginal equivalent symptoms. He had an ECHO in April which was within normal limits with moderate TR and pericardial thickening. He is ordered for an echo today as well.       PAST MEDICAL & SURGICAL HISTORY:  HLD (hyperlipidemia)  HTN (hypertension)  Liver cancer: stage 4  Prostate cancer  Ischemic stroke: 01/2019  HLD (hyperlipidemia)  HTN (hypertension)  Hernia  DM (diabetes mellitus)  Prostate CA  H/O hernia repair    Allergies    No Known Allergies    Intolerances        MEDICATIONS  (STANDING):  chlorhexidine 4% Liquid 1 Application(s) Topical once  sodium chloride 0.9%. 1000 milliLiter(s) (75 mL/Hr) IV Continuous <Continuous>    MEDICATIONS  (PRN):      REVIEW OF SYSTEMS:  12 point ROS negative except for that stated in the HPI    PHYSICAL EXAM:  Vitals past 24 Hours: T(C): 36.7 (06-03-19 @ 11:55), Max: 36.7 (06-03-19 @ 11:55)  HR: 81 (06-03-19 @ 11:55) (81 - 81)  BP: 143/86 (06-03-19 @ 11:55) (143/86 - 143/86)  RR: 16 (06-03-19 @ 11:55) (16 - 16)  SpO2: 98% (06-03-19 @ 11:55) (98% - 98%)	    Daily     Daily     GEN: Alert and awake, no acute distress, cachectic, frail   HEENT: Moist mucous membranes  Neck: elevated JVD pulsation   Cardiovascular: Regular rate and rhythm, +S1 S2. No murmurs, rubs, or gallops appreciated  Respiratory: Lungs clear to auscultation bilaterally  Gastrointestinal:  Soft, non-tender, non-distended, normoactive bowel sounds  Skin: No rashes, No ecchymoses, No cyanosis  Neurologic: Non-focal, alert and oriented x3.   Extremities: No clubbing, cyanosis or edema. Warm, well-perfused extremities.   Vascular: Radial and dorsalis pedis pulses 2+ bilaterally    I&O's Detail        LABS:                        10.8   3.30  )-----------( 111      ( 04 Jun 2019 07:50 )             34.2     06-04    133<L>  |  96  |  13  ----------------------------<  114<H>  4.8   |  26  |  1.43<H>    Ca    9.9      04 Jun 2019 07:50    TPro  8.1  /  Alb  4.3  /  TBili  0.2  /  DBili  x   /  AST  28  /  ALT  13  /  AlkPhos  240<H>  06-04        PT/INR - ( 04 Jun 2019 07:50 )   PT: 10.5 sec;   INR: 0.93          PTT - ( 04 Jun 2019 07:50 )  PTT:31.5 sec    I&O's Summary      CARDIAC DIAGNOSTIC TESTING:    ECG: right bundle branch block, normal sinus rhythm     TELEMETRY: nil     ECHO: pending       RADIOLOGY & ADDITIONAL STUDIES:  nil     ASSESSMENT/PLAN:  77 y/o M PMHx of HLD, HTN, DM presents for catheter guided chemo-infusion. Cards consulted for pre-op risk stratification.     #Pre- op risk stratification   - RCRI Class 1 risk   - Patient is a low risk candidate for low risk procedures (however, these risk score calculators likely underestimate the predicted risk espcially is this patient with known metastatic disease)   - ECHO pending   - may proceed to procedure without any further intervention   - keep patient euvolemic and normotensive during procedure       #HTN  - Can continue Amlodipine 10 mg daily and MTP 12.5 mg daily on discharge     Discussed and rounded with Dr. Glez   Thank you for the consult   Please call us with any further concerns.     Jovita Rollins MD   Cardiology consult fellow Patient is a 79y old  Male who presents with a chief complaint of Chemo infusion (03 Jun 2019 11:55)    79 y/o M PMHx as listed below presents for an elective catheter directed chemo-infusion by Dr. Jimenez today.   Cardiology consulted for pre-op risk stratification.     Patient's cardiac risk factors are: HTN, HLD and DM.   He was recently told that he had a heart attack which was never worked up. This was in [place] around Dec 2018. His family members note that the reason he did not go for a cardiac cath is because of his known CKD at the time. Since then he has had 2 strokes. Second stroke treated at [facility], on Lovenox, no tpA required thrombectomy at the time, followed by Dr. Merlos.     In terms of his physical activity, he does not feel inhibited by SOB or chest pain. Denies any history of angina. He can walk 2 flights of stairs. Occassional he gets right knee pain but does not stop for anginal equivalent symptoms. He had an ECHO in April which was within normal limits with moderate TR and pericardial thickening. He is ordered for an echo today as well.       PAST MEDICAL & SURGICAL HISTORY:  HLD (hyperlipidemia)  HTN (hypertension)  Liver cancer: stage 4  Prostate cancer  Ischemic stroke: 01/2019  HLD (hyperlipidemia)  HTN (hypertension)  Hernia  DM (diabetes mellitus)  Prostate CA  H/O hernia repair    Allergies    No Known Allergies    Intolerances        MEDICATIONS  (STANDING):  chlorhexidine 4% Liquid 1 Application(s) Topical once  sodium chloride 0.9%. 1000 milliLiter(s) (75 mL/Hr) IV Continuous <Continuous>    MEDICATIONS  (PRN):      REVIEW OF SYSTEMS:  12 point ROS negative except for that stated in the HPI    PHYSICAL EXAM:  Vitals past 24 Hours: T(C): 36.7 (06-03-19 @ 11:55), Max: 36.7 (06-03-19 @ 11:55)  HR: 81 (06-03-19 @ 11:55) (81 - 81)  BP: 143/86 (06-03-19 @ 11:55) (143/86 - 143/86)  RR: 16 (06-03-19 @ 11:55) (16 - 16)  SpO2: 98% (06-03-19 @ 11:55) (98% - 98%)	    Daily     Daily     GEN: Alert and awake, no acute distress, cachectic, frail   HEENT: Moist mucous membranes  Neck: elevated JVD pulsation   Cardiovascular: Regular rate and rhythm, +S1 S2. No murmurs, rubs, or gallops appreciated  Respiratory: Lungs clear to auscultation bilaterally  Gastrointestinal:  Soft, non-tender, non-distended, normoactive bowel sounds  Skin: No rashes, No ecchymoses, No cyanosis  Neurologic: Non-focal, alert and oriented x3.   Extremities: No clubbing, cyanosis or edema. Warm, well-perfused extremities.   Vascular: Radial and dorsalis pedis pulses 2+ bilaterally    I&O's Detail        LABS:                        10.8   3.30  )-----------( 111      ( 04 Jun 2019 07:50 )             34.2     06-04    133<L>  |  96  |  13  ----------------------------<  114<H>  4.8   |  26  |  1.43<H>    Ca    9.9      04 Jun 2019 07:50    TPro  8.1  /  Alb  4.3  /  TBili  0.2  /  DBili  x   /  AST  28  /  ALT  13  /  AlkPhos  240<H>  06-04        PT/INR - ( 04 Jun 2019 07:50 )   PT: 10.5 sec;   INR: 0.93          PTT - ( 04 Jun 2019 07:50 )  PTT:31.5 sec    I&O's Summary      CARDIAC DIAGNOSTIC TESTING:    ECG: right bundle branch block, normal sinus rhythm     TELEMETRY: nil     ECHO: pending       RADIOLOGY & ADDITIONAL STUDIES:  nil     ASSESSMENT/PLAN:  79 y/o M PMHx of HLD, HTN, DM presents for catheter guided chemo-infusion. Cards consulted for pre-op risk stratification.     #Pre- op risk stratification   - RCRI Class 1 risk   - Patient is a low risk candidate for low risk procedures (however, these risk score calculators likely underestimate the predicted risk espcially is this patient with known metastatic disease)   - ECHO WNL - EF 71%, no other gross abnormalities   - may proceed to procedure without any further intervention   - keep patient euvolemic and normotensive during procedure       #HTN  - Can continue Amlodipine 10 mg daily and MTP 12.5 mg daily on discharge     Discussed and rounded with Dr. Glez   Thank you for the consult   Please call us with any further concerns.     Jovita Rollins MD   Cardiology consult fellow

## 2019-06-04 NOTE — ASSESSMENT
[FreeTextEntry1] : This is a 79-year-old male with a history of metastatic prostate cancer referred by Adriana Daugherty and Desi. He currently has multifocal metastatic disease in the liver, possibly perihepatic nodes and a questionable lesion in the clavicle. He has had radiosurgery treatment by Dr. Weeks. He is referred for consideration of chemotherapy infusion of the liver. He has a recent PET/CT scan which does show multifocal disease within both lobes of the liver. He has a history of marantic endocarditis as well as a right parietal stroke with secondary hemorrhage and MCA clot retrieval at [facility] earlier this year and is currently on Lovenox. I spoke with Dr Daugherty while the patient was here in the office and agrees with the treatment plan. He has had recent blood work done which we will retrieve and he will need cardiology clearance either from his PMD or when he arrives here for treatment. We will attempt to arrange it for next week or the week after.

## 2019-06-04 NOTE — REASON FOR VISIT
[Consultation] : a consultation visit [FreeTextEntry1] : prostate CA with liver mets [Family Member] : family member

## 2019-06-04 NOTE — CONSULT NOTE ADULT - ATTENDING COMMENTS
77 y/o  male with a PMH of HTN, DM (diet controlled), hyperlipidemia, prostate CA, stage 4 Liver CA, recent left MCA distribution stroke 3/2019 and asymptomatic/incidental right MCA distribution infarct with hemorrhagic transformation s/p endovascular thrombectomy 3/2019 (on Lovenox daily-last dose 6/2/19) presents for chemo-infusion under general anesthesia.    Examined the patient this morning at bedside with fellow.    Echocardiogram from this morning reviewed: EF 71%, trace AI, mild MR, mild TR  EKG: SR, RBBB  Labs reviewed: Creatinine baseline 1.4-1.5    Agree with plan as above    -The patient reports good exercise tolerance (reports she can climb 2 flights of stairs without any anginal symptoms)   -echo is wnl  -would proceed with catheter guided chemo-infusion without the need of any frurther cardiac work up  -while the patient is a low risk patient for a low risk procedure, the RCRI score likely underestimates the risk of this patient with metastatic disease and previous CVAs  -maintain the patient euvolemic and normotensive during the procedure 79 y/o  male with a PMH of HTN, DM (diet controlled), hyperlipidemia, prostate CA, stage 4 Liver CA, recent left MCA distribution stroke 3/2019 and asymptomatic/incidental right MCA distribution infarct with hemorrhagic transformation s/p endovascular thrombectomy 3/2019 (on Lovenox daily-last dose 6/2/19) presents for chemo-infusion under general anesthesia.    Examined the patient this morning at bedside with fellow.    Echocardiogram from this morning reviewed: EF 71%, trace AI, mild MR, mild TR  EKG: SR, RBBB  Labs reviewed: Creatinine baseline 1.4-1.5    Agree with plan as above    -The patient reports good exercise tolerance (reports she can climb 2 flights of stairs without any anginal symptoms)   -echo is wnl  -would proceed with catheter guided chemo-infusion without the need of any further cardiac work up  -while the patient is a low risk patient for a low risk procedure, the RCRI score likely underestimates the risk of this patient with metastatic disease and previous CVAs  -maintain the patient euvolemic and normotensive during the procedure  -continue betablocker perioperatively

## 2019-06-05 ENCOUNTER — TRANSCRIPTION ENCOUNTER (OUTPATIENT)
Age: 79
End: 2019-06-05

## 2019-06-05 VITALS
HEART RATE: 87 BPM | RESPIRATION RATE: 17 BRPM | SYSTOLIC BLOOD PRESSURE: 120 MMHG | DIASTOLIC BLOOD PRESSURE: 69 MMHG | OXYGEN SATURATION: 98 %

## 2019-06-05 LAB
ALBUMIN SERPL ELPH-MCNC: 3.8 G/DL — SIGNIFICANT CHANGE UP (ref 3.3–5)
ALP SERPL-CCNC: 210 U/L — HIGH (ref 40–120)
ALT FLD-CCNC: 13 U/L — SIGNIFICANT CHANGE UP (ref 10–45)
ANION GAP SERPL CALC-SCNC: 9 MMOL/L — SIGNIFICANT CHANGE UP (ref 5–17)
AST SERPL-CCNC: 24 U/L — SIGNIFICANT CHANGE UP (ref 10–40)
BILIRUB SERPL-MCNC: 0.2 MG/DL — SIGNIFICANT CHANGE UP (ref 0.2–1.2)
BUN SERPL-MCNC: 11 MG/DL — SIGNIFICANT CHANGE UP (ref 7–23)
CALCIUM SERPL-MCNC: 9.8 MG/DL — SIGNIFICANT CHANGE UP (ref 8.4–10.5)
CHLORIDE SERPL-SCNC: 97 MMOL/L — SIGNIFICANT CHANGE UP (ref 96–108)
CO2 SERPL-SCNC: 27 MMOL/L — SIGNIFICANT CHANGE UP (ref 22–31)
CREAT SERPL-MCNC: 1.54 MG/DL — HIGH (ref 0.5–1.3)
GLUCOSE BLDC GLUCOMTR-MCNC: 115 MG/DL — HIGH (ref 70–99)
GLUCOSE BLDC GLUCOMTR-MCNC: 159 MG/DL — HIGH (ref 70–99)
GLUCOSE BLDC GLUCOMTR-MCNC: 236 MG/DL — HIGH (ref 70–99)
GLUCOSE SERPL-MCNC: 108 MG/DL — HIGH (ref 70–99)
HBA1C BLD-MCNC: 5.8 % — HIGH (ref 4–5.6)
HCT VFR BLD CALC: 29.9 % — LOW (ref 39–50)
HGB BLD-MCNC: 9.5 G/DL — LOW (ref 13–17)
MCHC RBC-ENTMCNC: 27.8 PG — SIGNIFICANT CHANGE UP (ref 27–34)
MCHC RBC-ENTMCNC: 31.8 GM/DL — LOW (ref 32–36)
MCV RBC AUTO: 87.4 FL — SIGNIFICANT CHANGE UP (ref 80–100)
NRBC # BLD: 0 /100 WBCS — SIGNIFICANT CHANGE UP (ref 0–0)
PLATELET # BLD AUTO: 108 K/UL — LOW (ref 150–400)
POTASSIUM SERPL-MCNC: 4.1 MMOL/L — SIGNIFICANT CHANGE UP (ref 3.5–5.3)
POTASSIUM SERPL-SCNC: 4.1 MMOL/L — SIGNIFICANT CHANGE UP (ref 3.5–5.3)
PROT SERPL-MCNC: 7.1 G/DL — SIGNIFICANT CHANGE UP (ref 6–8.3)
RBC # BLD: 3.42 M/UL — LOW (ref 4.2–5.8)
RBC # FLD: 17.1 % — HIGH (ref 10.3–14.5)
SODIUM SERPL-SCNC: 133 MMOL/L — LOW (ref 135–145)
WBC # BLD: 4.03 K/UL — SIGNIFICANT CHANGE UP (ref 3.8–10.5)
WBC # FLD AUTO: 4.03 K/UL — SIGNIFICANT CHANGE UP (ref 3.8–10.5)

## 2019-06-05 PROCEDURE — 80053 COMPREHEN METABOLIC PANEL: CPT

## 2019-06-05 PROCEDURE — 36415 COLL VENOUS BLD VENIPUNCTURE: CPT

## 2019-06-05 PROCEDURE — 82962 GLUCOSE BLOOD TEST: CPT

## 2019-06-05 PROCEDURE — C1769: CPT

## 2019-06-05 PROCEDURE — C1887: CPT

## 2019-06-05 PROCEDURE — 85027 COMPLETE CBC AUTOMATED: CPT

## 2019-06-05 PROCEDURE — 83036 HEMOGLOBIN GLYCOSYLATED A1C: CPT

## 2019-06-05 PROCEDURE — 85610 PROTHROMBIN TIME: CPT

## 2019-06-05 PROCEDURE — 93306 TTE W/DOPPLER COMPLETE: CPT

## 2019-06-05 PROCEDURE — 85730 THROMBOPLASTIN TIME PARTIAL: CPT

## 2019-06-05 PROCEDURE — C1894: CPT

## 2019-06-05 PROCEDURE — 93005 ELECTROCARDIOGRAM TRACING: CPT

## 2019-06-05 RX ORDER — METOPROLOL TARTRATE 50 MG
0.5 TABLET ORAL
Qty: 0 | Refills: 0 | DISCHARGE

## 2019-06-05 RX ORDER — TERAZOSIN HYDROCHLORIDE 10 MG/1
1 CAPSULE ORAL
Qty: 30 | Refills: 3
Start: 2019-06-05 | End: 2019-10-02

## 2019-06-05 RX ORDER — CEPHALEXIN 500 MG
1 CAPSULE ORAL
Qty: 28 | Refills: 0
Start: 2019-06-05 | End: 2019-06-11

## 2019-06-05 RX ORDER — METOPROLOL TARTRATE 50 MG
0.5 TABLET ORAL
Qty: 30 | Refills: 3
Start: 2019-06-05 | End: 2019-10-02

## 2019-06-05 RX ORDER — TERAZOSIN HYDROCHLORIDE 10 MG/1
0 CAPSULE ORAL
Qty: 0 | Refills: 0 | DISCHARGE

## 2019-06-05 RX ADMIN — Medication 100 MILLIGRAM(S): at 06:32

## 2019-06-05 RX ADMIN — Medication 2: at 12:28

## 2019-06-05 RX ADMIN — Medication 12.5 MILLIGRAM(S): at 05:38

## 2019-06-05 RX ADMIN — AMLODIPINE BESYLATE 10 MILLIGRAM(S): 2.5 TABLET ORAL at 05:38

## 2019-06-05 RX ADMIN — Medication 100 MILLIGRAM(S): at 05:38

## 2019-06-05 NOTE — DISCHARGE NOTE PROVIDER - NSDCACTIVITY_GEN_ALL_CORE
Do not drive or operate machinery/Walking - Outdoors allowed/Stairs allowed/No heavy lifting/straining/Walking - Indoors allowed/Showering allowed

## 2019-06-05 NOTE — CONSULT NOTE ADULT - SUBJECTIVE AND OBJECTIVE BOX
The patient feels generally well.  The patient was seen and examined on  and today  and the chemo orders were written on .    The patient comes in today for recommendations on the treatment of a multifocal hepatocellular cancer which is widely metastatic.    History of Present Illness: The patient was well until the fall of last year when he began to have symptoms of low energy and weight loss.  The patient noticed that his right leg was swollen.  The patient ultimately went on to have 2 strokes and heart attack in December and at the time of his evaluation there was a large number of tumors in the patient's liver.  The patient had a stroke year and at that time he was placed on Lovenox.  The patient elected to see Dr. Weeks for radiosurgery to the liver.  The patient has a history of heavy drinking in the past.    Review of Systems: the patient denies headache, visual changes, hearing loss or tinnitus, nosebleeds or nasal discharge, oral sores, oral lesions or poor dental health.  the patient denies skin rashes, lesions, pruritus or alteration in nail bed shaped or integrity.  the patient denies arthralgias, myalgias, tremors, numbness, paresthesias.  the patient denies shortness of breath, cardiac palpitations or chest pain on exertion, cough or orthostasis.  the patient denies dysuria, hematuria, urinary frequency, scrotal or penile rashes.    Past Medical History: The patient was treated for localized prostate cancer with therapeutic radiation therapy in .  Patient has been treated for hypertension for 30 years.  The patient has had symptoms of prostatism independent of his cancer since .  The patient was treated for a inguinal right hernia in     Family History: The patient's mother  at age 93 from complications of stroke.  The patient's father  at age 93 from complications of old age.  The patient's brother  at age 96 of complications of prostate cancer.  The patient had a sister who  in her 90s of liver problems.  The patient has 5 children 1 of whom has diabetes, hypertension and elevated cholesterol.    Social History: [Tobacco: Former smoker (60 pk yrs / 0 yrs quit)  Start Date: 2019    End Date: 2019]    Allergies: No Known Allergies    Medications: No Active Meds    Physical Examination: Wt: 146 lb  Ht/Ln: 67 in  BMI: 22.9  BP: 110/65  Pulse: 89  RR: 18  Temp: 97.8F  Sat: 98  Examination of the head ears eyes nose and throat demonstrates mucous membrane and facial pallor but no other abnormality.  Examination of the neck reveals no palpable lymphadenopathy, jugular venous distention or thyroidomegaly.  Lungs are clear to percussion and auscultation.  The heart sounds are regular with no auscultatory evidence for murmur, rub or gallop.  The patient has mild to minimal truncal obesity with no palpable or percussible hepatomegaly or splenomegaly.  The liver measures 7 cm and 5 cm in the anterior axillary and midclavicular lines respectively.  The patient's genitalia are anatomically normal except for the testes which are largely atrophic.  The patient has edema of the right leg greater than the left leg with increased venous pattern.  Neurologically there is no gross abnormality in higher cortical, cerebellar, motor or sensory functions.  The patient has no rash.

## 2019-06-05 NOTE — CONSULT NOTE ADULT - ASSESSMENT
The patient tolerated his regional chemotherapy well.  The patient's liver has diminished in size substantially just since the exam yesterday.

## 2019-06-05 NOTE — DISCHARGE NOTE PROVIDER - NSDCCPCAREPLAN_GEN_ALL_CORE_FT
PRINCIPAL DISCHARGE DIAGNOSIS  Diagnosis: Liver cancer  Assessment and Plan of Treatment: You underwent a chemoinfusion of the liver. Avoid strenuous activity or heavy lifting for the next five days. Do not take a bath or swim for the next five days; you may shower. For any bleeding or hematoma formation (hardened blood collection under the skin) at the access site of right groin please hold pressure and go to the emergency room. Please follow up with Dr. Jimenez in 4 weeks. You can restart your lovenox today.      SECONDARY DISCHARGE DIAGNOSES  Diagnosis: DM (diabetes mellitus)  Assessment and Plan of Treatment: Please continue medications as listed for diabetes. Maintain a low carbohydrate, low sugar diet. Check for your blood sugars regularly.    Diagnosis: HLD (hyperlipidemia)  Assessment and Plan of Treatment: Please continue medications as prescribed to keep your cholesterol low. High cholesterol contributes to heart disease.    Diagnosis: HTN (hypertension)  Assessment and Plan of Treatment: Please continue medications as listed to keep your blood pressure controlled. For blood pressure that is too high or too low please see your doctor or go to the emergency room as necessary. PRINCIPAL DISCHARGE DIAGNOSIS  Diagnosis: Liver cancer  Assessment and Plan of Treatment: You underwent a chemoinfusion of the liver. Avoid strenuous activity or heavy lifting for the next five days. Do not take a bath or swim for the next five days; you may shower. For any bleeding or hematoma formation (hardened blood collection under the skin) at the access site of right groin please hold pressure and go to the emergency room. Please follow up with Dr. Jimenez in 4 weeks.  You were given Keflex 500mg 4 times daily for 7 days for antibiotics coverage.  You can restart your lovenox today. Please follow up with Dr. Daugherty on June 10th, 2019 in office      SECONDARY DISCHARGE DIAGNOSES  Diagnosis: DM (diabetes mellitus)  Assessment and Plan of Treatment: Please continue medications as listed for diabetes. Maintain a low carbohydrate, low sugar diet. Check for your blood sugars regularly.    Diagnosis: HLD (hyperlipidemia)  Assessment and Plan of Treatment: Please continue medications as prescribed to keep your cholesterol low. High cholesterol contributes to heart disease.    Diagnosis: HTN (hypertension)  Assessment and Plan of Treatment: Please continue medications as listed to keep your blood pressure controlled. For blood pressure that is too high or too low please see your doctor or go to the emergency room as necessary.

## 2019-06-05 NOTE — CONSULT NOTE ADULT - ATTENDING COMMENTS
The patient should see me next week after his discharge today and since he is going to see his radiation therapist on Monday he can see me on that date.

## 2019-06-05 NOTE — DISCHARGE NOTE NURSING/CASE MANAGEMENT/SOCIAL WORK - NSDCDPATPORTLINK_GEN_ALL_CORE
You can access the [facility] Patient Portal, offered by [facility], by registering with the following website: [email or web address]

## 2019-06-05 NOTE — CONSULT NOTE ADULT - CONSULT REASON
Elderly man with multifocal hepatocellular carcinoma or cholangiocarcinoma for regional chemoperfusion.

## 2019-06-05 NOTE — DISCHARGE NOTE PROVIDER - HOSPITAL COURSE
77yo male with a PMhx of HTN, DM (diet controlled), hyperlipidemia, prostate CA, stage 4 Liver CA (radiation therapy completed 5/24, Oncologist: Dr. Weeks), recent left MCA distribution stroke 3/2019 and asymptomatic/incidental right MCA distribution infarct with hemorrhagic transformation s/p endovascular thrombectomy 3/2019 (on Lovenox daily—last dose 6/2/19) presents for chemo-infusion under general anesthesia. Patient reports he is doing well overall, admits to occasional nausea and dyspepsia after meals but denies any abdominal pain, fever, chills, chest pain, SOB, weakness, diarrhea. Patient now presents for chemo-infusion under general anesthesia. Of note, during CVA work up; patient’s Echo 4/19 revealed Normal mitral valve, normal left atrium, normal left medical systolic function, no segmental wall motion abnormalities. Per note of Neurologist Dr. Merlos’s note in [employer], Embolic stroke likely due to marantic endocarditis versus secondary hypercoagulable state attributed by metastatic malignancy. Echo performed in house on 6/4 revealed LVEF 71%, LA and RA normal, tricuspid annular plane systolic excursion is 22mm, trace AR, mild MR, mild TR, no echo evidence for pulmHTN, aortic root measures 4cm at the sinuses, borderline aortic root dilatation, small pericardial effusion noted with no echocardiographic evidence for cardiac tamponade. Patient was evaluated by cardiology prior to chemoinfusion who cleared him for the procedure. Patient underwent hepatic chemo infusion on 6/4 with right groin access that was manually compressed. Patient was seen and examined this AM and was asymptomatic without complaints. VSS, labs and tele reviewed. Patient is cleared for discharge per Dr. Jimenez. He has been given appropriate discharge instructions including medication regimen, access site management and follow up. Medications have been e-prescribed to patient's preferred pharmacy.            Temp 98.7F, HR 80bpm, /80, RR 18, O2 sat 96% RA    Gen: NAD, A&O x 3    Cards: RRR, clear S1 and S2 without murmur    Pulm: CTA b/l without w/r/r    Abd: BS x 4, soft, NT    Right groin: no hematoma or ooze, femoral pulse 2+ without bruit, DP/PT 2+    Ext: No LE edema or ulcerations b/l

## 2019-06-11 DIAGNOSIS — I12.9 HYPERTENSIVE CHRONIC KIDNEY DISEASE WITH STAGE 1 THROUGH STAGE 4 CHRONIC KIDNEY DISEASE, OR UNSPECIFIED CHRONIC KIDNEY DISEASE: ICD-10-CM

## 2019-06-11 DIAGNOSIS — I27.20 PULMONARY HYPERTENSION, UNSPECIFIED: ICD-10-CM

## 2019-06-11 DIAGNOSIS — E78.5 HYPERLIPIDEMIA, UNSPECIFIED: ICD-10-CM

## 2019-06-11 DIAGNOSIS — Z51.11 ENCOUNTER FOR ANTINEOPLASTIC CHEMOTHERAPY: ICD-10-CM

## 2019-06-11 DIAGNOSIS — I08.3 COMBINED RHEUMATIC DISORDERS OF MITRAL, AORTIC AND TRICUSPID VALVES: ICD-10-CM

## 2019-06-11 DIAGNOSIS — Z87.891 PERSONAL HISTORY OF NICOTINE DEPENDENCE: ICD-10-CM

## 2019-06-11 DIAGNOSIS — E11.22 TYPE 2 DIABETES MELLITUS WITH DIABETIC CHRONIC KIDNEY DISEASE: ICD-10-CM

## 2019-06-11 DIAGNOSIS — Z85.46 PERSONAL HISTORY OF MALIGNANT NEOPLASM OF PROSTATE: ICD-10-CM

## 2019-06-11 DIAGNOSIS — N18.3 CHRONIC KIDNEY DISEASE, STAGE 3 (MODERATE): ICD-10-CM

## 2019-06-11 DIAGNOSIS — C78.7 SECONDARY MALIGNANT NEOPLASM OF LIVER AND INTRAHEPATIC BILE DUCT: ICD-10-CM

## 2019-06-11 DIAGNOSIS — Z86.73 PERSONAL HISTORY OF TRANSIENT ISCHEMIC ATTACK (TIA), AND CEREBRAL INFARCTION WITHOUT RESIDUAL DEFICITS: ICD-10-CM

## 2019-06-11 DIAGNOSIS — I45.10 UNSPECIFIED RIGHT BUNDLE-BRANCH BLOCK: ICD-10-CM

## 2019-06-17 ENCOUNTER — APPOINTMENT (OUTPATIENT)
Dept: NEUROLOGY | Facility: CLINIC | Age: 79
End: 2019-06-17
Payer: MEDICARE

## 2019-06-17 PROCEDURE — 93886 INTRACRANIAL COMPLETE STUDY: CPT

## 2019-06-17 PROCEDURE — 93880 EXTRACRANIAL BILAT STUDY: CPT

## 2019-06-17 PROCEDURE — 93892 TCD EMBOLI DETECT W/O INJ: CPT

## 2019-07-06 ENCOUNTER — INPATIENT (INPATIENT)
Facility: HOSPITAL | Age: 79
LOS: 3 days | Discharge: ANOTHER HOSPITAL | End: 2019-07-10
Attending: INTERNAL MEDICINE | Admitting: INTERNAL MEDICINE
Payer: MEDICARE

## 2019-07-06 VITALS
RESPIRATION RATE: 19 BRPM | OXYGEN SATURATION: 94 % | TEMPERATURE: 98 F | DIASTOLIC BLOOD PRESSURE: 69 MMHG | SYSTOLIC BLOOD PRESSURE: 115 MMHG | HEART RATE: 118 BPM | WEIGHT: 141.1 LBS | HEIGHT: 68 IN

## 2019-07-06 DIAGNOSIS — Z98.890 OTHER SPECIFIED POSTPROCEDURAL STATES: Chronic | ICD-10-CM

## 2019-07-06 DIAGNOSIS — C61 MALIGNANT NEOPLASM OF PROSTATE: ICD-10-CM

## 2019-07-06 DIAGNOSIS — D64.9 ANEMIA, UNSPECIFIED: ICD-10-CM

## 2019-07-06 DIAGNOSIS — C22.0 LIVER CELL CARCINOMA: ICD-10-CM

## 2019-07-06 DIAGNOSIS — E78.1 PURE HYPERGLYCERIDEMIA: ICD-10-CM

## 2019-07-06 DIAGNOSIS — M25.561 PAIN IN RIGHT KNEE: ICD-10-CM

## 2019-07-06 DIAGNOSIS — E11.9 TYPE 2 DIABETES MELLITUS WITHOUT COMPLICATIONS: ICD-10-CM

## 2019-07-06 DIAGNOSIS — I10 ESSENTIAL (PRIMARY) HYPERTENSION: ICD-10-CM

## 2019-07-06 DIAGNOSIS — R06.02 SHORTNESS OF BREATH: ICD-10-CM

## 2019-07-06 LAB
ALBUMIN SERPL ELPH-MCNC: 2.7 G/DL — LOW (ref 3.3–5)
ALP SERPL-CCNC: 284 U/L — HIGH (ref 40–120)
ALT FLD-CCNC: 32 U/L — SIGNIFICANT CHANGE UP (ref 12–78)
ANION GAP SERPL CALC-SCNC: 9 MMOL/L — SIGNIFICANT CHANGE UP (ref 5–17)
APPEARANCE UR: CLEAR — SIGNIFICANT CHANGE UP
APTT BLD: 31.5 SEC — SIGNIFICANT CHANGE UP (ref 28.5–37)
AST SERPL-CCNC: 36 U/L — SIGNIFICANT CHANGE UP (ref 15–37)
BACTERIA # UR AUTO: ABNORMAL
BILIRUB SERPL-MCNC: 0.2 MG/DL — SIGNIFICANT CHANGE UP (ref 0.2–1.2)
BILIRUB UR-MCNC: NEGATIVE — SIGNIFICANT CHANGE UP
BLD GP AB SCN SERPL QL: SIGNIFICANT CHANGE UP
BUN SERPL-MCNC: 18 MG/DL — SIGNIFICANT CHANGE UP (ref 7–23)
CALCIUM SERPL-MCNC: 8.2 MG/DL — LOW (ref 8.5–10.1)
CHLORIDE SERPL-SCNC: 98 MMOL/L — SIGNIFICANT CHANGE UP (ref 96–108)
CO2 SERPL-SCNC: 25 MMOL/L — SIGNIFICANT CHANGE UP (ref 22–31)
COLOR SPEC: YELLOW — SIGNIFICANT CHANGE UP
CREAT SERPL-MCNC: 1.47 MG/DL — HIGH (ref 0.5–1.3)
CRP SERPL-MCNC: 9.03 MG/DL — HIGH (ref 0–0.4)
DIFF PNL FLD: ABNORMAL
EPI CELLS # UR: SIGNIFICANT CHANGE UP
ERYTHROCYTE [SEDIMENTATION RATE] IN BLOOD: 104 MM/HR — HIGH (ref 0–20)
GLUCOSE BLDC GLUCOMTR-MCNC: 135 MG/DL — HIGH (ref 70–99)
GLUCOSE BLDC GLUCOMTR-MCNC: 153 MG/DL — HIGH (ref 70–99)
GLUCOSE SERPL-MCNC: 256 MG/DL — HIGH (ref 70–99)
GLUCOSE UR QL: 100 MG/DL
HCT VFR BLD CALC: 21 % — CRITICAL LOW (ref 39–50)
HGB BLD-MCNC: 6.9 G/DL — CRITICAL LOW (ref 13–17)
INR BLD: 0.97 RATIO — SIGNIFICANT CHANGE UP (ref 0.88–1.16)
KETONES UR-MCNC: NEGATIVE — SIGNIFICANT CHANGE UP
LACTATE SERPL-SCNC: 3.5 MMOL/L — HIGH (ref 0.7–2)
LEUKOCYTE ESTERASE UR-ACNC: ABNORMAL
LIDOCAIN IGE QN: 353 U/L — SIGNIFICANT CHANGE UP (ref 73–393)
MCHC RBC-ENTMCNC: 28.6 PG — SIGNIFICANT CHANGE UP (ref 27–34)
MCHC RBC-ENTMCNC: 32.9 GM/DL — SIGNIFICANT CHANGE UP (ref 32–36)
MCV RBC AUTO: 87.1 FL — SIGNIFICANT CHANGE UP (ref 80–100)
NITRITE UR-MCNC: NEGATIVE — SIGNIFICANT CHANGE UP
NRBC # BLD: 0 /100 WBCS — SIGNIFICANT CHANGE UP (ref 0–0)
NT-PROBNP SERPL-SCNC: 1482 PG/ML — HIGH (ref 0–450)
PH UR: 6.5 — SIGNIFICANT CHANGE UP (ref 5–8)
PLATELET # BLD AUTO: 60 K/UL — LOW (ref 150–400)
POTASSIUM SERPL-MCNC: 3.8 MMOL/L — SIGNIFICANT CHANGE UP (ref 3.5–5.3)
POTASSIUM SERPL-SCNC: 3.8 MMOL/L — SIGNIFICANT CHANGE UP (ref 3.5–5.3)
PROT SERPL-MCNC: 6.7 GM/DL — SIGNIFICANT CHANGE UP (ref 6–8.3)
PROT UR-MCNC: 100 MG/DL
PROTHROM AB SERPL-ACNC: 10.8 SEC — SIGNIFICANT CHANGE UP (ref 10–12.9)
RBC # BLD: 2.41 M/UL — LOW (ref 4.2–5.8)
RBC # FLD: 16.3 % — HIGH (ref 10.3–14.5)
RBC CASTS # UR COMP ASSIST: >50 /HPF (ref 0–4)
SODIUM SERPL-SCNC: 132 MMOL/L — LOW (ref 135–145)
SP GR SPEC: 1 — LOW (ref 1.01–1.02)
UROBILINOGEN FLD QL: NEGATIVE MG/DL — SIGNIFICANT CHANGE UP
WBC # BLD: 2.55 K/UL — LOW (ref 3.8–10.5)
WBC # FLD AUTO: 2.55 K/UL — LOW (ref 3.8–10.5)
WBC UR QL: SIGNIFICANT CHANGE UP

## 2019-07-06 PROCEDURE — 99285 EMERGENCY DEPT VISIT HI MDM: CPT

## 2019-07-06 PROCEDURE — 73564 X-RAY EXAM KNEE 4 OR MORE: CPT | Mod: 26,RT

## 2019-07-06 PROCEDURE — 71045 X-RAY EXAM CHEST 1 VIEW: CPT | Mod: 26

## 2019-07-06 PROCEDURE — 93010 ELECTROCARDIOGRAM REPORT: CPT

## 2019-07-06 RX ORDER — INSULIN LISPRO 100/ML
VIAL (ML) SUBCUTANEOUS
Refills: 0 | Status: DISCONTINUED | OUTPATIENT
Start: 2019-07-06 | End: 2019-07-10

## 2019-07-06 RX ORDER — ATORVASTATIN CALCIUM 80 MG/1
40 TABLET, FILM COATED ORAL AT BEDTIME
Refills: 0 | Status: DISCONTINUED | OUTPATIENT
Start: 2019-07-06 | End: 2019-07-10

## 2019-07-06 RX ORDER — ENOXAPARIN SODIUM 100 MG/ML
90 INJECTION SUBCUTANEOUS AT BEDTIME
Refills: 0 | Status: DISCONTINUED | OUTPATIENT
Start: 2019-07-06 | End: 2019-07-10

## 2019-07-06 RX ORDER — CARBAMAZEPINE 200 MG
100 TABLET ORAL
Refills: 0 | Status: DISCONTINUED | OUTPATIENT
Start: 2019-07-06 | End: 2019-07-10

## 2019-07-06 RX ORDER — GLUCAGON INJECTION, SOLUTION 0.5 MG/.1ML
1 INJECTION, SOLUTION SUBCUTANEOUS ONCE
Refills: 0 | Status: DISCONTINUED | OUTPATIENT
Start: 2019-07-06 | End: 2019-07-10

## 2019-07-06 RX ORDER — DEXTROSE 50 % IN WATER 50 %
25 SYRINGE (ML) INTRAVENOUS ONCE
Refills: 0 | Status: DISCONTINUED | OUTPATIENT
Start: 2019-07-06 | End: 2019-07-10

## 2019-07-06 RX ORDER — CARBAMAZEPINE 200 MG
100 TABLET ORAL
Refills: 0 | Status: DISCONTINUED | OUTPATIENT
Start: 2019-07-06 | End: 2019-07-06

## 2019-07-06 RX ORDER — METOPROLOL TARTRATE 50 MG
25 TABLET ORAL
Refills: 0 | Status: DISCONTINUED | OUTPATIENT
Start: 2019-07-06 | End: 2019-07-10

## 2019-07-06 RX ORDER — SORAFENIB 200 MG/1
400 TABLET, FILM COATED ORAL DAILY
Refills: 0 | Status: DISCONTINUED | OUTPATIENT
Start: 2019-07-06 | End: 2019-07-10

## 2019-07-06 RX ORDER — AMLODIPINE BESYLATE 2.5 MG/1
10 TABLET ORAL DAILY
Refills: 0 | Status: DISCONTINUED | OUTPATIENT
Start: 2019-07-06 | End: 2019-07-10

## 2019-07-06 RX ORDER — METFORMIN HYDROCHLORIDE 850 MG/1
1000 TABLET ORAL
Refills: 0 | Status: DISCONTINUED | OUTPATIENT
Start: 2019-07-06 | End: 2019-07-06

## 2019-07-06 RX ORDER — OXYCODONE AND ACETAMINOPHEN 5; 325 MG/1; MG/1
1 TABLET ORAL ONCE
Refills: 0 | Status: DISCONTINUED | OUTPATIENT
Start: 2019-07-06 | End: 2019-07-06

## 2019-07-06 RX ORDER — OXYCODONE HYDROCHLORIDE 5 MG/1
5 TABLET ORAL EVERY 6 HOURS
Refills: 0 | Status: DISCONTINUED | OUTPATIENT
Start: 2019-07-06 | End: 2019-07-10

## 2019-07-06 RX ORDER — SODIUM CHLORIDE 9 MG/ML
1000 INJECTION, SOLUTION INTRAVENOUS
Refills: 0 | Status: DISCONTINUED | OUTPATIENT
Start: 2019-07-06 | End: 2019-07-10

## 2019-07-06 RX ORDER — SODIUM CHLORIDE 9 MG/ML
500 INJECTION INTRAMUSCULAR; INTRAVENOUS; SUBCUTANEOUS ONCE
Refills: 0 | Status: COMPLETED | OUTPATIENT
Start: 2019-07-06 | End: 2019-07-06

## 2019-07-06 RX ORDER — ATORVASTATIN CALCIUM 80 MG/1
1 TABLET, FILM COATED ORAL
Qty: 0 | Refills: 0 | DISCHARGE

## 2019-07-06 RX ORDER — FERROUS SULFATE 325(65) MG
325 TABLET ORAL DAILY
Refills: 0 | Status: DISCONTINUED | OUTPATIENT
Start: 2019-07-06 | End: 2019-07-10

## 2019-07-06 RX ORDER — DEXTROSE 50 % IN WATER 50 %
12.5 SYRINGE (ML) INTRAVENOUS ONCE
Refills: 0 | Status: DISCONTINUED | OUTPATIENT
Start: 2019-07-06 | End: 2019-07-10

## 2019-07-06 RX ORDER — DEXTROSE 50 % IN WATER 50 %
15 SYRINGE (ML) INTRAVENOUS ONCE
Refills: 0 | Status: DISCONTINUED | OUTPATIENT
Start: 2019-07-06 | End: 2019-07-10

## 2019-07-06 RX ADMIN — OXYCODONE AND ACETAMINOPHEN 1 TABLET(S): 5; 325 TABLET ORAL at 11:55

## 2019-07-06 RX ADMIN — OXYCODONE AND ACETAMINOPHEN 1 TABLET(S): 5; 325 TABLET ORAL at 10:54

## 2019-07-06 RX ADMIN — Medication 100 MILLIGRAM(S): at 18:00

## 2019-07-06 RX ADMIN — OXYCODONE HYDROCHLORIDE 5 MILLIGRAM(S): 5 TABLET ORAL at 22:08

## 2019-07-06 RX ADMIN — SODIUM CHLORIDE 500 MILLILITER(S): 9 INJECTION INTRAMUSCULAR; INTRAVENOUS; SUBCUTANEOUS at 11:51

## 2019-07-06 RX ADMIN — SODIUM CHLORIDE 500 MILLILITER(S): 9 INJECTION INTRAMUSCULAR; INTRAVENOUS; SUBCUTANEOUS at 10:54

## 2019-07-06 RX ADMIN — OXYCODONE HYDROCHLORIDE 5 MILLIGRAM(S): 5 TABLET ORAL at 16:03

## 2019-07-06 RX ADMIN — ATORVASTATIN CALCIUM 40 MILLIGRAM(S): 80 TABLET, FILM COATED ORAL at 21:55

## 2019-07-06 RX ADMIN — SODIUM CHLORIDE 500 MILLILITER(S): 9 INJECTION INTRAMUSCULAR; INTRAVENOUS; SUBCUTANEOUS at 11:43

## 2019-07-06 RX ADMIN — SORAFENIB 400 MILLIGRAM(S): 200 TABLET, FILM COATED ORAL at 21:58

## 2019-07-06 RX ADMIN — OXYCODONE HYDROCHLORIDE 5 MILLIGRAM(S): 5 TABLET ORAL at 17:03

## 2019-07-06 RX ADMIN — ENOXAPARIN SODIUM 90 MILLIGRAM(S): 100 INJECTION SUBCUTANEOUS at 21:55

## 2019-07-06 RX ADMIN — Medication 25 MILLIGRAM(S): at 18:01

## 2019-07-06 RX ADMIN — OXYCODONE HYDROCHLORIDE 5 MILLIGRAM(S): 5 TABLET ORAL at 22:45

## 2019-07-06 NOTE — ED ADULT NURSE NOTE - OBJECTIVE STATEMENT
patient c/o of R leg pain , c/o of upper back pain and difficulty breathing, denied chest pain , denied abdominal pain ,  history of stroke 4 months ago, patient on chemo and blood thinner PMH HTN/DM/Prostate CA/liver cirrhosis as per patient.  Few days ago urine was pink

## 2019-07-06 NOTE — H&P ADULT - HISTORY OF PRESENT ILLNESS
· HPI : Pt is a 80 yo gentleman with a pmhx of sob and R. knee pain. The sob started over last couple days, worse when he is walking. No chest pain, no cough, no fevers at home. Did notice yesterday that his R. knee was hurting more. No swelling, no hx of dvt/pe. No trauma. No hx of gout. Has had anemia in the past. Is on chemotherapy, last was in June.	  Had Intrahepatic Arterial Chemo in June.

## 2019-07-06 NOTE — PATIENT PROFILE ADULT - PROVIDER NAME
Dr. Merlos (neurology); Dr. Daugherty (oncology); Dr. Ibarra (radiology oncology); Dr. Wright (primary)

## 2019-07-06 NOTE — ED PROVIDER NOTE - CARE PLAN
Principal Discharge DX:	SOB (shortness of breath)  Secondary Diagnosis:	Anemia, unspecified type  Secondary Diagnosis:	Acute pain of right knee

## 2019-07-06 NOTE — ED PROVIDER NOTE - OBJECTIVE STATEMENT
Pt is a 80 yo gentleman with a pmhx of sob and R. knee pain. The sob started over last couple days, worse when he is walking. No chest pain, no cough, no fevers at home. Did notice yesterday that his R. knee was hurting more. No swelling, no hx of dvt/pe. No trauma. No hx of gout. Has had anemia in the past. Is on chemotherapy, last was in June.

## 2019-07-06 NOTE — H&P ADULT - ASSESSMENT
IMPROVE VTE Individual Risk Assessment    RISK                                                                Points    [  ] Previous VTE                                                  3    [  ] Thrombophilia                                               2    [  ] Lower limb paralysis                                      2        (unable to hold up >15 seconds)      [  ] Current Cancer                                              2         (within 6 months)    [  ] Immobilization > 24 hrs                                1    [  ] ICU/CCU stay > 24 hours                              1    [  ] Age > 60                                                      1    IMPROVE VTE Score __4_______ On Lovenox    IMPROVE Score 0-1: Low Risk, No VTE prophylaxis required for most patients, encourage ambulation.   IMPROVE Score 2-3: At risk, pharmacologic VTE prophylaxis is indicated for most patients (in the absence of a contraindication)  IMPROVE Score > or = 4: High Risk, pharmacologic VTE prophylaxis is indicated for most patients (in the absence of a contraindication)

## 2019-07-06 NOTE — ED PROVIDER NOTE - CLINICAL SUMMARY MEDICAL DECISION MAKING FREE TEXT BOX
Ddx: CHF exacerbation/ no pleuritic pain or calf swelling to suggest dvt/pe/ septic joint, although no erythema, and good range of motion on knee  Plan: Cbc, cmp, lipase, lactate, cxr, knee xray, abx, ortho consult, reassess

## 2019-07-06 NOTE — ED ADULT NURSE NOTE - CHIEF COMPLAINT QUOTE
patient c/o of R leg pain , c/o of upper back pain and difficulty breathing, denied chest pain , denied abdominal pain ,  history of stroke 4 months ago, patient on chemo and blood thinner

## 2019-07-06 NOTE — H&P ADULT - NSHPLABSRESULTS_GEN_ALL_CORE
6.9    2.55  )-----------( 60       ( 2019 10:51 )             21.0     -    132<L>  |  98  |  18  ----------------------------<  256<H>  3.8   |  25  |  1.47<H>    Ca    8.2<L>      2019 10:51    TPro  6.7  /  Alb  2.7<L>  /  TBili  0.2  /  DBili  x   /  AST  36  /  ALT  32  /  AlkPhos  284<H>      PT/INR - ( 2019 10:51 )   PT: 10.8 sec;   INR: 0.97 ratio         PTT - ( 2019 10:51 )  PTT:31.5 sec  Urinalysis Basic - ( 2019 12:36 )    Color: Yellow / Appearance: Clear / S.005 / pH: x  Gluc: x / Ketone: Negative  / Bili: Negative / Urobili: Negative mg/dL   Blood: x / Protein: 100 mg/dL / Nitrite: Negative   Leuk Esterase: Trace / RBC: >50 /HPF / WBC 3-5   Sq Epi: x / Non Sq Epi: Occasional / Bacteria: Few      CAPILLARY BLOOD GLUCOSE                Urine Culture:      Blood Culture:  < from: Xray Chest 1 View AP/PA. (19 @ 11:13) >    IMPRESSION:  Cardiomegaly with prominence of vascular interstitial markings.      < end of copied text >  < from: Xray Knee 4 Views, Right (19 @ 11:13) >    IMPRESSION:     Abnormal appearance of the proximal tibia with sclerosis admixed with   permeative areas. Further workup recommended for possible infiltrative   disease. CT imaging and follow-up MRI imaging with gadolinium suggested.      < end of copied text >

## 2019-07-06 NOTE — PHARMACY COMMUNICATION NOTE - REASON FOR NOTE
MD is aware the platelet count is low, he will monitor the patient. He said it is ok to continue with trhe lovenox

## 2019-07-06 NOTE — ED ADULT TRIAGE NOTE - CHIEF COMPLAINT QUOTE
patient c/o of R leg pain , c/o of upper back pain and difficulty breathing, denied chest pain , denied abdominal pain ,  history of stroke 4 months ago patient c/o of R leg pain , c/o of upper back pain and difficulty breathing, denied chest pain , denied abdominal pain ,  history of stroke 4 months ago, patient on chemo and blood thinner

## 2019-07-06 NOTE — H&P ADULT - NSHPPHYSICALEXAM_GEN_ALL_CORE
GENERAL: NAD, well-groomed, well-developed  HEAD:  Atraumatic, Normocephalic  EYES: EOMI, PERRLA, conjunctiva and sclera clear  ENMT:  Moist mucous membranes, Good dentition, No lesions  NECK: Supple, No JVD, Normal thyroid  NERVOUS SYSTEM:  Alert & Oriented X3, Good concentration; Motor Strength 5/5 B/L upper and lower extremities; DTRs 2+ intact and symmetric  CHEST/LUNG: Clear to percussion bilaterally; No rales, rhonchi, wheezing, or rubs  HEART: Regular rate and rhythm; No murmurs, rubs, or gallops  ABDOMEN: Soft, Nontender, Nondistended; Bowel sounds present  EXTREMITIES:  2+ Peripheral Pulses, No clubbing, cyanosis, or edema. Rt. Knee swollen, no erythema. Tender to touch.  LYMPH: No lymphadenopathy noted  SKIN: No rashes or lesions      Vital Signs Last 24 Hrs  T(C): 37.6 (06 Jul 2019 11:55), Max: 37.6 (06 Jul 2019 11:55)  T(F): 99.7 (06 Jul 2019 11:55), Max: 99.7 (06 Jul 2019 11:55)  HR: 118 (06 Jul 2019 09:50) (118 - 118)  BP: 115/69 (06 Jul 2019 09:50) (115/69 - 115/69)  BP(mean): --  RR: 19 (06 Jul 2019 09:50) (19 - 19)  SpO2: 94% (06 Jul 2019 09:50) (94% - 94%)

## 2019-07-07 LAB
ANION GAP SERPL CALC-SCNC: 8 MMOL/L — SIGNIFICANT CHANGE UP (ref 5–17)
BASOPHILS # BLD AUTO: 0.01 K/UL — SIGNIFICANT CHANGE UP (ref 0–0.2)
BASOPHILS NFR BLD AUTO: 0.3 % — SIGNIFICANT CHANGE UP (ref 0–2)
BUN SERPL-MCNC: 11 MG/DL — SIGNIFICANT CHANGE UP (ref 7–23)
CALCIUM SERPL-MCNC: 8.2 MG/DL — LOW (ref 8.5–10.1)
CHLORIDE SERPL-SCNC: 102 MMOL/L — SIGNIFICANT CHANGE UP (ref 96–108)
CO2 SERPL-SCNC: 25 MMOL/L — SIGNIFICANT CHANGE UP (ref 22–31)
CREAT SERPL-MCNC: 1.06 MG/DL — SIGNIFICANT CHANGE UP (ref 0.5–1.3)
CULTURE RESULTS: SIGNIFICANT CHANGE UP
EOSINOPHIL # BLD AUTO: 0.01 K/UL — SIGNIFICANT CHANGE UP (ref 0–0.5)
EOSINOPHIL NFR BLD AUTO: 0.3 % — SIGNIFICANT CHANGE UP (ref 0–6)
GLUCOSE BLDC GLUCOMTR-MCNC: 107 MG/DL — HIGH (ref 70–99)
GLUCOSE BLDC GLUCOMTR-MCNC: 109 MG/DL — HIGH (ref 70–99)
GLUCOSE BLDC GLUCOMTR-MCNC: 116 MG/DL — HIGH (ref 70–99)
GLUCOSE BLDC GLUCOMTR-MCNC: 140 MG/DL — HIGH (ref 70–99)
GLUCOSE SERPL-MCNC: 108 MG/DL — HIGH (ref 70–99)
HCT VFR BLD CALC: 30.1 % — LOW (ref 39–50)
HGB BLD-MCNC: 10 G/DL — LOW (ref 13–17)
IMM GRANULOCYTES NFR BLD AUTO: 0.6 % — SIGNIFICANT CHANGE UP (ref 0–1.5)
LYMPHOCYTES # BLD AUTO: 0.3 K/UL — LOW (ref 1–3.3)
LYMPHOCYTES # BLD AUTO: 8.9 % — LOW (ref 13–44)
MCHC RBC-ENTMCNC: 28.1 PG — SIGNIFICANT CHANGE UP (ref 27–34)
MCHC RBC-ENTMCNC: 33.2 GM/DL — SIGNIFICANT CHANGE UP (ref 32–36)
MCV RBC AUTO: 84.6 FL — SIGNIFICANT CHANGE UP (ref 80–100)
MONOCYTES # BLD AUTO: 0.47 K/UL — SIGNIFICANT CHANGE UP (ref 0–0.9)
MONOCYTES NFR BLD AUTO: 13.9 % — SIGNIFICANT CHANGE UP (ref 2–14)
NEUTROPHILS # BLD AUTO: 2.56 K/UL — SIGNIFICANT CHANGE UP (ref 1.8–7.4)
NEUTROPHILS NFR BLD AUTO: 76 % — SIGNIFICANT CHANGE UP (ref 43–77)
NRBC # BLD: 0 /100 WBCS — SIGNIFICANT CHANGE UP (ref 0–0)
PLATELET # BLD AUTO: 70 K/UL — LOW (ref 150–400)
POTASSIUM SERPL-MCNC: 3.8 MMOL/L — SIGNIFICANT CHANGE UP (ref 3.5–5.3)
POTASSIUM SERPL-SCNC: 3.8 MMOL/L — SIGNIFICANT CHANGE UP (ref 3.5–5.3)
RBC # BLD: 3.56 M/UL — LOW (ref 4.2–5.8)
RBC # FLD: 16.1 % — HIGH (ref 10.3–14.5)
SODIUM SERPL-SCNC: 135 MMOL/L — SIGNIFICANT CHANGE UP (ref 135–145)
SPECIMEN SOURCE: SIGNIFICANT CHANGE UP
WBC # BLD: 3.37 K/UL — LOW (ref 3.8–10.5)
WBC # FLD AUTO: 3.37 K/UL — LOW (ref 3.8–10.5)

## 2019-07-07 PROCEDURE — 73721 MRI JNT OF LWR EXTRE W/O DYE: CPT | Mod: 26,RT

## 2019-07-07 RX ORDER — DOCUSATE SODIUM 100 MG
100 CAPSULE ORAL DAILY
Refills: 0 | Status: DISCONTINUED | OUTPATIENT
Start: 2019-07-07 | End: 2019-07-10

## 2019-07-07 RX ADMIN — AMLODIPINE BESYLATE 10 MILLIGRAM(S): 2.5 TABLET ORAL at 06:15

## 2019-07-07 RX ADMIN — OXYCODONE HYDROCHLORIDE 5 MILLIGRAM(S): 5 TABLET ORAL at 22:42

## 2019-07-07 RX ADMIN — Medication 100 MILLIGRAM(S): at 22:41

## 2019-07-07 RX ADMIN — ATORVASTATIN CALCIUM 40 MILLIGRAM(S): 80 TABLET, FILM COATED ORAL at 22:41

## 2019-07-07 RX ADMIN — OXYCODONE HYDROCHLORIDE 5 MILLIGRAM(S): 5 TABLET ORAL at 07:10

## 2019-07-07 RX ADMIN — ENOXAPARIN SODIUM 90 MILLIGRAM(S): 100 INJECTION SUBCUTANEOUS at 22:41

## 2019-07-07 RX ADMIN — Medication 325 MILLIGRAM(S): at 11:29

## 2019-07-07 RX ADMIN — Medication 25 MILLIGRAM(S): at 06:16

## 2019-07-07 RX ADMIN — Medication 5 MILLIGRAM(S): at 22:42

## 2019-07-07 RX ADMIN — OXYCODONE HYDROCHLORIDE 5 MILLIGRAM(S): 5 TABLET ORAL at 23:15

## 2019-07-07 RX ADMIN — Medication 100 MILLIGRAM(S): at 06:15

## 2019-07-07 RX ADMIN — SORAFENIB 400 MILLIGRAM(S): 200 TABLET, FILM COATED ORAL at 11:30

## 2019-07-07 RX ADMIN — OXYCODONE HYDROCHLORIDE 5 MILLIGRAM(S): 5 TABLET ORAL at 06:16

## 2019-07-07 RX ADMIN — Medication 25 MILLIGRAM(S): at 17:29

## 2019-07-07 RX ADMIN — Medication 100 MILLIGRAM(S): at 17:29

## 2019-07-07 NOTE — PROGRESS NOTE ADULT - SUBJECTIVE AND OBJECTIVE BOX
Patient is a 79y old  Male who presents with a chief complaint of Pain Rt. Knee. SOB (2019 13:20)      INTERVAL HPI/OVERNIGHT EVENTS:  pt c/o right knee pain and swelling  MEDICATIONS  (STANDING):  amLODIPine   Tablet 10 milliGRAM(s) Oral daily  atorvastatin 40 milliGRAM(s) Oral at bedtime  carBAMazepine 100 milliGRAM(s) Oral two times a day  dextrose 5%. 1000 milliLiter(s) (50 mL/Hr) IV Continuous <Continuous>  dextrose 50% Injectable 12.5 Gram(s) IV Push once  dextrose 50% Injectable 25 Gram(s) IV Push once  enoxaparin Injectable 90 milliGRAM(s) SubCutaneous at bedtime  ferrous    sulfate 325 milliGRAM(s) Oral daily  insulin lispro (HumaLOG) corrective regimen sliding scale   SubCutaneous three times a day before meals  metoprolol tartrate 25 milliGRAM(s) Oral two times a day  SORAfenib 400 milliGRAM(s) Oral daily    MEDICATIONS  (PRN):  dextrose 40% Gel 15 Gram(s) Oral once PRN Blood Glucose LESS THAN 70 milliGRAM(s)/deciliter  glucagon  Injectable 1 milliGRAM(s) IntraMuscular once PRN Glucose LESS THAN 70 milligrams/deciliter  oxyCODONE    IR 5 milliGRAM(s) Oral every 6 hours PRN Severe Pain (7 - 10)      Allergies    No Known Allergies    Intolerances        REVIEW OF SYSTEMS:  CONSTITUTIONAL: No fever, weight loss, or fatigue  EYES: No eye pain, visual disturbances, or discharge  ENMT:  No difficulty hearing, tinnitus, vertigo; No sinus or throat pain  NECK: No pain or stiffness  BREASTS: No pain, masses, or nipple discharge  RESPIRATORY: No cough, wheezing, chills or hemoptysis; No shortness of breath  CARDIOVASCULAR: No chest pain, palpitations, dizziness, or leg swelling  GASTROINTESTINAL: No abdominal or epigastric pain. No nausea, vomiting, or hematemesis; No diarrhea or constipation. No melena or hematochezia.  GENITOURINARY: No dysuria, frequency, hematuria, or incontinence  NEUROLOGICAL: No headaches, memory loss, loss of strength, numbness, or tremors  SKIN: No itching, burning, rashes, or lesions   LYMPH NODES: No enlarged glands  ENDOCRINE: No heat or cold intolerance; No hair loss  MUSCULOSKELETAL: No joint pain or swelling; No muscle, back, or extremity pain  PSYCHIATRIC: No depression, anxiety, mood swings, or difficulty sleeping  HEME/LYMPH: No easy bruising, or bleeding gums  ALLERGY AND IMMUNOLOGIC: No hives or eczema    Vital Signs Last 24 Hrs  T(C): 36.7 (2019 17:41), Max: 37.7 (2019 21:45)  T(F): 98.1 (2019 17:41), Max: 99.9 (2019 21:45)  HR: 87 (2019 17:41) (78 - 87)  BP: 162/95 (2019 17:41) (136/90 - 164/97)  BP(mean): --  RR: 17 (2019 17:41) (17 - 18)  SpO2: 93% (2019 17:41) (93% - 95%)    PHYSICAL EXAM:  GENERAL: NAD, well-groomed, well-developed  HEAD:  Atraumatic, Normocephalic  EYES: EOMI, PERRLA, conjunctiva and sclera clear  ENMT: No tonsillar erythema, exudates, or enlargement; Moist mucous membranes, Good dentition, No lesions  NECK: Supple, No JVD, Normal thyroid  NERVOUS SYSTEM:  Alert & Oriented X3, Good concentration; Motor Strength 5/5 B/L upper and lower extremities; DTRs 2+ intact and symmetric  CHEST/LUNG: Clear to percussion bilaterally; No rales, rhonchi, wheezing, or rubs  HEART: Regular rate and rhythm; No murmurs, rubs, or gallops  ABDOMEN: Soft, Nontender, Nondistended; Bowel sounds present  EXTREMITIES:  2+ Peripheral Pulses, No clubbing, cyanosis, or edema  LYMPH: No lymphadenopathy noted  SKIN: No rashes or lesions    LABS:                        10.0   3.37  )-----------( 70       ( 2019 07:11 )             30.1         135  |  102  |  11  ----------------------------<  108<H>  3.8   |  25  |  1.06    Ca    8.2<L>      2019 07:11    TPro  6.7  /  Alb  2.7<L>  /  TBili  0.2  /  DBili  x   /  AST  36  /  ALT  32  /  AlkPhos  284<H>      PT/INR - ( 2019 10:51 )   PT: 10.8 sec;   INR: 0.97 ratio         PTT - ( 2019 10:51 )  PTT:31.5 sec  Urinalysis Basic - ( 2019 12:36 )    Color: Yellow / Appearance: Clear / S.005 / pH: x  Gluc: x / Ketone: Negative  / Bili: Negative / Urobili: Negative mg/dL   Blood: x / Protein: 100 mg/dL / Nitrite: Negative   Leuk Esterase: Trace / RBC: >50 /HPF / WBC 3-5   Sq Epi: x / Non Sq Epi: Occasional / Bacteria: Few      CAPILLARY BLOOD GLUCOSE      POCT Blood Glucose.: 109 mg/dL (2019 16:18)  POCT Blood Glucose.: 140 mg/dL (2019 11:28)  POCT Blood Glucose.: 116 mg/dL (2019 07:27)  POCT Blood Glucose.: 153 mg/dL (2019 22:40)    CULTURES:  Culture Results:   >=3 organisms. Probable collection contamination. ( @ 20:03)  Culture Results:   No growth to date. ( @ 13:54)  Culture Results:   No growth to date. ( @ 13:54)    HEMOGLOBIN A1C:    CHOLESTEROL:        RADIOLOGY & ADDITIONAL TESTS:

## 2019-07-08 ENCOUNTER — RESULT REVIEW (OUTPATIENT)
Age: 79
End: 2019-07-08

## 2019-07-08 DIAGNOSIS — M25.461 EFFUSION, RIGHT KNEE: ICD-10-CM

## 2019-07-08 DIAGNOSIS — M89.9 DISORDER OF BONE, UNSPECIFIED: ICD-10-CM

## 2019-07-08 LAB
ANION GAP SERPL CALC-SCNC: 6 MMOL/L — SIGNIFICANT CHANGE UP (ref 5–17)
BUN SERPL-MCNC: 10 MG/DL — SIGNIFICANT CHANGE UP (ref 7–23)
CALCIUM SERPL-MCNC: 8.6 MG/DL — SIGNIFICANT CHANGE UP (ref 8.5–10.1)
CHLORIDE SERPL-SCNC: 99 MMOL/L — SIGNIFICANT CHANGE UP (ref 96–108)
CO2 SERPL-SCNC: 28 MMOL/L — SIGNIFICANT CHANGE UP (ref 22–31)
CREAT SERPL-MCNC: 1.09 MG/DL — SIGNIFICANT CHANGE UP (ref 0.5–1.3)
GLUCOSE BLDC GLUCOMTR-MCNC: 104 MG/DL — HIGH (ref 70–99)
GLUCOSE BLDC GLUCOMTR-MCNC: 112 MG/DL — HIGH (ref 70–99)
GLUCOSE BLDC GLUCOMTR-MCNC: 117 MG/DL — HIGH (ref 70–99)
GLUCOSE SERPL-MCNC: 103 MG/DL — HIGH (ref 70–99)
HCT VFR BLD CALC: 34.3 % — LOW (ref 39–50)
HGB BLD-MCNC: 11.1 G/DL — LOW (ref 13–17)
MCHC RBC-ENTMCNC: 28 PG — SIGNIFICANT CHANGE UP (ref 27–34)
MCHC RBC-ENTMCNC: 32.4 GM/DL — SIGNIFICANT CHANGE UP (ref 32–36)
MCV RBC AUTO: 86.4 FL — SIGNIFICANT CHANGE UP (ref 80–100)
NRBC # BLD: 0 /100 WBCS — SIGNIFICANT CHANGE UP (ref 0–0)
PLATELET # BLD AUTO: 80 K/UL — LOW (ref 150–400)
POTASSIUM SERPL-MCNC: 3.4 MMOL/L — LOW (ref 3.5–5.3)
POTASSIUM SERPL-SCNC: 3.4 MMOL/L — LOW (ref 3.5–5.3)
RBC # BLD: 3.97 M/UL — LOW (ref 4.2–5.8)
RBC # FLD: 16.3 % — HIGH (ref 10.3–14.5)
SODIUM SERPL-SCNC: 133 MMOL/L — LOW (ref 135–145)
WBC # BLD: 3.14 K/UL — LOW (ref 3.8–10.5)
WBC # FLD AUTO: 3.14 K/UL — LOW (ref 3.8–10.5)

## 2019-07-08 PROCEDURE — 72192 CT PELVIS W/O DYE: CPT | Mod: 26

## 2019-07-08 PROCEDURE — 73521 X-RAY EXAM HIPS BI 2 VIEWS: CPT | Mod: 26

## 2019-07-08 PROCEDURE — 78315 BONE IMAGING 3 PHASE: CPT | Mod: 26

## 2019-07-08 PROCEDURE — 88311 DECALCIFY TISSUE: CPT | Mod: 26

## 2019-07-08 PROCEDURE — 20225 BONE BIOPSY TROCAR/NDL DEEP: CPT

## 2019-07-08 PROCEDURE — 73552 X-RAY EXAM OF FEMUR 2/>: CPT | Mod: 26,50

## 2019-07-08 PROCEDURE — 73562 X-RAY EXAM OF KNEE 3: CPT | Mod: 26,LT

## 2019-07-08 PROCEDURE — 88305 TISSUE EXAM BY PATHOLOGIST: CPT | Mod: 26

## 2019-07-08 PROCEDURE — 77012 CT SCAN FOR NEEDLE BIOPSY: CPT | Mod: 26

## 2019-07-08 RX ADMIN — Medication 25 MILLIGRAM(S): at 20:08

## 2019-07-08 RX ADMIN — ATORVASTATIN CALCIUM 40 MILLIGRAM(S): 80 TABLET, FILM COATED ORAL at 21:52

## 2019-07-08 RX ADMIN — Medication 100 MILLIGRAM(S): at 20:08

## 2019-07-08 RX ADMIN — Medication 25 MILLIGRAM(S): at 06:08

## 2019-07-08 RX ADMIN — Medication 325 MILLIGRAM(S): at 11:26

## 2019-07-08 RX ADMIN — AMLODIPINE BESYLATE 10 MILLIGRAM(S): 2.5 TABLET ORAL at 06:07

## 2019-07-08 RX ADMIN — Medication 100 MILLIGRAM(S): at 06:07

## 2019-07-08 RX ADMIN — OXYCODONE HYDROCHLORIDE 5 MILLIGRAM(S): 5 TABLET ORAL at 22:13

## 2019-07-08 RX ADMIN — Medication 100 MILLIGRAM(S): at 11:28

## 2019-07-08 NOTE — CHART NOTE - NSCHARTNOTEFT_GEN_A_CORE
Vascular & Interventional Radiology Post-Procedure Note    Pre-Procedure Diagnosis: Bone lesion  Post-Procedure Diagnosis: Same as pre.  Indications for Procedure: malignancy workup    : Randy  Assistant(s): n/a    Procedure Details/Findings: Right proximal tibia sclerotic mass needle biopsy  Access (if applicable): n/a    Complications: none  Estimated Blood Loss: Minimal  Specimen: 3 cores 12G x 15mm each  Contrast: none  Sedation: Anesthesia  Patient Condition/Disposition: stable, 30 min recovery, then floor    Anticoagulation: OK to restart anticoagulation, if prescribed, in 24 hours    Plan: f/u path

## 2019-07-08 NOTE — CONSULT NOTE ADULT - SUBJECTIVE AND OBJECTIVE BOX
78y M presented to the ED for R knee pain and SOB. Pt is a community ambulator with cane/walker at baseline. Pt was admitted for R knee pain and orthopedics was consulted for further evaluation. Pt reports having a hx of R knee pain for over a year, however states his pain has significantly increased over the last 1-2 weeks. Denies any recent trauma or falls/Injuries. Pt has a hx of prostate cancer and Stage IV HCC for which he has been seeing two oncologists Dr. Weeks and Dr. Daugherty. Pt is currently on Infusion chemotherapy, and recently had radiotherapy a few months ago. History was obtained by patient and family members at bedside. 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 [facility] state they are aware of patient having metastatic disease as they were recently notified patient having clavicle lymph nodes + for mets, unsure of laterality.   Pt reports he has informed his PCP and oncologist about his R knee pain, however has not had bone scan performed yet, however states there was a discussion for patient having a bone scan eventually. Pt denies fever, night sweats, chills. Denies numbness/tingling of the BL LE. No other complaints at this time.         PAST MEDICAL & SURGICAL HISTORY:  HLD (hyperlipidemia)  HTN (hypertension)  Liver cancer: stage 4  Prostate cancer  Ischemic stroke: 01/2019  HLD (hyperlipidemia)  HTN (hypertension)  Hernia  DM (diabetes mellitus)  Prostate CA  H/O hernia repair      Home Medications:  atorvastatin 40 mg oral tablet: 1 tab(s) orally once a day (06 Jul 2019 10:15)  carBAMazepine 100 mg oral tablet, chewable: 1 tab(s) orally 2 times a day (06 Jul 2019 10:15)  ferrous sulfate: orally once a day (06 Jul 2019 10:15)  Lovenox 100 mg/mL injectable solution: 90 milligram(s) injectable once a day.      Pt restart Lovenox 90mg injection on 6/5/19 in PM (06 Jul 2019 10:15)  metFORMIN 1000 mg oral tablet: 1 tab(s) orally 2 times a day (06 Jul 2019 10:15)  NexAVAR 200 mg oral tablet: 2 tab(s) orally once a day (06 Jul 2019 10:15)  ondansetron:  (06 Jul 2019 10:15)  Zofran 8 mg oral tablet: orally 2 times a day, As Needed (06 Jul 2019 10:15)      Allergies    No Known Allergies    Intolerances        Imaging:     XR R knee: abnormal sclerosis proximal tibita  MR R Knee: ND pathologic Tibial palteau fx medial/lateral posteriorly       PE:    GEN: Cachetic NAD     RLE:  SKin intact, no erythema, or echymosis  SILT L3-S1  EHL/FHL/TA/SC intact  NO pain with ROM of the Knee joint  + palpable effusion Knee joint  Able to Flex knee to 120 degrees without pain  Able to SLR  no pain at this time with Internal/External Rotation  +pain with axial load to the Hip joint  DP pulse 2+  compartments soft and compressible

## 2019-07-08 NOTE — CONSULT NOTE ADULT - SUBJECTIVE AND OBJECTIVE BOX
HPI:  · HPI : Pt is a 78 yo gentleman with a pmhx of sob and R. knee pain. The sob started over last couple days, worse when he is walking. No chest pain, no cough, no fevers at home. Did notice yesterday that his R. knee was hurting more. No swelling, no hx of dvt/pe. No trauma. No hx of gout. Has had anemia in the past. Is on chemotherapy, last was in June.	  Had Intrahepatic Arterial Chemo in June. (06 Jul 2019 13:20)      PAST MEDICAL & SURGICAL HISTORY:  HLD (hyperlipidemia)  HTN (hypertension)  Liver cancer: stage 4  Prostate cancer  Ischemic stroke: 01/2019  HLD (hyperlipidemia)  HTN (hypertension)  Hernia  DM (diabetes mellitus)  Prostate CA  H/O hernia repair      REVIEW OF SYSTEMS: pain of the Rt knee     CONSTITUTIONAL: No weakness, fevers or chills  EYES/ENT:  NECK:  RESPIRATORY: No cough, wheezing, hemoptysis; No shortness of breath  CARDIOVASCULAR: No chest pain or palpitations  GASTROINTESTINAL: No abdominal or epigastric pain. No nausea, vomiting, or hematemesis; No diarrhea or constipation. No melena or hematochezia.  GENITOURINARY: No dysuria, frequency or hematuria  NEUROLOGICAL: No numbness or weakness  SKIN: No itching, rashes    MEDICATIONS  (STANDING):  amLODIPine   Tablet 10 milliGRAM(s) Oral daily  atorvastatin 40 milliGRAM(s) Oral at bedtime  carBAMazepine 100 milliGRAM(s) Oral two times a day  dextrose 5%. 1000 milliLiter(s) (50 mL/Hr) IV Continuous <Continuous>  dextrose 50% Injectable 12.5 Gram(s) IV Push once  dextrose 50% Injectable 25 Gram(s) IV Push once  docusate sodium 100 milliGRAM(s) Oral daily  enoxaparin Injectable 90 milliGRAM(s) SubCutaneous at bedtime  ferrous    sulfate 325 milliGRAM(s) Oral daily  insulin lispro (HumaLOG) corrective regimen sliding scale   SubCutaneous three times a day before meals  metoprolol tartrate 25 milliGRAM(s) Oral two times a day  SORAfenib 400 milliGRAM(s) Oral daily    MEDICATIONS  (PRN):  bisacodyl 5 milliGRAM(s) Oral every 12 hours PRN Constipation  dextrose 40% Gel 15 Gram(s) Oral once PRN Blood Glucose LESS THAN 70 milliGRAM(s)/deciliter  glucagon  Injectable 1 milliGRAM(s) IntraMuscular once PRN Glucose LESS THAN 70 milligrams/deciliter  oxyCODONE    IR 5 milliGRAM(s) Oral every 6 hours PRN Severe Pain (7 - 10)      Allergies    No Known Allergies    Intolerances        SOCIAL HISTORY:    FAMILY HISTORY:      Vital Signs Last 24 Hrs  T(C): 36.1 (08 Jul 2019 19:27), Max: 36.7 (08 Jul 2019 00:17)  T(F): 96.9 (08 Jul 2019 19:27), Max: 98 (08 Jul 2019 00:17)  HR: 83 (08 Jul 2019 19:27) (71 - 83)  BP: 147/100 (08 Jul 2019 19:27) (135/88 - 152/101)  BP(mean): --  RR: 18 (08 Jul 2019 19:27) (17 - 18)  SpO2: 96% (08 Jul 2019 19:27) (95% - 96%)    PHYSICAL EXAM:    GENERAL: NAD, well-developed  HEAD:  Atraumatic, Normocephalic  EYES: EOMI, PERRLA, conjunctiva and sclera clear  NECK: Supple, No JVD  BREAST:   CHEST/LUNG: Clear   HEART: Regular rate and rhythm;  ABDOMEN: Soft, Nontender, Nondistended; Bowel sounds present  EXTREMITIES: brace of Rt knee   PSYCH: AAOx3  NEUROLOGY: non-focal    LABS:                        11.1   3.14  )-----------( 80       ( 08 Jul 2019 07:26 )             34.3     07-08    133<L>  |  99  |  10  ----------------------------<  103<H>  3.4<L>   |  28  |  1.09    Ca    8.6      08 Jul 2019 07:26            RADIOLOGY & ADDITIONAL STUDIES:

## 2019-07-08 NOTE — PROGRESS NOTE ADULT - SUBJECTIVE AND OBJECTIVE BOX
Patient seen and examined at bedside this am. Pain is well controlled. No acute overnight events. Tolerating RLE Knee immobilizer. Denies fever, chills, numbness/tingling RLE. No other complaints at this time.         Vital Signs Last 24 Hrs  T(C): 36.6 (08 Jul 2019 05:22), Max: 37.6 (07 Jul 2019 11:50)  T(F): 97.8 (08 Jul 2019 05:22), Max: 99.7 (07 Jul 2019 11:50)  HR: 71 (08 Jul 2019 05:22) (71 - 87)  BP: 152/101 (08 Jul 2019 05:22) (136/90 - 162/95)  BP(mean): --  RR: 18 (08 Jul 2019 05:22) (17 - 18)  SpO2: 96% (08 Jul 2019 05:22) (93% - 96%)      PE:    GEN: NAD       RLE:  Skin intact, no erythema, or echymosis  SILT L3-S1  EHL/FHL/TA/SC intact  NO pain with ROM of the Knee joint  + palpable effusion Knee joint  Able to Flex knee to 120 degrees without pain  Able to SLR  no pain at this time with Internal/External Rotation  +pain with axial load to the Hip joint  DP pulse 2+  compartments soft and compressible

## 2019-07-08 NOTE — CONSULT NOTE ADULT - ASSESSMENT
79 y  male with history of hepatocellular cancer with mets to Rt knee   sp Rt knee biopsy
A/P: 79y M w/ known Prostate and HCC, with R Knee posterior Medial/Lateral intercondylar ridge Pathologic Fractura    -Imaging and Case discussed with attending.  -Pt has known hx of metastasis, at current from chart review and history taking uncertain if from prostate cx vs HCC vs both.   -Discussed with patient and family members at bedside, Patient's pain in R knee likely due to metastasis given history and imaging findings.   Patient has a pathologic lesion within the R TIbia, alongside lesions in the R Femur seen on MR, Patient will require bone scan to better evaluate the extend of bony metastasis. Pathologic fracture of proximal tibia is NON Displaced.   -Pt reports of Minor R Hip Pain, will order imaging to further Evaluate   -Pt does not report of Left Hip/Ankle/Knee pain, WBAT LLE at this time.   -FU Bone scan   -FU CT BL LEs   -FU XRs   -ESR/CRP elevated, likely 2/2 Metastasis   -NWB RLE in Knee Immobilizer   -Rec Pt c/w chemotherapy and outpatient Oncology FU.   -REc Onc c/s while patient admitted  -Will rec patient FU with Dr. Huizar Orthopedic Oncologist for management of R Proximal TIbia lesion/pathologic fracture. Conservative management vs. Surgical options could be explored.   -No further ortho intervention at this time  -Attending aware and agrees with above  -Will FU imaging studies and advise if plan changes.
80 yo male w/ known Prostate and HCC, with R Knee posterior Medial/Lateral intercondylar ridge Pathologic Fracture.  Patient has a pathologic lesion within the R TIbia, alongside lesions in the R Femur seen on MR, also sclerotic lesions seen on the bony pelvis.  IR consulted for right proximal tibial lesion biopsy

## 2019-07-08 NOTE — CONSULT NOTE ADULT - SUBJECTIVE AND OBJECTIVE BOX
Patient is a 79y old  Male who presents with a chief complaint of Pain Rt. Knee. SOB     79y M w/ known Prostate and HCC, with R Knee posterior Medial/Lateral intercondylar ridge Pathologic Fracture.  Patient has a pathologic lesion within the R TIbia, alongside lesions in the R Femur seen on MR, also sclerotic lesions seen on the bony pelvis.          HPI:  · HPI : Pt is a 80 yo gentleman with a pmhx of sob and R. knee pain. The sob started over last couple days, worse when he is walking. No chest pain, no cough, no fevers at home. Did notice yesterday that his R. knee was hurting more. No swelling, no hx of dvt/pe. No trauma. No hx of gout. Has had anemia in the past. Is on chemotherapy, last was in .	  Had Intrahepatic Arterial Chemo in . (2019 13:20)      General:  No wt loss, fevers, chills, night sweats  Eyes:  Good vision, no reported pain  CV:  No pain, palpitations,   Resp:  No dyspnea, cough, tachypnea, wheezing  GI:  No pain, nausea, vomiting, diarrhea, constipation  :  No pain, bleeding, incontinence, nocturia  Muscle:  No pain, weakness  Neuro:  No weakness, tingling, memory problems  Psych:  No fatigue, insomnia, mood problems, depression  Endocrine:  No polyuria, polydypsia, cold/heat intolerance  Heme:  No petechiae, ecchymosis, easy bruisability  Skin:  No rash, tattoos, scars, edema    PAST MEDICAL & SURGICAL HISTORY:  HLD (hyperlipidemia)  HTN (hypertension)  Liver cancer: stage 4  Prostate cancer  Ischemic stroke: 2019  HLD (hyperlipidemia)  HTN (hypertension)  Hernia  DM (diabetes mellitus)  Prostate CA  H/O hernia repair      Allergies    No Known Allergies    Intolerances        MEDICATIONS  (STANDING):  amLODIPine   Tablet 10 milliGRAM(s) Oral daily  atorvastatin 40 milliGRAM(s) Oral at bedtime  carBAMazepine 100 milliGRAM(s) Oral two times a day  dextrose 5%. 1000 milliLiter(s) (50 mL/Hr) IV Continuous <Continuous>  dextrose 50% Injectable 12.5 Gram(s) IV Push once  dextrose 50% Injectable 25 Gram(s) IV Push once  docusate sodium 100 milliGRAM(s) Oral daily  enoxaparin Injectable 90 milliGRAM(s) SubCutaneous at bedtime  ferrous    sulfate 325 milliGRAM(s) Oral daily  insulin lispro (HumaLOG) corrective regimen sliding scale   SubCutaneous three times a day before meals  metoprolol tartrate 25 milliGRAM(s) Oral two times a day  SORAfenib 400 milliGRAM(s) Oral daily    MEDICATIONS  (PRN):  bisacodyl 5 milliGRAM(s) Oral every 12 hours PRN Constipation  dextrose 40% Gel 15 Gram(s) Oral once PRN Blood Glucose LESS THAN 70 milliGRAM(s)/deciliter  glucagon  Injectable 1 milliGRAM(s) IntraMuscular once PRN Glucose LESS THAN 70 milligrams/deciliter  oxyCODONE    IR 5 milliGRAM(s) Oral every 6 hours PRN Severe Pain (7 - 10)        SOCIAL HISTORY:    FAMILY HISTORY:        PHYSICAL EXAM:    Vital Signs Last 24 Hrs  T(C): 36.6 (2019 05:22), Max: 37.6 (2019 11:50)  T(F): 97.8 (2019 05:22), Max: 99.7 (2019 11:50)  HR: 71 (2019 05:22) (71 - 87)  BP: 152/101 (2019 05:22) (136/90 - 162/95)  BP(mean): --  RR: 18 (2019 05:22) (17 - 18)  SpO2: 96% (2019 05:22) (93% - 96%)    General:  Appears stated age, well-groomed, well-nourished, no distress  Lungs:  CTAB  Cardiovascular:  good S1, S2,   Abdomen:  Soft, non-tender, non-distended,   Extremities:  no calf tenderness/swelling b/l  Musculoskeletal:  Full ROM in all joints w/o swelling/tenderness/effusion  Neuro/Psych:  A &O x 3    LABS:                        11.1   3.14  )-----------( 80       ( 2019 07:26 )             34.3     07-08    133<L>  |  99  |  10  ----------------------------<  103<H>  3.4<L>   |  28  |  1.09    Ca    8.6      2019 07:26        Urinalysis Basic - ( 2019 12:36 )    Color: Yellow / Appearance: Clear / S.005 / pH: x  Gluc: x / Ketone: Negative  / Bili: Negative / Urobili: Negative mg/dL   Blood: x / Protein: 100 mg/dL / Nitrite: Negative   Leuk Esterase: Trace / RBC: >50 /HPF / WBC 3-5   Sq Epi: x / Non Sq Epi: Occasional / Bacteria: Few        RADIOLOGY & ADDITIONAL STUDIES: Patient is a 79y old  Male who presents with a chief complaint of Pain Rt. Knee. SOB     79y M w/ known Prostate and HCC, with R Knee posterior Medial/Lateral intercondylar ridge Pathologic Fracture.  Patient has a pathologic lesion within the R TIbia, alongside lesions in the R Femur seen on MR, also sclerotic lesions seen on the bony pelvis.          HPI:  · HPI : Pt is a 80 yo gentleman with a pmhx of sob and R. knee pain. The sob started over last couple days, worse when he is walking. No chest pain, no cough, no fevers at home. Did notice yesterday that his R. knee was hurting more. No swelling, no hx of dvt/pe. No trauma. No hx of gout. Has had anemia in the past. Is on chemotherapy, last was in .	  Had Intrahepatic Arterial Chemo in . (2019 13:20)        PAST MEDICAL & SURGICAL HISTORY:  HLD (hyperlipidemia)  HTN (hypertension)  Liver cancer: stage 4  Prostate cancer  Ischemic stroke: 2019  HLD (hyperlipidemia)  HTN (hypertension)  Hernia  DM (diabetes mellitus)  Prostate CA  H/O hernia repair      Allergies    No Known Allergies    Intolerances        MEDICATIONS  (STANDING):  amLODIPine   Tablet 10 milliGRAM(s) Oral daily  atorvastatin 40 milliGRAM(s) Oral at bedtime  carBAMazepine 100 milliGRAM(s) Oral two times a day  dextrose 5%. 1000 milliLiter(s) (50 mL/Hr) IV Continuous <Continuous>  dextrose 50% Injectable 12.5 Gram(s) IV Push once  dextrose 50% Injectable 25 Gram(s) IV Push once  docusate sodium 100 milliGRAM(s) Oral daily  enoxaparin Injectable 90 milliGRAM(s) SubCutaneous at bedtime  ferrous    sulfate 325 milliGRAM(s) Oral daily  insulin lispro (HumaLOG) corrective regimen sliding scale   SubCutaneous three times a day before meals  metoprolol tartrate 25 milliGRAM(s) Oral two times a day  SORAfenib 400 milliGRAM(s) Oral daily    MEDICATIONS  (PRN):  bisacodyl 5 milliGRAM(s) Oral every 12 hours PRN Constipation  dextrose 40% Gel 15 Gram(s) Oral once PRN Blood Glucose LESS THAN 70 milliGRAM(s)/deciliter  glucagon  Injectable 1 milliGRAM(s) IntraMuscular once PRN Glucose LESS THAN 70 milligrams/deciliter  oxyCODONE    IR 5 milliGRAM(s) Oral every 6 hours PRN Severe Pain (7 - 10)        SOCIAL HISTORY:    FAMILY HISTORY:        PHYSICAL EXAM:    Vital Signs Last 24 Hrs  T(C): 36.6 (2019 05:22), Max: 37.6 (2019 11:50)  T(F): 97.8 (2019 05:22), Max: 99.7 (2019 11:50)  HR: 71 (2019 05:22) (71 - 87)  BP: 152/101 (2019 05:22) (136/90 - 162/95)  BP(mean): --  RR: 18 (2019 05:22) (17 - 18)  SpO2: 96% (2019 05:22) (93% - 96%)    General:  Appears stated age, well-groomed, well-nourished, no distress  Lungs:  CTAB  Cardiovascular:  good S1, S2,   Abdomen:  Soft, non-tender, non-distended,   Extremities: brace on RLE  Neuro/Psych:  A &O x 3    LABS:                        11.1   3.14  )-----------( 80       ( 2019 07:26 )             34.3     07-08    133<L>  |  99  |  10  ----------------------------<  103<H>  3.4<L>   |  28  |  1.09    Ca    8.6      2019 07:26        Urinalysis Basic - ( 2019 12:36 )    Color: Yellow / Appearance: Clear / S.005 / pH: x  Gluc: x / Ketone: Negative  / Bili: Negative / Urobili: Negative mg/dL   Blood: x / Protein: 100 mg/dL / Nitrite: Negative   Leuk Esterase: Trace / RBC: >50 /HPF / WBC 3-5   Sq Epi: x / Non Sq Epi: Occasional / Bacteria: Few        RADIOLOGY & ADDITIONAL STUDIES:

## 2019-07-08 NOTE — CONSULT NOTE ADULT - PROBLEM SELECTOR RECOMMENDATION 9
follow up with oncologist
Will perform today,  meds, labs and vitals reviewed.  Pt on therapeutic dose of lovenox for stroke.    Will resume lovenox tomorrow night  consent obtained from patient

## 2019-07-08 NOTE — PROGRESS NOTE ADULT - ASSESSMENT
A/P: 79y M w/ known Prostate and HCC, with R Knee posterior Medial/Lateral intercondylar ridge Pathologic Fractura    -Will reach out to Dr. Huizar Orthopedic Oncologist Regarding R Knee Pain and Pathologic Fracture 7/8.     -Imaging and Case discussed with attending.  -Pt has known hx of metastasis, at current from chart review and history taking uncertain if from prostate cx vs HCC vs both.   -Discussed with patient and family members at bedside, Patient's pain in R knee likely due to metastasis given history and imaging findings.   Patient has a pathologic lesion within the R TIbia, alongside lesions in the R Femur seen on MR, Patient will require bone scan to better evaluate the extend of bony metastasis. Pathologic fracture of proximal tibia is NON Displaced.   -Pt reports of Minor R Hip Pain, will order imaging to further Evaluate   -Pt does not report of Left Hip/Ankle/Knee pain, WBAT LLE at this time.   -FU Bone scan   -FU CT BL LEs   -FU XRs   -ESR/CRP elevated, likely 2/2 Metastasis   -NWB RLE in Knee Immobilizer   -Rec Pt c/w chemotherapy and outpatient Oncology FU.   -REc Onc c/s while patient admitted  -Will rec patient FU with Dr. Huizar Orthopedic Oncologist for management of R Proximal TIbia lesion/pathologic fracture. Conservative management vs. Surgical options could be explored.   -No further ortho intervention at this time  -Attending aware and agrees with above  -Will FU imaging studies and advise if plan changes. A/P: 79y M w/ known Prostate and HCC, with R Knee posterior Medial/Lateral intercondylar ridge Pathologic Fracture    -Spoke with Dr. Huizar orthopedic Oncologist at [facility]. Plan for R Knee Path Fx work up to rule out primary Bone Tumor vs. Metastasis, then Transfer to [facility] for Possible Planned OR Procedure of the R Proximal Tibia.   -Will Order CT Guided Core Needle Biopsy to Confirm metastasis and rule out primary bone tumor.   -Imaging and Case discussed with attending.  -Pt has known hx of metastasis, at current from chart review and history taking uncertain if from prostate cx vs HCC vs both.   -Discussed with patient and family members at bedside, Patient's pain in R knee likely due to metastasis vs primary bone tumor.   -Pt reports of Minor R Hip Pain, will order imaging to further Evaluate   -Pt does not report of Left Hip/Ankle/Knee pain, WBAT LLE at this time.   -FU Bone scan   -FU CT Guided R Proximal Tibia Biopsy  -FU CT BL LEs   -FU XRs   -ESR/CRP elevated, likely 2/2 Metastasis   -NWB RLE in Knee Immobilizer   -Rec Pt c/w chemotherapy and outpatient Oncology FU.   -Rec Onc c/s while patient admitted  -Will Need Transfer to Dr. Huizar Orthopedic Oncologist At [facility] for management of R Proximal TIbia lesion/pathologic fracture Once Work Up Bone Scan/Biopsy Is completed. Plan for possible Modular Prosthetic Tibial Replacement.   -No further ortho intervention at this time  -Attending aware and agrees with above A/P: 79y M w/ known Prostate and HCC, with R Knee posterior Medial/Lateral intercondylar ridge Pathologic Fracture    -Spoke with Dr. Huizar orthopedic Oncologist at [facility]. Plan for R Knee Path Fx work up to rule out primary Bone Tumor vs. Metastasis, then Transfer to [facility] for Possible Planned OR Procedure of the R Proximal Tibia.   -Will Order CT Guided Core Needle Biopsy to Confirm metastasis and rule out primary bone tumor to be done today 7/8  -Imaging and Case discussed with attending.  -Pt has known hx of metastasis, at current from chart review and history taking uncertain if from prostate cx vs HCC vs both.   -Discussed with patient and family members at bedside, Patient's pain in R knee likely due to metastasis vs primary bone tumor.   -Pt reports of Minor R Hip Pain, will order imaging to further Evaluate   -Pt does not report of Left Hip/Ankle/Knee pain, WBAT LLE at this time.   -FU Bone scan   -FU CT Guided R Proximal Tibia Biopsy  -FU CT BL LEs   -FU XRs   -ESR/CRP elevated, likely 2/2 Metastasis   -NWB RLE in Knee Immobilizer   -Rec Pt c/w chemotherapy and outpatient Oncology FU.   -Rec Onc c/s while patient admitted  -Will Need Transfer to Dr. Huizar Orthopedic Oncologist At [facility] for management of R Proximal TIbia lesion/pathologic fracture Once Work Up Bone Scan/Biopsy Is completed. Plan for possible Modular Prosthetic Tibial Replacement.   -No further ortho intervention at this time  -Attending aware and agrees with above

## 2019-07-08 NOTE — PROGRESS NOTE ADULT - SUBJECTIVE AND OBJECTIVE BOX
Patient is a 79y old  Male who presents with a chief complaint of Pain Rt. Knee. SOB (2019 11:25)      INTERVAL HPI/OVERNIGHT EVENTS:  no new episode  MEDICATIONS  (STANDING):  amLODIPine   Tablet 10 milliGRAM(s) Oral daily  atorvastatin 40 milliGRAM(s) Oral at bedtime  carBAMazepine 100 milliGRAM(s) Oral two times a day  dextrose 5%. 1000 milliLiter(s) (50 mL/Hr) IV Continuous <Continuous>  dextrose 50% Injectable 12.5 Gram(s) IV Push once  dextrose 50% Injectable 25 Gram(s) IV Push once  docusate sodium 100 milliGRAM(s) Oral daily  enoxaparin Injectable 90 milliGRAM(s) SubCutaneous at bedtime  ferrous    sulfate 325 milliGRAM(s) Oral daily  insulin lispro (HumaLOG) corrective regimen sliding scale   SubCutaneous three times a day before meals  metoprolol tartrate 25 milliGRAM(s) Oral two times a day  SORAfenib 400 milliGRAM(s) Oral daily    MEDICATIONS  (PRN):  bisacodyl 5 milliGRAM(s) Oral every 12 hours PRN Constipation  dextrose 40% Gel 15 Gram(s) Oral once PRN Blood Glucose LESS THAN 70 milliGRAM(s)/deciliter  glucagon  Injectable 1 milliGRAM(s) IntraMuscular once PRN Glucose LESS THAN 70 milligrams/deciliter  oxyCODONE    IR 5 milliGRAM(s) Oral every 6 hours PRN Severe Pain (7 - 10)      Allergies    No Known Allergies    Intolerances        REVIEW OF SYSTEMS:  CONSTITUTIONAL: No fever, weight loss, or fatigue  EYES: No eye pain, visual disturbances, or discharge  ENMT:  No difficulty hearing, tinnitus, vertigo; No sinus or throat pain  NECK: No pain or stiffness  BREASTS: No pain, masses, or nipple discharge  RESPIRATORY: No cough, wheezing, chills or hemoptysis; No shortness of breath  CARDIOVASCULAR: No chest pain, palpitations, dizziness, or leg swelling  GASTROINTESTINAL: No abdominal or epigastric pain. No nausea, vomiting, or hematemesis; No diarrhea or constipation. No melena or hematochezia.  GENITOURINARY: No dysuria, frequency, hematuria, or incontinence  NEUROLOGICAL: No headaches, memory loss, loss of strength, numbness, or tremors  SKIN: No itching, burning, rashes, or lesions   LYMPH NODES: No enlarged glands  ENDOCRINE: No heat or cold intolerance; No hair loss  MUSCULOSKELETAL: No joint pain or swelling; No muscle, back, or extremity pain  PSYCHIATRIC: No depression, anxiety, mood swings, or difficulty sleeping  HEME/LYMPH: No easy bruising, or bleeding gums  ALLERGY AND IMMUNOLOGIC: No hives or eczema    Vital Signs Last 24 Hrs  T(C): 36.6 (2019 11:30), Max: 36.7 (2019 17:41)  T(F): 97.8 (2019 11:30), Max: 98.1 (2019 17:41)  HR: 83 (2019 11:30) (71 - 87)  BP: 135/88 (2019 11:30) (135/88 - 162/95)  BP(mean): --  RR: 17 (2019 11:30) (17 - 18)  SpO2: 96% (2019 11:30) (93% - 96%)    PHYSICAL EXAM:  GENERAL: NAD, well-groomed, well-developed  HEAD:  Atraumatic, Normocephalic  EYES: EOMI, PERRLA, conjunctiva and sclera clear  ENMT: No tonsillar erythema, exudates, or enlargement; Moist mucous membranes, Good dentition, No lesions  NECK: Supple, No JVD, Normal thyroid  NERVOUS SYSTEM:  Alert & Oriented X3, Good concentration; Motor Strength 5/5 B/L upper and lower extremities; DTRs 2+ intact and symmetric  CHEST/LUNG: Clear to percussion bilaterally; No rales, rhonchi, wheezing, or rubs  HEART: Regular rate and rhythm; No murmurs, rubs, or gallops  ABDOMEN: Soft, Nontender, Nondistended; Bowel sounds present  EXTREMITIES:  2+ Peripheral Pulses, No clubbing, cyanosis, or edema  LYMPH: No lymphadenopathy noted  SKIN: No rashes or lesions    LABS:                        11.1   3.14  )-----------( 80       ( 2019 07:26 )             34.3     07-08    133<L>  |  99  |  10  ----------------------------<  103<H>  3.4<L>   |  28  |  1.09    Ca    8.6      2019 07:26        Urinalysis Basic - ( 2019 12:36 )    Color: Yellow / Appearance: Clear / S.005 / pH: x  Gluc: x / Ketone: Negative  / Bili: Negative / Urobili: Negative mg/dL   Blood: x / Protein: 100 mg/dL / Nitrite: Negative   Leuk Esterase: Trace / RBC: >50 /HPF / WBC 3-5   Sq Epi: x / Non Sq Epi: Occasional / Bacteria: Few      CAPILLARY BLOOD GLUCOSE      POCT Blood Glucose.: 117 mg/dL (2019 11:14)  POCT Blood Glucose.: 112 mg/dL (2019 08:09)  POCT Blood Glucose.: 107 mg/dL (2019 23:47)  POCT Blood Glucose.: 109 mg/dL (2019 16:18)    CULTURES:  Culture Results:   >=3 organisms. Probable collection contamination. ( @ 20:03)  Culture Results:   No growth to date. ( @ 13:54)  Culture Results:   No growth to date. ( @ 13:54)    HEMOGLOBIN A1C:    CHOLESTEROL:        RADIOLOGY & ADDITIONAL TESTS:

## 2019-07-09 ENCOUNTER — TRANSCRIPTION ENCOUNTER (OUTPATIENT)
Age: 79
End: 2019-07-09

## 2019-07-09 LAB
GLUCOSE BLDC GLUCOMTR-MCNC: 100 MG/DL — HIGH (ref 70–99)
GLUCOSE BLDC GLUCOMTR-MCNC: 101 MG/DL — HIGH (ref 70–99)
GLUCOSE BLDC GLUCOMTR-MCNC: 118 MG/DL — HIGH (ref 70–99)
GLUCOSE BLDC GLUCOMTR-MCNC: 125 MG/DL — HIGH (ref 70–99)

## 2019-07-09 RX ORDER — FERROUS SULFATE 325(65) MG
0 TABLET ORAL
Qty: 0 | Refills: 0 | DISCHARGE

## 2019-07-09 RX ORDER — ONDANSETRON 8 MG/1
0 TABLET, FILM COATED ORAL
Qty: 0 | Refills: 0 | DISCHARGE

## 2019-07-09 RX ADMIN — Medication 100 MILLIGRAM(S): at 18:20

## 2019-07-09 RX ADMIN — Medication 25 MILLIGRAM(S): at 06:15

## 2019-07-09 RX ADMIN — Medication 100 MILLIGRAM(S): at 06:15

## 2019-07-09 RX ADMIN — ATORVASTATIN CALCIUM 40 MILLIGRAM(S): 80 TABLET, FILM COATED ORAL at 22:24

## 2019-07-09 RX ADMIN — Medication 100 MILLIGRAM(S): at 12:11

## 2019-07-09 RX ADMIN — ENOXAPARIN SODIUM 90 MILLIGRAM(S): 100 INJECTION SUBCUTANEOUS at 22:24

## 2019-07-09 RX ADMIN — SORAFENIB 400 MILLIGRAM(S): 200 TABLET, FILM COATED ORAL at 19:31

## 2019-07-09 RX ADMIN — Medication 25 MILLIGRAM(S): at 18:20

## 2019-07-09 RX ADMIN — Medication 325 MILLIGRAM(S): at 12:10

## 2019-07-09 RX ADMIN — AMLODIPINE BESYLATE 10 MILLIGRAM(S): 2.5 TABLET ORAL at 06:15

## 2019-07-09 NOTE — PROGRESS NOTE ADULT - SUBJECTIVE AND OBJECTIVE BOX
Pt seen & examined. Pain controlled. No acute events overnight  All vital signs stable (as per nursing flowsheet)  Gen: NAD  RLE:  Dressing clean D&I  In knee immobilizer  +sensation L2-S1  +dorsiflexion/plantarflexion of ankle/hallux  +dorsalis pedis pulse  Soft compartments, - calf tenderness

## 2019-07-09 NOTE — DISCHARGE NOTE PROVIDER - HOSPITAL COURSE
Pt is a 80 yo gentleman with a pmhx of sob and R. knee pain. pt s/p IR knee bx and pt to be transfer to [facility] for surgery Pt is a 80 yo gentleman with a pmhx of sob and R. knee pain. pt s/p IR knee bx and pt to be transfer to [facility] for surgery. spoke to pt and the 2 sons at the bedside, they want to see their oncologist because pt is getting Radiation therapy for the left shoulder pain and it is working. they want to be     dc and have 2nd opinion. Reason for Admission: Pain Rt. Knee. SOB    History of Present Illness:     · HPI : Pt is a 78 yo gentleman with a pmhx of sob and R. knee pain. The sob started over last couple days, worse when he is walking. No chest pain, no cough, no fevers at home. Did notice yesterday that his R. knee was hurting more. No swelling, no hx of dvt/pe. No trauma. No hx of gout. Has had anemia in the past. Is on chemotherapy, last was in June.    Had Intrahepatic Arterial Chemo in June.        Patient History:      Past Medical, Past Surgical History:    PAST MEDICAL HISTORY:    DM (diabetes mellitus)     Hernia     HLD (hyperlipidemia)     HLD (hyperlipidemia)     HTN (hypertension)     HTN (hypertension)     Ischemic stroke 01/2019    Liver cancer stage 4    Prostate CA     Prostate cancer.         PAST SURGICAL HISTORY:    H/O hernia repair.        Pt is a 78 yo gentleman with a pmhx of sob and R. knee pain. pt s/p IR knee bx and pt to be transfer to [facility] for surgery. spoke to pt and the 2 sons at the bedside, they want to see their oncologist because pt is getting Radiation therapy for the left shoulder pain and it is working. they want to be     dc and have 2nd opinion.    80 y/o M underwent Right proximal tibia interlesional curettage and bone graft on 7/11/19 with Dr. Huizar.  Patient tolerated procedure well.  Patient was evaluated postoperatively by physical and occupational therapists for weight bearing as tolerated ambulation in knee immobilizer and cleared patient for discharge home.  Patient advised to keep surgical incision/dressing clean and dry, and  follow up with Dr. Huizar i14 days post op (7/25/19) for wound check and suture removal.

## 2019-07-09 NOTE — DISCHARGE NOTE PROVIDER - CARE PROVIDERS DIRECT ADDRESSES
,DirectAddress_Unknown ,DirectAddress_Unknown,[email or web address] the [email or web address]

## 2019-07-09 NOTE — CHART NOTE - NSCHARTNOTEFT_GEN_A_CORE
Patient seen and examined with son at bedside. Explained to patient and Son transfer to [facility] is for follow up care regarding his lesion/Pathologic fracture in his right proximal tibia. Explained to patient and son that surgical indication may be warranted depending on CT Biopsy of the R Proximal tibia results.     -After extensive discussion patient and son agreeable for transfer to [facility] under Dr. Huizar (orthopedic oncologist) service  -Plan for FLoor to FLoor Transfer to [facility]  -[facility] RLE in Knee Immobilizer  -analgesia  -Case discussed with Dr. Huizar who is aware and accepting transfer  -case discussed with attending who is aware of plan   no further ortho intervention at this time  ortho stable for Floor to Floor transfer to [facility].

## 2019-07-09 NOTE — DISCHARGE NOTE PROVIDER - CARE PROVIDER_API CALL
Henok Wright (DO)  Internal Medicine  [address] [place] [address]  Phone: [phone]  Fax: [phone]  Follow Up Time: Henok Wright (DO)  Internal Medicine  [address] [place] [address]  Phone: [phone]  Fax: [phone]  Follow Up Time:     Tapan Huizar)  Orthopedics  [address] [place] [address] [address]  [place] [address]  Phone: [phone]  Fax: [phone]  Follow Up Time: Henok Wright (DO)  Internal Medicine  [address] [place] [address]  Phone: [phone]  Fax: [phone]  Follow Up Time:     Tapan Huizar)  Orthopedics  [address] [place] [address] [address]  [place] [address]  Phone: [phone]  Fax: [phone]  Follow Up Time:

## 2019-07-09 NOTE — DISCHARGE NOTE PROVIDER - NSDCACTIVITY_GEN_ALL_CORE
Walking - Indoors allowed/Stairs allowed/Do not drive or operate machinery/No heavy lifting/straining/Walking - Outdoors allowed/Do not make important decisions

## 2019-07-09 NOTE — DISCHARGE NOTE PROVIDER - NSDCFUADDINST_GEN_ALL_CORE_FT
keep surgical incision/dressing  clean and dry. Follow up with Dr Huizar post operative day #14 (7/25/19) for wound check and suture removal.

## 2019-07-09 NOTE — PROGRESS NOTE ADULT - ASSESSMENT
79M w/ R pathologic medial and lateral tibial plateau fracture  Analgesia  DVT ppx  Incentive spirometry  NWB LLE in BJKI  FU CT guided biopsy results  Will need transfer to [facility] for definitive surgical management 79M w/ R pathologic medial and lateral tibial plateau fracture  Analgesia  DVT ppx  Incentive spirometry  NWB LLE in BJKI  FU CT guided biopsy results  FU Transfer to [facility] Today vs. Tomorrow for Dr. Huizar Orthopedic Oncologist   NO further Ortho Intervention at this time

## 2019-07-09 NOTE — DISCHARGE NOTE PROVIDER - PROVIDER TOKENS
PROVIDER:[TOKEN:[[id number]]] PROVIDER:[TOKEN:[[id number]]],PROVIDER:[TOKEN:[[id number]]]

## 2019-07-09 NOTE — DISCHARGE NOTE PROVIDER - NSDCCPCAREPLAN_GEN_ALL_CORE_FT
PRINCIPAL DISCHARGE DIAGNOSIS  Diagnosis: Effusion of bursa of right knee  Assessment and Plan of Treatment: Effusion of bursa of right knee      SECONDARY DISCHARGE DIAGNOSES  Diagnosis: Anemia, unspecified type  Assessment and Plan of Treatment:     Diagnosis: Hepatocellular carcinoma  Assessment and Plan of Treatment: Hepatocellular carcinoma    Diagnosis: Acute pain of right knee  Assessment and Plan of Treatment:     Diagnosis: Effusion of bursa of right knee  Assessment and Plan of Treatment: Effusion of bursa of right knee

## 2019-07-10 ENCOUNTER — INPATIENT (INPATIENT)
Facility: HOSPITAL | Age: 79
LOS: 4 days | Discharge: HOME | DRG: 493 | End: 2019-07-15
Attending: ORTHOPAEDIC SURGERY | Admitting: ORTHOPAEDIC SURGERY
Payer: MEDICARE

## 2019-07-10 ENCOUNTER — TRANSCRIPTION ENCOUNTER (OUTPATIENT)
Age: 79
End: 2019-07-10

## 2019-07-10 VITALS
RESPIRATION RATE: 18 BRPM | TEMPERATURE: 99 F | DIASTOLIC BLOOD PRESSURE: 100 MMHG | HEART RATE: 86 BPM | OXYGEN SATURATION: 95 % | SYSTOLIC BLOOD PRESSURE: 158 MMHG

## 2019-07-10 VITALS
SYSTOLIC BLOOD PRESSURE: 136 MMHG | HEART RATE: 73 BPM | DIASTOLIC BLOOD PRESSURE: 81 MMHG | OXYGEN SATURATION: 99 % | RESPIRATION RATE: 16 BRPM | TEMPERATURE: 98 F

## 2019-07-10 DIAGNOSIS — Z98.890 OTHER SPECIFIED POSTPROCEDURAL STATES: Chronic | ICD-10-CM

## 2019-07-10 DIAGNOSIS — S82.92XA UNSPECIFIED FRACTURE OF LEFT LOWER LEG, INITIAL ENCOUNTER FOR CLOSED FRACTURE: ICD-10-CM

## 2019-07-10 LAB
ANION GAP SERPL CALC-SCNC: 13 MMOL/L — SIGNIFICANT CHANGE UP (ref 5–17)
APTT BLD: 34.7 SEC — SIGNIFICANT CHANGE UP (ref 27.5–36.3)
BUN SERPL-MCNC: 15 MG/DL — SIGNIFICANT CHANGE UP (ref 7–23)
CALCIUM SERPL-MCNC: 8 MG/DL — LOW (ref 8.4–10.5)
CHLORIDE SERPL-SCNC: 96 MMOL/L — SIGNIFICANT CHANGE UP (ref 96–108)
CO2 SERPL-SCNC: 21 MMOL/L — LOW (ref 22–31)
CREAT SERPL-MCNC: 1.01 MG/DL — SIGNIFICANT CHANGE UP (ref 0.5–1.3)
GLUCOSE BLDC GLUCOMTR-MCNC: 108 MG/DL — HIGH (ref 70–99)
GLUCOSE BLDC GLUCOMTR-MCNC: 113 MG/DL — HIGH (ref 70–99)
GLUCOSE BLDC GLUCOMTR-MCNC: 120 MG/DL — HIGH (ref 70–99)
GLUCOSE SERPL-MCNC: 145 MG/DL — HIGH (ref 70–99)
HCT VFR BLD CALC: 29.9 % — LOW (ref 39–50)
HGB BLD-MCNC: 10.6 G/DL — LOW (ref 13–17)
INR BLD: 0.94 RATIO — SIGNIFICANT CHANGE UP (ref 0.88–1.16)
MCHC RBC-ENTMCNC: 31.1 PG — SIGNIFICANT CHANGE UP (ref 27–34)
MCHC RBC-ENTMCNC: 35.6 GM/DL — SIGNIFICANT CHANGE UP (ref 32–36)
MCV RBC AUTO: 87.4 FL — SIGNIFICANT CHANGE UP (ref 80–100)
PLATELET # BLD AUTO: 60 K/UL — LOW (ref 150–400)
POTASSIUM SERPL-MCNC: 3.7 MMOL/L — SIGNIFICANT CHANGE UP (ref 3.5–5.3)
POTASSIUM SERPL-SCNC: 3.7 MMOL/L — SIGNIFICANT CHANGE UP (ref 3.5–5.3)
PROTHROM AB SERPL-ACNC: 10.8 SEC — SIGNIFICANT CHANGE UP (ref 10–12.9)
RBC # BLD: 3.42 M/UL — LOW (ref 4.2–5.8)
RBC # FLD: 14.1 % — SIGNIFICANT CHANGE UP (ref 10.3–14.5)
SODIUM SERPL-SCNC: 130 MMOL/L — LOW (ref 135–145)
WBC # BLD: 2.9 K/UL — LOW (ref 3.8–10.5)
WBC # FLD AUTO: 2.9 K/UL — LOW (ref 3.8–10.5)

## 2019-07-10 PROCEDURE — 93010 ELECTROCARDIOGRAM REPORT: CPT

## 2019-07-10 PROCEDURE — 71045 X-RAY EXAM CHEST 1 VIEW: CPT | Mod: 26

## 2019-07-10 RX ORDER — CARBAMAZEPINE 200 MG
100 TABLET ORAL
Refills: 0 | Status: DISCONTINUED | OUTPATIENT
Start: 2019-07-10 | End: 2019-07-10

## 2019-07-10 RX ORDER — SORAFENIB 200 MG/1
400 TABLET, FILM COATED ORAL
Refills: 0 | Status: DISCONTINUED | OUTPATIENT
Start: 2019-07-10 | End: 2019-07-11

## 2019-07-10 RX ORDER — AMLODIPINE BESYLATE 2.5 MG/1
10 TABLET ORAL DAILY
Refills: 0 | Status: DISCONTINUED | OUTPATIENT
Start: 2019-07-10 | End: 2019-07-10

## 2019-07-10 RX ORDER — ENOXAPARIN SODIUM 100 MG/ML
90 INJECTION SUBCUTANEOUS AT BEDTIME
Refills: 0 | Status: DISCONTINUED | OUTPATIENT
Start: 2019-07-10 | End: 2019-07-11

## 2019-07-10 RX ORDER — GLUCAGON INJECTION, SOLUTION 0.5 MG/.1ML
1 INJECTION, SOLUTION SUBCUTANEOUS ONCE
Refills: 0 | Status: DISCONTINUED | OUTPATIENT
Start: 2019-07-10 | End: 2019-07-11

## 2019-07-10 RX ORDER — ENOXAPARIN SODIUM 100 MG/ML
90 INJECTION SUBCUTANEOUS DAILY
Refills: 0 | Status: DISCONTINUED | OUTPATIENT
Start: 2019-07-10 | End: 2019-07-10

## 2019-07-10 RX ORDER — SODIUM CHLORIDE 9 MG/ML
1000 INJECTION INTRAMUSCULAR; INTRAVENOUS; SUBCUTANEOUS
Refills: 0 | Status: DISCONTINUED | OUTPATIENT
Start: 2019-07-10 | End: 2019-07-10

## 2019-07-10 RX ORDER — DEXTROSE 50 % IN WATER 50 %
25 SYRINGE (ML) INTRAVENOUS ONCE
Refills: 0 | Status: DISCONTINUED | OUTPATIENT
Start: 2019-07-10 | End: 2019-07-11

## 2019-07-10 RX ORDER — DOXAZOSIN MESYLATE 4 MG
4 TABLET ORAL AT BEDTIME
Refills: 0 | Status: DISCONTINUED | OUTPATIENT
Start: 2019-07-10 | End: 2019-07-11

## 2019-07-10 RX ORDER — ATORVASTATIN CALCIUM 80 MG/1
40 TABLET, FILM COATED ORAL AT BEDTIME
Refills: 0 | Status: DISCONTINUED | OUTPATIENT
Start: 2019-07-10 | End: 2019-07-11

## 2019-07-10 RX ORDER — CARBAMAZEPINE 200 MG
100 TABLET ORAL
Refills: 0 | Status: DISCONTINUED | OUTPATIENT
Start: 2019-07-10 | End: 2019-07-11

## 2019-07-10 RX ORDER — METOPROLOL TARTRATE 50 MG
12.5 TABLET ORAL
Refills: 0 | Status: DISCONTINUED | OUTPATIENT
Start: 2019-07-10 | End: 2019-07-10

## 2019-07-10 RX ORDER — OXYCODONE HYDROCHLORIDE 5 MG/1
5 TABLET ORAL EVERY 4 HOURS
Refills: 0 | Status: DISCONTINUED | OUTPATIENT
Start: 2019-07-10 | End: 2019-07-11

## 2019-07-10 RX ORDER — METOPROLOL TARTRATE 50 MG
12.5 TABLET ORAL
Refills: 0 | Status: DISCONTINUED | OUTPATIENT
Start: 2019-07-10 | End: 2019-07-11

## 2019-07-10 RX ORDER — AMLODIPINE BESYLATE 2.5 MG/1
10 TABLET ORAL DAILY
Refills: 0 | Status: DISCONTINUED | OUTPATIENT
Start: 2019-07-10 | End: 2019-07-11

## 2019-07-10 RX ORDER — OXYCODONE HYDROCHLORIDE 5 MG/1
10 TABLET ORAL EVERY 4 HOURS
Refills: 0 | Status: DISCONTINUED | OUTPATIENT
Start: 2019-07-10 | End: 2019-07-11

## 2019-07-10 RX ORDER — SODIUM CHLORIDE 9 MG/ML
1000 INJECTION, SOLUTION INTRAVENOUS
Refills: 0 | Status: DISCONTINUED | OUTPATIENT
Start: 2019-07-10 | End: 2019-07-11

## 2019-07-10 RX ORDER — DEXTROSE 50 % IN WATER 50 %
12.5 SYRINGE (ML) INTRAVENOUS ONCE
Refills: 0 | Status: DISCONTINUED | OUTPATIENT
Start: 2019-07-10 | End: 2019-07-11

## 2019-07-10 RX ORDER — ACETAMINOPHEN 500 MG
975 TABLET ORAL EVERY 8 HOURS
Refills: 0 | Status: DISCONTINUED | OUTPATIENT
Start: 2019-07-10 | End: 2019-07-11

## 2019-07-10 RX ORDER — INSULIN LISPRO 100/ML
VIAL (ML) SUBCUTANEOUS
Refills: 0 | Status: DISCONTINUED | OUTPATIENT
Start: 2019-07-10 | End: 2019-07-11

## 2019-07-10 RX ORDER — INSULIN LISPRO 100/ML
VIAL (ML) SUBCUTANEOUS AT BEDTIME
Refills: 0 | Status: DISCONTINUED | OUTPATIENT
Start: 2019-07-10 | End: 2019-07-11

## 2019-07-10 RX ORDER — DEXTROSE 50 % IN WATER 50 %
15 SYRINGE (ML) INTRAVENOUS ONCE
Refills: 0 | Status: DISCONTINUED | OUTPATIENT
Start: 2019-07-10 | End: 2019-07-11

## 2019-07-10 RX ADMIN — AMLODIPINE BESYLATE 10 MILLIGRAM(S): 2.5 TABLET ORAL at 05:50

## 2019-07-10 RX ADMIN — Medication 100 MILLIGRAM(S): at 17:12

## 2019-07-10 RX ADMIN — ATORVASTATIN CALCIUM 40 MILLIGRAM(S): 80 TABLET, FILM COATED ORAL at 22:00

## 2019-07-10 RX ADMIN — Medication 100 MILLIGRAM(S): at 11:19

## 2019-07-10 RX ADMIN — Medication 4 MILLIGRAM(S): at 22:00

## 2019-07-10 RX ADMIN — Medication 100 MILLIGRAM(S): at 05:50

## 2019-07-10 RX ADMIN — Medication 25 MILLIGRAM(S): at 17:12

## 2019-07-10 RX ADMIN — Medication 325 MILLIGRAM(S): at 11:19

## 2019-07-10 RX ADMIN — Medication 975 MILLIGRAM(S): at 22:31

## 2019-07-10 RX ADMIN — Medication 975 MILLIGRAM(S): at 22:01

## 2019-07-10 RX ADMIN — SORAFENIB 400 MILLIGRAM(S): 200 TABLET, FILM COATED ORAL at 13:21

## 2019-07-10 RX ADMIN — ENOXAPARIN SODIUM 90 MILLIGRAM(S): 100 INJECTION SUBCUTANEOUS at 22:12

## 2019-07-10 RX ADMIN — Medication 12.5 MILLIGRAM(S): at 22:01

## 2019-07-10 RX ADMIN — Medication 25 MILLIGRAM(S): at 05:50

## 2019-07-10 NOTE — PROGRESS NOTE ADULT - SUBJECTIVE AND OBJECTIVE BOX
Patient is a 79y old  Male who presents with a chief complaint of Pain Rt. Knee. SOB (10 Jul 2019 07:36)      INTERVAL HPI/OVERNIGHT EVENTS:  pt now agree to be transferred to [facility] for surgery  MEDICATIONS  (STANDING):  amLODIPine   Tablet 10 milliGRAM(s) Oral daily  atorvastatin 40 milliGRAM(s) Oral at bedtime  carBAMazepine 100 milliGRAM(s) Oral two times a day  dextrose 5%. 1000 milliLiter(s) (50 mL/Hr) IV Continuous <Continuous>  dextrose 50% Injectable 12.5 Gram(s) IV Push once  dextrose 50% Injectable 25 Gram(s) IV Push once  docusate sodium 100 milliGRAM(s) Oral daily  enoxaparin Injectable 90 milliGRAM(s) SubCutaneous at bedtime  ferrous    sulfate 325 milliGRAM(s) Oral daily  insulin lispro (HumaLOG) corrective regimen sliding scale   SubCutaneous three times a day before meals  metoprolol tartrate 25 milliGRAM(s) Oral two times a day  SORAfenib 400 milliGRAM(s) Oral daily    MEDICATIONS  (PRN):  bisacodyl 5 milliGRAM(s) Oral every 12 hours PRN Constipation  dextrose 40% Gel 15 Gram(s) Oral once PRN Blood Glucose LESS THAN 70 milliGRAM(s)/deciliter  glucagon  Injectable 1 milliGRAM(s) IntraMuscular once PRN Glucose LESS THAN 70 milligrams/deciliter  oxyCODONE    IR 5 milliGRAM(s) Oral every 6 hours PRN Severe Pain (7 - 10)      Allergies    No Known Allergies    Intolerances        REVIEW OF SYSTEMS:  CONSTITUTIONAL: No fever, weight loss, or fatigue  EYES: No eye pain, visual disturbances, or discharge  ENMT:  No difficulty hearing, tinnitus, vertigo; No sinus or throat pain  NECK: No pain or stiffness  BREASTS: No pain, masses, or nipple discharge  RESPIRATORY: No cough, wheezing, chills or hemoptysis; No shortness of breath  CARDIOVASCULAR: No chest pain, palpitations, dizziness, or leg swelling  GASTROINTESTINAL: No abdominal or epigastric pain. No nausea, vomiting, or hematemesis; No diarrhea or constipation. No melena or hematochezia.  GENITOURINARY: No dysuria, frequency, hematuria, or incontinence  NEUROLOGICAL: No headaches, memory loss, loss of strength, numbness, or tremors  SKIN: No itching, burning, rashes, or lesions   LYMPH NODES: No enlarged glands  ENDOCRINE: No heat or cold intolerance; No hair loss  MUSCULOSKELETAL: No joint pain or swelling; No muscle, back, or extremity pain  PSYCHIATRIC: No depression, anxiety, mood swings, or difficulty sleeping  HEME/LYMPH: No easy bruising, or bleeding gums  ALLERGY AND IMMUNOLOGIC: No hives or eczema    Vital Signs Last 24 Hrs  T(C): 37 (10 Jul 2019 10:15), Max: 37 (10 Jul 2019 10:15)  T(F): 98.6 (10 Jul 2019 10:15), Max: 98.6 (10 Jul 2019 10:15)  HR: 87 (10 Jul 2019 10:15) (64 - 93)  BP: 113/84 (10 Jul 2019 10:15) (113/84 - 159/95)  BP(mean): --  RR: 18 (10 Jul 2019 10:15) (17 - 18)  SpO2: 98% (10 Jul 2019 10:15) (95% - 98%)    PHYSICAL EXAM:  GENERAL: NAD, well-groomed, well-developed  HEAD:  Atraumatic, Normocephalic  EYES: EOMI, PERRLA, conjunctiva and sclera clear  ENMT: No tonsillar erythema, exudates, or enlargement; Moist mucous membranes, Good dentition, No lesions  NECK: Supple, No JVD, Normal thyroid  NERVOUS SYSTEM:  Alert & Oriented X3, Good concentration; Motor Strength 5/5 B/L upper and lower extremities; DTRs 2+ intact and symmetric  CHEST/LUNG: Clear to percussion bilaterally; No rales, rhonchi, wheezing, or rubs  HEART: Regular rate and rhythm; No murmurs, rubs, or gallops  ABDOMEN: Soft, Nontender, Nondistended; Bowel sounds present  EXTREMITIES:  2+ Peripheral Pulses, No clubbing, cyanosis, or edema  LYMPH: No lymphadenopathy noted  SKIN: No rashes or lesions    LABS:              CAPILLARY BLOOD GLUCOSE      POCT Blood Glucose.: 120 mg/dL (10 Jul 2019 11:18)  POCT Blood Glucose.: 113 mg/dL (10 Jul 2019 07:24)  POCT Blood Glucose.: 101 mg/dL (09 Jul 2019 22:35)  POCT Blood Glucose.: 118 mg/dL (09 Jul 2019 16:33)    CULTURES:    HEMOGLOBIN A1C:    CHOLESTEROL:        RADIOLOGY & ADDITIONAL TESTS:

## 2019-07-10 NOTE — PROGRESS NOTE ADULT - REASON FOR ADMISSION
Pain Rt. Knee. SOB
04-Sep-2018 11:49
Pain Rt. Knee. SOB

## 2019-07-10 NOTE — PROGRESS NOTE ADULT - SUBJECTIVE AND OBJECTIVE BOX
Patient seen and examined at bedside this am. Pain is well controlled. No acute overnight events. Tolerating RLE Knee immobilizer. Denies fever, chills, numbness/tingling RLE. No other complaints at this time.         Vital Signs Last 24 Hrs  T(C): 36.6 (10 Jul 2019 05:48), Max: 36.8 (09 Jul 2019 10:23)  T(F): 97.8 (10 Jul 2019 05:48), Max: 98.3 (10 Jul 2019 00:28)  HR: 64 (10 Jul 2019 05:48) (64 - 93)  BP: 149/105 (10 Jul 2019 05:48) (145/93 - 159/95)  BP(mean): --  RR: 18 (10 Jul 2019 05:48) (17 - 18)  SpO2: 96% (10 Jul 2019 05:48) (95% - 96%)      PE:    GEN: NAD       RLE:  In Knee Immobilizer   Skin intact, no erythema, or echymosis  SILT L3-S1  EHL/FHL/TA/SC intact  NO pain with ROM of the Knee joint  + palpable effusion Knee joint  Able to SLR  no pain at this time with Internal/External Rotation  +pain with axial load to the Hip joint  DP pulse 2+  compartments soft and compressible

## 2019-07-10 NOTE — PROGRESS NOTE ADULT - ASSESSMENT
A/P: 79y M w/ known Prostate and HCC, with R Knee posterior Medial/Lateral intercondylar ridge Pathologic Fracture    -Plan for Floor to Floor Transfer to [facility] Today under Dr. Huizar Service. Possible Planned OR Procedure of the R Proximal Tibia.   -FU Medicine Regarding [facility] Transfer.   -CT guided Biopsy performed, FU Cx results   -Pt has known hx of metastasis, at current from chart review and history taking uncertain if from prostate cx vs HCC vs both.   -Discussed with patient and family members at bedside, Patient's pain in R knee likely due to metastasis vs primary bone tumor. Patient and Family members agreeable for transfer to [facility] for further work up of R proximal tibia pathologic fracture.   -Bone scan completed  -ESR/CRP elevated, likely 2/2 Metastasis   -NWB RLE in Knee Immobilizer   -No further ortho intervention at this time at [facility] VS  -Ortho Stable For Floor to Floor Transfer   -Attending aware and agrees with above

## 2019-07-11 DIAGNOSIS — E11.9 TYPE 2 DIABETES MELLITUS WITHOUT COMPLICATIONS: ICD-10-CM

## 2019-07-11 DIAGNOSIS — I10 ESSENTIAL (PRIMARY) HYPERTENSION: ICD-10-CM

## 2019-07-11 DIAGNOSIS — I63.9 CEREBRAL INFARCTION, UNSPECIFIED: ICD-10-CM

## 2019-07-11 DIAGNOSIS — C79.9 SECONDARY MALIGNANT NEOPLASM OF UNSPECIFIED SITE: ICD-10-CM

## 2019-07-11 DIAGNOSIS — C61 MALIGNANT NEOPLASM OF PROSTATE: ICD-10-CM

## 2019-07-11 DIAGNOSIS — C22.9 MALIGNANT NEOPLASM OF LIVER, NOT SPECIFIED AS PRIMARY OR SECONDARY: ICD-10-CM

## 2019-07-11 LAB
ANION GAP SERPL CALC-SCNC: 12 MMOL/L — SIGNIFICANT CHANGE UP (ref 5–17)
APPEARANCE UR: CLEAR — SIGNIFICANT CHANGE UP
APTT BLD: 40.6 SEC — HIGH (ref 27.5–36.3)
BILIRUB UR-MCNC: NEGATIVE — SIGNIFICANT CHANGE UP
BLD GP AB SCN SERPL QL: NEGATIVE — SIGNIFICANT CHANGE UP
BLD GP AB SCN SERPL QL: NEGATIVE — SIGNIFICANT CHANGE UP
BUN SERPL-MCNC: 15 MG/DL — SIGNIFICANT CHANGE UP (ref 7–23)
CALCIUM SERPL-MCNC: 8.4 MG/DL — SIGNIFICANT CHANGE UP (ref 8.4–10.5)
CHLORIDE SERPL-SCNC: 93 MMOL/L — LOW (ref 96–108)
CO2 SERPL-SCNC: 22 MMOL/L — SIGNIFICANT CHANGE UP (ref 22–31)
COLOR SPEC: YELLOW — SIGNIFICANT CHANGE UP
CREAT SERPL-MCNC: 1.2 MG/DL — SIGNIFICANT CHANGE UP (ref 0.5–1.3)
CULTURE RESULTS: SIGNIFICANT CHANGE UP
CULTURE RESULTS: SIGNIFICANT CHANGE UP
DIFF PNL FLD: NEGATIVE — SIGNIFICANT CHANGE UP
GLUCOSE BLDC GLUCOMTR-MCNC: 113 MG/DL — HIGH (ref 70–99)
GLUCOSE BLDC GLUCOMTR-MCNC: 146 MG/DL — HIGH (ref 70–99)
GLUCOSE BLDC GLUCOMTR-MCNC: 96 MG/DL — SIGNIFICANT CHANGE UP (ref 70–99)
GLUCOSE SERPL-MCNC: 304 MG/DL — HIGH (ref 70–99)
GLUCOSE UR QL: NEGATIVE — SIGNIFICANT CHANGE UP
HCT VFR BLD CALC: 32.4 % — LOW (ref 39–50)
HGB BLD-MCNC: 10.9 G/DL — LOW (ref 13–17)
INR BLD: 0.97 RATIO — SIGNIFICANT CHANGE UP (ref 0.88–1.16)
KETONES UR-MCNC: NEGATIVE — SIGNIFICANT CHANGE UP
LEUKOCYTE ESTERASE UR-ACNC: NEGATIVE — SIGNIFICANT CHANGE UP
MCHC RBC-ENTMCNC: 29.4 PG — SIGNIFICANT CHANGE UP (ref 27–34)
MCHC RBC-ENTMCNC: 33.7 GM/DL — SIGNIFICANT CHANGE UP (ref 32–36)
MCV RBC AUTO: 87.4 FL — SIGNIFICANT CHANGE UP (ref 80–100)
NITRITE UR-MCNC: NEGATIVE — SIGNIFICANT CHANGE UP
PH UR: 6.5 — SIGNIFICANT CHANGE UP (ref 5–8)
PLATELET # BLD AUTO: 50 K/UL — LOW (ref 150–400)
POTASSIUM SERPL-MCNC: 3.9 MMOL/L — SIGNIFICANT CHANGE UP (ref 3.5–5.3)
POTASSIUM SERPL-SCNC: 3.9 MMOL/L — SIGNIFICANT CHANGE UP (ref 3.5–5.3)
PROT UR-MCNC: ABNORMAL
PROTHROM AB SERPL-ACNC: 11.1 SEC — SIGNIFICANT CHANGE UP (ref 10–12.9)
RBC # BLD: 3.71 M/UL — LOW (ref 4.2–5.8)
RBC # FLD: 13.9 % — SIGNIFICANT CHANGE UP (ref 10.3–14.5)
RH IG SCN BLD-IMP: POSITIVE — SIGNIFICANT CHANGE UP
RH IG SCN BLD-IMP: POSITIVE — SIGNIFICANT CHANGE UP
SODIUM SERPL-SCNC: 127 MMOL/L — LOW (ref 135–145)
SP GR SPEC: 1.02 — SIGNIFICANT CHANGE UP (ref 1.01–1.02)
SPECIMEN SOURCE: SIGNIFICANT CHANGE UP
SPECIMEN SOURCE: SIGNIFICANT CHANGE UP
UROBILINOGEN FLD QL: SIGNIFICANT CHANGE UP
WBC # BLD: 2.4 K/UL — LOW (ref 3.8–10.5)
WBC # FLD AUTO: 2.4 K/UL — LOW (ref 3.8–10.5)

## 2019-07-11 PROCEDURE — 99233 SBSQ HOSP IP/OBS HIGH 50: CPT | Mod: GC

## 2019-07-11 PROCEDURE — 27645 RESECT TIBIA TUMOR: CPT | Mod: RT

## 2019-07-11 PROCEDURE — 93010 ELECTROCARDIOGRAM REPORT: CPT

## 2019-07-11 RX ORDER — INSULIN LISPRO 100/ML
VIAL (ML) SUBCUTANEOUS
Refills: 0 | Status: DISCONTINUED | OUTPATIENT
Start: 2019-07-12 | End: 2019-07-15

## 2019-07-11 RX ORDER — SODIUM CHLORIDE 9 MG/ML
1000 INJECTION INTRAMUSCULAR; INTRAVENOUS; SUBCUTANEOUS
Refills: 0 | Status: DISCONTINUED | OUTPATIENT
Start: 2019-07-12 | End: 2019-07-15

## 2019-07-11 RX ORDER — METOPROLOL TARTRATE 50 MG
12.5 TABLET ORAL
Refills: 0 | Status: DISCONTINUED | OUTPATIENT
Start: 2019-07-12 | End: 2019-07-15

## 2019-07-11 RX ORDER — DOXAZOSIN MESYLATE 4 MG
4 TABLET ORAL AT BEDTIME
Refills: 0 | Status: DISCONTINUED | OUTPATIENT
Start: 2019-07-12 | End: 2019-07-15

## 2019-07-11 RX ORDER — MAGNESIUM HYDROXIDE 400 MG/1
30 TABLET, CHEWABLE ORAL DAILY
Refills: 0 | Status: DISCONTINUED | OUTPATIENT
Start: 2019-07-12 | End: 2019-07-15

## 2019-07-11 RX ORDER — CARBAMAZEPINE 200 MG
100 TABLET ORAL
Refills: 0 | Status: DISCONTINUED | OUTPATIENT
Start: 2019-07-12 | End: 2019-07-15

## 2019-07-11 RX ORDER — ENOXAPARIN SODIUM 100 MG/ML
90 INJECTION SUBCUTANEOUS DAILY
Refills: 0 | Status: DISCONTINUED | OUTPATIENT
Start: 2019-07-12 | End: 2019-07-15

## 2019-07-11 RX ORDER — ATORVASTATIN CALCIUM 80 MG/1
40 TABLET, FILM COATED ORAL AT BEDTIME
Refills: 0 | Status: DISCONTINUED | OUTPATIENT
Start: 2019-07-12 | End: 2019-07-15

## 2019-07-11 RX ORDER — ACETAMINOPHEN 500 MG
975 TABLET ORAL EVERY 8 HOURS
Refills: 0 | Status: DISCONTINUED | OUTPATIENT
Start: 2019-07-12 | End: 2019-07-15

## 2019-07-11 RX ORDER — DOCUSATE SODIUM 100 MG
100 CAPSULE ORAL THREE TIMES A DAY
Refills: 0 | Status: DISCONTINUED | OUTPATIENT
Start: 2019-07-12 | End: 2019-07-15

## 2019-07-11 RX ORDER — GLUCAGON INJECTION, SOLUTION 0.5 MG/.1ML
1 INJECTION, SOLUTION SUBCUTANEOUS ONCE
Refills: 0 | Status: DISCONTINUED | OUTPATIENT
Start: 2019-07-12 | End: 2019-07-15

## 2019-07-11 RX ORDER — OXYCODONE HYDROCHLORIDE 5 MG/1
10 TABLET ORAL EVERY 4 HOURS
Refills: 0 | Status: DISCONTINUED | OUTPATIENT
Start: 2019-07-12 | End: 2019-07-15

## 2019-07-11 RX ORDER — INSULIN LISPRO 100/ML
VIAL (ML) SUBCUTANEOUS AT BEDTIME
Refills: 0 | Status: DISCONTINUED | OUTPATIENT
Start: 2019-07-12 | End: 2019-07-15

## 2019-07-11 RX ORDER — SODIUM CHLORIDE 9 MG/ML
1000 INJECTION, SOLUTION INTRAVENOUS
Refills: 0 | Status: DISCONTINUED | OUTPATIENT
Start: 2019-07-12 | End: 2019-07-15

## 2019-07-11 RX ORDER — SENNA PLUS 8.6 MG/1
2 TABLET ORAL AT BEDTIME
Refills: 0 | Status: DISCONTINUED | OUTPATIENT
Start: 2019-07-12 | End: 2019-07-15

## 2019-07-11 RX ORDER — DEXTROSE 50 % IN WATER 50 %
12.5 SYRINGE (ML) INTRAVENOUS ONCE
Refills: 0 | Status: DISCONTINUED | OUTPATIENT
Start: 2019-07-12 | End: 2019-07-15

## 2019-07-11 RX ORDER — SODIUM CHLORIDE 9 MG/ML
1000 INJECTION INTRAMUSCULAR; INTRAVENOUS; SUBCUTANEOUS
Refills: 0 | Status: DISCONTINUED | OUTPATIENT
Start: 2019-07-11 | End: 2019-07-11

## 2019-07-11 RX ORDER — SORAFENIB 200 MG/1
400 TABLET, FILM COATED ORAL
Refills: 0 | Status: DISCONTINUED | OUTPATIENT
Start: 2019-07-12 | End: 2019-07-15

## 2019-07-11 RX ORDER — DEXTROSE 50 % IN WATER 50 %
25 SYRINGE (ML) INTRAVENOUS ONCE
Refills: 0 | Status: DISCONTINUED | OUTPATIENT
Start: 2019-07-12 | End: 2019-07-15

## 2019-07-11 RX ORDER — OXYCODONE HYDROCHLORIDE 5 MG/1
5 TABLET ORAL EVERY 4 HOURS
Refills: 0 | Status: DISCONTINUED | OUTPATIENT
Start: 2019-07-12 | End: 2019-07-15

## 2019-07-11 RX ORDER — DEXTROSE 50 % IN WATER 50 %
15 SYRINGE (ML) INTRAVENOUS ONCE
Refills: 0 | Status: DISCONTINUED | OUTPATIENT
Start: 2019-07-12 | End: 2019-07-15

## 2019-07-11 RX ORDER — AMLODIPINE BESYLATE 2.5 MG/1
10 TABLET ORAL DAILY
Refills: 0 | Status: DISCONTINUED | OUTPATIENT
Start: 2019-07-12 | End: 2019-07-15

## 2019-07-11 RX ORDER — TRAMADOL HYDROCHLORIDE 50 MG/1
50 TABLET ORAL EVERY 6 HOURS
Refills: 0 | Status: DISCONTINUED | OUTPATIENT
Start: 2019-07-12 | End: 2019-07-15

## 2019-07-11 RX ADMIN — Medication 100 MILLIGRAM(S): at 05:47

## 2019-07-11 RX ADMIN — Medication 100 MILLIGRAM(S): at 19:09

## 2019-07-11 RX ADMIN — Medication 975 MILLIGRAM(S): at 05:47

## 2019-07-11 RX ADMIN — Medication 4 MILLIGRAM(S): at 21:18

## 2019-07-11 RX ADMIN — AMLODIPINE BESYLATE 10 MILLIGRAM(S): 2.5 TABLET ORAL at 05:47

## 2019-07-11 RX ADMIN — Medication 12.5 MILLIGRAM(S): at 05:47

## 2019-07-11 RX ADMIN — Medication 975 MILLIGRAM(S): at 06:17

## 2019-07-11 RX ADMIN — Medication 12.5 MILLIGRAM(S): at 17:14

## 2019-07-11 NOTE — CONSULT NOTE ADULT - SUBJECTIVE AND OBJECTIVE BOX
78y M presented to the ED for R knee pain and SOB. Pt is a community ambulator with cane/walker at baseline. Pt was admitted for R knee pain and orthopedics was consulted for further evaluation. Pt reports having a hx of R knee pain for over a year, however states his pain has significantly increased over the last 1-2 weeks. Denies any recent trauma or falls/Injuries. Pt has a hx of prostate cancer and Stage IV HCC for which he has been seeing two oncologists Dr. Weeks and Dr. Daugherty. Pt is currently on Infusion chemotherapy, and recently had radiotherapy a few months ago. History was obtained by patient and family members at bedside. 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 Pt and family members at [facility] state they are aware of patient having metastatic disease as they were recently notified patient having clavicle lymph nodes + for mets, unsure of laterality.   Pt reports he has informed his PCP and oncologist about his R knee pain, however has not had bone scan performed yet, however states there was a discussion for patient having a bone scan eventually. Pt denies fever, night sweats, chills. Denies numbness/tingling of the BL LE. No other complaints at this time. Patient seen by Dr Huizar and is scheduled for surgery of the right tibia.           PAST MEDICAL & SURGICAL HISTORY:  HLD (hyperlipidemia)  HTN (hypertension)  Liver cancer: stage 4  Prostate cancer  Ischemic stroke: 2019  HLD (hyperlipidemia)  HTN (hypertension)  Hernia  DM (diabetes mellitus)  Prostate CA  H/O hernia repair  mycotic endocarditis       MEDICATIONS  (STANDING):  acetaminophen   Tablet .. 975 milliGRAM(s) Oral every 8 hours  amLODIPine   Tablet 10 milliGRAM(s) Oral daily  atorvastatin 40 milliGRAM(s) Oral at bedtime  carBAMazepine ER Capsule 100 milliGRAM(s) Oral two times a day  dextrose 5%. 1000 milliLiter(s) (50 mL/Hr) IV Continuous <Continuous>  dextrose 50% Injectable 12.5 Gram(s) IV Push once  dextrose 50% Injectable 25 Gram(s) IV Push once  dextrose 50% Injectable 25 Gram(s) IV Push once  doxazosin 4 milliGRAM(s) Oral at bedtime  insulin lispro (HumaLOG) corrective regimen sliding scale   SubCutaneous three times a day before meals  insulin lispro (HumaLOG) corrective regimen sliding scale   SubCutaneous at bedtime  metoprolol tartrate 12.5 milliGRAM(s) Oral two times a day  sodium chloride 0.9%. 1000 milliLiter(s) (75 mL/Hr) IV Continuous <Continuous>  SORAfenib 400 milliGRAM(s) Oral two times a day    MEDICATIONS  (PRN):  dextrose 40% Gel 15 Gram(s) Oral once PRN Blood Glucose LESS THAN 70 milliGRAM(s)/deciliter  glucagon  Injectable 1 milliGRAM(s) IntraMuscular once PRN Glucose LESS THAN 70 milligrams/deciliter  oxyCODONE    IR 5 milliGRAM(s) Oral every 4 hours PRN Moderate Pain (4 - 6)  oxyCODONE    IR 10 milliGRAM(s) Oral every 4 hours PRN Severe Pain (7 - 10)    Social Hx:  Tobacco: quit in   ETOH: occasionally  Drugs: neg    Family Hx:  As per my conversation with the patient, non contributory        CONSTITUTIONAL: No weakness, fevers or chills  EYES/ENT: No visual changes;  No vertigo or throat pain   NECK: No pain or stiffness  RESPIRATORY: No cough, wheezing, hemoptysis; No shortness of breath  CARDIOVASCULAR: No chest pain or palpitations  GASTROINTESTINAL: No abdominal or epigastric pain. No nausea, vomiting, or hematemesis; No diarrhea or constipation. No melena or hematochezia.  GENITOURINARY: No dysuria, frequency or hematuria  NEUROLOGICAL: No numbness or weakness  SKIN: No itching, burning, rashes, or lesions   MUSCULOSKELETAL: right leg pain    INTERVAL HPI/OVERNIGHT EVENTS:  T(C): 36.4 (19 @ 07:30), Max: 37.2 (07-10-19 @ 15:41)  HR: 76 (19 @ 07:30) (69 - 86)  BP: 154/89 (19 @ 07:30) (118/80 - 160/89)  RR: 17 (19 @ 07:30) (16 - 18)  SpO2: 97% (19 @ 07:30) (94% - 99%)  Wt(kg): --  I&O's Summary    10 Jul 2019 07:01  -  2019 07:00  --------------------------------------------------------  IN: 0 mL / OUT: 325 mL / NET: -325 mL    2019 07:01  -  2019 13:20  --------------------------------------------------------  IN: 300 mL / OUT: 0 mL / NET: 300 mL        PHYSICAL EXAM:  GENERAL: NAD, well-groomed, well-developed  HEAD:  Atraumatic, Normocephalic  EYES: EOMI, PERRLA, conjunctiva and sclera clear  ENMT: No tonsillar erythema, exudates, or enlargement; Moist mucous membranes, Good dentition, No lesions  NECK: Supple, No JVD, Normal thyroid  NERVOUS SYSTEM:  Alert & Oriented X3, Good concentration; Motor Strength 5/5 B/L upper and lower extremities; DTRs 2+ intact and symmetric  CHEST/LUNG: Clear to percussion bilaterally; No rales, rhonchi, wheezing, or rubs  HEART: Regular rate and rhythm; No murmurs, rubs, or gallops  ABDOMEN: Soft, Nontender, Nondistended; Bowel sounds present  EXTREMITIES:  2+ Peripheral Pulses, No clubbing, cyanosis, or edema  LYMPH: No lymphadenopathy noted  SKIN: No rashes or lesions        LABS:                        10.9   2.4   )-----------( 50       ( 2019 10:50 )             32.4     07-11    127<L>  |  93<L>  |  15  ----------------------------<  304<H>  3.9   |  22  |  1.20    Ca    8.4      2019 10:50      PT/INR - ( 2019 10:50 )   PT: 11.1 sec;   INR: 0.97 ratio         PTT - ( 2019 10:50 )  PTT:40.6 sec  Urinalysis Basic - ( 2019 08:36 )    Color: Yellow / Appearance: Clear / S.017 / pH: x  Gluc: x / Ketone: Negative  / Bili: Negative / Urobili: <2 mg/dL   Blood: x / Protein: 100 mg/dL / Nitrite: Negative   Leuk Esterase: Negative / RBC: 10 /HPF / WBC 2 /HPF   Sq Epi: x / Non Sq Epi: 1 /HPF / Bacteria: Negative      CAPILLARY BLOOD GLUCOSE      POCT Blood Glucose.: 146 mg/dL (2019 12:50)  POCT Blood Glucose.: 113 mg/dL (2019 09:15)  POCT Blood Glucose.: 108 mg/dL (10 Jul 2019 22:02)        Urinalysis Basic - ( 2019 08:36 )    Color: Yellow / Appearance: Clear / S.017 / pH: x  Gluc: x / Ketone: Negative  / Bili: Negative / Urobili: <2 mg/dL   Blood: x / Protein: 100 mg/dL / Nitrite: Negative   Leuk Esterase: Negative / RBC: 10 /HPF / WBC 2 /HPF   Sq Epi: x / Non Sq Epi: 1 /HPF / Bacteria: Negative    EKG:  < from: 12 Lead ECG (19 @ 10:19) >  Ventricular Rate 103 BPM    Atrial Rate 103 BPM    P-R Interval 176 ms    QRS Duration 136 ms    Q-T Interval 374 ms    QTC Calculation(Bezet) 489 ms    P Axis 43 degrees    R Axis 73 degrees    T Axis 21 degrees    Diagnosis Line Sinus tachycardia  Right bundle branch block  Abnormal ECG    ECHO:    EXAM:  ECHOCARDIOGRAM (CARDIOL)                          PROCEDURE DATE:  2019          INTERPRETATION:  Patient Height: 172.7 cm  Patient Weight: 63.5 kg  Systolic Pressure: 135 mmHg  Diastolic Pressure: 82 mmHg  BSA: 1.8 m^2  Interpretation Summary  Normal left ventricular size and wall thickness.The left ventricular   ejection   fraction is 71%.The left atrial size is normal.Right atrial size is   normal.The   right ventricle is normal in size and function.The tricuspid annular   plane   systolic excursion (TAPSE) is 22 mm (normal 17mm or higher).    Structurally   normal aortic valve.There is trace aortic regurgitation.Structurally   normal   mitral valve.There is mild mitral regurgitation.Structurally normal   tricuspid   valve.There is mild tricuspid regurgitation.There is no echocardiographic   evidence for pulmonary hypertension.Structurally normal pulmonic valve.No   pulmonic regurgitation noted.The aortic root measures 4.0 cm at sinuses   (normal less than 4 cm for men, less than 3.6 cm for women).  The aortic   root   measures 2.2 cm/m2 at sinuses (normal less than 2.1 cm/m2 for men, less   than   2.2 cm/m2 for women).  Borderline aortic root dilatation.A small   pericardial   effusion noted.There is no echocardiographic evidence for cardiac   tamponade.  Procedure Details  A complete two-dimensional transthoracic echocardiogram was performed (2D,  M-mode, spectral and color flow doppler).  Study Quality: Good.  Left Ventricle  Normal left ventricular size and wall thickness.  The left ventricular wall motion is normal.  The left ventricular ejection fraction is normal.  The left ventricular ejection fraction is 71%.  Left Atrium  The left atrial size is normal.  Right Atrium  Right atrial size is normal.  Right Ventricle  The right ventricle is normal in size and function.  The tricuspid annular plane systolic excursion (TAPSE) is 22 mm (normal   17mm  or higher).  Aortic Valve  Structurally normal aortic valve.  There is trace aortic regurgitation.  Mitral Valve  Structurally normal mitral valve.  There is mild mitral regurgitation.  Tricuspid Valve  Structurally normal tricuspid valve.  There is mild tricuspid regurgitation.  There is no echocardiographic evidence for pulmonary hypertension.  Pulmonic Valve  Structurally normal pulmonic valve.  No pulmonic regurgitation noted.  Arteries and Venous System  The aortic root measures 4.0 cm at sinuses (normal less than 4 cm for men,  less than 3.6 cm for women).  The aortic root measures 2.2 cm/m2 at sinuses (normal less than 2.1 cm/m2   for  men, less than 2.2 cm/m2 for women).  Borderline aortic root dilatation.  Pericardium / Pleura  A small pericardial effusion noted.  There is no echocardiographic evidence for cardiac tamponade.  Doppler Measurements & Calculations  MV E point: 52.8 cm/sec  MV A point: 75.0 cm/sec  MV E/A: 0.7  MV dec time: 0.2 sec  Ao V2 mean: 79.8 cm/sec  Ao mean P.8 mmHg  Ao mean PG (full): 1.2 mmHg  Ao V2 VTI: 21.6 cm  LUIS(I,A): 3.3 cm^2  LUIS(I,D): 3.3 cm^2  LV max PG: 3.3 mmHg  LV mean P.5 mmHg  LV V1 max: 90.3 cm/sec  LV V1 mean: 58.5 cm/sec  LV V1 VTI: 18.3 cm  SV(Ao): 267.7 ml  SI(Ao): 152.4 ml/m^2  SV(LVOT): 72.4 ml  SI(LVOT): 41.2 ml/m^2  TR Max norma: 233.5 cm/sec  MMode 2D Measurements & Calculations  Ao root diam: 4.0 cm  Ao root area: 12.4 cm^2  asc Aorta: 3.5 cm  LVOT diam: 2.2 cm  LVOT area: 4.0 cm^2  LVOT area (M): 3.8 cm^2  LVLd ap4: 8.1 cm  EDV(MOD-sp4): 86 ml  LVLs ap4: 6.6 cm  ESV(MOD-sp4): 21 ml  EF(MOD-sp4): 75.6 %  LVLd ap2: 7.4 cm  EDV(MOD-sp2): 69 ml  LVLs ap2: 5.8 cm  ESV(MOD-sp2): 22 ml  EF(MOD-sp2): 68.1 %  SV(MOD-sp4): 65 ml  SI(MOD-sp4): 37.0 ml/m^2  SV(MOD-sp2): 47 ml  SI(MOD-sp2): 26.8 ml/m^2  Interpreting Physician:Michael Mane MD electronically signed on   2019 11:01:08

## 2019-07-11 NOTE — CONSULT NOTE ADULT - PROBLEM SELECTOR RECOMMENDATION 9
Patient scheduled for resection of bone lesion   No contraindication to scheduled procedure  pain meds as needed  DVT and GI prophylaxis

## 2019-07-11 NOTE — CONSULT NOTE ADULT - SUBJECTIVE AND OBJECTIVE BOX
HPI:  79-year-old male with a history of HTN, DM II, HPLD, right parietal stroke with secondary hemorrhage and MCA clot retrieval at [facility] on 3/19, hx of history of marantic endocarditis, hx of metastatic prostate cancer follows by Adriana Daugherty and Desi. Also recently with multifocal metastatic disease in the liver (adenocarcinoma, origin from GI or pancreas or biliary tract), possibly perihepatic nodes and a questionable lesion in the clavicle. He has had radiosurgery treatment by Dr. Weeks and had chemo infusion, currently on Sorafenib since 2 weeks ago, transferred from [place] . At [place], he was found to have sclerotic lesions to bony pelvis, multiple osseous mets, enhancement in right proximal tibia s/p biopsy (poorly differentiated adenocarcinoma), transferred to [place] for possible surgical intervention.        REVIEW OF SYSTEMS:    CONSTITUTIONAL: No weakness, fevers or chills  EYES/ENT: No visual changes, no throat pain   RESPIRATORY: No cough, wheezing, hemoptysis; No shortness of breath  CARDIOVASCULAR: No chest pain or palpitations  GASTROINTESTINAL: No abdominal pain, nausea, vomiting, or hematemesis; No diarrhea or constipation. No melena or hematochezia.  GENITOURINARY: No dysuria, frequency or hematuria  MUSCULOSKELETAL: No joint pain.  NEUROLOGICAL: No dizziness, numbness, or weakness  SKIN: No itching, burning, rashes, or lesions   All other review of systems is negative unless indicated above.  Allergies    No Known Allergies    Intolerances        PAST MEDICAL & SURGICAL HISTORY:  HLD (hyperlipidemia)  HTN (hypertension)  Liver cancer: stage 4  Prostate cancer  Ischemic stroke: 01/2019  HLD (hyperlipidemia)  HTN (hypertension)  Hernia  DM (diabetes mellitus)  Prostate CA  H/O hernia repair      FAMILY HISTORY:  brother with prostate cancer at age 35    Social History:  Lives with his son, previous smoker 1 PPD stopped in 2000        VITAL SIGNS:  Vital Signs Last 24 Hrs  T(C): 36.7 (11 Jul 2019 14:45), Max: 36.8 (10 Jul 2019 18:23)  T(F): 98.1 (11 Jul 2019 14:45), Max: 98.2 (10 Jul 2019 18:23)  HR: 77 (11 Jul 2019 14:45) (69 - 83)  BP: 153/92 (11 Jul 2019 14:45) (118/80 - 160/89)  BP(mean): --  RR: 16 (11 Jul 2019 14:45) (16 - 17)  SpO2: 98% (11 Jul 2019 14:45) (94% - 99%)      PHYSICAL EXAM:     GENERAL: no acute distress  HEENT: EOMI, neck supple  RESPIRATORY: LCTAB/L, no rhonchi, rales, or wheezing  CARDIOVASCULAR: RRR, no murmurs, gallops, rubs  ABDOMINAL: soft, non-tender, non-distended, positive bowel sounds   EXTREMITIES: right leg in brace  NEUROLOGICAL: alert and oriented x 3, non-focal  SKIN: no rashes or lesions   MUSCULOSKELETAL: no gross joint deformity                          10.9   2.4   )-----------( 50       ( 11 Jul 2019 10:50 )             32.4     07-11    127<L>  |  93<L>  |  15  ----------------------------<  304<H>  3.9   |  22  |  1.20    Ca    8.4      11 Jul 2019 10:50        MEDICATIONS  (STANDING):  acetaminophen   Tablet .. 975 milliGRAM(s) Oral every 8 hours  amLODIPine   Tablet 10 milliGRAM(s) Oral daily  atorvastatin 40 milliGRAM(s) Oral at bedtime  carBAMazepine ER Capsule 100 milliGRAM(s) Oral two times a day  dextrose 5%. 1000 milliLiter(s) (50 mL/Hr) IV Continuous <Continuous>  dextrose 50% Injectable 12.5 Gram(s) IV Push once  dextrose 50% Injectable 25 Gram(s) IV Push once  dextrose 50% Injectable 25 Gram(s) IV Push once  doxazosin 4 milliGRAM(s) Oral at bedtime  insulin lispro (HumaLOG) corrective regimen sliding scale   SubCutaneous three times a day before meals  insulin lispro (HumaLOG) corrective regimen sliding scale   SubCutaneous at bedtime  metoprolol tartrate 12.5 milliGRAM(s) Oral two times a day  sodium chloride 0.9%. 1000 milliLiter(s) (75 mL/Hr) IV Continuous <Continuous>  SORAfenib 400 milliGRAM(s) Oral two times a day

## 2019-07-11 NOTE — PROGRESS NOTE ADULT - SUBJECTIVE AND OBJECTIVE BOX
Patient seen and examined. Pain controlled. No acute events overnight. denies cp/sob. denies pain in right knee while resting but c/o pain with movements.    HEALTH ISSUES - PROBLEM Dx:        MEDICATIONS  (STANDING):  acetaminophen   Tablet .. 975 milliGRAM(s) Oral every 8 hours  amLODIPine   Tablet 10 milliGRAM(s) Oral daily  atorvastatin 40 milliGRAM(s) Oral at bedtime  carBAMazepine ER Capsule 100 milliGRAM(s) Oral two times a day  dextrose 5%. 1000 milliLiter(s) IV Continuous <Continuous>  dextrose 50% Injectable 12.5 Gram(s) IV Push once  dextrose 50% Injectable 25 Gram(s) IV Push once  dextrose 50% Injectable 25 Gram(s) IV Push once  doxazosin 4 milliGRAM(s) Oral at bedtime  enoxaparin Injectable 90 milliGRAM(s) SubCutaneous at bedtime  insulin lispro (HumaLOG) corrective regimen sliding scale   SubCutaneous three times a day before meals  insulin lispro (HumaLOG) corrective regimen sliding scale   SubCutaneous at bedtime  metoprolol tartrate 12.5 milliGRAM(s) Oral two times a day  SORAfenib 400 milliGRAM(s) Oral two times a day    Allergies    No Known Allergies    Intolerances                            10.6   2.9   )-----------( 60       ( 10 Jul 2019 19:57 )             29.9     10 Jul 2019 19:57    130    |  96     |  15     ----------------------------<  145    3.7     |  21     |  1.01     Ca    8.0        10 Jul 2019 19:57      PT/INR - ( 10 Jul 2019 19:57 )   PT: 10.8 sec;   INR: 0.94 ratio         PTT - ( 10 Jul 2019 19:57 )  PTT:34.7 sec  Vital Signs Last 24 Hrs  T(C): 36.7 (07-11-19 @ 05:15), Max: 37.2 (07-10-19 @ 15:41)  T(F): 98 (07-11-19 @ 05:15), Max: 98.9 (07-10-19 @ 15:41)  HR: 69 (07-11-19 @ 05:15) (69 - 87)  BP: 143/90 (07-11-19 @ 05:15) (113/84 - 160/89)  BP(mean): --  RR: 16 (07-11-19 @ 05:15) (16 - 18)  SpO2: 98% (07-11-19 @ 05:15) (94% - 99%)    Physical Exam  Gen: NAD  RLE:   DYLLAN c/d/i  +ehl/fhl/ta/gs function  L2-S1 silt  Dp/pt pulse intact  No calf ttp  Compartments soft    A/P: 79y Malew right proximal tibia lesion and pathologic fracture  Pain control  DVT ppx  NWB RLE in [facility]  FU labs  Ice/elevate  FU biopsy results, surgical plan pending results   Medical/heme onc management appreciated, clearance requested for OR  Incentive spirometry  plan for OR w dr alfaro pending biopsy results

## 2019-07-11 NOTE — CONSULT NOTE ADULT - ASSESSMENT
Assessment/Plan:  79-year-old male with a history of HTN, DM II, HPLD, right parietal stroke with secondary hemorrhage and MCA clot retrieval at [place] on 3/19, hx of history of marantic endocarditis, hx of metastatic prostate cancer follows by Adriana Daugherty and Desi.  Also recently with multifocal metastatic disease in the liver (adenocarcinoma, origin from GI or pancreas or biliary tract), possibly perihepatic nodes and a questionable lesion in the clavicle. He has had radiosurgery treatment by Dr. Weeks and had chemo infusion, currently on Sorafenib since 2 weeks ago, transferred from [place] after was found to have sclerotic lesions to bony pelvis, multiple osseous mets, enhancement in right proximal tibia s/p biopsy (poorly differentiated adenocarcinoma).    -Will contact Dr. Daugherty for collateral information about pathology and other imagings  -Please Hold Sorafinib and we will update primary team after discussion with Dr. Daugherty  -Oncology team will follow the patient    Latasha Magallon PGY4  Hem/Onc fellow

## 2019-07-11 NOTE — CONSULT NOTE ADULT - ASSESSMENT
77 yo male with a hx of HCC and suggested mets present with metastatic disease to the right knee. Patient is schedule forright tibial surgery with Dr Huizar.

## 2019-07-12 ENCOUNTER — TRANSCRIPTION ENCOUNTER (OUTPATIENT)
Age: 79
End: 2019-07-12

## 2019-07-12 ENCOUNTER — RESULT REVIEW (OUTPATIENT)
Age: 79
End: 2019-07-12

## 2019-07-12 DIAGNOSIS — L98.0 PYOGENIC GRANULOMA: ICD-10-CM

## 2019-07-12 LAB
ANION GAP SERPL CALC-SCNC: 10 MMOL/L — SIGNIFICANT CHANGE UP (ref 5–17)
ANION GAP SERPL CALC-SCNC: 14 MMOL/L — SIGNIFICANT CHANGE UP (ref 5–17)
BUN SERPL-MCNC: 11 MG/DL — SIGNIFICANT CHANGE UP (ref 7–23)
BUN SERPL-MCNC: 12 MG/DL — SIGNIFICANT CHANGE UP (ref 7–23)
CALCIUM SERPL-MCNC: 7.5 MG/DL — LOW (ref 8.4–10.5)
CALCIUM SERPL-MCNC: 7.5 MG/DL — LOW (ref 8.4–10.5)
CHLORIDE SERPL-SCNC: 100 MMOL/L — SIGNIFICANT CHANGE UP (ref 96–108)
CHLORIDE SERPL-SCNC: 98 MMOL/L — SIGNIFICANT CHANGE UP (ref 96–108)
CO2 SERPL-SCNC: 20 MMOL/L — LOW (ref 22–31)
CO2 SERPL-SCNC: 21 MMOL/L — LOW (ref 22–31)
CREAT SERPL-MCNC: 0.95 MG/DL — SIGNIFICANT CHANGE UP (ref 0.5–1.3)
CREAT SERPL-MCNC: 1 MG/DL — SIGNIFICANT CHANGE UP (ref 0.5–1.3)
GLUCOSE BLDC GLUCOMTR-MCNC: 104 MG/DL — HIGH (ref 70–99)
GLUCOSE BLDC GLUCOMTR-MCNC: 105 MG/DL — HIGH (ref 70–99)
GLUCOSE BLDC GLUCOMTR-MCNC: 128 MG/DL — HIGH (ref 70–99)
GLUCOSE BLDC GLUCOMTR-MCNC: 142 MG/DL — HIGH (ref 70–99)
GLUCOSE BLDC GLUCOMTR-MCNC: 169 MG/DL — HIGH (ref 70–99)
GLUCOSE SERPL-MCNC: 134 MG/DL — HIGH (ref 70–99)
GLUCOSE SERPL-MCNC: 151 MG/DL — HIGH (ref 70–99)
HCT VFR BLD CALC: 26.3 % — LOW (ref 39–50)
HCT VFR BLD CALC: 29.4 % — LOW (ref 39–50)
HGB BLD-MCNC: 10.2 G/DL — LOW (ref 13–17)
HGB BLD-MCNC: 9.6 G/DL — LOW (ref 13–17)
MCHC RBC-ENTMCNC: 30.5 PG — SIGNIFICANT CHANGE UP (ref 27–34)
MCHC RBC-ENTMCNC: 32.3 PG — SIGNIFICANT CHANGE UP (ref 27–34)
MCHC RBC-ENTMCNC: 34.8 GM/DL — SIGNIFICANT CHANGE UP (ref 32–36)
MCHC RBC-ENTMCNC: 36.6 GM/DL — HIGH (ref 32–36)
MCV RBC AUTO: 87.7 FL — SIGNIFICANT CHANGE UP (ref 80–100)
MCV RBC AUTO: 88.3 FL — SIGNIFICANT CHANGE UP (ref 80–100)
PLATELET # BLD AUTO: 55 K/UL — LOW (ref 150–400)
PLATELET # BLD AUTO: 61 K/UL — LOW (ref 150–400)
POTASSIUM SERPL-MCNC: 3.5 MMOL/L — SIGNIFICANT CHANGE UP (ref 3.5–5.3)
POTASSIUM SERPL-MCNC: 3.9 MMOL/L — SIGNIFICANT CHANGE UP (ref 3.5–5.3)
POTASSIUM SERPL-SCNC: 3.5 MMOL/L — SIGNIFICANT CHANGE UP (ref 3.5–5.3)
POTASSIUM SERPL-SCNC: 3.9 MMOL/L — SIGNIFICANT CHANGE UP (ref 3.5–5.3)
RBC # BLD: 2.98 M/UL — LOW (ref 4.2–5.8)
RBC # BLD: 3.35 M/UL — LOW (ref 4.2–5.8)
RBC # FLD: 13.8 % — SIGNIFICANT CHANGE UP (ref 10.3–14.5)
RBC # FLD: 13.9 % — SIGNIFICANT CHANGE UP (ref 10.3–14.5)
SODIUM SERPL-SCNC: 130 MMOL/L — LOW (ref 135–145)
SODIUM SERPL-SCNC: 133 MMOL/L — LOW (ref 135–145)
WBC # BLD: 2.8 K/UL — LOW (ref 3.8–10.5)
WBC # BLD: 3.2 K/UL — LOW (ref 3.8–10.5)
WBC # FLD AUTO: 2.8 K/UL — LOW (ref 3.8–10.5)
WBC # FLD AUTO: 3.2 K/UL — LOW (ref 3.8–10.5)

## 2019-07-12 PROCEDURE — 88307 TISSUE EXAM BY PATHOLOGIST: CPT | Mod: 26

## 2019-07-12 PROCEDURE — 88341 IMHCHEM/IMCYTCHM EA ADD ANTB: CPT | Mod: 26

## 2019-07-12 PROCEDURE — 88311 DECALCIFY TISSUE: CPT | Mod: 26

## 2019-07-12 PROCEDURE — 88342 IMHCHEM/IMCYTCHM 1ST ANTB: CPT | Mod: 26

## 2019-07-12 RX ORDER — CEFAZOLIN SODIUM 1 G
2000 VIAL (EA) INJECTION EVERY 8 HOURS
Refills: 0 | Status: COMPLETED | OUTPATIENT
Start: 2019-07-12 | End: 2019-07-12

## 2019-07-12 RX ORDER — HYDROMORPHONE HYDROCHLORIDE 2 MG/ML
0.5 INJECTION INTRAMUSCULAR; INTRAVENOUS; SUBCUTANEOUS
Refills: 0 | Status: DISCONTINUED | OUTPATIENT
Start: 2019-07-12 | End: 2019-07-12

## 2019-07-12 RX ADMIN — Medication 1: at 13:31

## 2019-07-12 RX ADMIN — Medication 975 MILLIGRAM(S): at 14:13

## 2019-07-12 RX ADMIN — Medication 975 MILLIGRAM(S): at 22:00

## 2019-07-12 RX ADMIN — OXYCODONE HYDROCHLORIDE 10 MILLIGRAM(S): 5 TABLET ORAL at 02:15

## 2019-07-12 RX ADMIN — Medication 100 MILLIGRAM(S): at 05:42

## 2019-07-12 RX ADMIN — HYDROMORPHONE HYDROCHLORIDE 0.5 MILLIGRAM(S): 2 INJECTION INTRAMUSCULAR; INTRAVENOUS; SUBCUTANEOUS at 02:00

## 2019-07-12 RX ADMIN — Medication 100 MILLIGRAM(S): at 05:41

## 2019-07-12 RX ADMIN — OXYCODONE HYDROCHLORIDE 10 MILLIGRAM(S): 5 TABLET ORAL at 21:10

## 2019-07-12 RX ADMIN — OXYCODONE HYDROCHLORIDE 10 MILLIGRAM(S): 5 TABLET ORAL at 16:18

## 2019-07-12 RX ADMIN — OXYCODONE HYDROCHLORIDE 10 MILLIGRAM(S): 5 TABLET ORAL at 22:00

## 2019-07-12 RX ADMIN — HYDROMORPHONE HYDROCHLORIDE 0.5 MILLIGRAM(S): 2 INJECTION INTRAMUSCULAR; INTRAVENOUS; SUBCUTANEOUS at 01:45

## 2019-07-12 RX ADMIN — Medication 975 MILLIGRAM(S): at 05:41

## 2019-07-12 RX ADMIN — Medication 975 MILLIGRAM(S): at 21:12

## 2019-07-12 RX ADMIN — Medication 100 MILLIGRAM(S): at 13:31

## 2019-07-12 RX ADMIN — OXYCODONE HYDROCHLORIDE 10 MILLIGRAM(S): 5 TABLET ORAL at 16:51

## 2019-07-12 RX ADMIN — SODIUM CHLORIDE 75 MILLILITER(S): 9 INJECTION INTRAMUSCULAR; INTRAVENOUS; SUBCUTANEOUS at 01:21

## 2019-07-12 RX ADMIN — AMLODIPINE BESYLATE 10 MILLIGRAM(S): 2.5 TABLET ORAL at 05:41

## 2019-07-12 RX ADMIN — Medication 100 MILLIGRAM(S): at 21:09

## 2019-07-12 RX ADMIN — ENOXAPARIN SODIUM 90 MILLIGRAM(S): 100 INJECTION SUBCUTANEOUS at 18:06

## 2019-07-12 RX ADMIN — Medication 12.5 MILLIGRAM(S): at 18:06

## 2019-07-12 RX ADMIN — HYDROMORPHONE HYDROCHLORIDE 0.5 MILLIGRAM(S): 2 INJECTION INTRAMUSCULAR; INTRAVENOUS; SUBCUTANEOUS at 01:15

## 2019-07-12 RX ADMIN — ATORVASTATIN CALCIUM 40 MILLIGRAM(S): 80 TABLET, FILM COATED ORAL at 21:09

## 2019-07-12 RX ADMIN — Medication 100 MILLIGRAM(S): at 05:49

## 2019-07-12 RX ADMIN — Medication 4 MILLIGRAM(S): at 21:09

## 2019-07-12 RX ADMIN — OXYCODONE HYDROCHLORIDE 10 MILLIGRAM(S): 5 TABLET ORAL at 11:50

## 2019-07-12 RX ADMIN — HYDROMORPHONE HYDROCHLORIDE 0.5 MILLIGRAM(S): 2 INJECTION INTRAMUSCULAR; INTRAVENOUS; SUBCUTANEOUS at 01:30

## 2019-07-12 RX ADMIN — OXYCODONE HYDROCHLORIDE 10 MILLIGRAM(S): 5 TABLET ORAL at 03:00

## 2019-07-12 RX ADMIN — Medication 975 MILLIGRAM(S): at 13:31

## 2019-07-12 RX ADMIN — Medication 975 MILLIGRAM(S): at 06:11

## 2019-07-12 RX ADMIN — HYDROMORPHONE HYDROCHLORIDE 0.5 MILLIGRAM(S): 2 INJECTION INTRAMUSCULAR; INTRAVENOUS; SUBCUTANEOUS at 01:00

## 2019-07-12 RX ADMIN — Medication 12.5 MILLIGRAM(S): at 05:41

## 2019-07-12 RX ADMIN — OXYCODONE HYDROCHLORIDE 10 MILLIGRAM(S): 5 TABLET ORAL at 11:19

## 2019-07-12 RX ADMIN — Medication 100 MILLIGRAM(S): at 18:06

## 2019-07-12 NOTE — PHYSICAL THERAPY INITIAL EVALUATION ADULT - ADDITIONAL COMMENTS
Pt lives with son in private home, 4 steps to enter and no steps to negotiate inside home. Prior to admission for ~2wks pt has been requiring increasing assistance for ADLs and functional mobility. Pt used rollator secondary to pain.

## 2019-07-12 NOTE — BRIEF OPERATIVE NOTE - NSICDXBRIEFPREOP_GEN_ALL_CORE_FT
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

## 2019-07-12 NOTE — DISCHARGE NOTE NURSING/CASE MANAGEMENT/SOCIAL WORK - NSDCDPATPORTLINK_GEN_ALL_CORE
You can access the [facility] Portal, offered by [facility], by registering with the following website: [email or web address]

## 2019-07-12 NOTE — PHYSICAL THERAPY INITIAL EVALUATION ADULT - PLANNED THERAPY INTERVENTIONS, PT EVAL
transfer training/GOAL: Pt will independently negotiate stairs with 1 handrail with step to pattern in 2wks./balance training/gait training/bed mobility training/strengthening

## 2019-07-12 NOTE — OCCUPATIONAL THERAPY INITIAL EVALUATION ADULT - TRANSFER TRAINING, PT EVAL
GOAL: Patient will require supervision with functional transfers with use of rolling walker  in 4 weeks

## 2019-07-12 NOTE — PHYSICAL THERAPY INITIAL EVALUATION ADULT - ACTIVE RANGE OF MOTION EXAMINATION, REHAB EVAL
bilateral upper extremity Active ROM was WFL (within functional limits)/Left LE Active ROM was WFL (within functional limits)/R ankle WFL; R knee in KI

## 2019-07-12 NOTE — OCCUPATIONAL THERAPY INITIAL EVALUATION ADULT - ADL RETRAINING, OT EVAL
GOAL: Pt will perform LB dressing with min A in 4 weeks GOAL: Patient will perform grooming standing sink level with supervision in 4 weeks

## 2019-07-12 NOTE — CHART NOTE - NSCHARTNOTEFT_GEN_A_CORE
Patient seen and examined.  No acute events in PACU.  Pain well controlled.     Vital Signs Last 24 Hrs  T(C): 36.3 (12 Jul 2019 03:53), Max: 36.8 (11 Jul 2019 13:30)  T(F): 97.4 (12 Jul 2019 03:53), Max: 98.2 (11 Jul 2019 13:30)  HR: 92 (12 Jul 2019 03:53) (69 - 98)  BP: 159/84 (12 Jul 2019 03:53) (137/89 - 183/99)  BP(mean): 118 (12 Jul 2019 03:00) (115 - 130)  RR: 16 (12 Jul 2019 03:53) (16 - 18)  SpO2: 95% (12 Jul 2019 03:53) (95% - 100%)    07-10 @ 07:01  -  07-11 @ 07:00  --------------------------------------------------------  IN: 0 mL / OUT: 325 mL / NET: -325 mL    07-11 @ 07:01  -  07-12 @ 04:51  --------------------------------------------------------  IN: 600 mL / OUT: 300 mL / NET: 300 mL                            9.6    2.8   )-----------( 55       ( 12 Jul 2019 01:22 )             26.3   07-12    130<L>  |  100  |  12  ----------------------------<  151<H>  3.5   |  20<L>  |  1.00    Ca    7.5<L>      12 Jul 2019 01:22        Exam:  Gen: NAD  RLE:  Dressing c/d/o  Motor: 5/5 EHL/FHL/TA/Gastrocnemius  Sensory: SILT DP/SP/S/S/T nerve distributions  Vascular: 2+ Dorsalis Pedis pulse      Assessment/Plan:  This is a 79yMale admitted for R prox tib lesion s/p intralesional curettage and bone grafting.     -pain control  -PT  -WBAT RLE in KI  -FU OR path  -OOB  -DVT ppx  -dispo plan

## 2019-07-12 NOTE — PROGRESS NOTE ADULT - SUBJECTIVE AND OBJECTIVE BOX
Orthopedic Surgery Progress Note    S: Patient seen and examined today. No acute events overnight. Pain is well controlled. Denies f/c.    O:  Vital Signs Last 24 Hrs  T(C): 36.6 (12 Jul 2019 04:55), Max: 36.8 (11 Jul 2019 13:30)  T(F): 97.8 (12 Jul 2019 04:55), Max: 98.2 (11 Jul 2019 13:30)  HR: 98 (12 Jul 2019 04:55) (76 - 98)  BP: 146/88 (12 Jul 2019 04:55) (137/89 - 183/99)  BP(mean): 118 (12 Jul 2019 03:00) (115 - 130)  RR: 16 (12 Jul 2019 04:55) (16 - 18)  SpO2: 98% (12 Jul 2019 04:55) (95% - 100%)    Gen: NAD  RLE  Dressing clean, dry, intact  In Knee immobilizer  EHL/FHL/TA/GS intact  SILT DP/SP/Macario/Sa  WWP distally                          9.6    2.8   )-----------( 55       ( 12 Jul 2019 01:22 )             26.3                         10.9   2.4   )-----------( 50       ( 11 Jul 2019 10:50 )             32.4       07-12    130<L>  |  100  |  12  ----------------------------<  151<H>  3.5   |  20<L>  |  1.00        PT/INR - ( 11 Jul 2019 10:50 )   PT: 11.1 sec;   INR: 0.97 ratio         PTT - ( 11 Jul 2019 10:50 )  PTT:40.6 sec

## 2019-07-12 NOTE — DISCHARGE NOTE NURSING/CASE MANAGEMENT/SOCIAL WORK - NSDCPEPTSTRK_GEN_ALL_CORE
Stroke warning signs and symptoms/Signs and symptoms of stroke/Prescribed medications/Risk factors for stroke/Stroke support groups for patients, families, and friends/Call 911 for stroke/Need for follow up after discharge/Stroke education booklet

## 2019-07-12 NOTE — PRE-ANESTHESIA EVALUATION ADULT - NSANTHOSAYNRD_GEN_A_CORE
No. DALIA screening performed.  STOP BANG Legend: 0-2 = LOW Risk; 3-4 = INTERMEDIATE Risk; 5-8 = HIGH Risk

## 2019-07-12 NOTE — OCCUPATIONAL THERAPY INITIAL EVALUATION ADULT - PERTINENT HX OF CURRENT PROBLEM, REHAB EVAL
79 year old male  with Liver Cancer stage 4 (currently receiving treatment) admitted with right proximal tibia lesion/pathological fracture. S/p Wide Resection and curettage of proximal tibia lesion, repair with cement and bone graft 7/12

## 2019-07-13 LAB
GLUCOSE BLDC GLUCOMTR-MCNC: 106 MG/DL — HIGH (ref 70–99)
GLUCOSE BLDC GLUCOMTR-MCNC: 114 MG/DL — HIGH (ref 70–99)
GLUCOSE BLDC GLUCOMTR-MCNC: 150 MG/DL — HIGH (ref 70–99)
GLUCOSE BLDC GLUCOMTR-MCNC: 91 MG/DL — SIGNIFICANT CHANGE UP (ref 70–99)

## 2019-07-13 RX ADMIN — Medication 975 MILLIGRAM(S): at 22:20

## 2019-07-13 RX ADMIN — ATORVASTATIN CALCIUM 40 MILLIGRAM(S): 80 TABLET, FILM COATED ORAL at 21:40

## 2019-07-13 RX ADMIN — Medication 975 MILLIGRAM(S): at 07:30

## 2019-07-13 RX ADMIN — ENOXAPARIN SODIUM 90 MILLIGRAM(S): 100 INJECTION SUBCUTANEOUS at 17:04

## 2019-07-13 RX ADMIN — OXYCODONE HYDROCHLORIDE 10 MILLIGRAM(S): 5 TABLET ORAL at 21:40

## 2019-07-13 RX ADMIN — Medication 975 MILLIGRAM(S): at 21:43

## 2019-07-13 RX ADMIN — OXYCODONE HYDROCHLORIDE 10 MILLIGRAM(S): 5 TABLET ORAL at 02:00

## 2019-07-13 RX ADMIN — OXYCODONE HYDROCHLORIDE 10 MILLIGRAM(S): 5 TABLET ORAL at 13:30

## 2019-07-13 RX ADMIN — Medication 975 MILLIGRAM(S): at 07:04

## 2019-07-13 RX ADMIN — Medication 12.5 MILLIGRAM(S): at 20:00

## 2019-07-13 RX ADMIN — OXYCODONE HYDROCHLORIDE 10 MILLIGRAM(S): 5 TABLET ORAL at 01:18

## 2019-07-13 RX ADMIN — Medication 4 MILLIGRAM(S): at 21:40

## 2019-07-13 RX ADMIN — OXYCODONE HYDROCHLORIDE 10 MILLIGRAM(S): 5 TABLET ORAL at 22:20

## 2019-07-13 RX ADMIN — Medication 100 MILLIGRAM(S): at 17:14

## 2019-07-13 RX ADMIN — Medication 975 MILLIGRAM(S): at 17:13

## 2019-07-13 RX ADMIN — OXYCODONE HYDROCHLORIDE 10 MILLIGRAM(S): 5 TABLET ORAL at 12:27

## 2019-07-13 RX ADMIN — OXYCODONE HYDROCHLORIDE 10 MILLIGRAM(S): 5 TABLET ORAL at 06:47

## 2019-07-13 RX ADMIN — OXYCODONE HYDROCHLORIDE 10 MILLIGRAM(S): 5 TABLET ORAL at 07:30

## 2019-07-13 RX ADMIN — Medication 975 MILLIGRAM(S): at 18:19

## 2019-07-13 RX ADMIN — Medication 100 MILLIGRAM(S): at 18:19

## 2019-07-13 RX ADMIN — Medication 100 MILLIGRAM(S): at 06:47

## 2019-07-13 RX ADMIN — Medication 12.5 MILLIGRAM(S): at 06:46

## 2019-07-13 RX ADMIN — Medication 100 MILLIGRAM(S): at 21:40

## 2019-07-13 RX ADMIN — Medication 100 MILLIGRAM(S): at 06:46

## 2019-07-13 RX ADMIN — AMLODIPINE BESYLATE 10 MILLIGRAM(S): 2.5 TABLET ORAL at 06:46

## 2019-07-13 NOTE — PROGRESS NOTE ADULT - SUBJECTIVE AND OBJECTIVE BOX
Patient is a 79y old  Male who presents with a chief complaint of Pain Rt. Knee.     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 2019 01:04:36  Louie Mcleod  ·  PROCEDURES:  Excision of bone lesion of tibia 2019 01:03:25  Louie Mcleod       Operative Findings:  · Operative Findings	Wide Resection and curettage of proximal tibia lesion, repair with cement and bone graft	    Denies SOB chest pain  NO fever chill  Pain better c/w yesterday   encourage incentive spirometry      INTERVAL HPI/OVERNIGHT EVENTS:  pt now agree to be transferred to [facility] for surgery  MEDICATIONS  (STANDING):  amLODIPine   Tablet 10 milliGRAM(s) Oral daily  atorvastatin 40 milliGRAM(s) Oral at bedtime  carBAMazepine 100 milliGRAM(s) Oral two times a day  dextrose 5%. 1000 milliLiter(s) (50 mL/Hr) IV Continuous <Continuous>  dextrose 50% Injectable 12.5 Gram(s) IV Push once  dextrose 50% Injectable 25 Gram(s) IV Push once  docusate sodium 100 milliGRAM(s) Oral daily  enoxaparin Injectable 90 milliGRAM(s) SubCutaneous at bedtime  ferrous    sulfate 325 milliGRAM(s) Oral daily  insulin lispro (HumaLOG) corrective regimen sliding scale   SubCutaneous three times a day before meals  metoprolol tartrate 25 milliGRAM(s) Oral two times a day  SORAfenib 400 milliGRAM(s) Oral daily    MEDICATIONS  (PRN):  bisacodyl 5 milliGRAM(s) Oral every 12 hours PRN Constipation  dextrose 40% Gel 15 Gram(s) Oral once PRN Blood Glucose LESS THAN 70 milliGRAM(s)/deciliter  glucagon  Injectable 1 milliGRAM(s) IntraMuscular once PRN Glucose LESS THAN 70 milligrams/deciliter  oxyCODONE    IR 5 milliGRAM(s) Oral every 6 hours PRN Severe Pain (7 - 10)      Allergies    No Known Allergies    Intolerances      Vital Signs Last 24 Hrs  T(C): 36.5 (2019 08:11), Max: 36.7 (2019 14:45)  T(F): 97.7 (2019 08:11), Max: 98.1 (2019 14:45)  HR: 106 (2019 12:14) (77 - 106)  BP: 153/94 (2019 12:14) (138/80 - 183/99)  BP(mean): 118 (2019 03:00) (115 - 130)  RR: 18 (2019 08:11) (16 - 18)  SpO2: 94% (2019 08:11) (94% - 100%)    PHYSICAL EXAM:  GENERAL: NAD, well-groomed, well-developed  HEAD:  Atraumatic, Normocephalic  EYES: EOMI, PERRLA, conjunctiva and sclera clear  ENMT: No tonsillar erythema, exudates, or enlargement; Moist mucous membranes, Good dentition, No lesions  NECK: Supple, No JVD, Normal thyroid  NERVOUS SYSTEM:  Alert & Oriented X3, Good concentration; Motor Strength 5/5 B/L upper and lower extremities; DTRs 2+ intact and symmetric  CHEST/LUNG: Clear to percussion bilaterally; No rales, rhonchi, wheezing, or rubs  HEART: Regular rate and rhythm; No murmurs, rubs, or gallops  ABDOMEN: Soft, Nontender, Nondistended; Bowel sounds present  EXTREMITIES:  2+ Peripheral Pulses, No clubbing, cyanosis, or edema Excision of right tibia lesion  LYMPH: No lymphadenopathy noted  SKIN: No rashes or lesions    LABS:          CBC Full  -  ( 2019 07:00 )  WBC Count : 3.2 K/uL  RBC Count : 3.35 M/uL  Hemoglobin : 10.2 g/dL  Hematocrit : 29.4 %  Platelet Count - Automated : 61 K/uL  Mean Cell Volume : 87.7 fl  Mean Cell Hemoglobin : 30.5 pg  Mean Cell Hemoglobin Concentration : 34.8 gm/dL  Auto Neutrophil # : x  Auto Lymphocyte # : x  Auto Monocyte # : x  Auto Eosinophil # : x  Auto Basophil # : x  Auto Neutrophil % : x  Auto Lymphocyte % : x  Auto Monocyte % : x  Auto Eosinophil % : x  Auto Basophil % : x    07-12    133<L>  |  98  |  11  ----------------------------<  134<H>  3.9   |  21<L>  |  0.95    Ca    7.5<L>      2019 07:00        PT/INR - ( 2019 10:50 )   PT: 11.1 sec;   INR: 0.97 ratio         PTT - ( 2019 10:50 )  PTT:40.6 sec  Urinalysis Basic - ( 2019 08:36 )    Color: Yellow / Appearance: Clear / S.017 / pH: x  Gluc: x / Ketone: Negative  / Bili: Negative / Urobili: <2 mg/dL   Blood: x / Protein: 100 mg/dL / Nitrite: Negative   Leuk Esterase: Negative / RBC: 10 /HPF / WBC 2 /HPF   Sq Epi: x / Non Sq Epi: 1 /HPF / Bacteria: Negative              CAPILLARY BLOOD GLUCOSE  CAPILLARY BLOOD GLUCOSE      POCT Blood Glucose.: 169 mg/dL (2019 13:26)  POCT Blood Glucose.: 104 mg/dL (2019 09:38)  POCT Blood Glucose.: 142 mg/dL (2019 01:09)  POCT Blood Glucose.: 96 mg/dL (2019 17:59)      CULTURES:    HEMOGLOBIN A1C:    CHOLESTEROL:        RADIOLOGY & ADDITIONAL TESTS: Patient is a 79y old  Male who presents with a chief complaint of Pain Rt. Knee.     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 975 milliGRAM(s) Oral every 8 hours  amLODIPine   Tablet 10 milliGRAM(s) Oral daily  atorvastatin 40 milliGRAM(s) Oral at bedtime  carBAMazepine ER Capsule 100 milliGRAM(s) Oral two times a day  dextrose 5%. 1000 milliLiter(s) (50 mL/Hr) IV Continuous <Continuous>  dextrose 50% Injectable 12.5 Gram(s) IV Push once  dextrose 50% Injectable 25 Gram(s) IV Push once  dextrose 50% Injectable 25 Gram(s) IV Push once  docusate sodium 100 milliGRAM(s) Oral three times a day  doxazosin 4 milliGRAM(s) Oral at bedtime  enoxaparin Injectable 90 milliGRAM(s) SubCutaneous daily  insulin lispro (HumaLOG) corrective regimen sliding scale   SubCutaneous three times a day before meals  insulin lispro (HumaLOG) corrective regimen sliding scale   SubCutaneous at bedtime  metoprolol tartrate 12.5 milliGRAM(s) Oral two times a day  sodium chloride 0.9%. 1000 milliLiter(s) (75 mL/Hr) IV Continuous <Continuous>  SORAfenib 400 milliGRAM(s) Oral two times a day    MEDICATIONS  (PRN):  dextrose 40% Gel 15 Gram(s) Oral once PRN Blood Glucose LESS THAN 70 milliGRAM(s)/deciliter  glucagon  Injectable 1 milliGRAM(s) IntraMuscular once PRN Glucose LESS THAN 70 milligrams/deciliter  magnesium hydroxide Suspension 30 milliLiter(s) Oral daily PRN Constipation  oxyCODONE    IR 5 milliGRAM(s) Oral every 4 hours PRN Moderate Pain (4 - 6)  oxyCODONE    IR 10 milliGRAM(s) Oral every 4 hours PRN Severe Pain (7 - 10)  senna 2 Tablet(s) Oral at bedtime PRN Constipation  traMADol 50 milliGRAM(s) Oral every 6 hours PRN Mild Pain (1 - 3)    Vital Signs Last 24 Hrs  T(C): 37.3 (13 Jul 2019 21:57), Max: 37.3 (13 Jul 2019 21:57)  T(F): 99.1 (13 Jul 2019 21:57), Max: 99.1 (13 Jul 2019 21:57)  HR: 84 (13 Jul 2019 21:57) (80 - 98)  BP: 131/83 (13 Jul 2019 21:57) (110/73 - 158/94)  BP(mean): --  RR: 18 (13 Jul 2019 21:57) (18 - 18)  SpO2: 94% (13 Jul 2019 21:57) (94% - 98%)      IPHYSICAL EXAM:  GENERAL: NAD, well-groomed, well-developed  HEAD:  Atraumatic, Normocephalic  EYES: EOMI, PERRLA, conjunctiva and sclera clear  ENMT: No tonsillar erythema, exudates, or enlargement; Moist mucous membranes, Good dentition, No lesions  NECK: Supple, No JVD, Normal thyroid  NERVOUS SYSTEM:  Alert & Oriented X3, Good concentration; Motor Strength 5/5 B/L upper and lower extremities; DTRs 2+ intact and symmetric  CHEST/LUNG: Clear to percussion bilaterally; No rales, rhonchi, wheezing, or rubs  HEART: Regular rate and rhythm; No murmurs, rubs, or gallops  ABDOMEN: Soft, Nontender, Nondistended; Bowel sounds present  EXTREMITIES:  2+ Peripheral Pulses, No clubbing, cyanosis, or edema Excision of right tibia lesion  LYMPH: No lymphadenopathy noted  SKIN: No rashes or lesions    LABS:          CBC Full  -  ( 12 Jul 2019 07:00 )  WBC Count : 3.2 K/uL  RBC Count : 3.35 M/uL  Hemoglobin : 10.2 g/dL  Hematocrit : 29.4 %  Platelet Count - Automated : 61 K/uL  Mean Cell Volume : 87.7 fl  Mean Cell Hemoglobin : 30.5 pg  Mean Cell Hemoglobin Concentration : 34.8 gm/dL  Auto Neutrophil # : x  Auto Lymphocyte # : x  Auto Monocyte # : x  Auto Eosinophil # : x  Auto Basophil # : x  Auto Neutrophil % : x  Auto Lymphocyte % : x  Auto Monocyte % : x  Auto Eosinophil % : x  Auto Basophil % : x    07-12    133<L>  |  98  |  11  ----------------------------<  134<H>  3.9   |  21<L>  |  0.95    Ca    7.5<L>      12 Jul 2019 07:00 Patient is a 79y old  Male who presents with a chief complaint of Pain Rt. Knee.   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  Denies SOB chest pain  NO fever chill  Pain better c/w yesterday   encourage incentive spirometry. Lethargic, but out of bed to chair.    MEDICATIONS  (STANDING):  acetaminophen   Tablet .. 975 milliGRAM(s) Oral every 8 hours  amLODIPine   Tablet 10 milliGRAM(s) Oral daily  atorvastatin 40 milliGRAM(s) Oral at bedtime  carBAMazepine ER Capsule 100 milliGRAM(s) Oral two times a day  dextrose 5%. 1000 milliLiter(s) (50 mL/Hr) IV Continuous <Continuous>  dextrose 50% Injectable 12.5 Gram(s) IV Push once  dextrose 50% Injectable 25 Gram(s) IV Push once  dextrose 50% Injectable 25 Gram(s) IV Push once  docusate sodium 100 milliGRAM(s) Oral three times a day  doxazosin 4 milliGRAM(s) Oral at bedtime  enoxaparin Injectable 90 milliGRAM(s) SubCutaneous daily  insulin lispro (HumaLOG) corrective regimen sliding scale   SubCutaneous three times a day before meals  insulin lispro (HumaLOG) corrective regimen sliding scale   SubCutaneous at bedtime  metoprolol tartrate 12.5 milliGRAM(s) Oral two times a day  sodium chloride 0.9%. 1000 milliLiter(s) (75 mL/Hr) IV Continuous <Continuous>  SORAfenib 400 milliGRAM(s) Oral two times a day    MEDICATIONS  (PRN):  dextrose 40% Gel 15 Gram(s) Oral once PRN Blood Glucose LESS THAN 70 milliGRAM(s)/deciliter  glucagon  Injectable 1 milliGRAM(s) IntraMuscular once PRN Glucose LESS THAN 70 milligrams/deciliter  magnesium hydroxide Suspension 30 milliLiter(s) Oral daily PRN Constipation  oxyCODONE    IR 5 milliGRAM(s) Oral every 4 hours PRN Moderate Pain (4 - 6)  oxyCODONE    IR 10 milliGRAM(s) Oral every 4 hours PRN Severe Pain (7 - 10)  senna 2 Tablet(s) Oral at bedtime PRN Constipation  traMADol 50 milliGRAM(s) Oral every 6 hours PRN Mild Pain (1 - 3)    Vital Signs Last 24 Hrs  T(C): 37.3 (13 Jul 2019 21:57), Max: 37.3 (13 Jul 2019 21:57)  T(F): 99.1 (13 Jul 2019 21:57), Max: 99.1 (13 Jul 2019 21:57)  HR: 84 (13 Jul 2019 21:57) (80 - 98)  BP: 131/83 (13 Jul 2019 21:57) (110/73 - 158/94)  BP(mean): --  RR: 18 (13 Jul 2019 21:57) (18 - 18)  SpO2: 94% (13 Jul 2019 21:57) (94% - 98%)      IPHYSICAL EXAM:  GENERAL: NAD, well-groomed, well-developed  HEAD:  Atraumatic, Normocephalic  EYES: EOMI, PERRLA, conjunctiva and sclera clear  ENMT: No tonsillar erythema, exudates, or enlargement; Moist mucous membranes, Good dentition, No lesions  NECK: Supple, No JVD, Normal thyroid  NERVOUS SYSTEM:  Alert & Oriented X3, Good concentration; Motor Strength 5/5 B/L upper and lower extremities; DTRs 2+ intact and symmetric  CHEST/LUNG: Clear to percussion bilaterally; No rales, rhonchi, wheezing, or rubs  HEART: Regular rate and rhythm; No murmurs, rubs, or gallops  ABDOMEN: Soft, Nontender, Nondistended; Bowel sounds present  EXTREMITIES:  2+ Peripheral Pulses, No clubbing, cyanosis, or edema Excision of right tibia lesion  LYMPH: No lymphadenopathy noted  SKIN: No rashes or lesions    LABS:          CBC Full  -  ( 12 Jul 2019 07:00 )  WBC Count : 3.2 K/uL  RBC Count : 3.35 M/uL  Hemoglobin : 10.2 g/dL  Hematocrit : 29.4 %  Platelet Count - Automated : 61 K/uL  Mean Cell Volume : 87.7 fl  Mean Cell Hemoglobin : 30.5 pg  Mean Cell Hemoglobin Concentration : 34.8 gm/dL  Auto Neutrophil # : x  Auto Lymphocyte # : x  Auto Monocyte # : x  Auto Eosinophil # : x  Auto Basophil # : x  Auto Neutrophil % : x  Auto Lymphocyte % : x  Auto Monocyte % : x  Auto Eosinophil % : x  Auto Basophil % : x    07-12    133<L>  |  98  |  11  ----------------------------<  134<H>  3.9   |  21<L>  |  0.95    Ca    7.5<L>      12 Jul 2019 07:00

## 2019-07-13 NOTE — PROGRESS NOTE ADULT - SUBJECTIVE AND OBJECTIVE BOX
Post op Day 2    Patient resting with complaints of right knee pain.  Denies chest pain, SOB, N/V. No events overnight.    T(C): 37.1 (07-13-19 @ 06:21), Max: 37.3 (07-12-19 @ 22:05)  HR: 98 (07-13-19 @ 06:21) (79 - 106)  BP: 158/94 (07-13-19 @ 06:21) (143/86 - 159/92)  RR: 18 (07-13-19 @ 06:21) (18 - 18)  SpO2: 98% (07-13-19 @ 06:21) (94% - 98%)  Wt(kg): --    Exam:  Alert and Oriented, No Acute Distress  Lower Extremities: R knee ace wrap c/d/i, in knee immobilizer  Calves Soft, Non-tender bilaterally  +PF/DF/EHL/FHL  SILT  +DP Pulse                            10.2   3.2   )-----------( 61       ( 12 Jul 2019 07:00 )             29.4    07-12    133<L>  |  98  |  11  ----------------------------<  134<H>  3.9   |  21<L>  |  0.95    Ca    7.5<L>      12 Jul 2019 07:00

## 2019-07-14 LAB
GLUCOSE BLDC GLUCOMTR-MCNC: 109 MG/DL — HIGH (ref 70–99)
GLUCOSE BLDC GLUCOMTR-MCNC: 113 MG/DL — HIGH (ref 70–99)
GLUCOSE BLDC GLUCOMTR-MCNC: 117 MG/DL — HIGH (ref 70–99)
GLUCOSE BLDC GLUCOMTR-MCNC: 118 MG/DL — HIGH (ref 70–99)

## 2019-07-14 RX ADMIN — AMLODIPINE BESYLATE 10 MILLIGRAM(S): 2.5 TABLET ORAL at 06:15

## 2019-07-14 RX ADMIN — Medication 100 MILLIGRAM(S): at 18:53

## 2019-07-14 RX ADMIN — OXYCODONE HYDROCHLORIDE 10 MILLIGRAM(S): 5 TABLET ORAL at 06:15

## 2019-07-14 RX ADMIN — OXYCODONE HYDROCHLORIDE 10 MILLIGRAM(S): 5 TABLET ORAL at 21:42

## 2019-07-14 RX ADMIN — Medication 100 MILLIGRAM(S): at 06:14

## 2019-07-14 RX ADMIN — Medication 4 MILLIGRAM(S): at 21:42

## 2019-07-14 RX ADMIN — Medication 975 MILLIGRAM(S): at 07:00

## 2019-07-14 RX ADMIN — Medication 12.5 MILLIGRAM(S): at 06:14

## 2019-07-14 RX ADMIN — OXYCODONE HYDROCHLORIDE 10 MILLIGRAM(S): 5 TABLET ORAL at 07:02

## 2019-07-14 RX ADMIN — ATORVASTATIN CALCIUM 40 MILLIGRAM(S): 80 TABLET, FILM COATED ORAL at 21:42

## 2019-07-14 RX ADMIN — Medication 12.5 MILLIGRAM(S): at 18:54

## 2019-07-14 RX ADMIN — Medication 975 MILLIGRAM(S): at 13:24

## 2019-07-14 RX ADMIN — OXYCODONE HYDROCHLORIDE 10 MILLIGRAM(S): 5 TABLET ORAL at 22:30

## 2019-07-14 RX ADMIN — ENOXAPARIN SODIUM 90 MILLIGRAM(S): 100 INJECTION SUBCUTANEOUS at 18:57

## 2019-07-14 RX ADMIN — Medication 975 MILLIGRAM(S): at 06:18

## 2019-07-14 RX ADMIN — Medication 975 MILLIGRAM(S): at 21:45

## 2019-07-14 RX ADMIN — Medication 100 MILLIGRAM(S): at 21:42

## 2019-07-14 RX ADMIN — Medication 100 MILLIGRAM(S): at 13:25

## 2019-07-14 RX ADMIN — Medication 975 MILLIGRAM(S): at 22:30

## 2019-07-14 RX ADMIN — Medication 100 MILLIGRAM(S): at 06:15

## 2019-07-14 RX ADMIN — Medication 975 MILLIGRAM(S): at 14:30

## 2019-07-14 NOTE — PROGRESS NOTE ADULT - SUBJECTIVE AND OBJECTIVE BOX
Post op Day 3    Patient resting without complaints. States pain is getting better to right knee.  Denies chest pain, SOB, N/V.    T(C): 36.9 (07-14-19 @ 06:02), Max: 37.3 (07-13-19 @ 21:57)  HR: 93 (07-14-19 @ 06:02) (80 - 96)  BP: 158/81 (07-14-19 @ 06:02) (110/73 - 158/81)  RR: 18 (07-14-19 @ 06:02) (18 - 18)  SpO2: 97% (07-14-19 @ 06:02) (94% - 97%)  Wt(kg): --    Exam:  Alert and Oriented, No Acute Distress  Lower Extremities: R knee ace wrap/immobilizer c/d/i  Compartments Soft, Non-tender bilaterally  +PF/DF/EHL/FHL  SILT  +DP Pulse           A/p: Patient is a 79y Male s/p Right proximal tibia intralesional curretage/bonegraft.  VSS. NAD.    PT/OT-WBAT  IS  DVT PPx  Pain Control  Continue Current Tx.    Patti Tripp PA-C  Team Pager: #[phone]

## 2019-07-14 NOTE — PROGRESS NOTE ADULT - PROBLEM SELECTOR PLAN 1
s/p resection of prox tibia mass  Await pathology results
PT/OT-WBAT RLE in KI  IS encouraged  DVT PPx- Therapeutic Lovenox  Pain Control PRN  Awaiting biopsy results   Appreciate onc following; will continue to hold chemo med for now  Continue Current Tx.    Patti Tripp PA-C  Team Pager: #[phone]
s/p resection of prox tibia mass  Await pathology results
s/p resection of prox tibia mass  Await pathology results

## 2019-07-14 NOTE — PROGRESS NOTE ADULT - PROBLEM SELECTOR PLAN 2
oncology follow up  follow [facility] post op

## 2019-07-14 NOTE — PROGRESS NOTE ADULT - SUBJECTIVE AND OBJECTIVE BOX
Patient is a 79y old  Male who presents with a chief complaint of Pain Rt. Knee.   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  Denies SOB chest pain  NO fever chill  Pain better c/w yesterday   encourage incentive spirometry. Lethargic, but out of bed to chair.    MEDICATIONS  (STANDING):  acetaminophen   Tablet .. 975 milliGRAM(s) Oral every 8 hours  amLODIPine   Tablet 10 milliGRAM(s) Oral daily  atorvastatin 40 milliGRAM(s) Oral at bedtime  carBAMazepine ER Capsule 100 milliGRAM(s) Oral two times a day  dextrose 5%. 1000 milliLiter(s) (50 mL/Hr) IV Continuous <Continuous>  dextrose 50% Injectable 12.5 Gram(s) IV Push once  dextrose 50% Injectable 25 Gram(s) IV Push once  dextrose 50% Injectable 25 Gram(s) IV Push once  docusate sodium 100 milliGRAM(s) Oral three times a day  doxazosin 4 milliGRAM(s) Oral at bedtime  enoxaparin Injectable 90 milliGRAM(s) SubCutaneous daily  insulin lispro (HumaLOG) corrective regimen sliding scale   SubCutaneous three times a day before meals  insulin lispro (HumaLOG) corrective regimen sliding scale   SubCutaneous at bedtime  metoprolol tartrate 12.5 milliGRAM(s) Oral two times a day  sodium chloride 0.9%. 1000 milliLiter(s) (75 mL/Hr) IV Continuous <Continuous>  SORAfenib 400 milliGRAM(s) Oral two times a day    MEDICATIONS  (PRN):  dextrose 40% Gel 15 Gram(s) Oral once PRN Blood Glucose LESS THAN 70 milliGRAM(s)/deciliter  glucagon  Injectable 1 milliGRAM(s) IntraMuscular once PRN Glucose LESS THAN 70 milligrams/deciliter  magnesium hydroxide Suspension 30 milliLiter(s) Oral daily PRN Constipation  oxyCODONE    IR 5 milliGRAM(s) Oral every 4 hours PRN Moderate Pain (4 - 6)  oxyCODONE    IR 10 milliGRAM(s) Oral every 4 hours PRN Severe Pain (7 - 10)  senna 2 Tablet(s) Oral at bedtime PRN Constipation  traMADol 50 milliGRAM(s) Oral every 6 hours PRN Mild Pain (1 - 3)    Vital Signs Last 24 Hrs  T(C): 37.3 (13 Jul 2019 21:57), Max: 37.3 (13 Jul 2019 21:57)  T(F): 99.1 (13 Jul 2019 21:57), Max: 99.1 (13 Jul 2019 21:57)  HR: 84 (13 Jul 2019 21:57) (80 - 98)  BP: 131/83 (13 Jul 2019 21:57) (110/73 - 158/94)  BP(mean): --  RR: 18 (13 Jul 2019 21:57) (18 - 18)  SpO2: 94% (13 Jul 2019 21:57) (94% - 98%)      IPHYSICAL EXAM:  GENERAL: NAD, well-groomed, well-developed  HEAD:  Atraumatic, Normocephalic  EYES: EOMI, PERRLA, conjunctiva and sclera clear  ENMT: No tonsillar erythema, exudates, or enlargement; Moist mucous membranes, Good dentition, No lesions  NECK: Supple, No JVD, Normal thyroid  NERVOUS SYSTEM:  Alert & Oriented X3, Good concentration; Motor Strength 5/5 B/L upper and lower extremities; DTRs 2+ intact and symmetric  CHEST/LUNG: Clear to percussion bilaterally; No rales, rhonchi, wheezing, or rubs  HEART: Regular rate and rhythm; No murmurs, rubs, or gallops  ABDOMEN: Soft, Nontender, Nondistended; Bowel sounds present  EXTREMITIES:  2+ Peripheral Pulses, No clubbing, cyanosis, or edema Excision of right tibia lesion  LYMPH: No lymphadenopathy noted  SKIN: No rashes or lesions    LABS:          CBC Full  -  ( 12 Jul 2019 07:00 )  WBC Count : 3.2 K/uL  RBC Count : 3.35 M/uL  Hemoglobin : 10.2 g/dL  Hematocrit : 29.4 %  Platelet Count - Automated : 61 K/uL  Mean Cell Volume : 87.7 fl  Mean Cell Hemoglobin : 30.5 pg  Mean Cell Hemoglobin Concentration : 34.8 gm/dL  Auto Neutrophil # : x  Auto Lymphocyte # : x  Auto Monocyte # : x  Auto Eosinophil # : x  Auto Basophil # : x  Auto Neutrophil % : x  Auto Lymphocyte % : x  Auto Monocyte % : x  Auto Eosinophil % : x  Auto Basophil % : x    07-12    133<L>  |  98  |  11  ----------------------------<  134<H>  3.9   |  21<L>  |  0.95    Ca    7.5<L>      12 Jul 2019 07:00 Patient is a 79y old  Male who presents with a chief complaint of Pain Rt. Knee.   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  Denies SOB chest pain  NO fever chill  Pain better c/w yesterday   encourage incentive spirometry. Lethargic, but out of bed to chair.  MEDICATIONS  (STANDING):  acetaminophen   Tablet .. 975 milliGRAM(s) Oral every 8 hours  amLODIPine   Tablet 10 milliGRAM(s) Oral daily  atorvastatin 40 milliGRAM(s) Oral at bedtime  carBAMazepine ER Capsule 100 milliGRAM(s) Oral two times a day  dextrose 5%. 1000 milliLiter(s) (50 mL/Hr) IV Continuous <Continuous>  dextrose 50% Injectable 12.5 Gram(s) IV Push once  dextrose 50% Injectable 25 Gram(s) IV Push once  dextrose 50% Injectable 25 Gram(s) IV Push once  docusate sodium 100 milliGRAM(s) Oral three times a day  doxazosin 4 milliGRAM(s) Oral at bedtime  enoxaparin Injectable 90 milliGRAM(s) SubCutaneous daily  insulin lispro (HumaLOG) corrective regimen sliding scale   SubCutaneous three times a day before meals  insulin lispro (HumaLOG) corrective regimen sliding scale   SubCutaneous at bedtime  metoprolol tartrate 12.5 milliGRAM(s) Oral two times a day  sodium chloride 0.9%. 1000 milliLiter(s) (75 mL/Hr) IV Continuous <Continuous>  SORAfenib 400 milliGRAM(s) Oral two times a day    MEDICATIONS  (PRN):  dextrose 40% Gel 15 Gram(s) Oral once PRN Blood Glucose LESS THAN 70 milliGRAM(s)/deciliter  glucagon  Injectable 1 milliGRAM(s) IntraMuscular once PRN Glucose LESS THAN 70 milligrams/deciliter  magnesium hydroxide Suspension 30 milliLiter(s) Oral daily PRN Constipation  oxyCODONE    IR 5 milliGRAM(s) Oral every 4 hours PRN Moderate Pain (4 - 6)  oxyCODONE    IR 10 milliGRAM(s) Oral every 4 hours PRN Severe Pain (7 - 10)  senna 2 Tablet(s) Oral at bedtime PRN Constipation  traMADol 50 milliGRAM(s) Oral every 6 hours PRN Mild Pain (1 - 3)    Vital Signs Last 24 Hrs  T(C): 36.9 (14 Jul 2019 21:48), Max: 36.9 (14 Jul 2019 06:02)  T(F): 98.4 (14 Jul 2019 21:48), Max: 98.5 (14 Jul 2019 06:02)  HR: 81 (14 Jul 2019 21:48) (81 - 93)  BP: 141/87 (14 Jul 2019 21:48) (111/57 - 158/81)  BP(mean): --  RR: 16 (14 Jul 2019 21:48) (16 - 18)  SpO2: 96% (14 Jul 2019 21:48) (93% - 98%)    IPHYSICAL EXAM:  GENERAL: NAD, well-groomed, well-developed  HEAD:  Atraumatic, Normocephalic  EYES: EOMI, PERRLA, conjunctiva and sclera clear  ENMT: No tonsillar erythema, exudates, or enlargement; Moist mucous membranes, Good dentition, No lesions  NECK: Supple, No JVD, Normal thyroid  NERVOUS SYSTEM:  Alert & Oriented X3, Good concentration; Motor Strength 5/5 B/L upper and lower extremities; DTRs 2+ intact and symmetric  CHEST/LUNG: Clear to percussion bilaterally; No rales, rhonchi, wheezing, or rubs  HEART: Regular rate and rhythm; No murmurs, rubs, or gallops  ABDOMEN: Soft, Nontender, Nondistended; Bowel sounds present  EXTREMITIES:  2+ Peripheral Pulses, No clubbing, cyanosis, or edema Excision of right tibia lesion  LYMPH: No lymphadenopathy noted  SKIN: No rashes or lesions    LABS:    No labs obtained today

## 2019-07-14 NOTE — PROGRESS NOTE ADULT - ATTENDING COMMENTS
No medical complications post-op to date and to proceed with physical therapy, as tolerated. Continues pulmonary toilet to lessen atelectasis, leg exercises as taught to lessen the risk of DVT and supervised pain medications for post-op pain control.
No medical complications post-op to date and to proceed with physical therapy, as tolerated. Continues pulmonary toilet to lessen atelectasis, leg exercises as taught to lessen the risk of DVT and supervised pain medications for post-op pain control.

## 2019-07-15 ENCOUNTER — TRANSCRIPTION ENCOUNTER (OUTPATIENT)
Age: 79
End: 2019-07-15

## 2019-07-15 VITALS
SYSTOLIC BLOOD PRESSURE: 148 MMHG | OXYGEN SATURATION: 95 % | DIASTOLIC BLOOD PRESSURE: 90 MMHG | HEART RATE: 85 BPM | TEMPERATURE: 98 F | RESPIRATION RATE: 18 BRPM

## 2019-07-15 DIAGNOSIS — C79.51 SECONDARY MALIGNANT NEOPLASM OF BONE: ICD-10-CM

## 2019-07-15 DIAGNOSIS — I10 ESSENTIAL (PRIMARY) HYPERTENSION: ICD-10-CM

## 2019-07-15 LAB
GLUCOSE BLDC GLUCOMTR-MCNC: 131 MG/DL — HIGH (ref 70–99)
GLUCOSE BLDC GLUCOMTR-MCNC: 185 MG/DL — HIGH (ref 70–99)

## 2019-07-15 PROCEDURE — 93005 ELECTROCARDIOGRAM TRACING: CPT

## 2019-07-15 PROCEDURE — 97535 SELF CARE MNGMENT TRAINING: CPT

## 2019-07-15 PROCEDURE — C1713: CPT

## 2019-07-15 PROCEDURE — C1889: CPT

## 2019-07-15 PROCEDURE — P9037: CPT

## 2019-07-15 PROCEDURE — 86900 BLOOD TYPING SEROLOGIC ABO: CPT

## 2019-07-15 PROCEDURE — V2787: CPT

## 2019-07-15 PROCEDURE — 85730 THROMBOPLASTIN TIME PARTIAL: CPT

## 2019-07-15 PROCEDURE — 97116 GAIT TRAINING THERAPY: CPT

## 2019-07-15 PROCEDURE — 86850 RBC ANTIBODY SCREEN: CPT

## 2019-07-15 PROCEDURE — 82962 GLUCOSE BLOOD TEST: CPT

## 2019-07-15 PROCEDURE — 97162 PT EVAL MOD COMPLEX 30 MIN: CPT

## 2019-07-15 PROCEDURE — 86901 BLOOD TYPING SEROLOGIC RH(D): CPT

## 2019-07-15 PROCEDURE — 88342 IMHCHEM/IMCYTCHM 1ST ANTB: CPT

## 2019-07-15 PROCEDURE — 80048 BASIC METABOLIC PNL TOTAL CA: CPT

## 2019-07-15 PROCEDURE — 36430 TRANSFUSION BLD/BLD COMPNT: CPT

## 2019-07-15 PROCEDURE — 88311 DECALCIFY TISSUE: CPT

## 2019-07-15 PROCEDURE — 88307 TISSUE EXAM BY PATHOLOGIST: CPT

## 2019-07-15 PROCEDURE — 97110 THERAPEUTIC EXERCISES: CPT

## 2019-07-15 PROCEDURE — 97165 OT EVAL LOW COMPLEX 30 MIN: CPT

## 2019-07-15 PROCEDURE — 85610 PROTHROMBIN TIME: CPT

## 2019-07-15 PROCEDURE — 85027 COMPLETE CBC AUTOMATED: CPT

## 2019-07-15 PROCEDURE — 71045 X-RAY EXAM CHEST 1 VIEW: CPT

## 2019-07-15 PROCEDURE — 81001 URINALYSIS AUTO W/SCOPE: CPT

## 2019-07-15 PROCEDURE — 97530 THERAPEUTIC ACTIVITIES: CPT

## 2019-07-15 PROCEDURE — 88341 IMHCHEM/IMCYTCHM EA ADD ANTB: CPT

## 2019-07-15 PROCEDURE — 76000 FLUOROSCOPY <1 HR PHYS/QHP: CPT

## 2019-07-15 RX ORDER — ACETAMINOPHEN 500 MG
3 TABLET ORAL
Qty: 0 | Refills: 0 | DISCHARGE
Start: 2019-07-15

## 2019-07-15 RX ORDER — OXYCODONE HYDROCHLORIDE 5 MG/1
1 TABLET ORAL
Qty: 0 | Refills: 0 | DISCHARGE
Start: 2019-07-15

## 2019-07-15 RX ORDER — ENOXAPARIN SODIUM 100 MG/ML
90 INJECTION SUBCUTANEOUS
Qty: 38 | Refills: 0
Start: 2019-07-15 | End: 2019-08-21

## 2019-07-15 RX ORDER — TRAMADOL HYDROCHLORIDE 50 MG/1
1 TABLET ORAL
Qty: 0 | Refills: 0 | DISCHARGE
Start: 2019-07-15

## 2019-07-15 RX ORDER — SENNA PLUS 8.6 MG/1
2 TABLET ORAL
Qty: 0 | Refills: 0 | DISCHARGE
Start: 2019-07-15

## 2019-07-15 RX ORDER — DOCUSATE SODIUM 100 MG
1 CAPSULE ORAL
Qty: 0 | Refills: 0 | DISCHARGE
Start: 2019-07-15

## 2019-07-15 RX ORDER — TRAMADOL HYDROCHLORIDE 50 MG/1
1 TABLET ORAL
Qty: 28 | Refills: 0
Start: 2019-07-15 | End: 2019-07-21

## 2019-07-15 RX ORDER — OXYCODONE HYDROCHLORIDE 5 MG/1
1 TABLET ORAL
Qty: 42 | Refills: 0
Start: 2019-07-15 | End: 2019-07-21

## 2019-07-15 RX ADMIN — AMLODIPINE BESYLATE 10 MILLIGRAM(S): 2.5 TABLET ORAL at 05:55

## 2019-07-15 RX ADMIN — Medication 100 MILLIGRAM(S): at 05:55

## 2019-07-15 RX ADMIN — Medication 100 MILLIGRAM(S): at 13:15

## 2019-07-15 RX ADMIN — Medication 1: at 13:10

## 2019-07-15 RX ADMIN — Medication 12.5 MILLIGRAM(S): at 05:55

## 2019-07-15 RX ADMIN — Medication 975 MILLIGRAM(S): at 06:45

## 2019-07-15 RX ADMIN — Medication 975 MILLIGRAM(S): at 05:59

## 2019-07-15 RX ADMIN — OXYCODONE HYDROCHLORIDE 10 MILLIGRAM(S): 5 TABLET ORAL at 05:56

## 2019-07-15 RX ADMIN — Medication 975 MILLIGRAM(S): at 13:14

## 2019-07-15 RX ADMIN — OXYCODONE HYDROCHLORIDE 10 MILLIGRAM(S): 5 TABLET ORAL at 06:45

## 2019-07-15 RX ADMIN — ENOXAPARIN SODIUM 90 MILLIGRAM(S): 100 INJECTION SUBCUTANEOUS at 13:48

## 2019-07-15 NOTE — DISCHARGE NOTE PROVIDER - NSDCACTIVITY_GEN_ALL_CORE
Walking - Outdoors allowed/Do not drive or operate machinery/Do not make important decisions/Walking - Indoors allowed/No heavy lifting/straining/Stairs allowed Do not make important decisions/Stairs allowed/Walking - Indoors allowed/Showering allowed/Do not drive or operate machinery/No heavy lifting/straining/Walking - Outdoors allowed/May Shower, Keep Knee Immobilizer Dry, seal with water proof bag before showering.    WBAT with Knee Immobilizer on.

## 2019-07-15 NOTE — DISCHARGE NOTE PROVIDER - NSDCFUADDINST_GEN_ALL_CORE_FT
keep surgical incision/dressing clean and dry, continue weight bearing as tolerated in knee immobilizer. Follow up with Dr Huizar post operative day #14 (7/25/19) for wound check and suture removal Keep surgical incision/dressing clean and dry.   Follow up with Dr. Huizar post operative day #14 (7/25/19) for wound check and suture removal.    Weight Bearing Status: WBAT RLE with Knee Immobilizer on.    Follow up with your primary care provider in 4 weeks for continuum care.

## 2019-07-15 NOTE — DISCHARGE NOTE PROVIDER - CARE PROVIDER_API CALL
Tapan Huizar)  Orthopedics  [address], [address]  [place] [address]  Phone: [phone]  Fax: [phone]  Follow Up Time: Tapan Huizar)  Orthopedics  [address], [address]  [place] [address]  Phone: [phone]  Fax: [phone]  Follow Up Time:     Benjamín Harden)  Geriatric Medicine; Internal Medicine  [address], [place] [id number]  Phone: [phone]  Fax: [phone]  Follow Up Time:

## 2019-07-15 NOTE — PROGRESS NOTE ADULT - ASSESSMENT
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  ·  PROCEDURES:  Excision of bone lesion of tibia 12-Jul-2019 01:03:25  Louie Mcleod.       Operative Findings:  · Operative Findings	Wide Resection and curettage of proximal tibia lesion, repair with cement and bone graft	    Advised incentive spirometry    DVT and Gi prophylaxis
78 y/o M s/p intralesional curettage   with bone graft, right tibia POD#4, d/c home once cleared by physical therapist  Kayeligh Gonzalez PA-C  Orthopaedic Surgery  Team pager [phone]  [facility] [phone]  [phone]
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
A/p: Patient is a 79y Male s/p Right Proximal Tibia Intralesional curretage/bone graft.  NONA. NAD.
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  ·  PROCEDURES:  Excision of bone lesion of tibia 12-Jul-2019 01:03:25  Louie Mcleod.       Operative Findings:  · Operative Findings	Wide Resection and curettage of proximal tibia lesion, repair with cement and bone graft	    Advised incentive spirometry    DVT and Gi prophylaxis
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  ·  PROCEDURES:  Excision of bone lesion of tibia 12-Jul-2019 01:03:25  Louie Mcleod.       Operative Findings:  · Operative Findings	Wide Resection and curettage of proximal tibia lesion, repair with cement and bone graft	    Advised incentive spirometry    DVT and Gi prophylaxis

## 2019-07-15 NOTE — DISCHARGE NOTE PROVIDER - CARE PROVIDERS DIRECT ADDRESSES
,[email or web address]

## 2019-07-15 NOTE — DISCHARGE NOTE PROVIDER - HOSPITAL COURSE
Patient History:      Past Medical, Past Surgical History:    PAST MEDICAL HISTORY:    DM (diabetes mellitus)     Hernia     HLD (hyperlipidemia)     HLD (hyperlipidemia)     HTN (hypertension)     HTN (hypertension)     Ischemic stroke 01/2019    Liver cancer stage 4    Prostate CA     Prostate cancer.         PAST SURGICAL HISTORY:    H/O hernia repair.        Pt is a 78 yo gentleman with a pmhx of sob and R. knee pain. pt s/p IR knee bx and pt to be transfer to [facility] for surgery. spoke to pt and the 2 sons at the bedside, they want to see their oncologist because pt is getting Radiation therapy for the left shoulder pain and it is working. they want to be     dc and have 2nd opinion.    80 y/o M underwent Right proximal tibia interlesional curettage and bone graft on 7/11/19 with Dr. Huizar.  Patient tolerated procedure well.  Patient was evaluated postoperatively by physical and occupational therapists for weight bearing as tolerated ambulation in knee immobilizer and cleared patient for discharge home.  Patient advised to keep surgical incision/dressing clean and dry, and  follow up with Dr. Huizar i14 days post op (7/25/19) for wound check and suture removal. Patient History:      Past Medical, Past Surgical History:    PAST MEDICAL HISTORY:    DM (diabetes mellitus)     Hernia     HLD (hyperlipidemia)     HTN (hypertension)     Ischemic stroke 01/2019    Liver cancer stage 4    Prostate CA         PAST SURGICAL HISTORY:    H/O hernia repair.        Pt is a 80 yo gentleman with a pmhx of sob and R. knee pain. pt s/p IR knee bx and pt to be transfer to [facility] for surgery. spoke to pt and the 2 sons at the bedside, they want to see their oncologist because pt is getting Radiation therapy for the left shoulder pain and it is working. they want to be dc and have 2nd opinion.        Hospital Course:    78 y/o M underwent Right proximal tibia interlesional curettage and bone graft on 7/11/19 with Dr. Huizar.  Patient tolerated procedure well.  Patient was evaluated postoperatively by physical and occupational therapists for weight bearing as tolerated ambulation in knee immobilizer and cleared patient for discharge home.  Patient advised to keep surgical incision/dressing clean and dry, and  follow up with Dr. Huizar in 14 days post op (7/25/19) for wound check and suture removal.  Follow up with Dr. Daugherty and Dr. Weeks within 1-2 weeks for Oncology care.        I-Stop: This report was requested by: Lamin Bradshaw | Reference #: [id number]

## 2019-07-15 NOTE — PROGRESS NOTE ADULT - SUBJECTIVE AND OBJECTIVE BOX
Patient is a 79y old  Male who presents with a chief complaint of Pain Rt. Knee.  Patient s/p intralesional curettage   with bone graft, right tibia   Patient resting without complaints.  No chest pain, SOB, N/V.    T(C): 36.8 (07-15-19 @ 05:50), Max: 36.9 (07-14-19 @ 09:05)  HR: 90 (07-15-19 @ 05:50) (81 - 94)  BP: 131/85 (07-15-19 @ 05:50) (111/57 - 141/87)  RR: 18 (07-15-19 @ 05:50) (16 - 18)  SpO2: 97% (07-15-19 @ 05:50) (93% - 98%)    Exam:  Alert and Oriented, No Acute Distress  Cards: +S1/S2, RRR  Pulm: CTAB  Lower Extremities: Right L/E in BJKI, moving digits 5/5 DF/PF B/L  Calves Soft, Non-tender bilaterally  +PF/DF/EHL/FHL  SILT  +DP Pulse

## 2019-07-22 ENCOUNTER — INPATIENT (INPATIENT)
Facility: HOSPITAL | Age: 79
LOS: 2 days | Discharge: HOME | DRG: 378 | End: 2019-07-25
Attending: INTERNAL MEDICINE | Admitting: STUDENT IN AN ORGANIZED HEALTH CARE EDUCATION/TRAINING PROGRAM
Payer: MEDICARE

## 2019-07-22 VITALS
RESPIRATION RATE: 17 BRPM | SYSTOLIC BLOOD PRESSURE: 106 MMHG | HEIGHT: 68 IN | TEMPERATURE: 98 F | HEART RATE: 112 BPM | OXYGEN SATURATION: 98 % | DIASTOLIC BLOOD PRESSURE: 68 MMHG | WEIGHT: 136.03 LBS

## 2019-07-22 DIAGNOSIS — D64.9 ANEMIA, UNSPECIFIED: ICD-10-CM

## 2019-07-22 DIAGNOSIS — Z98.890 OTHER SPECIFIED POSTPROCEDURAL STATES: Chronic | ICD-10-CM

## 2019-07-22 LAB
ALBUMIN SERPL ELPH-MCNC: 3.4 G/DL — SIGNIFICANT CHANGE UP (ref 3.3–5)
ALP SERPL-CCNC: 242 U/L — HIGH (ref 40–120)
ALT FLD-CCNC: 32 U/L — SIGNIFICANT CHANGE UP (ref 10–45)
ANION GAP SERPL CALC-SCNC: 19 MMOL/L — HIGH (ref 5–17)
APTT BLD: 32.6 SEC — SIGNIFICANT CHANGE UP (ref 27.5–36.3)
AST SERPL-CCNC: 43 U/L — HIGH (ref 10–40)
BASOPHILS # BLD AUTO: 0 K/UL — SIGNIFICANT CHANGE UP (ref 0–0.2)
BASOPHILS NFR BLD AUTO: 0 % — SIGNIFICANT CHANGE UP (ref 0–2)
BILIRUB SERPL-MCNC: 0.2 MG/DL — SIGNIFICANT CHANGE UP (ref 0.2–1.2)
BLD GP AB SCN SERPL QL: NEGATIVE — SIGNIFICANT CHANGE UP
BUN SERPL-MCNC: 15 MG/DL — SIGNIFICANT CHANGE UP (ref 7–23)
CALCIUM SERPL-MCNC: 9.2 MG/DL — SIGNIFICANT CHANGE UP (ref 8.4–10.5)
CHLORIDE SERPL-SCNC: 92 MMOL/L — LOW (ref 96–108)
CO2 SERPL-SCNC: 21 MMOL/L — LOW (ref 22–31)
CREAT SERPL-MCNC: 1.2 MG/DL — SIGNIFICANT CHANGE UP (ref 0.5–1.3)
EOSINOPHIL # BLD AUTO: 0 K/UL — SIGNIFICANT CHANGE UP (ref 0–0.5)
EOSINOPHIL NFR BLD AUTO: 1.1 % — SIGNIFICANT CHANGE UP (ref 0–6)
GLUCOSE SERPL-MCNC: 179 MG/DL — HIGH (ref 70–99)
HCT VFR BLD CALC: 22.9 % — LOW (ref 39–50)
HGB BLD-MCNC: 7.7 G/DL — LOW (ref 13–17)
INR BLD: 0.9 RATIO — SIGNIFICANT CHANGE UP (ref 0.88–1.16)
LYMPHOCYTES # BLD AUTO: 0.6 K/UL — LOW (ref 1–3.3)
LYMPHOCYTES # BLD AUTO: 13.3 % — SIGNIFICANT CHANGE UP (ref 13–44)
MCHC RBC-ENTMCNC: 30.5 PG — SIGNIFICANT CHANGE UP (ref 27–34)
MCHC RBC-ENTMCNC: 33.6 GM/DL — SIGNIFICANT CHANGE UP (ref 32–36)
MCV RBC AUTO: 90.8 FL — SIGNIFICANT CHANGE UP (ref 80–100)
MONOCYTES # BLD AUTO: 0.5 K/UL — SIGNIFICANT CHANGE UP (ref 0–0.9)
MONOCYTES NFR BLD AUTO: 11.7 % — SIGNIFICANT CHANGE UP (ref 2–14)
NEUTROPHILS # BLD AUTO: 3.3 K/UL — SIGNIFICANT CHANGE UP (ref 1.8–7.4)
NEUTROPHILS NFR BLD AUTO: 73.8 % — SIGNIFICANT CHANGE UP (ref 43–77)
OB PNL STL: POSITIVE
PLATELET # BLD AUTO: 87 K/UL — LOW (ref 150–400)
POTASSIUM SERPL-MCNC: 4.8 MMOL/L — SIGNIFICANT CHANGE UP (ref 3.5–5.3)
POTASSIUM SERPL-SCNC: 4.8 MMOL/L — SIGNIFICANT CHANGE UP (ref 3.5–5.3)
PROT SERPL-MCNC: 6.5 G/DL — SIGNIFICANT CHANGE UP (ref 6–8.3)
PROTHROM AB SERPL-ACNC: 10.3 SEC — SIGNIFICANT CHANGE UP (ref 10–12.9)
RBC # BLD: 2.52 M/UL — LOW (ref 4.2–5.8)
RBC # FLD: 14.6 % — HIGH (ref 10.3–14.5)
RH IG SCN BLD-IMP: POSITIVE — SIGNIFICANT CHANGE UP
SODIUM SERPL-SCNC: 132 MMOL/L — LOW (ref 135–145)
WBC # BLD: 4.5 K/UL — SIGNIFICANT CHANGE UP (ref 3.8–10.5)
WBC # FLD AUTO: 4.5 K/UL — SIGNIFICANT CHANGE UP (ref 3.8–10.5)

## 2019-07-22 PROCEDURE — 99285 EMERGENCY DEPT VISIT HI MDM: CPT

## 2019-07-22 RX ORDER — METOCLOPRAMIDE HCL 10 MG
10 TABLET ORAL ONCE
Refills: 0 | Status: COMPLETED | OUTPATIENT
Start: 2019-07-22 | End: 2019-07-22

## 2019-07-22 RX ORDER — ONDANSETRON 8 MG/1
4 TABLET, FILM COATED ORAL ONCE
Refills: 0 | Status: COMPLETED | OUTPATIENT
Start: 2019-07-22 | End: 2019-07-22

## 2019-07-22 RX ORDER — PANTOPRAZOLE SODIUM 20 MG/1
80 TABLET, DELAYED RELEASE ORAL ONCE
Refills: 0 | Status: COMPLETED | OUTPATIENT
Start: 2019-07-22 | End: 2019-07-23

## 2019-07-22 RX ORDER — PANTOPRAZOLE SODIUM 20 MG/1
8 TABLET, DELAYED RELEASE ORAL
Qty: 80 | Refills: 0 | Status: DISCONTINUED | OUTPATIENT
Start: 2019-07-22 | End: 2019-07-25

## 2019-07-22 RX ADMIN — Medication 10 MILLIGRAM(S): at 21:33

## 2019-07-22 RX ADMIN — ONDANSETRON 4 MILLIGRAM(S): 8 TABLET, FILM COATED ORAL at 21:33

## 2019-07-22 NOTE — PATIENT PROFILE ADULT - MONEY FOR FOOD
Detail Level: Zone Initiate Treatment: Apply Triamcinolone cream bid to back x 2 weeks Initiate Treatment: Take vitamin C supplement\\n no

## 2019-07-22 NOTE — ED STATDOCS - OBJECTIVE STATEMENT
Tachy 110 bpm.  PE: Tachy, regular.   MDM: Will get basic labs. Will type and screen.   **pt seen in waiting room for MD triage - comprehensive history and physical is not performed by me - patient to be sent to main ED for full history / physcial and medical evaluation - all orders placed by me to be followed by MD in main ED** Tachy 110 bpm. Review of old notes show that pt was on Lovenox. Pt is unsure if he is taking it and his son is in the car and will come back.   PE: Tachy, regular.   MDM: Will get basic labs. Will type and screen.   **pt seen in waiting room for MD triage - comprehensive history and physical is not performed by me - patient to be sent to main ED for full history / physcial and medical evaluation - all orders placed by me to be followed by MD in main ED**

## 2019-07-22 NOTE — ED PROVIDER NOTE - OBJECTIVE STATEMENT
80 y/o male with PMH Prostate CA, Liver CA, multiple stroke/MI, DM, HTN, HLD, CKD3 presents to ED sent from Oncology for low H/H.     Patient was discharged from [facility] July 15 s/p Wide Resection and curettage of proximal tibia lesion, repair with cement and bone graft.  Patient states that his stools are typically dark and has not noticed any bleeding.   Oncologist: Dr. Anmol Daugherty 80 y/o male with PMH Prostate CA, Liver CA getting radiation and chemo, multiple stroke/MI, DM, HTN, HLD, CKD3, hx transfusions presents to ED sent from Oncology for low H/H. Patient states that he has been feeling weaker, with headache today. Patient at baseline does have chronic abdominal pain, especially after radiation. Patient was discharged from [facility] July 15 s/p Wide Resection and curettage of proximal tibia lesion, repair with cement and bone graft.  Patient states that his stools are typically dark and has not noticed any bleeding.   Oncologist: Dr. Anmol Daugherty  Patient taking Lovenox.

## 2019-07-22 NOTE — ED PROVIDER NOTE - ATTENDING CONTRIBUTION TO CARE
78y/o M with h/o prostate/liver malignancy, DM, HLD, HTN, presenting with worsening generalized weakness and dark stools.    On Physical Exam:  General: well appearing, in NAD, speaking clearly in full sentences and without difficulty; cooperative with exam  HEENT: PERRL, MMM  Neck: no neck tenderness, no nuchal rigidity  Cardiac: normal s1, s2; RRR; no MGR  Lungs: CTABL  Abdomen: soft nontender/nondistended  guaiac positive stool  : no bladder tenderness or distension  Skin: intact, no rash  Extremities: no peripheral edema, no gross deformities  Neuro: no gross neurologic deficits    AP: Symptomatic anemia hgb 7 with positive guaiac, concerning for upper GI bleed; labs and type/screen obtained, will transfuse PRBCs start on protonix infusion, antiemetics prn and admit for further evaluation / management.

## 2019-07-22 NOTE — ED PROVIDER NOTE - PHYSICAL EXAMINATION
GEN: Pale Appearing, NAD  HEENT: NC/AT, Symm Facies. EOMI  CV: +S1S2, RRR w/o m/g/r  RESP: CTAB w/o w/r/r  ABD: Soft, nt/nd, +BS. No guarding/rebound.  EXT/MSK: No lower extremity edema or calf tenderness. FROMx4  SKIN: No erythema, lesions or rash  Neuro: Grossly intact, AOX3 with normal speech; Sensation intact, motor equal throughout

## 2019-07-22 NOTE — ED ADULT NURSE NOTE - PSH
Patient arrives s/p punching a mirror resulting in right dorsal hand laceration. Punched mirror in anger, not self-harm. Wrapped with pressure tape around gauze. Small shadowing noted.  
H/O hernia repair

## 2019-07-22 NOTE — ED ADULT NURSE NOTE - OBJECTIVE STATEMENT
79 year old male presents to the ED sent in from oncologist for low H&H on labs drawn today. PMH Prostate CA, Liver CA - getting radiation and chemo, multiple stroke/MI, DM, HTN, HLD, CKD3. Patient states that he has been feeling weaker, with headache and mild nausea today. Patient at baseline does have chronic abdominal pain, especially after radiation. Patient was discharged from [facility] July 15 s/p Wide Resection and curettage of proximal tibia lesion, repair with cement and bone graft. Pt. denies fevers/ chills, vomiting, diarrhea, bloody stool, hematuria. On assessment, patient is A&Ox4, VSS, in no acute distress. R leg is in splinted. Bilateral AC IVs placed and Labs drawn and sent to lab. Patient undressed and placed into gown, call bell in hand and side rails up with bed in lowest position for safety. blanket provided. Comfort and safety provided.

## 2019-07-22 NOTE — ED PROVIDER NOTE - NS ED ROS FT
Constitutional: No fever or chills  Eyes: No visual changes  CV: No chest pain or lower extremity edema  Resp: No SOB no cough  GI: + nausea No vomiting. No diarrhea. No constipation.   : No dysuria, hematuria.   MSK: No musculoskeletal pain  Skin: No rash  Neuro: + headache. No numbness or tingling. + weakness.  +per patient chronic black stool

## 2019-07-23 DIAGNOSIS — I10 ESSENTIAL (PRIMARY) HYPERTENSION: ICD-10-CM

## 2019-07-23 DIAGNOSIS — C61 MALIGNANT NEOPLASM OF PROSTATE: ICD-10-CM

## 2019-07-23 DIAGNOSIS — Z29.9 ENCOUNTER FOR PROPHYLACTIC MEASURES, UNSPECIFIED: ICD-10-CM

## 2019-07-23 DIAGNOSIS — K92.2 GASTROINTESTINAL HEMORRHAGE, UNSPECIFIED: ICD-10-CM

## 2019-07-23 DIAGNOSIS — I63.411 CEREBRAL INFARCTION DUE TO EMBOLISM OF RIGHT MIDDLE CEREBRAL ARTERY: ICD-10-CM

## 2019-07-23 DIAGNOSIS — C22.9 MALIGNANT NEOPLASM OF LIVER, NOT SPECIFIED AS PRIMARY OR SECONDARY: ICD-10-CM

## 2019-07-23 DIAGNOSIS — D64.9 ANEMIA, UNSPECIFIED: ICD-10-CM

## 2019-07-23 DIAGNOSIS — D47.3 ESSENTIAL (HEMORRHAGIC) THROMBOCYTHEMIA: ICD-10-CM

## 2019-07-23 DIAGNOSIS — I38 ENDOCARDITIS, VALVE UNSPECIFIED: ICD-10-CM

## 2019-07-23 DIAGNOSIS — E11.9 TYPE 2 DIABETES MELLITUS WITHOUT COMPLICATIONS: ICD-10-CM

## 2019-07-23 DIAGNOSIS — E78.5 HYPERLIPIDEMIA, UNSPECIFIED: ICD-10-CM

## 2019-07-23 LAB
ANION GAP SERPL CALC-SCNC: 9 MMOL/L — SIGNIFICANT CHANGE UP (ref 5–17)
BASOPHILS # BLD AUTO: 0.01 K/UL — SIGNIFICANT CHANGE UP (ref 0–0.2)
BASOPHILS NFR BLD AUTO: 0.3 % — SIGNIFICANT CHANGE UP (ref 0–2)
BUN SERPL-MCNC: 13 MG/DL — SIGNIFICANT CHANGE UP (ref 7–23)
CALCIUM SERPL-MCNC: 9 MG/DL — SIGNIFICANT CHANGE UP (ref 8.4–10.5)
CHLORIDE SERPL-SCNC: 99 MMOL/L — SIGNIFICANT CHANGE UP (ref 96–108)
CO2 SERPL-SCNC: 25 MMOL/L — SIGNIFICANT CHANGE UP (ref 22–31)
CREAT SERPL-MCNC: 1.14 MG/DL — SIGNIFICANT CHANGE UP (ref 0.5–1.3)
EOSINOPHIL # BLD AUTO: 0.02 K/UL — SIGNIFICANT CHANGE UP (ref 0–0.5)
EOSINOPHIL NFR BLD AUTO: 0.5 % — SIGNIFICANT CHANGE UP (ref 0–6)
FOLATE SERPL-MCNC: 8.6 NG/ML — SIGNIFICANT CHANGE UP
GLUCOSE BLDC GLUCOMTR-MCNC: 109 MG/DL — HIGH (ref 70–99)
GLUCOSE BLDC GLUCOMTR-MCNC: 110 MG/DL — HIGH (ref 70–99)
GLUCOSE BLDC GLUCOMTR-MCNC: 132 MG/DL — HIGH (ref 70–99)
GLUCOSE SERPL-MCNC: 107 MG/DL — HIGH (ref 70–99)
HAPTOGLOB SERPL-MCNC: 92 MG/DL — SIGNIFICANT CHANGE UP (ref 34–200)
HCT VFR BLD CALC: 28.2 % — LOW (ref 39–50)
HCT VFR BLD CALC: 29.7 % — LOW (ref 39–50)
HGB BLD-MCNC: 9.2 G/DL — LOW (ref 13–17)
HGB BLD-MCNC: 9.5 G/DL — LOW (ref 13–17)
IMM GRANULOCYTES NFR BLD AUTO: 1.3 % — SIGNIFICANT CHANGE UP (ref 0–1.5)
IRON SATN MFR SERPL: 24 % — SIGNIFICANT CHANGE UP (ref 16–55)
IRON SATN MFR SERPL: 60 UG/DL — SIGNIFICANT CHANGE UP (ref 45–165)
LDH SERPL L TO P-CCNC: 185 U/L — SIGNIFICANT CHANGE UP (ref 50–242)
LYMPHOCYTES # BLD AUTO: 0.31 K/UL — LOW (ref 1–3.3)
LYMPHOCYTES # BLD AUTO: 8 % — LOW (ref 13–44)
MAGNESIUM SERPL-MCNC: 1.6 MG/DL — SIGNIFICANT CHANGE UP (ref 1.6–2.6)
MCHC RBC-ENTMCNC: 29.8 PG — SIGNIFICANT CHANGE UP (ref 27–34)
MCHC RBC-ENTMCNC: 29.8 PG — SIGNIFICANT CHANGE UP (ref 27–34)
MCHC RBC-ENTMCNC: 32 GM/DL — SIGNIFICANT CHANGE UP (ref 32–36)
MCHC RBC-ENTMCNC: 32.6 GM/DL — SIGNIFICANT CHANGE UP (ref 32–36)
MCV RBC AUTO: 91.3 FL — SIGNIFICANT CHANGE UP (ref 80–100)
MCV RBC AUTO: 93.1 FL — SIGNIFICANT CHANGE UP (ref 80–100)
MONOCYTES # BLD AUTO: 0.45 K/UL — SIGNIFICANT CHANGE UP (ref 0–0.9)
MONOCYTES NFR BLD AUTO: 11.6 % — SIGNIFICANT CHANGE UP (ref 2–14)
NEUTROPHILS # BLD AUTO: 3.05 K/UL — SIGNIFICANT CHANGE UP (ref 1.8–7.4)
NEUTROPHILS NFR BLD AUTO: 78.3 % — HIGH (ref 43–77)
PHOSPHATE SERPL-MCNC: 1.2 MG/DL — LOW (ref 2.5–4.5)
PLATELET # BLD AUTO: 73 K/UL — LOW (ref 150–400)
PLATELET # BLD AUTO: 74 K/UL — LOW (ref 150–400)
POTASSIUM SERPL-MCNC: 4.3 MMOL/L — SIGNIFICANT CHANGE UP (ref 3.5–5.3)
POTASSIUM SERPL-SCNC: 4.3 MMOL/L — SIGNIFICANT CHANGE UP (ref 3.5–5.3)
RBC # BLD: 3.09 M/UL — LOW (ref 4.2–5.8)
RBC # BLD: 3.19 M/UL — LOW (ref 4.2–5.8)
RBC # FLD: 14.6 % — HIGH (ref 10.3–14.5)
RBC # FLD: 15.3 % — HIGH (ref 10.3–14.5)
SODIUM SERPL-SCNC: 133 MMOL/L — LOW (ref 135–145)
TIBC SERPL-MCNC: 245 UG/DL — SIGNIFICANT CHANGE UP (ref 220–430)
UIBC SERPL-MCNC: 185 UG/DL — SIGNIFICANT CHANGE UP (ref 110–370)
VIT B12 SERPL-MCNC: 1239 PG/ML — SIGNIFICANT CHANGE UP (ref 232–1245)
WBC # BLD: 3.89 K/UL — SIGNIFICANT CHANGE UP (ref 3.8–10.5)
WBC # BLD: 4.86 K/UL — SIGNIFICANT CHANGE UP (ref 3.8–10.5)
WBC # FLD AUTO: 3.89 K/UL — SIGNIFICANT CHANGE UP (ref 3.8–10.5)
WBC # FLD AUTO: 4.86 K/UL — SIGNIFICANT CHANGE UP (ref 3.8–10.5)

## 2019-07-23 PROCEDURE — 99223 1ST HOSP IP/OBS HIGH 75: CPT | Mod: GC

## 2019-07-23 PROCEDURE — 12345: CPT | Mod: NC,GC

## 2019-07-23 PROCEDURE — 43255 EGD CONTROL BLEEDING ANY: CPT | Mod: GC

## 2019-07-23 PROCEDURE — 74177 CT ABD & PELVIS W/CONTRAST: CPT | Mod: 26

## 2019-07-23 PROCEDURE — 71046 X-RAY EXAM CHEST 2 VIEWS: CPT | Mod: 26

## 2019-07-23 PROCEDURE — 99222 1ST HOSP IP/OBS MODERATE 55: CPT | Mod: 25

## 2019-07-23 RX ORDER — GLUCAGON INJECTION, SOLUTION 0.5 MG/.1ML
1 INJECTION, SOLUTION SUBCUTANEOUS ONCE
Refills: 0 | Status: DISCONTINUED | OUTPATIENT
Start: 2019-07-23 | End: 2019-07-25

## 2019-07-23 RX ORDER — DEXTROSE 50 % IN WATER 50 %
25 SYRINGE (ML) INTRAVENOUS ONCE
Refills: 0 | Status: DISCONTINUED | OUTPATIENT
Start: 2019-07-23 | End: 2019-07-25

## 2019-07-23 RX ORDER — DEXTROSE 50 % IN WATER 50 %
12.5 SYRINGE (ML) INTRAVENOUS ONCE
Refills: 0 | Status: DISCONTINUED | OUTPATIENT
Start: 2019-07-23 | End: 2019-07-25

## 2019-07-23 RX ORDER — HEPARIN SODIUM 5000 [USP'U]/ML
5000 INJECTION INTRAVENOUS; SUBCUTANEOUS EVERY 6 HOURS
Refills: 0 | Status: DISCONTINUED | OUTPATIENT
Start: 2019-07-23 | End: 2019-07-23

## 2019-07-23 RX ORDER — SENNA PLUS 8.6 MG/1
2 TABLET ORAL AT BEDTIME
Refills: 0 | Status: DISCONTINUED | OUTPATIENT
Start: 2019-07-23 | End: 2019-07-25

## 2019-07-23 RX ORDER — HYDROMORPHONE HYDROCHLORIDE 2 MG/ML
0.5 INJECTION INTRAMUSCULAR; INTRAVENOUS; SUBCUTANEOUS EVERY 4 HOURS
Refills: 0 | Status: DISCONTINUED | OUTPATIENT
Start: 2019-07-23 | End: 2019-07-25

## 2019-07-23 RX ORDER — ATORVASTATIN CALCIUM 80 MG/1
40 TABLET, FILM COATED ORAL AT BEDTIME
Refills: 0 | Status: DISCONTINUED | OUTPATIENT
Start: 2019-07-23 | End: 2019-07-25

## 2019-07-23 RX ORDER — DEXTROSE 50 % IN WATER 50 %
15 SYRINGE (ML) INTRAVENOUS ONCE
Refills: 0 | Status: DISCONTINUED | OUTPATIENT
Start: 2019-07-23 | End: 2019-07-25

## 2019-07-23 RX ORDER — HEPARIN SODIUM 5000 [USP'U]/ML
2500 INJECTION INTRAVENOUS; SUBCUTANEOUS EVERY 6 HOURS
Refills: 0 | Status: DISCONTINUED | OUTPATIENT
Start: 2019-07-23 | End: 2019-07-23

## 2019-07-23 RX ORDER — ACETAMINOPHEN 500 MG
650 TABLET ORAL EVERY 6 HOURS
Refills: 0 | Status: DISCONTINUED | OUTPATIENT
Start: 2019-07-23 | End: 2019-07-25

## 2019-07-23 RX ORDER — SODIUM,POTASSIUM PHOSPHATES 278-250MG
1 POWDER IN PACKET (EA) ORAL ONCE
Refills: 0 | Status: COMPLETED | OUTPATIENT
Start: 2019-07-23 | End: 2019-07-23

## 2019-07-23 RX ORDER — METOPROLOL TARTRATE 50 MG
12.5 TABLET ORAL
Refills: 0 | Status: DISCONTINUED | OUTPATIENT
Start: 2019-07-23 | End: 2019-07-25

## 2019-07-23 RX ORDER — SORAFENIB 200 MG/1
2 TABLET, FILM COATED ORAL
Qty: 0 | Refills: 0 | DISCHARGE

## 2019-07-23 RX ORDER — HYDROMORPHONE HYDROCHLORIDE 2 MG/ML
0.25 INJECTION INTRAMUSCULAR; INTRAVENOUS; SUBCUTANEOUS
Refills: 0 | Status: DISCONTINUED | OUTPATIENT
Start: 2019-07-23 | End: 2019-07-25

## 2019-07-23 RX ORDER — DOCUSATE SODIUM 100 MG
100 CAPSULE ORAL THREE TIMES A DAY
Refills: 0 | Status: DISCONTINUED | OUTPATIENT
Start: 2019-07-23 | End: 2019-07-25

## 2019-07-23 RX ORDER — OXYCODONE HYDROCHLORIDE 5 MG/1
5 TABLET ORAL EVERY 4 HOURS
Refills: 0 | Status: DISCONTINUED | OUTPATIENT
Start: 2019-07-23 | End: 2019-07-25

## 2019-07-23 RX ORDER — TAMSULOSIN HYDROCHLORIDE 0.4 MG/1
0.8 CAPSULE ORAL AT BEDTIME
Refills: 0 | Status: DISCONTINUED | OUTPATIENT
Start: 2019-07-23 | End: 2019-07-25

## 2019-07-23 RX ORDER — CARBAMAZEPINE 200 MG
100 TABLET ORAL
Refills: 0 | Status: DISCONTINUED | OUTPATIENT
Start: 2019-07-23 | End: 2019-07-25

## 2019-07-23 RX ORDER — INSULIN LISPRO 100/ML
VIAL (ML) SUBCUTANEOUS EVERY 6 HOURS
Refills: 0 | Status: DISCONTINUED | OUTPATIENT
Start: 2019-07-23 | End: 2019-07-24

## 2019-07-23 RX ORDER — SODIUM CHLORIDE 9 MG/ML
1000 INJECTION, SOLUTION INTRAVENOUS
Refills: 0 | Status: DISCONTINUED | OUTPATIENT
Start: 2019-07-23 | End: 2019-07-25

## 2019-07-23 RX ORDER — HEPARIN SODIUM 5000 [USP'U]/ML
INJECTION INTRAVENOUS; SUBCUTANEOUS
Qty: 25000 | Refills: 0 | Status: DISCONTINUED | OUTPATIENT
Start: 2019-07-23 | End: 2019-07-23

## 2019-07-23 RX ORDER — TRAMADOL HYDROCHLORIDE 50 MG/1
50 TABLET ORAL EVERY 6 HOURS
Refills: 0 | Status: DISCONTINUED | OUTPATIENT
Start: 2019-07-23 | End: 2019-07-25

## 2019-07-23 RX ADMIN — PANTOPRAZOLE SODIUM 80 MILLIGRAM(S): 20 TABLET, DELAYED RELEASE ORAL at 00:16

## 2019-07-23 RX ADMIN — OXYCODONE HYDROCHLORIDE 5 MILLIGRAM(S): 5 TABLET ORAL at 05:01

## 2019-07-23 RX ADMIN — OXYCODONE HYDROCHLORIDE 5 MILLIGRAM(S): 5 TABLET ORAL at 04:19

## 2019-07-23 RX ADMIN — PANTOPRAZOLE SODIUM 10 MG/HR: 20 TABLET, DELAYED RELEASE ORAL at 00:16

## 2019-07-23 RX ADMIN — Medication 1 PACKET(S): at 17:54

## 2019-07-23 RX ADMIN — PANTOPRAZOLE SODIUM 10 MG/HR: 20 TABLET, DELAYED RELEASE ORAL at 11:37

## 2019-07-23 RX ADMIN — ATORVASTATIN CALCIUM 40 MILLIGRAM(S): 80 TABLET, FILM COATED ORAL at 21:49

## 2019-07-23 RX ADMIN — Medication 100 MILLIGRAM(S): at 05:50

## 2019-07-23 RX ADMIN — TAMSULOSIN HYDROCHLORIDE 0.8 MILLIGRAM(S): 0.4 CAPSULE ORAL at 21:49

## 2019-07-23 RX ADMIN — Medication 12.5 MILLIGRAM(S): at 17:54

## 2019-07-23 RX ADMIN — Medication 100 MILLIGRAM(S): at 17:55

## 2019-07-23 RX ADMIN — SODIUM CHLORIDE 50 MILLILITER(S): 9 INJECTION, SOLUTION INTRAVENOUS at 17:00

## 2019-07-23 NOTE — H&P ADULT - PROBLEM SELECTOR PROBLEM 3
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

## 2019-07-23 NOTE — PROGRESS NOTE ADULT - PROBLEM SELECTOR PLAN 1
Patient presented from oncologist office with low hemoglobin, reports melanotic stool, post-prandial epigastric pain and vomiting for >4 months, episode of anemia during early July admission (Hb 6.9, requiring transfusion to Hb 11.1); patient has also been on lovenox 90mg daily since ischemic stroke in February.  - Patient received 2 units PRBCs in ED  - Maintain 2 large bore IVs, active type and screen, PPI drip, NPO  -pt is now s/p EGD with cauterization of bleeding ulcer. Per GI he has a 50% chance of rebleeding. Pt also at risk for perforation  -continue IV PPI for 72 hours  -patient has appointment for outpatient chemo and immunotherapy for Wednesday 7/24. Per his outpatient heme/onc if he does not get it tomorrow he will have to wait another 2 weeks. Will discuss risks/benefits with family of following GI recs vs discharge for chemo/immunotherapy

## 2019-07-23 NOTE — H&P ADULT - PROBLEM/PLAN-2
Regarding: Seen at ER, cough and would like medication   ----- Message from Tatum Webster sent at 2/26/2019  6:34 PM CST -----  Patient Name: Rajeev Christiansen  Specialist or PCP:No pcp  Pregnant (If Yes, how long?):na  Symptoms:Seen at ER, cough and would like medication   Call Back #:[phone]  Is the patient’s permanent residence located in [place], [place], or a [facility]? Yes [place] [address]  Call Center Account #:[phone]       DISPLAY PLAN FREE TEXT

## 2019-07-23 NOTE — PROGRESS NOTE ADULT - ATTENDING COMMENTS
Patient had EGD which showed large bleeding ulcer s/p cauterization. GI strongly feels that the patient should be monitored on PPI drop for next 72 hours. However, his onc was planning to do chemo tomorrow s an out patient. I d/w patient in detail. He is going to discuss with his family.

## 2019-07-23 NOTE — CHART NOTE - NSCHARTNOTEFT_GEN_A_CORE
Spoke to pt's daughter, Maria Victoria, via phone re treatment plan, which includes hospital stay for at least 72 hours for completion of protonix gtt given recent EGD with findings of large, bleeding gastric ulcer, now s/p cautery. Pt's private, outpatient oncologist wishes for pt to be discharged tomorrow for initiation of chemo. However, per conversation with GI, pt is at high risk of rebleeding vs perforation given extent of ulcer. After explanation of risks vs benefits, pt's daughter agrees that patient needs to remain in hospital to be treated, even if it means postponing chemo.     Dena Powell, PGY-3

## 2019-07-23 NOTE — PROGRESS NOTE ADULT - ASSESSMENT
Patient is a 79 y.o. man with a PMH of HTN, DM2, HLD, CKD3, CVA (s/p MCA clot retrieval in 3/19), and prostate cancer with metastases to bone and liver s/p right tibial resection and bone graft in 7/19, who presented to the ED after being notified by his oncologist that he had a low hemoglobin, likely 2/2 upper GI bleed. Patient is a 79 y.o. man with a PMH of HTN, DM2, HLD, CKD3, CVA (s/p MCA clot retrieval in 3/19), and prostate cancer with metastases to bone and liver s/p right tibial resection and bone graft in 7/19, who presented to the ED after being notified by his oncologist that he had a low hemoglobin, likely 2/2 upper GI bleed..

## 2019-07-23 NOTE — H&P ADULT - PROBLEM SELECTOR PLAN 1
Pt reporting melanotic stool, +occult stool, describing epigastric pain, worse with meals. Pt also on Lovenox 90mg daily. Possible peptic ulcer bleed based on pt's Sx. Last admitted 7/6 and received transfusion for hg of 6.9  - s/p 2u pRBCs  - maintain 2 large bore IVs  - maintain active type and screen  - PPI gtt  - Will consult GI for possible EGD  - NPO Pt reporting melanotic stool, +occult stool, describing epigastric pain, worse with meals. Pt also on Lovenox 90mg daily. Possible peptic ulcer bleed based on pt's Sx. Last admitted 7/6 and received transfusion for hg of 6.9. Unclear when last c-scope was.   - s/p 2u pRBCs  - maintain 2 large bore IVs  - maintain active type and screen  - PPI gtt  - Will consult GI for possible EGD  - NPO  - posttransfusion CBC Pt reporting melanotic stool, +occult stool, describing epigastric pain, worse with meals. Pt also on Lovenox 90mg daily. Possible peptic ulcer bleed based on pt's Sx. Last admitted 7/6 and received transfusion for hg of 6.9. Unclear when last c-scope was.   - s/p 2u pRBCs  - maintain 2 large bore IVs  - maintain active type and screen  - PPI gtt  - Will consult GI for possible EGD +/- colonoscopy  - NPO  - posttransfusion CBC

## 2019-07-23 NOTE — PROGRESS NOTE ADULT - SUBJECTIVE AND OBJECTIVE BOX
Mukesh Soliman  Internal Medicine PGY-1  Pager: [phone] / [phone]      PATIENT:  SHER LILLY  [id number]    Patient is a 79 y.o. 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      OVERNIGHT EVENTS:    There were no acute events overnight. In the ED, the patient received 2 units PRBCs after being found to have a Hb of 7.7.      SUBJECTIVE:    The patient reports feeling well this morning. He notes generalized fatigue and poor appetite, but unchanged from his baseline since developing metastatic cancer. He reports baseline abdominal pain and nausea as well. He denies headache, lightheadedness, dizziness. Also denies chest pain, SOB, hemoptysis, but he endorses mild cough. He notes that he generally vomits after eating, but denies any recent episodes of hematemesis. He also notes that he has had dark tarry stools since ~February, and these have been unchanged recently. His abdominal pain and vomiting have also been going on since approximately February as well. He denies any recent diarrhea or fariha blood in the stool.    OBJECTIVE:    T(C): 37 (07-23-19 @ 14:53), Max: 37 (07-23-19 @ 14:53)  HR: 91 (07-23-19 @ 14:53) (78 - 112)  BP: 162/88 (07-23-19 @ 14:53) (106/68 - 162/88)  RR: 18 (07-23-19 @ 14:53) (17 - 18)  SpO2: 94% (07-23-19 @ 14:53) (93% - 98%)      07-22-19 @ 07:01  -  07-23-19 @ 07:00  --------------------------------------------------------  IN: 74 mL / OUT: 650 mL / NET: -576 mL          PHYSICAL EXAM:  General: NAD, lying comfortably in bed, pleasant and conversational, speaking in full sentences  HEENR: no conjunctival erythema; notes decreased vision and "darkness" in left eye since stroke; mucus membranes moist  Neck: Neck supple, no lymphadenopathy, thyromegaly, or JVD noted  Respiratory: CTA B/L, no wheezing, rales, rhonchi  CV: RRR; no murmurs, rubs, gallops appreciated  Abdominal: +diffuse lower abdominal tenderness; soft, non-distended, normoactive bowel sounds  MSK: no focal weakness  Extremities: No edema, 2+ peripheral pulses; dressing and brace present and intact on RLE  Neurology: A&Ox3, no focal neurologic deficits  Skin: No rashes, hematoma, ecchymosis            LABS:                          9.2    3.89  )-----------( 73       ( 23 Jul 2019 08:41 )             28.2     07-23    133<L>  |  99  |  13  ----------------------------<  107<H>  4.3   |  25  |  1.14    Ca    9.0      23 Jul 2019 07:13  Phos  1.2     07-23  Mg     1.6     07-23    TPro  6.5  /  Alb  3.4  /  TBili  0.2  /  DBili  x   /  AST  43<H>  /  ALT  32  /  AlkPhos  242<H>  07-22    LIVER FUNCTIONS - ( 22 Jul 2019 19:33 )  Alb: 3.4 g/dL / Pro: 6.5 g/dL / ALK PHOS: 242 U/L / ALT: 32 U/L / AST: 43 U/L / GGT: x             PT/INR - ( 22 Jul 2019 19:33 )   PT: 10.3 sec;   INR: 0.90 ratio         PTT - ( 22 Jul 2019 19:33 )  PTT:32.6 sec                    IMAGING:      MEDICATIONS:  MEDICATIONS  (STANDING):  atorvastatin 40 milliGRAM(s) Oral at bedtime  carBAMazepine Chewable 100 milliGRAM(s) Chew two times a day  dextrose 5%. 1000 milliLiter(s) (50 mL/Hr) IV Continuous <Continuous>  dextrose 50% Injectable 12.5 Gram(s) IV Push once  dextrose 50% Injectable 25 Gram(s) IV Push once  dextrose 50% Injectable 25 Gram(s) IV Push once  insulin lispro (HumaLOG) corrective regimen sliding scale   SubCutaneous every 6 hours  lactated ringers. 1000 milliLiter(s) (50 mL/Hr) IV Continuous <Continuous>  metoprolol tartrate 12.5 milliGRAM(s) Oral two times a day  pantoprazole Infusion 8 mG/Hr (10 mL/Hr) IV Continuous <Continuous>  potassium phosphate / sodium phosphate powder 1 Packet(s) Oral once  tamsulosin 0.8 milliGRAM(s) Oral at bedtime    MEDICATIONS  (PRN):  acetaminophen   Tablet .. 650 milliGRAM(s) Oral every 6 hours PRN Mild Pain (1 - 3)  dextrose 40% Gel 15 Gram(s) Oral once PRN Blood Glucose LESS THAN 70 milliGRAM(s)/deciliter  docusate sodium 100 milliGRAM(s) Oral three times a day PRN Constipation  glucagon  Injectable 1 milliGRAM(s) IntraMuscular once PRN Glucose LESS THAN 70 milligrams/deciliter  HYDROmorphone  Injectable 0.5 milliGRAM(s) IV Push every 4 hours PRN Severe Pain (7 - 10)  HYDROmorphone  Injectable 0.25 milliGRAM(s) IV Push every 2 hours PRN Breakthrough pain  oxyCODONE    IR 5 milliGRAM(s) Oral every 4 hours PRN Severe Pain (7 - 10)  senna 2 Tablet(s) Oral at bedtime PRN Constipation  traMADol 50 milliGRAM(s) Oral every 6 hours PRN Moderate Pain (4 - 6)

## 2019-07-23 NOTE — H&P ADULT - PROBLEM SELECTOR PLAN 2
Pt with multifocal metastatic disease in the liver (follows with Adriana Daugherty and Desi). Sorafinib held since the 10th  - consult oncology in the AM Pt with multifocal metastatic disease in the liver (follows with Adriana Daugherty and Desi). Sorafinib held since the 10th. Progressive weakness likely related for anemia and metastatic disease  - consult oncology in the AM Pt with multifocal metastatic disease in the liver (follows with Adriana Daugherty and Desi). Sorafinib held since the 10th. Progressive weakness likely related for anemia and metastatic disease  - consult oncology in the AM  - Needs palliative care c/s Pt with multifocal metastatic disease in the liver (follows with Adriana Daugherty and Desi). Sorafenib held since the 10th. Progressive weakness likely related for anemia and metastatic disease  - consult oncology in the AM  - Needs palliative care c/s Pt with multifocal metastatic disease in the liver (follows with Adriana Daugherty and Desi). Sorafenib held since the 10th. Progressive weakness likely related for anemia and metastatic disease  - Will consult oncology   - Needs palliative care c/s

## 2019-07-23 NOTE — H&P ADULT - PROBLEM SELECTOR PLAN 3
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 On Lovenox 90 mg daily as per neuro. coags normal on admission, but thrombocytopenic   - recommend cardiology c/s in the AM to discuss anticoagulation   - repeat ECHO  - Heparin  - c/w Carbamezapine Hx of multiple CVAs, most recently R MCA stroke with hemorrhage conversion s/p thrombectomy. On Lovenox 90 mg daily as per neuro for suspected marantic endocarditis (normal TTE, CARIE never preformed)  coags normal on admission, but thrombocytopenic   - recommend cardiology c/s in the AM to discuss anticoagulation   - repeat ECHO w/ definitely   - will hold anticoagulation   - c/w Carbamezapine Hx of multiple CVAs, most recently R MCA stroke with hemorrhage conversion s/p thrombectomy. On Lovenox 90 mg daily as per neuro for suspected marantic endocarditis (normal TTE, CARIE never preformed)  coags normal on admission, but thrombocytopenic   - recommend cardiology c/s in the AM to discuss anticoagulation   - repeat ECHO w/ Definity  - will hold anticoagulation   - c/w Carbamezapine Hx of multiple CVAs, most recently R MCA stroke with hemorrhage conversion s/p thrombectomy. On Lovenox 90 mg daily as per neuro for suspected marantic endocarditis (normal TTE, CARIE never preformed)  coags normal on admission, but thrombocytopenic   - recommend cardiology c/s in the AM to discuss anticoagulation   - repeat ECHO w/ Definity  - will hold anticoagulation for now. risk benefit of bleeding. No source of CVA elucidated during last hospitalization, but poss embolic or "marantic endocarditis" was documented.  - c/w Carbamezapine

## 2019-07-23 NOTE — CONSULT NOTE ADULT - ATTENDING COMMENTS
metastatic cancer to the liver with unknown primary on sorafenib? (not primary HCC)    acute drop H/H, EGD showed gastric ulcer. Please hold off sorafenib given active bleeding.

## 2019-07-23 NOTE — H&P ADULT - NSHPSOCIALHISTORY_GEN_ALL_CORE
Lives at home with family   No toxic habits Lives at home with son Ruben  No toxic habits  Wife lives in [place].  Total 5 children.

## 2019-07-23 NOTE — H&P ADULT - PROBLEM SELECTOR PLAN 5
- c/w atorvastatin 40mg Anemia likely due to blood loss, however anemia workup not sent during prior admission. will send anemia workup with AM labs Anemia likely due to blood loss and chronic disease, however anemia workup not sent during prior admission. will send anemia workup with AM labs Anemia likely due to blood loss and chronic disease, however anemia workup not sent during prior admission. will send anemia workup with AM labs. Will add-on iron studies to pretransfusion CBC

## 2019-07-23 NOTE — PROGRESS NOTE ADULT - SUBJECTIVE AND OBJECTIVE BOX
Pre-Endoscopy Evaluation      Referring Physician:  Dr. Flores                                   Procedure: upper endoscopy    Indication for Procedure: melena    Pertinent History:  Pt is a 79-year-old male with a history of HTN, DM II, HLD, CKD III, multiple CVA without residual deficits, including right parietal stroke of unclear etiology on lovenox presents with  low H&H from his Oncologist's office and melena      Sedation by Anesthesia [X]    PAST MEDICAL & SURGICAL HISTORY:  HLD (hyperlipidemia)  HTN (hypertension)  Liver cancer: stage 4  Prostate cancer  Ischemic stroke: 01/2019  HLD (hyperlipidemia)  HTN (hypertension)  Hernia  DM (diabetes mellitus)  Prostate CA  H/O hernia repair      PMH of Gastroparesis [ ]  Gastric Surgery [ ]  Gastric Outlet Obstruction [ ]    Allergies    No Known Allergies    Intolerances        Latex allergy: [ ] yes [X] no    Medications:MEDICATIONS  (STANDING):  atorvastatin 40 milliGRAM(s) Oral at bedtime  carBAMazepine Chewable 100 milliGRAM(s) Chew two times a day  dextrose 5%. 1000 milliLiter(s) (50 mL/Hr) IV Continuous <Continuous>  dextrose 50% Injectable 12.5 Gram(s) IV Push once  dextrose 50% Injectable 25 Gram(s) IV Push once  dextrose 50% Injectable 25 Gram(s) IV Push once  insulin lispro (HumaLOG) corrective regimen sliding scale   SubCutaneous every 6 hours  lactated ringers. 1000 milliLiter(s) (50 mL/Hr) IV Continuous <Continuous>  metoprolol tartrate 12.5 milliGRAM(s) Oral two times a day  pantoprazole Infusion 8 mG/Hr (10 mL/Hr) IV Continuous <Continuous>  potassium phosphate / sodium phosphate powder 1 Packet(s) Oral once  tamsulosin 0.8 milliGRAM(s) Oral at bedtime    MEDICATIONS  (PRN):  acetaminophen   Tablet .. 650 milliGRAM(s) Oral every 6 hours PRN Mild Pain (1 - 3)  dextrose 40% Gel 15 Gram(s) Oral once PRN Blood Glucose LESS THAN 70 milliGRAM(s)/deciliter  docusate sodium 100 milliGRAM(s) Oral three times a day PRN Constipation  glucagon  Injectable 1 milliGRAM(s) IntraMuscular once PRN Glucose LESS THAN 70 milligrams/deciliter  HYDROmorphone  Injectable 0.5 milliGRAM(s) IV Push every 4 hours PRN Severe Pain (7 - 10)  HYDROmorphone  Injectable 0.25 milliGRAM(s) IV Push every 2 hours PRN Breakthrough pain  oxyCODONE    IR 5 milliGRAM(s) Oral every 4 hours PRN Severe Pain (7 - 10)  senna 2 Tablet(s) Oral at bedtime PRN Constipation  traMADol 50 milliGRAM(s) Oral every 6 hours PRN Moderate Pain (4 - 6)      Smoking: [ ] yes  [X] no    AICD/PPM: [ ] yes   [X] no    Pertinent lab data:                        9.2    3.89  )-----------( 73       ( 23 Jul 2019 08:41 )             28.2     07-23    133<L>  |  99  |  13  ----------------------------<  107<H>  4.3   |  25  |  1.14    Ca    9.0      23 Jul 2019 07:13  Phos  1.2     07-23  Mg     1.6     07-23    TPro  6.5  /  Alb  3.4  /  TBili  0.2  /  DBili  x   /  AST  43<H>  /  ALT  32  /  AlkPhos  242<H>  07-22    PT/INR - ( 22 Jul 2019 19:33 )   PT: 10.3 sec;   INR: 0.90 ratio         PTT - ( 22 Jul 2019 19:33 )  PTT:32.6 sec              Physical Examination:  Daily Height in cm: 172.72 (22 Jul 2019 18:55)    Daily   Vital Signs Last 24 Hrs  T(C): 37 (23 Jul 2019 14:53), Max: 37 (23 Jul 2019 14:53)  T(F): 98.6 (23 Jul 2019 14:53), Max: 98.6 (23 Jul 2019 14:53)  HR: 91 (23 Jul 2019 14:53) (78 - 112)  BP: 162/88 (23 Jul 2019 14:53) (106/68 - 162/88)  BP(mean): --  RR: 18 (23 Jul 2019 14:53) (17 - 18)  SpO2: 94% (23 Jul 2019 14:53) (93% - 98%)      Constitutional: NAD    HEENT: PERRLA, EOMI,       Neck:  No JVD    Respiratory: CTAB/L    Cardiovascular: S1 and S2    Gastrointestinal: BS+, soft, NT/ND    Extremities: No peripheral edema    Neurological: A/O x 3, no focal deficits    Psychiatric: Normal mood, normal affect    : No Jackson    Skin: No rashes    Comments:    ASA Class: I [ ]  II [ ]  III [X]  IV [ ]    The patient is a suitable candidate for the planned procedure unless box checked [ ]  No, explain:

## 2019-07-23 NOTE — PROGRESS NOTE ADULT - ASSESSMENT
Patient is a 79 y.o. 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

## 2019-07-23 NOTE — H&P ADULT - HISTORY OF PRESENT ILLNESS
Mr. Balderrama is a 79-year-old male with a history of HTN, DM II, HLD, CKD III, right parietal stroke with secondary hemorrhage and MCA clot retrieval at [place] on 3/19, hx of history of marantic endocarditis, hx of metastatic prostate cancer and multifocal metastatic disease in the liver (adenocarcinoma, origin from GI or pancreas or biliary tract), and multiple osseous mets s/p recent R Tibia Intralesional curettage/bone graft presenting with low H&H from his Oncologist's office.    Pt describes progressive weakness and episode of left temporal headache this morning. Patient was discharged from [facility] July 15 after wide Resection and curettage of proximal tibia lesion, repair with cement and bone graft. He reports weakness since that admission that has not improved. He describes stomach pain that is worse with eating as well as 1 month of dark black soft stools. Also endorses cough w/ phlegm weight loss, and nausea/vomiting. Describes his emesis as "food," son reports pt stated he vomited up phlegm. During last admission (7/6) pt presented with hg of 6.9 and was transfused to 11.1, but has downtrended since. Pt Denies fever/chills, SOB, CP, palpitations, or focal weakness. No sick contacts.      Received 1u pRBCs and reglan/zofran in the ED Mr. Balderrama is a 79-year-old male with a history of HTN, DM II, HLD, CKD III, right parietal stroke with secondary hemorrhage and MCA clot retrieval at [place] on 3/19, hx of history of marantic endocarditis, hx of metastatic prostate cancer and multifocal metastatic disease in the liver (adenocarcinoma, origin from GI or pancreas or biliary tract), and multiple osseous mets s/p recent R Tibia Intralesional curettage/bone graft presenting with low H&H from his Oncologist's office.    Pt describes progressive weakness and episode of left temporal headache this morning. Patient was discharged from [facility] July 15 after wide Resection and curettage of proximal tibia lesion, repair with cement and bone graft. He reports weakness since that admission that has not improved. He describes stomach pain that is worse with eating as well as 1 month of dark black soft stools. Also endorses cough w/ phlegm weight loss, and nausea/vomiting. Describes his emesis as "food," son reports pt stated he vomited up phlegm. During last admission (7/6) pt presented with hg of 6.9 and was transfused to 11.1, but has downtrended since. Pt Denies fever/chills, SOB, CP, palpitations, or focal weakness. No sick contacts. Reports Hx of EGD in [place] last year, unsure of results.      Received 1u pRBCs and reglan/zofran in the ED Mr. Balderrama is a 79-year-old male with a history of HTN, DM II, HLD, CKD III, right parietal stroke with secondary hemorrhage and MCA clot retrieval at [place] on 3/19, metastatic prostate cancer and multifocal metastatic disease in the liver (adenocarcinoma, origin from GI or pancreas or biliary tract), and multiple osseous mets s/p recent R Tibia Intralesional curettage/bone graft presenting with low H&H from his Oncologist's office.    Pt describes progressive weakness and episode of left temporal headache this morning. Patient was discharged from [facility] July 15 after wide Resection and curettage of proximal tibia lesion, repair with cement and bone graft. He reports weakness since that admission that has not improved. He describes stomach pain that is worse with eating as well as 1 month of dark black soft stools. Also endorses cough w/ phlegm weight loss, and nausea/vomiting. Describes his emesis as "food," son reports pt stated he vomited up phlegm. During last admission (7/6) pt presented with hg of 6.9 and was transfused to 11.1, but has downtrended since. Pt Denies fever/chills, SOB, CP, palpitations, or focal weakness. No sick contacts. Reports Hx of EGD in [place] last year, unsure of results.      Received 1u pRBCs and reglan/zofran in the ED Mr. Balderrama is a 79-year-old male with a history of HTN, DM II, HLD, CKD III, multiple CVA without residual deficits, including right parietal stroke of unclear etiology on lovenox c/b secondary hemorrhage s/p MCA clot retrieval at [place] on 3/19, metastatic prostate cancer, metastatic adenocarcinoma of unclear etiology, with metastases to liver and bone s/p recent R Tibia Intralesional curettage/bone graft presenting with low H&H from his Oncologist's office.    Pt describes progressive weakness and episode of left temporal headache this morning. Patient was discharged from [facility] July 15 after wide Resection and curettage of proximal tibia lesion, repair with cement and bone graft. He reports weakness since that admission that has not improved. He describes stomach pain that is worse with eating as well as 1 month of dark black soft stools. Also endorses cough w/ phlegm weight loss, and nausea/vomiting. Describes his emesis as "food," son reports pt stated he vomited up phlegm. During last admission (7/6) pt presented with hg of 6.9 and was transfused to 11.1, but has downtrended since. Pt Denies fever/chills, SOB, CP, palpitations, or focal weakness. No sick contacts. Reports Hx of EGD in [place] last year, unsure of results. Pt reports his last colonoscopy was "many years ago".     Received 1u pRBCs and reglan/zofran in the ED

## 2019-07-23 NOTE — H&P ADULT - NSHPPHYSICALEXAM_GEN_ALL_CORE
T(C): 36.4 (07-22-19 @ 22:25), Max: 36.9 (07-22-19 @ 18:55)  HR: 78 (07-22-19 @ 22:25) (78 - 112)  BP: 121/79 (07-22-19 @ 22:25) (106/68 - 128/85)  RR: 18 (07-22-19 @ 22:25) (17 - 18)  SpO2: 97% (07-22-19 @ 22:25) (97% - 98%)  General: NAD, comfortably resting in ED stretcher   Eyes: no conjunctival erythema  ENT: MMM  Neck: Neck supple, No lymphadenopathy   Respiratory: CTA B/L, No wheezing, rales, rhonchi  CV: RRR no murmurs  Abdominal: +diffuse lower abdominal tenderness. Soft, ND +BS  MSK: no focal weakness, R leg brace in place  Extremities: No edema, 2+ peripheral pulses  Neurology: A&Ox3, nonfocal, GALINDO x 4  Skin: No Rashes, Hematoma, Ecchymosis  Psych: Calm and appropriate

## 2019-07-23 NOTE — H&P ADULT - NSICDXPASTMEDICALHX_GEN_ALL_CORE_FT
PAST MEDICAL HISTORY:  DM (diabetes mellitus)     Hernia     HLD (hyperlipidemia)     HLD (hyperlipidemia)     HTN (hypertension)     HTN (hypertension)     Ischemic stroke 01/2019    Liver cancer stage 4    Prostate CA     Prostate cancer
10-Jul-2019 06:05

## 2019-07-23 NOTE — PROGRESS NOTE ADULT - PROBLEM SELECTOR PLAN 2
Patient presented with Hb of 7.7, most likely 2/2 upper GI bleed. Patient received 2 units PRBCs in ED as stated above.  - Trend CBC BID  - Complete iron studies  - F/u post-transfusion CBC  - Transfuse PRBCs for Hb <7 or symptomatic anemia

## 2019-07-23 NOTE — H&P ADULT - PROBLEM SELECTOR PLAN 4
Hx of HTN on metoprolol 12.5 BID and amlodipine 10mg   - will monitor BP and hold anti-hypertensives in setting of GIB Thrombocytopenic to 87 on admission. Baseline  during recent admissions.   - Heme/onc c/s for further workup of thrombocytopenia

## 2019-07-23 NOTE — H&P ADULT - PROBLEM SELECTOR PLAN 7
Hx of HTN on metoprolol 12.5 BID and amlodipine 10mg   - will monitor BP and hold anti-hypertensives in setting of GIB

## 2019-07-23 NOTE — H&P ADULT - ASSESSMENT
Pt is a 79-year-old male with a history of HTN, DM II (a1c 5.8), HLD, CKD III, right parietal stroke with secondary hemorrhage and MCA clot retrieval at [place] on 3/19, hx of history of marantic endocarditis, hx of metastatic prostate cancer and multifocal metastatic disease in the liver, and multiple osseous mets s/p recent R Tibia Intralesional curettage/bone graft presenting with low H&H from his Oncologist's office, likely due to GIB Pt is a 79-year-old male with a history of HTN, DM II (a1c 5.8), HLD, CKD III, right parietal stroke with secondary hemorrhage and MCA clot retrieval at [place] on 3/19, metastatic prostate cancer and multifocal metastatic disease in the liver, and multiple osseous mets s/p recent R Tibia Intralesional curettage/bone graft presenting with low H&H from his Oncologist's office, likely due to GIB 79-year-old male with a history of HTN, DM II, HLD, CKD III, multiple CVA without residual deficits, including right parietal stroke of unclear etiology on lovenox c/b secondary hemorrhage s/p MCA clot retrieval at [place] on 3/19, metastatic prostate cancer, metastatic adenocarcinoma of unclear etiology, with metastases to liver and bone s/p recent R Tibia Intralesional curettage/bone graft pw anemia 2/2 poss UGIB.

## 2019-07-23 NOTE — CONSULT NOTE ADULT - SUBJECTIVE AND OBJECTIVE BOX
HPI:  79-year-old male with a history of HTN, DM II, HPLD, right parietal stroke with secondary hemorrhage and MCA clot retrieval at [facility] on 3/19, hx of history of marantic endocarditis, hx of metastatic prostate cancer follows by Adriana Daugherty and Desi.  Also recently with multifocal metastatic disease in the liver (adenocarcinoma, origin from GI or pancreas or biliary tract), possibly perihepatic nodes and a questionable lesion in the clavicle. He has had radiosurgery treatment by Dr. Weeks and had chemo infusion, currently on Sorafenib,with sclerotic lesions to bony pelvis, multiple osseous mets, enhancement in right proximal tibia s/p recent R Tibia Intralesional curettage/bone graft ( biopsy showed poorly differentiated adenocarcinoma), presenting with low H&H from his Oncologist's office. He also describes stomach pain that is worse with eating as well as 1 month of dark black soft stools. During last admission (7/6) pt presented with hg of 6.9 and was transfused to 11.1, but has downtrended since. Hb this admission was 7.7, now s/p 2 U PRBC. 	      REVIEW OF SYSTEMS:    CONSTITUTIONAL: No weakness, fevers or chills  EYES/ENT: No visual changes, no throat pain   RESPIRATORY: No cough, wheezing, hemoptysis; No shortness of breath  CARDIOVASCULAR: No chest pain or palpitations  GASTROINTESTINAL: No abdominal pain, nausea, vomiting, or hematemesis; No diarrhea or constipation. No melena or hematochezia.  GENITOURINARY: No dysuria, frequency or hematuria  MUSCULOSKELETAL: No joint pain.  NEUROLOGICAL: No dizziness, numbness, or weakness  SKIN: No itching, burning, rashes, or lesions   All other review of systems is negative unless indicated above.  Allergies    No Known Allergies    Intolerances        PAST MEDICAL & SURGICAL HISTORY:  HLD (hyperlipidemia)  HTN (hypertension)  Liver cancer: stage 4  Prostate cancer  Ischemic stroke: 01/2019  HLD (hyperlipidemia)  HTN (hypertension)  Hernia  DM (diabetes mellitus)  Prostate CA  H/O hernia repair      FAMILY HISTORY:  FH: HTN (hypertension)  FH: diabetes mellitus      Social History:          VITAL SIGNS:  Vital Signs Last 24 Hrs  T(C): 36.5 (23 Jul 2019 08:22), Max: 36.9 (22 Jul 2019 18:55)  T(F): 97.7 (23 Jul 2019 08:22), Max: 98.4 (22 Jul 2019 18:55)  HR: 87 (23 Jul 2019 08:22) (78 - 112)  BP: 155/82 (23 Jul 2019 08:22) (106/68 - 155/82)  BP(mean): --  RR: 18 (23 Jul 2019 08:22) (17 - 18)  SpO2: 96% (23 Jul 2019 08:22) (93% - 98%)      PHYSICAL EXAM:     GENERAL: no acute distress  HEENT: EOMI, neck supple  RESPIRATORY: LCTAB/L, no rhonchi, rales, or wheezing  CARDIOVASCULAR: RRR, no murmurs, gallops, rubs  ABDOMINAL: soft, non-tender, non-distended, positive bowel sounds   EXTREMITIES: no clubbing, cyanosis, or edema  NEUROLOGICAL: alert and oriented x 3, non-focal  SKIN: no rashes or lesions   MUSCULOSKELETAL: no gross joint deformity                          9.2    3.89  )-----------( 73       ( 23 Jul 2019 08:41 )             28.2     07-23    133<L>  |  99  |  13  ----------------------------<  107<H>  4.3   |  25  |  1.14    Ca    9.0      23 Jul 2019 07:13  Phos  1.2     07-23  Mg     1.6     07-23    TPro  6.5  /  Alb  3.4  /  TBili  0.2  /  DBili  x   /  AST  43<H>  /  ALT  32  /  AlkPhos  242<H>  07-22      MEDICATIONS  (STANDING):  atorvastatin 40 milliGRAM(s) Oral at bedtime  carBAMazepine Chewable 100 milliGRAM(s) Chew two times a day  dextrose 5%. 1000 milliLiter(s) (50 mL/Hr) IV Continuous <Continuous>  dextrose 50% Injectable 12.5 Gram(s) IV Push once  dextrose 50% Injectable 25 Gram(s) IV Push once  dextrose 50% Injectable 25 Gram(s) IV Push once  insulin lispro (HumaLOG) corrective regimen sliding scale   SubCutaneous every 6 hours  metoprolol tartrate 12.5 milliGRAM(s) Oral two times a day  pantoprazole Infusion 8 mG/Hr (10 mL/Hr) IV Continuous <Continuous>  tamsulosin 0.8 milliGRAM(s) Oral at bedtime HPI:  79-year-old male with a history of HTN, DM II, HPLD, right parietal stroke with secondary hemorrhage and MCA clot retrieval at [facility] on 3/19, hx of history of marantic endocarditis, hx of metastatic prostate cancer follows by Adriana Daugherty and Desi.  Also recently with multifocal metastatic disease in the liver (adenocarcinoma, origin from GI or pancreas or biliary tract), possibly perihepatic nodes and a questionable lesion in the clavicle. He has had radiosurgery treatment by Dr. Weeks and had chemo infusion, currently on Sorafenib,with sclerotic lesions to bony pelvis, multiple osseous mets, enhancement in right proximal tibia s/p recent R Tibia Intralesional curettage/bone graft ( biopsy showed poorly differentiated adenocarcinoma), presenting with low H&H from his Oncologist's office. He also describes stomach pain that is worse with eating as well as 1 month of dark black soft stools. During last admission (7/6) pt presented with hg of 6.9 and was transfused to 11.1, but has downtrended since. Hb this admission was 7.7, now s/p 1 U PRBC. Plan for EGD today.	      REVIEW OF SYSTEMS:    CONSTITUTIONAL: No weakness, fevers or chills  EYES/ENT: No visual changes, no throat pain   RESPIRATORY: No cough, wheezing, hemoptysis; No shortness of breath  CARDIOVASCULAR: No chest pain or palpitations  GASTROINTESTINAL: No abdominal pain, nausea, vomiting, or hematemesis; No diarrhea or constipation. No melena or hematochezia.  GENITOURINARY: No dysuria, frequency or hematuria  MUSCULOSKELETAL: No joint pain.  NEUROLOGICAL: No dizziness, numbness, or weakness  SKIN: No itching, burning, rashes, or lesions   All other review of systems is negative unless indicated above.  Allergies    No Known Allergies    Intolerances        PAST MEDICAL & SURGICAL HISTORY:  HLD (hyperlipidemia)  HTN (hypertension)  Liver cancer: stage 4  Prostate cancer  Ischemic stroke: 01/2019  HLD (hyperlipidemia)  HTN (hypertension)  Hernia  DM (diabetes mellitus)  Prostate CA  H/O hernia repair      FAMILY HISTORY:  FH: HTN (hypertension)  FH: diabetes mellitus      Social History:          VITAL SIGNS:  Vital Signs Last 24 Hrs  T(C): 36.5 (23 Jul 2019 08:22), Max: 36.9 (22 Jul 2019 18:55)  T(F): 97.7 (23 Jul 2019 08:22), Max: 98.4 (22 Jul 2019 18:55)  HR: 87 (23 Jul 2019 08:22) (78 - 112)  BP: 155/82 (23 Jul 2019 08:22) (106/68 - 155/82)  BP(mean): --  RR: 18 (23 Jul 2019 08:22) (17 - 18)  SpO2: 96% (23 Jul 2019 08:22) (93% - 98%)      PHYSICAL EXAM:     GENERAL: no acute distress  HEENT: EOMI, neck supple  RESPIRATORY: LCTAB/L, no rhonchi, rales, or wheezing  CARDIOVASCULAR: RRR, no murmurs, gallops, rubs  ABDOMINAL: soft, non-tender, non-distended, positive bowel sounds   EXTREMITIES: no clubbing, cyanosis, or edema  NEUROLOGICAL: alert and oriented x 3, non-focal  SKIN: no rashes or lesions   MUSCULOSKELETAL: no gross joint deformity                          9.2    3.89  )-----------( 73       ( 23 Jul 2019 08:41 )             28.2     07-23    133<L>  |  99  |  13  ----------------------------<  107<H>  4.3   |  25  |  1.14    Ca    9.0      23 Jul 2019 07:13  Phos  1.2     07-23  Mg     1.6     07-23    TPro  6.5  /  Alb  3.4  /  TBili  0.2  /  DBili  x   /  AST  43<H>  /  ALT  32  /  AlkPhos  242<H>  07-22      MEDICATIONS  (STANDING):  atorvastatin 40 milliGRAM(s) Oral at bedtime  carBAMazepine Chewable 100 milliGRAM(s) Chew two times a day  dextrose 5%. 1000 milliLiter(s) (50 mL/Hr) IV Continuous <Continuous>  dextrose 50% Injectable 12.5 Gram(s) IV Push once  dextrose 50% Injectable 25 Gram(s) IV Push once  dextrose 50% Injectable 25 Gram(s) IV Push once  insulin lispro (HumaLOG) corrective regimen sliding scale   SubCutaneous every 6 hours  metoprolol tartrate 12.5 milliGRAM(s) Oral two times a day  pantoprazole Infusion 8 mG/Hr (10 mL/Hr) IV Continuous <Continuous>  tamsulosin 0.8 milliGRAM(s) Oral at bedtime HPI:  79-year-old male with a history of HTN, DM II, HPLD, right parietal stroke with secondary hemorrhage and MCA clot retrieval at [place] on 3/19, hx of history of marantic endocarditis, hx of metastatic prostate cancer follows by Adriana Daugherty and Desi.  Also recently with multifocal metastatic disease in the liver (adenocarcinoma, origin from GI or pancreas or biliary tract), possibly perihepatic nodes and a questionable lesion in the clavicle. He has had radiosurgery treatment by Dr. Weeks and had chemo infusion, currently on Sorafenib,with sclerotic lesions to bony pelvis, multiple osseous mets, enhancement in right proximal tibia s/p recent R Tibia Intralesional curettage/bone graft ( biopsy showed poorly differentiated adenocarcinoma), presenting with low H&H from his Oncologist's office. He also describes epigastric pain that is worse with eating as well as 1 month of dark black soft stools. During last admission (7/6) pt presented with hg of 6.9 and was transfused to 11.1, but has downtrended since. Hb this admission was 7.7, now s/p 1 U PRBC. Plan for EGD today.	      REVIEW OF SYSTEMS:    CONSTITUTIONAL: No weakness, fevers or chills  EYES/ENT: No visual changes, no throat pain   RESPIRATORY: No cough, wheezing, hemoptysis; No shortness of breath  CARDIOVASCULAR: No chest pain or palpitations  GASTROINTESTINAL: No abdominal pain, nausea, vomiting, or hematemesis; No diarrhea or constipation. No melena or hematochezia.  GENITOURINARY: No dysuria, frequency or hematuria  MUSCULOSKELETAL: No joint pain.  NEUROLOGICAL: No dizziness, numbness, or weakness  SKIN: No itching, burning, rashes, or lesions   All other review of systems is negative unless indicated above.  Allergies    No Known Allergies    Intolerances        PAST MEDICAL & SURGICAL HISTORY:  HLD (hyperlipidemia)  HTN (hypertension)  Liver cancer: stage 4  Prostate cancer  Ischemic stroke: 01/2019  HLD (hyperlipidemia)  HTN (hypertension)  Hernia  DM (diabetes mellitus)  Prostate CA  H/O hernia repair      FAMILY HISTORY:  FH: HTN (hypertension)  FH: diabetes mellitus      Social History:          VITAL SIGNS:  Vital Signs Last 24 Hrs  T(C): 36.5 (23 Jul 2019 08:22), Max: 36.9 (22 Jul 2019 18:55)  T(F): 97.7 (23 Jul 2019 08:22), Max: 98.4 (22 Jul 2019 18:55)  HR: 87 (23 Jul 2019 08:22) (78 - 112)  BP: 155/82 (23 Jul 2019 08:22) (106/68 - 155/82)  BP(mean): --  RR: 18 (23 Jul 2019 08:22) (17 - 18)  SpO2: 96% (23 Jul 2019 08:22) (93% - 98%)      PHYSICAL EXAM:     GENERAL: no acute distress  HEENT: EOMI, neck supple  RESPIRATORY: LCTAB/L, no rhonchi, rales, or wheezing  CARDIOVASCULAR: RRR, no murmurs, gallops, rubs  ABDOMINAL: soft, non-tender, non-distended, positive bowel sounds   EXTREMITIES: no clubbing, cyanosis, or edema  NEUROLOGICAL: alert and oriented x 3, non-focal  SKIN: no rashes or lesions   MUSCULOSKELETAL: no gross joint deformity                          9.2    3.89  )-----------( 73       ( 23 Jul 2019 08:41 )             28.2     07-23    133<L>  |  99  |  13  ----------------------------<  107<H>  4.3   |  25  |  1.14    Ca    9.0      23 Jul 2019 07:13  Phos  1.2     07-23  Mg     1.6     07-23    TPro  6.5  /  Alb  3.4  /  TBili  0.2  /  DBili  x   /  AST  43<H>  /  ALT  32  /  AlkPhos  242<H>  07-22      MEDICATIONS  (STANDING):  atorvastatin 40 milliGRAM(s) Oral at bedtime  carBAMazepine Chewable 100 milliGRAM(s) Chew two times a day  dextrose 5%. 1000 milliLiter(s) (50 mL/Hr) IV Continuous <Continuous>  dextrose 50% Injectable 12.5 Gram(s) IV Push once  dextrose 50% Injectable 25 Gram(s) IV Push once  dextrose 50% Injectable 25 Gram(s) IV Push once  insulin lispro (HumaLOG) corrective regimen sliding scale   SubCutaneous every 6 hours  metoprolol tartrate 12.5 milliGRAM(s) Oral two times a day  pantoprazole Infusion 8 mG/Hr (10 mL/Hr) IV Continuous <Continuous>  tamsulosin 0.8 milliGRAM(s) Oral at bedtime HPI:  79-year-old male with a history of HTN, DM II, HPLD, right parietal stroke with secondary hemorrhage and MCA clot retrieval at [place] on 3/19, hx of history of marantic endocarditis, hx of metastatic prostate cancer follows by Adriana Daugherty and Desi.  Also recently with multifocal metastatic disease in the liver (adenocarcinoma, origin from GI or pancreas or biliary tract), possibly perihepatic nodes and a questionable lesion in the clavicle. He has had radiosurgery treatment by Dr. Weeks and had chemo infusion, currently on Sorafenib,with sclerotic lesions to bony pelvis, multiple osseous mets, enhancement in right proximal tibia s/p recent R Tibia Intralesional curettage/bone graft ( biopsy showed poorly differentiated adenocarcinoma), presenting with low H&H from his Oncologist's office. He also describes epigastric pain that is worse with eating as well as 1 month of dark black soft stools. During last admission (7/6) pt presented with hg of 6.9 and was transfused to 11.1, but has downtrended since. Hb this admission was 7.7, now s/p 1 U PRBC s/p EGD today showed oozing gastric ulcer that treated with bipolar cautery.        REVIEW OF SYSTEMS:    CONSTITUTIONAL: No weakness, fevers or chills  EYES/ENT: No visual changes, no throat pain   RESPIRATORY: No cough, wheezing, hemoptysis; No shortness of breath  CARDIOVASCULAR: No chest pain or palpitations  GASTROINTESTINAL: No abdominal pain, nausea, vomiting, or hematemesis; No diarrhea or constipation. No melena or hematochezia.  GENITOURINARY: No dysuria, frequency or hematuria  MUSCULOSKELETAL: No joint pain.  NEUROLOGICAL: No dizziness, numbness, or weakness  SKIN: No itching, burning, rashes, or lesions   All other review of systems is negative unless indicated above.  Allergies    No Known Allergies    Intolerances        PAST MEDICAL & SURGICAL HISTORY:  HLD (hyperlipidemia)  HTN (hypertension)  Liver cancer: stage 4  Prostate cancer  Ischemic stroke: 01/2019  HLD (hyperlipidemia)  HTN (hypertension)  Hernia  DM (diabetes mellitus)  Prostate CA  H/O hernia repair      FAMILY HISTORY:  FH: HTN (hypertension)  FH: diabetes mellitus      Social History:          VITAL SIGNS:  Vital Signs Last 24 Hrs  T(C): 36.5 (23 Jul 2019 08:22), Max: 36.9 (22 Jul 2019 18:55)  T(F): 97.7 (23 Jul 2019 08:22), Max: 98.4 (22 Jul 2019 18:55)  HR: 87 (23 Jul 2019 08:22) (78 - 112)  BP: 155/82 (23 Jul 2019 08:22) (106/68 - 155/82)  BP(mean): --  RR: 18 (23 Jul 2019 08:22) (17 - 18)  SpO2: 96% (23 Jul 2019 08:22) (93% - 98%)      PHYSICAL EXAM:     GENERAL: no acute distress  HEENT: EOMI, neck supple  RESPIRATORY: LCTAB/L, no rhonchi, rales, or wheezing  CARDIOVASCULAR: RRR, no murmurs, gallops, rubs  ABDOMINAL: soft, non-tender, non-distended, positive bowel sounds   EXTREMITIES: no clubbing, cyanosis, or edema  NEUROLOGICAL: alert and oriented x 3, non-focal  SKIN: no rashes or lesions   MUSCULOSKELETAL: no gross joint deformity                          9.2    3.89  )-----------( 73       ( 23 Jul 2019 08:41 )             28.2     07-23    133<L>  |  99  |  13  ----------------------------<  107<H>  4.3   |  25  |  1.14    Ca    9.0      23 Jul 2019 07:13  Phos  1.2     07-23  Mg     1.6     07-23    TPro  6.5  /  Alb  3.4  /  TBili  0.2  /  DBili  x   /  AST  43<H>  /  ALT  32  /  AlkPhos  242<H>  07-22      MEDICATIONS  (STANDING):  atorvastatin 40 milliGRAM(s) Oral at bedtime  carBAMazepine Chewable 100 milliGRAM(s) Chew two times a day  dextrose 5%. 1000 milliLiter(s) (50 mL/Hr) IV Continuous <Continuous>  dextrose 50% Injectable 12.5 Gram(s) IV Push once  dextrose 50% Injectable 25 Gram(s) IV Push once  dextrose 50% Injectable 25 Gram(s) IV Push once  insulin lispro (HumaLOG) corrective regimen sliding scale   SubCutaneous every 6 hours  metoprolol tartrate 12.5 milliGRAM(s) Oral two times a day  pantoprazole Infusion 8 mG/Hr (10 mL/Hr) IV Continuous <Continuous>  tamsulosin 0.8 milliGRAM(s) Oral at bedtime HPI:  79-year-old male with a history of HTN, DM II, HPLD, right parietal stroke with secondary hemorrhage and MCA clot retrieval at [place] on 3/19, hx of history of marantic endocarditis, hx of metastatic prostate cancer follows by Adriana Daugherty and Desi.  Also recently with multifocal metastatic disease in the liver (adenocarcinoma, origin from GI or pancreas or biliary tract), possibly perihepatic nodes and a questionable lesion in the clavicle. He has had radiosurgery treatment by Dr. Weeks and had chemo infusion, currently on Sorafenib,with sclerotic lesions to bony pelvis, multiple osseous mets, enhancement in right proximal tibia s/p recent R Tibia Intralesional curettage/bone graft ( biopsy showed poorly differentiated adenocarcinoma), presenting with low H&H from his Oncologist's office. He also describes epigastric pain that is worse with eating as well as 1 month of dark black soft stools. During last admission (7/6) pt presented with hg of 6.9 and was transfused to 11.1, but has downtrended since. Hb this admission was 7.7, now s/p 1 U PRBC s/p EGD today showed oozing gastric ulcer that treated with bipolar cautery.        REVIEW OF SYSTEMS:    CONSTITUTIONAL: No weakness, fevers or chills  EYES/ENT: No visual changes, no throat pain   RESPIRATORY: No cough, wheezing, hemoptysis; No shortness of breath  CARDIOVASCULAR: No chest pain or palpitations  GASTROINTESTINAL: +abdominal pain,   no nausea, vomiting, or hematemesis; No diarrhea or constipation. No melena or hematochezia.  GENITOURINARY: No dysuria, frequency or hematuria  MUSCULOSKELETAL: No joint pain.  NEUROLOGICAL: No dizziness, numbness, or weakness  SKIN: No itching, burning, rashes, or lesions   All other review of systems is negative unless indicated above.  Allergies    No Known Allergies    Intolerances        PAST MEDICAL & SURGICAL HISTORY:  HLD (hyperlipidemia)  HTN (hypertension)  Liver cancer: stage 4  Prostate cancer  Ischemic stroke: 01/2019  HLD (hyperlipidemia)  HTN (hypertension)  Hernia  DM (diabetes mellitus)  Prostate CA  H/O hernia repair      FAMILY HISTORY:  FH: HTN (hypertension)  FH: diabetes mellitus      Social History:          VITAL SIGNS:  Vital Signs Last 24 Hrs  T(C): 36.5 (23 Jul 2019 08:22), Max: 36.9 (22 Jul 2019 18:55)  T(F): 97.7 (23 Jul 2019 08:22), Max: 98.4 (22 Jul 2019 18:55)  HR: 87 (23 Jul 2019 08:22) (78 - 112)  BP: 155/82 (23 Jul 2019 08:22) (106/68 - 155/82)  BP(mean): --  RR: 18 (23 Jul 2019 08:22) (17 - 18)  SpO2: 96% (23 Jul 2019 08:22) (93% - 98%)      PHYSICAL EXAM:     GENERAL: no acute distress  HEENT: EOMI, neck supple  RESPIRATORY: LCTAB/L, no rhonchi, rales, or wheezing  CARDIOVASCULAR: RRR, no murmurs, gallops, rubs  ABDOMINAL: soft, non-distended, positive bowel sounds   EXTREMITIES: no clubbing, cyanosis, or edema  NEUROLOGICAL: alert and oriented x 3, non-focal  SKIN: no rashes or lesions   MUSCULOSKELETAL: no gross joint deformity                          9.2    3.89  )-----------( 73       ( 23 Jul 2019 08:41 )             28.2     07-23    133<L>  |  99  |  13  ----------------------------<  107<H>  4.3   |  25  |  1.14    Ca    9.0      23 Jul 2019 07:13  Phos  1.2     07-23  Mg     1.6     07-23    TPro  6.5  /  Alb  3.4  /  TBili  0.2  /  DBili  x   /  AST  43<H>  /  ALT  32  /  AlkPhos  242<H>  07-22      MEDICATIONS  (STANDING):  atorvastatin 40 milliGRAM(s) Oral at bedtime  carBAMazepine Chewable 100 milliGRAM(s) Chew two times a day  dextrose 5%. 1000 milliLiter(s) (50 mL/Hr) IV Continuous <Continuous>  dextrose 50% Injectable 12.5 Gram(s) IV Push once  dextrose 50% Injectable 25 Gram(s) IV Push once  dextrose 50% Injectable 25 Gram(s) IV Push once  insulin lispro (HumaLOG) corrective regimen sliding scale   SubCutaneous every 6 hours  metoprolol tartrate 12.5 milliGRAM(s) Oral two times a day  pantoprazole Infusion 8 mG/Hr (10 mL/Hr) IV Continuous <Continuous>  tamsulosin 0.8 milliGRAM(s) Oral at bedtime

## 2019-07-23 NOTE — PROGRESS NOTE ADULT - PROBLEM SELECTOR PLAN 5
Patient presents with thrombocytopenia to 87, up from 50s during prior admission in 7/2019.  - Consult heme/onc in-house to establish possible etiology of thrombocytopenia  - Transfuse if Plt <50 and active bleeding continues, or if Plt <10.

## 2019-07-23 NOTE — PROGRESS NOTE ADULT - PROBLEM SELECTOR PLAN 2
Patient presented with Hb of 7.7, most likely 2/2 upper GI bleed. Patient received 2 units PRBCs in ED as stated above.   -AM Hgb=9.2  - Trend CBC BID  - Complete iron studies  - Transfuse PRBCs for Hb <7 or symptomatic anemia

## 2019-07-23 NOTE — CONSULT NOTE ADULT - ASSESSMENT
Impression:  79yoM with history of metastatic prostate ca who presents with melena, anemia.    #Melena, anemia - likely upper GIB in setting of NSAID use, also consider metastatic disease, H. pylori, angioectasias, etc  # ?HCC on sorafenib  # CVA  # Thrombocytopenia    Recs:  - endoscopy today  - NPO   - PPI drip  - trend CBC, transfuse to Hgb >8, plt >50  - hold anticoagulant if possible

## 2019-07-23 NOTE — CONSULT NOTE ADULT - ASSESSMENT
A/P:  79-year-old male with x of metastatic prostate cancer follows by Adriana Daugherty and Desi.  Also recently with multifocal metastatic disease in the liver (adenocarcinoma, origin from GI or pancreas or biliary tract), possibly perihepatic nodes and a questionable lesion in the clavicle. He has had radiosurgery treatment by Dr. Weeks and had chemo infusion, currently on Sorafenib, with sclerotic lesions to bony pelvis, multiple osseous mets, enhancement in right proximal tibia s/p recent R Tibia Intralesional curettage/bone graft ( biopsy showed poorly differentiated adenocarcinoma possibly GI origin), presenting with low H&H from his Oncologist's office s/p 1 U PRBC s/p EGD today showed oozing gastric ulcer that treated with bipolar cautery.    -Please stop Sorafenib as GI bleeding is the side effect of medication A/P:  79-year-old male with x of metastatic prostate cancer follows by Adriana Daugherty and Desi.  Also recently with multifocal metastatic disease in the liver (adenocarcinoma, origin from GI or pancreas or biliary tract), possibly perihepatic nodes and a questionable lesion in the clavicle. He has had radiosurgery treatment by Dr. Weeks and had chemo infusion, currently on Sorafenib, with sclerotic lesions to bony pelvis, multiple osseous mets, enhancement in right proximal tibia s/p recent R Tibia Intralesional curettage/bone graft ( biopsy showed poorly differentiated adenocarcinoma possibly GI origin), presenting with low H&H from his Oncologist's office s/p 1 U PRBC s/p EGD today showed oozing gastric ulcer that treated with bipolar cautery.    -Please stop Sorafenib as GI bleeding is the side effect of medication  -Oncology will follow     Latasha Magallon PGY4  Heme/Onc fellow

## 2019-07-23 NOTE — H&P ADULT - NSHPLABSRESULTS_GEN_ALL_CORE
7.7    4.5   )-----------( 87       ( 22 Jul 2019 19:33 )             22.9     07-22    132<L>  |  92<L>  |  15  ----------------------------<  179<H>  4.8   |  21<L>  |  1.20    Ca    9.2      22 Jul 2019 19:33    TPro  6.5  /  Alb  3.4  /  TBili  0.2  /  DBili  x   /  AST  43<H>  /  ALT  32  /  AlkPhos  242<H>  07-22    PT/INR - ( 22 Jul 2019 19:33 )   PT: 10.3 sec;   INR: 0.90 ratio         PTT - ( 22 Jul 2019 19:33 )  PTT:32.6 sec    Occult Blood, Feces: Positive (07.22.19 @ 21:42) All labs personally reviewed     7.7    4.5   )-----------( 87       ( 22 Jul 2019 19:33 )             22.9     07-22    132<L>  |  92<L>  |  15  ----------------------------<  179<H>  4.8   |  21<L>  |  1.20    Ca    9.2      22 Jul 2019 19:33    TPro  6.5  /  Alb  3.4  /  TBili  0.2  /  DBili  x   /  AST  43<H>  /  ALT  32  /  AlkPhos  242<H>  07-22    PT/INR - ( 22 Jul 2019 19:33 )   PT: 10.3 sec;   INR: 0.90 ratio         PTT - ( 22 Jul 2019 19:33 )  PTT:32.6 sec    Occult Blood, Feces: Positive (07.22.19 @ 21:42) All labs, imaging personally reviewed     7.7    4.5   )-----------( 87       ( 22 Jul 2019 19:33 )             22.9     07-22    132<L>  |  92<L>  |  15  ----------------------------<  179<H>  4.8   |  21<L>  |  1.20    Ca    9.2      22 Jul 2019 19:33    TPro  6.5  /  Alb  3.4  /  TBili  0.2  /  DBili  x   /  AST  43<H>  /  ALT  32  /  AlkPhos  242<H>  07-22    PT/INR - ( 22 Jul 2019 19:33 )   PT: 10.3 sec;   INR: 0.90 ratio         PTT - ( 22 Jul 2019 19:33 )  PTT:32.6 sec    Occult Blood, Feces: Positive (07.22.19 @ 21:42)

## 2019-07-23 NOTE — PROGRESS NOTE ADULT - SUBJECTIVE AND OBJECTIVE BOX
REASON FOR ADMISSION:    Patient is a 79 y.o. man with a PMH of HTN, DM2, HLD, CKD3, CVA (s/p MCA clot retrieval in 3/19), and prostate cancer with metastases to bone and liver s/p right tibial resection and bone graft in 7/19, who presented to the ED after being notified by his oncologist that he had a low hemoglobin, likely 2/2 upper GI bleed.      OVERNIGHT EVENTS:    There were no acute events overnight. In the ED, the patient received 2 units PRBCs after being found to have a Hb of 7.7.      SUBJECTIVE:    The patient reports feeling well this morning. He notes generalized fatigue and poor appetite, but unchanged from his baseline since developing metastatic cancer. He reports baseline abdominal pain and nausea as well. He denies headache, lightheadedness, dizziness. Also denies chest pain, SOB, hemoptysis, but he endorses mild cough. He notes that he generally vomits after eating, but denies any recent episodes of hematemesis. He also notes that he has had dark tarry stools since ~February, and these have been unchanged recently. He denies any recent diarrhea or fariha blood in the stool.      MEDICATIONS  (STANDING):  atorvastatin 40 milliGRAM(s) Oral at bedtime  carBAMazepine Chewable 100 milliGRAM(s) Chew two times a day  dextrose 5%. 1000 milliLiter(s) (50 mL/Hr) IV Continuous <Continuous>  dextrose 50% Injectable 12.5 Gram(s) IV Push once  dextrose 50% Injectable 25 Gram(s) IV Push once  dextrose 50% Injectable 25 Gram(s) IV Push once  insulin lispro (HumaLOG) corrective regimen sliding scale   SubCutaneous every 6 hours  pantoprazole Infusion 8 mG/Hr (10 mL/Hr) IV Continuous <Continuous>  tamsulosin 0.8 milliGRAM(s) Oral at bedtime    MEDICATIONS  (PRN):  acetaminophen   Tablet .. 650 milliGRAM(s) Oral every 6 hours PRN Mild Pain (1 - 3)  dextrose 40% Gel 15 Gram(s) Oral once PRN Blood Glucose LESS THAN 70 milliGRAM(s)/deciliter  docusate sodium 100 milliGRAM(s) Oral three times a day PRN Constipation  glucagon  Injectable 1 milliGRAM(s) IntraMuscular once PRN Glucose LESS THAN 70 milligrams/deciliter  oxyCODONE    IR 5 milliGRAM(s) Oral every 4 hours PRN Severe Pain (7 - 10)  senna 2 Tablet(s) Oral at bedtime PRN Constipation  traMADol 50 milliGRAM(s) Oral every 6 hours PRN Moderate Pain (4 - 6)      PHYSICAL EXAMINATION:    Vitals: T 97.7F, HR 80s, BP 140s/70s, RR 18, SpO2 96% on RA  General: NAD, lying comfortably in bed, pleasant and conversational, speaking in full sentences  HEENR: no conjunctival erythema; notes decreased vision and "darkness" in left eye since stroke; mucus membranes moist  Neck: Neck supple, no lymphadenopathy, thyromegaly, or JVD noted  Respiratory: CTA B/L, no wheezing, rales, rhonchi  CV: RRR; no murmurs, rubs, gallops appreciated  Abdominal: +diffuse lower abdominal tenderness; soft, non-distended, normoactive bowel sounds  MSK: no focal weakness  Extremities: No edema, 2+ peripheral pulses; dressing and brace present and intact on RLE  Neurology: A&Ox3, no focal neurologic deficits  Skin: No rashes, hematoma, ecchymosis      OBJECTIVE DATA:                          7.7    4.5   )-----------( 87       ( 22 Jul 2019 19:33 )             22.9     07-23    133<L>  |  99  |  13  ----------------------------<  107<H>  4.3   |  25  |  1.14    Ca    9.0      23 Jul 2019 07:13  Phos  1.2     07-23  Mg     1.6     07-23    TPro  6.5  /  Alb  3.4  /  TBili  0.2  /  DBili  x   /  AST  43<H>  /  ALT  32  /  AlkPhos  242<H>  07-22    PT/INR - ( 22 Jul 2019 19:33 )   PT: 10.3 sec;   INR: 0.90 ratio    PTT - ( 22 Jul 2019 19:33 )  PTT:32.6 sec REASON FOR ADMISSION:    Patient is a 79 y.o. man with a PMH of HTN, DM2, HLD, CKD3, CVA (s/p MCA clot retrieval in 3/19), and prostate cancer with metastases to bone and liver s/p right tibial resection and bone graft in 7/19, who presented to the ED after being notified by his oncologist that he had a low hemoglobin, likely 2/2 upper GI bleed.      OVERNIGHT EVENTS:    There were no acute events overnight. In the ED, the patient received 2 units PRBCs after being found to have a Hb of 7.7.      SUBJECTIVE:    The patient reports feeling well this morning. He notes generalized fatigue and poor appetite, but unchanged from his baseline since developing metastatic cancer. He reports baseline abdominal pain and nausea as well. He denies headache, lightheadedness, dizziness. Also denies chest pain, SOB, hemoptysis, but he endorses mild cough. He notes that he generally vomits after eating, but denies any recent episodes of hematemesis. He also notes that he has had dark tarry stools since ~February, and these have been unchanged recently. He denies any recent diarrhea or fariha blood in the stool.      MEDICATIONS  (STANDING):  atorvastatin 40 milliGRAM(s) Oral at bedtime  carBAMazepine Chewable 100 milliGRAM(s) Chew two times a day  dextrose 5%. 1000 milliLiter(s) (50 mL/Hr) IV Continuous <Continuous>  dextrose 50% Injectable 12.5 Gram(s) IV Push once  dextrose 50% Injectable 25 Gram(s) IV Push once  dextrose 50% Injectable 25 Gram(s) IV Push once  insulin lispro (HumaLOG) corrective regimen sliding scale   SubCutaneous every 6 hours  pantoprazole Infusion 8 mG/Hr (10 mL/Hr) IV Continuous <Continuous>  tamsulosin 0.8 milliGRAM(s) Oral at bedtime    MEDICATIONS  (PRN):  acetaminophen   Tablet .. 650 milliGRAM(s) Oral every 6 hours PRN Mild Pain (1 - 3)  dextrose 40% Gel 15 Gram(s) Oral once PRN Blood Glucose LESS THAN 70 milliGRAM(s)/deciliter  docusate sodium 100 milliGRAM(s) Oral three times a day PRN Constipation  glucagon  Injectable 1 milliGRAM(s) IntraMuscular once PRN Glucose LESS THAN 70 milligrams/deciliter.  oxyCODONE    IR 5 milliGRAM(s) Oral every 4 hours PRN Severe Pain (7 - 10)  senna 2 Tablet(s) Oral at bedtime PRN Constipation  traMADol 50 milliGRAM(s) Oral every 6 hours PRN Moderate Pain (4 - 6)      PHYSICAL EXAMINATION:    Vitals: T 97.7F, HR 80s, BP 140s/70s, RR 18, SpO2 96% on RA  General: NAD, lying comfortably in bed, pleasant and conversational, speaking in full sentences  HEENR: no conjunctival erythema; notes decreased vision and "darkness" in left eye since stroke; mucus membranes moist  Neck: Neck supple, no lymphadenopathy, thyromegaly, or JVD noted  Respiratory: CTA B/L, no wheezing, rales, rhonchi  CV: RRR; no murmurs, rubs, gallops appreciated  Abdominal: +diffuse lower abdominal tenderness; soft, non-distended, normoactive bowel sounds  MSK: no focal weakness  Extremities: No edema, 2+ peripheral pulses; dressing and brace present and intact on RLE  Neurology: A&Ox3, no focal neurologic deficits  Skin: No rashes, hematoma, ecchymosis      OBJECTIVE DATA:                          7.7    4.5   )-----------( 87       ( 22 Jul 2019 19:33 )             22.9     07-23    133<L>  |  99  |  13  ----------------------------<  107<H>  4.3   |  25  |  1.14    Ca    9.0      23 Jul 2019 07:13  Phos  1.2     07-23  Mg     1.6     07-23    TPro  6.5  /  Alb  3.4  /  TBili  0.2  /  DBili  x   /  AST  43<H>  /  ALT  32  /  AlkPhos  242<H>  07-22    PT/INR - ( 22 Jul 2019 19:33 )   PT: 10.3 sec;   INR: 0.90 ratio    PTT - ( 22 Jul 2019 19:33 )  PTT:32.6 sec

## 2019-07-23 NOTE — CONSULT NOTE ADULT - SUBJECTIVE AND OBJECTIVE BOX
Chief Complaint:  Patient is a 79y old  Male who presents with a chief complaint of GI Bleed (23 Jul 2019 09:07)      HPI:  Mr. Balderrama is a 79-year-old male with a history of HTN, DM II, HLD, CKD III, multiple CVA without residual deficits, including right parietal stroke of unclear etiology on lovenox c/b secondary hemorrhage s/p MCA clot retrieval at [place] on 3/19, metastatic prostate cancer, metastatic adenocarcinoma of unclear etiology, with metastases to liver and bone s/p recent R Tibia Intralesional curettage/bone graft presenting with low H&H from his Oncologist's office.    Pt describes progressive weakness and episode of left temporal headache this morning. Patient was discharged from [facility] July 15 after wide Resection and curettage of proximal tibia lesion, repair with cement and bone graft. He reports weakness since that admission that has not improved. He describes stomach pain that is worse with eating as well as 1 month of dark black soft stools. Also endorses cough w/ phlegm weight loss, and nausea/vomiting. Describes his emesis as "food," son reports pt stated he vomited up phlegm. During last admission (7/6) pt presented with hg of 6.9 and was transfused to 11.1, but has downtrended since. Pt Denies fever/chills, SOB, CP, palpitations, or focal weakness. No sick contacts. Reports Hx of EGD in [place] last year, unsure of results. Pt reports his last colonoscopy was "many years ago".     Received 1u pRBCs and reglan/zofran in the ED (23 Jul 2019 00:58)    GI consulted for melena.  Pt reports abdominal pain. NSAID use.    Allergies:  No Known Allergies      Home Medications:  acetaminophen 325 mg oral tablet: 3 tab(s) orally every 8 hours as needed for fever, H/A, mild pain (1-3) (15 Jul 2019 11:40)  atorvastatin 40 mg oral tablet: 1 tab(s) orally once a day (06 Jul 2019 10:15)  carBAMazepine 100 mg oral tablet, chewable: 1 tab(s) orally 2 times a day (06 Jul 2019 10:15)  docusate sodium 100 mg oral capsule: 1 cap(s) orally 3 times a day (15 Jul 2019 11:40)  metFORMIN 1000 mg oral tablet: 1 tab(s) orally 2 times a day (06 Jul 2019 10:15)  senna oral tablet: 2 tab(s) orally once a day (at bedtime), As needed, Constipation (15 Jul 2019 11:40)  Home Medications:    Hospital Medications:  acetaminophen   Tablet .. 650 milliGRAM(s) Oral every 6 hours PRN  atorvastatin 40 milliGRAM(s) Oral at bedtime  carBAMazepine Chewable 100 milliGRAM(s) Chew two times a day  dextrose 40% Gel 15 Gram(s) Oral once PRN  dextrose 5%. 1000 milliLiter(s) IV Continuous <Continuous>  dextrose 50% Injectable 12.5 Gram(s) IV Push once  dextrose 50% Injectable 25 Gram(s) IV Push once  dextrose 50% Injectable 25 Gram(s) IV Push once  docusate sodium 100 milliGRAM(s) Oral three times a day PRN  glucagon  Injectable 1 milliGRAM(s) IntraMuscular once PRN  HYDROmorphone  Injectable 0.5 milliGRAM(s) IV Push every 4 hours PRN  HYDROmorphone  Injectable 0.25 milliGRAM(s) IV Push every 2 hours PRN  insulin lispro (HumaLOG) corrective regimen sliding scale   SubCutaneous every 6 hours  metoprolol tartrate 12.5 milliGRAM(s) Oral two times a day  oxyCODONE    IR 5 milliGRAM(s) Oral every 4 hours PRN  pantoprazole Infusion 8 mG/Hr IV Continuous <Continuous>  senna 2 Tablet(s) Oral at bedtime PRN  tamsulosin 0.8 milliGRAM(s) Oral at bedtime  traMADol 50 milliGRAM(s) Oral every 6 hours PRN      PMHX/PSHX:  AICD (automatic cardioverter/defibrillator) present  HLD (hyperlipidemia)  HTN (hypertension)  Liver cancer  Prostate cancer  Ischemic stroke  HLD (hyperlipidemia)  HTN (hypertension)  Hernia  DM (diabetes mellitus)  Prostate CA  H/O hernia repair      Family history:  FH: HTN (hypertension)  FH: diabetes mellitus      Social History: denies etoh/drugs/tob    ROS:     General:  No wt loss, fevers, chills, night sweats, fatigue,   Eyes:  Good vision, no reported pain  ENT:  No sore throat, pain, runny nose, dysphagia  CV:  No pain, palpitations, hypo/hypertension  Resp:  No dyspnea, cough, tachypnea, wheezing  GI:  See HPI  :  No pain, bleeding, incontinence, nocturia  Muscle:  No pain, weakness  Neuro:  No weakness, tingling, memory problems  Psych:  No fatigue, insomnia, mood problems, depression  Endocrine:  No polyuria, polydipsia, cold/heat intolerance  Heme:  No petechiae, ecchymosis, easy bruisability  Skin:  No rash, edema      PHYSICAL EXAM:     GENERAL: NAD  HEENT:  NC/AT  CHEST:  Full & symmetric excursion, no increased effort  ABDOMEN:  Soft, non-tender, non-distended, +BS  EXTREMITIES:  no edema  SKIN:  No rash  NEURO:  Alert      Vital Signs:  Vital Signs Last 24 Hrs  T(C): 36.5 (23 Jul 2019 08:22), Max: 36.9 (22 Jul 2019 18:55)  T(F): 97.7 (23 Jul 2019 08:22), Max: 98.4 (22 Jul 2019 18:55)  HR: 87 (23 Jul 2019 08:22) (78 - 112)  BP: 155/82 (23 Jul 2019 08:22) (106/68 - 155/82)  BP(mean): --  RR: 18 (23 Jul 2019 08:22) (17 - 18)  SpO2: 96% (23 Jul 2019 08:22) (93% - 98%)  Daily Height in cm: 172.72 (22 Jul 2019 18:55)    Daily     LABS:                        9.2    3.89  )-----------( 73       ( 23 Jul 2019 08:41 )             28.2     07-23    133<L>  |  99  |  13  ----------------------------<  107<H>  4.3   |  25  |  1.14    Ca    9.0      23 Jul 2019 07:13  Phos  1.2     07-23  Mg     1.6     07-23    TPro  6.5  /  Alb  3.4  /  TBili  0.2  /  DBili  x   /  AST  43<H>  /  ALT  32  /  AlkPhos  242<H>  07-22    LIVER FUNCTIONS - ( 22 Jul 2019 19:33 )  Alb: 3.4 g/dL / Pro: 6.5 g/dL / ALK PHOS: 242 U/L / ALT: 32 U/L / AST: 43 U/L / GGT: x           PT/INR - ( 22 Jul 2019 19:33 )   PT: 10.3 sec;   INR: 0.90 ratio         PTT - ( 22 Jul 2019 19:33 )  PTT:32.6 sec        Imaging:  reviewed

## 2019-07-23 NOTE — PROGRESS NOTE ADULT - PROBLEM SELECTOR PLAN 1
Patient presented from oncologist office with low hemoglobin, reports melanotic stool, post-prandial epigastric pain and vomiting for >4 months, episode of anemia during early July admission (Hb 6.9, requiring transfusion to Hb 11.1); patient has also been on lovenox 90mg daily since ischemic stroke in February.  - Patient received 2 units PRBCs in ED  - Maintain 2 large bore IVs, active type and screen, PPI drip, NPO  - F/u post-transfusion CBC  - Consult GI for possible EGD +/- colonoscopy

## 2019-07-23 NOTE — H&P ADULT - NSHPREVIEWOFSYSTEMS_GEN_ALL_CORE
REVIEW OF SYSTEMS:    CONSTITUTIONAL: +fatigue/generalized weakness, No fevers or chills  EYES: +chronic left sided dark vision No eye pain.   ENT: No throat pain. No dysphagia.    NECK: No pain or stiffness  RESPIRATORY: +cough w/ phlegm, No wheezing, hemoptysis; No shortness of breath  CARDIOVASCULAR: No chest pain or palpitations.  GASTROINTESTINAL: +epigastric pain, worse with meals. +nausea & vomiting; +melenotic stools. No hematochezia.  GENITOURINARY: No dysuria, frequency or hematuria  NEUROLOGICAL: +generalized weakness. No numbness or focal weakness. No dizziness or falls.   SKIN: No itching, burning, rashes, or lesions.   LYMPHATIC: No masses or swelling.   All other review of systems is negative unless indicated above.

## 2019-07-23 NOTE — H&P ADULT - PROBLEM SELECTOR PLAN 9
DVT: heparin gtt  Diet: NPO pending GI evaluation DVT: will hold chemical DVT prophylaxis   Diet: NPO pending GI evaluation DVT: will hold chemical DVT prophylaxis. SCDs  Diet: NPO pending GI evaluation  - PT eval

## 2019-07-23 NOTE — H&P ADULT - PROBLEM SELECTOR PLAN 6
DVT: hold chemical DVT prophylaxis in setting of GIB  Diet: NPO pending GI evaluation A1c of 5.8 last month on metformin   - insulin sliding scale while inpatient

## 2019-07-23 NOTE — PROGRESS NOTE ADULT - PROBLEM SELECTOR PLAN 5
Patient presents with thrombocytopenia to 87, up from 50s during prior admission in 7/2019.  - Consult heme/onc in-house to establish possible etiology of thrombocytopenia  - Transfuse if Plt <50 and active bleeding continues, or if Plt <10 Patient presents with thrombocytopenia to 87, up from 50s during prior admission in 7/2019.  - Consult heme/onc in-house to establish possible etiology of thrombocytopenia  - Transfuse if Plt <50 and active bleeding continues, or if Plt <10.

## 2019-07-24 DIAGNOSIS — C22.0 LIVER CELL CARCINOMA: ICD-10-CM

## 2019-07-24 DIAGNOSIS — C61 MALIGNANT NEOPLASM OF PROSTATE: ICD-10-CM

## 2019-07-24 DIAGNOSIS — Z79.84 LONG TERM (CURRENT) USE OF ORAL HYPOGLYCEMIC DRUGS: ICD-10-CM

## 2019-07-24 DIAGNOSIS — C79.51 SECONDARY MALIGNANT NEOPLASM OF BONE: ICD-10-CM

## 2019-07-24 DIAGNOSIS — Z86.73 PERSONAL HISTORY OF TRANSIENT ISCHEMIC ATTACK (TIA), AND CEREBRAL INFARCTION WITHOUT RESIDUAL DEFICITS: ICD-10-CM

## 2019-07-24 DIAGNOSIS — M84.461A PATHOLOGICAL FRACTURE, RIGHT TIBIA, INITIAL ENCOUNTER FOR FRACTURE: ICD-10-CM

## 2019-07-24 DIAGNOSIS — M25.561 PAIN IN RIGHT KNEE: ICD-10-CM

## 2019-07-24 DIAGNOSIS — Z92.3 PERSONAL HISTORY OF IRRADIATION: ICD-10-CM

## 2019-07-24 DIAGNOSIS — E11.9 TYPE 2 DIABETES MELLITUS WITHOUT COMPLICATIONS: ICD-10-CM

## 2019-07-24 DIAGNOSIS — E78.5 HYPERLIPIDEMIA, UNSPECIFIED: ICD-10-CM

## 2019-07-24 DIAGNOSIS — E78.1 PURE HYPERGLYCERIDEMIA: ICD-10-CM

## 2019-07-24 DIAGNOSIS — D64.9 ANEMIA, UNSPECIFIED: ICD-10-CM

## 2019-07-24 DIAGNOSIS — C79.89 SECONDARY MALIGNANT NEOPLASM OF OTHER SPECIFIED SITES: ICD-10-CM

## 2019-07-24 DIAGNOSIS — M25.461 EFFUSION, RIGHT KNEE: ICD-10-CM

## 2019-07-24 LAB
ANION GAP SERPL CALC-SCNC: 14 MMOL/L — SIGNIFICANT CHANGE UP (ref 5–17)
BUN SERPL-MCNC: 11 MG/DL — SIGNIFICANT CHANGE UP (ref 7–23)
CALCIUM SERPL-MCNC: 8.9 MG/DL — SIGNIFICANT CHANGE UP (ref 8.4–10.5)
CHLORIDE SERPL-SCNC: 94 MMOL/L — LOW (ref 96–108)
CO2 SERPL-SCNC: 23 MMOL/L — SIGNIFICANT CHANGE UP (ref 22–31)
CREAT SERPL-MCNC: 1.09 MG/DL — SIGNIFICANT CHANGE UP (ref 0.5–1.3)
GLUCOSE BLDC GLUCOMTR-MCNC: 105 MG/DL — HIGH (ref 70–99)
GLUCOSE BLDC GLUCOMTR-MCNC: 105 MG/DL — HIGH (ref 70–99)
GLUCOSE BLDC GLUCOMTR-MCNC: 114 MG/DL — HIGH (ref 70–99)
GLUCOSE BLDC GLUCOMTR-MCNC: 118 MG/DL — HIGH (ref 70–99)
GLUCOSE BLDC GLUCOMTR-MCNC: 146 MG/DL — HIGH (ref 70–99)
GLUCOSE SERPL-MCNC: 113 MG/DL — HIGH (ref 70–99)
HCT VFR BLD CALC: 29.9 % — LOW (ref 39–50)
HCT VFR BLD CALC: 31.5 % — LOW (ref 39–50)
HGB BLD-MCNC: 10.1 G/DL — LOW (ref 13–17)
HGB BLD-MCNC: 10.2 G/DL — LOW (ref 13–17)
MAGNESIUM SERPL-MCNC: 1.5 MG/DL — LOW (ref 1.6–2.6)
MCHC RBC-ENTMCNC: 29.7 PG — SIGNIFICANT CHANGE UP (ref 27–34)
MCHC RBC-ENTMCNC: 31 PG — SIGNIFICANT CHANGE UP (ref 27–34)
MCHC RBC-ENTMCNC: 32.4 GM/DL — SIGNIFICANT CHANGE UP (ref 32–36)
MCHC RBC-ENTMCNC: 33.8 GM/DL — SIGNIFICANT CHANGE UP (ref 32–36)
MCV RBC AUTO: 91.6 FL — SIGNIFICANT CHANGE UP (ref 80–100)
MCV RBC AUTO: 91.6 FL — SIGNIFICANT CHANGE UP (ref 80–100)
PHOSPHATE SERPL-MCNC: 2.5 MG/DL — SIGNIFICANT CHANGE UP (ref 2.5–4.5)
PLATELET # BLD AUTO: 66 K/UL — LOW (ref 150–400)
PLATELET # BLD AUTO: 83 K/UL — LOW (ref 150–400)
POTASSIUM SERPL-MCNC: 4 MMOL/L — SIGNIFICANT CHANGE UP (ref 3.5–5.3)
POTASSIUM SERPL-SCNC: 4 MMOL/L — SIGNIFICANT CHANGE UP (ref 3.5–5.3)
RBC # BLD: 3.26 M/UL — LOW (ref 4.2–5.8)
RBC # BLD: 3.44 M/UL — LOW (ref 4.2–5.8)
RBC # FLD: 15.1 % — HIGH (ref 10.3–14.5)
RBC # FLD: 15.6 % — HIGH (ref 10.3–14.5)
SODIUM SERPL-SCNC: 131 MMOL/L — LOW (ref 135–145)
WBC # BLD: 4.3 K/UL — SIGNIFICANT CHANGE UP (ref 3.8–10.5)
WBC # BLD: 5.73 K/UL — SIGNIFICANT CHANGE UP (ref 3.8–10.5)
WBC # FLD AUTO: 4.3 K/UL — SIGNIFICANT CHANGE UP (ref 3.8–10.5)
WBC # FLD AUTO: 5.73 K/UL — SIGNIFICANT CHANGE UP (ref 3.8–10.5)

## 2019-07-24 PROCEDURE — 99233 SBSQ HOSP IP/OBS HIGH 50: CPT | Mod: GC

## 2019-07-24 RX ORDER — INSULIN LISPRO 100/ML
VIAL (ML) SUBCUTANEOUS
Refills: 0 | Status: DISCONTINUED | OUTPATIENT
Start: 2019-07-24 | End: 2019-07-25

## 2019-07-24 RX ORDER — MAGNESIUM SULFATE 500 MG/ML
2 VIAL (ML) INJECTION ONCE
Refills: 0 | Status: COMPLETED | OUTPATIENT
Start: 2019-07-24 | End: 2019-07-24

## 2019-07-24 RX ORDER — INSULIN LISPRO 100/ML
VIAL (ML) SUBCUTANEOUS AT BEDTIME
Refills: 0 | Status: DISCONTINUED | OUTPATIENT
Start: 2019-07-24 | End: 2019-07-25

## 2019-07-24 RX ORDER — LANOLIN ALCOHOL/MO/W.PET/CERES
5 CREAM (GRAM) TOPICAL AT BEDTIME
Refills: 0 | Status: DISCONTINUED | OUTPATIENT
Start: 2019-07-24 | End: 2019-07-25

## 2019-07-24 RX ADMIN — Medication 50 GRAM(S): at 09:46

## 2019-07-24 RX ADMIN — Medication 12.5 MILLIGRAM(S): at 06:06

## 2019-07-24 RX ADMIN — PANTOPRAZOLE SODIUM 10 MG/HR: 20 TABLET, DELAYED RELEASE ORAL at 09:46

## 2019-07-24 RX ADMIN — ATORVASTATIN CALCIUM 40 MILLIGRAM(S): 80 TABLET, FILM COATED ORAL at 21:44

## 2019-07-24 RX ADMIN — TAMSULOSIN HYDROCHLORIDE 0.8 MILLIGRAM(S): 0.4 CAPSULE ORAL at 21:44

## 2019-07-24 RX ADMIN — PANTOPRAZOLE SODIUM 10 MG/HR: 20 TABLET, DELAYED RELEASE ORAL at 00:46

## 2019-07-24 RX ADMIN — Medication 5 MILLIGRAM(S): at 21:44

## 2019-07-24 RX ADMIN — Medication 100 MILLIGRAM(S): at 06:06

## 2019-07-24 RX ADMIN — PANTOPRAZOLE SODIUM 10 MG/HR: 20 TABLET, DELAYED RELEASE ORAL at 18:45

## 2019-07-24 RX ADMIN — Medication 100 MILLIGRAM(S): at 17:31

## 2019-07-24 RX ADMIN — Medication 12.5 MILLIGRAM(S): at 17:31

## 2019-07-24 NOTE — PHYSICAL THERAPY INITIAL EVALUATION ADULT - PERTINENT HX OF CURRENT PROBLEM, REHAB EVAL
Pt is a 79M Hx HTN, DM II (a1c 5.8), HLD, CKD III, right parietal stroke with secondary hemorrhage and MCA clot retrieval, metastatic prostate cancer and multifocal metastatic disease in the liver, and multiple osseous mets s/p recent R Tibia Intralesional curettage/bone graft presenting with low H&H from his Oncologist's office, likely due to GIB. EGD today showing bleeding gastric ulcer. Cauterized.

## 2019-07-24 NOTE — PHYSICAL THERAPY INITIAL EVALUATION ADULT - ADDITIONAL COMMENTS
PTA pt lived with son in pvt home, 4 steps to enter, independent with ADLs, ambulates with SC and rollator.

## 2019-07-24 NOTE — PROGRESS NOTE ADULT - PROBLEM SELECTOR PLAN 1
Patient presented from oncologist office with low hemoglobin, reports melanotic stool, post-prandial epigastric pain and vomiting for >4 months, episode of anemia during early July admission (Hb 6.9, requiring transfusion to Hb 11.1); patient has also been on lovenox 90mg daily since ischemic stroke in February.  - S/p upper GI endoscopy and cautery of 1.5cm bleeding ulcer  - Continue 8mg/hr pantoprazole drip for 72hrs (through 7/26 afternoon) per GI recommendations  - Per GI recs starting clear liquid diet this afternoon.

## 2019-07-24 NOTE — PHYSICAL THERAPY INITIAL EVALUATION ADULT - GAIT DEVIATIONS NOTED, PT EVAL
dec stance RLE/decreased weight-shifting ability/decreased kg/decreased stride length/decreased step length

## 2019-07-24 NOTE — PROGRESS NOTE ADULT - SUBJECTIVE AND OBJECTIVE BOX
Mukesh Soliman  Internal Medicine PGY-1  Pager: [phone] / [phone]      PATIENT:  SHER LILLY  [id number]    OVERNIGHT EVENTS:    There were no acute events overnight. Yesterday, a conversation was had with the patient's family regarding staying in the hospital for a PPI drip vs. going home for first cycle of chemotherapy; family decided to manage acute ulcer and will follow-up with private oncologist post-discharge.      SUBJECTIVE:    Patient seen and examined at bedside. The patient reports feeling "not good" this morning, though he notes that this is his baseline in recent weeks due to the cancer. His primary complaint is that he feels achy all over, as he has lost a lot of weight and the hospital bed is very uncomfortable for him. He notes that his abdominal pain and nausea have significantly improved since the procedure yesterday. He had difficulty sleeping last night. He continues to report no appetite, but he would like to try to eat food today to see if he can hold it down. He denies vomiting. His last BM was Monday prior to admission. Denies headache, lightheadedness, dizziness.      OBJECTIVE:    T(C): 36.5 (07-24-19 @ 12:06), Max: 37.3 (07-24-19 @ 00:26)  HR: 81 (07-24-19 @ 12:06) (80 - 92)  BP: 162/91 (07-24-19 @ 12:06) (142/83 - 162/94)  RR: 18 (07-24-19 @ 12:06) (18 - 18)  SpO2: 96% (07-24-19 @ 12:06) (93% - 96%)      07-23-19 @ 07:01  -  07-24-19 @ 07:00  --------------------------------------------------------  IN: 1270 mL / OUT: 1050 mL / NET: 220 mL    07-24-19 @ 07:01  -  07-24-19 @ 14:59  --------------------------------------------------------  IN: 0 mL / OUT: 0 mL / NET: 0 mL          PHYSICAL EXAM:  General: NAD, lying comfortably in bed, pleasant and conversational, speaking in full sentences  HEENR: no conjunctival erythema; notes decreased vision and "darkness" in left eye since stroke; mucus membranes moist  Neck: Neck supple, no lymphadenopathy, thyromegaly, or JVD noted  Respiratory: CTA B/L, no wheezing, rales, rhonchi  CV: RRR; no murmurs, rubs, gallops appreciated  Abdominal: +diffuse lower abdominal tenderness; soft, non-distended, normoactive bowel sounds  MSK: no focal weakness  Extremities: No edema, 2+ peripheral pulses; dressing and brace present and intact on RLE  Neurology: A&Ox3, no focal neurologic deficits  Skin: No rashes, hematoma, ecchymosis            LABS:                          10.2   5.73  )-----------( 83       ( 24 Jul 2019 09:40 )             31.5     07-24    131<L>  |  94<L>  |  11  ----------------------------<  113<H>  4.0   |  23  |  1.09    Ca    8.9      24 Jul 2019 07:07  Phos  2.5     07-24  Mg     1.5     07-24    TPro  6.5  /  Alb  3.4  /  TBili  0.2  /  DBili  x   /  AST  43<H>  /  ALT  32  /  AlkPhos  242<H>  07-22    LIVER FUNCTIONS - ( 22 Jul 2019 19:33 )  Alb: 3.4 g/dL / Pro: 6.5 g/dL / ALK PHOS: 242 U/L / ALT: 32 U/L / AST: 43 U/L / GGT: x             PT/INR - ( 22 Jul 2019 19:33 )   PT: 10.3 sec;   INR: 0.90 ratio         PTT - ( 22 Jul 2019 19:33 )  PTT:32.6 sec                    IMAGING:      MEDICATIONS:  MEDICATIONS  (STANDING):  atorvastatin 40 milliGRAM(s) Oral at bedtime  carBAMazepine Chewable 100 milliGRAM(s) Chew two times a day  dextrose 5%. 1000 milliLiter(s) (50 mL/Hr) IV Continuous <Continuous>  dextrose 50% Injectable 12.5 Gram(s) IV Push once  dextrose 50% Injectable 25 Gram(s) IV Push once  dextrose 50% Injectable 25 Gram(s) IV Push once  insulin lispro (HumaLOG) corrective regimen sliding scale   SubCutaneous every 6 hours  lactated ringers. 1000 milliLiter(s) (50 mL/Hr) IV Continuous <Continuous>  melatonin 5 milliGRAM(s) Oral at bedtime  metoprolol tartrate 12.5 milliGRAM(s) Oral two times a day  pantoprazole Infusion 8 mG/Hr (10 mL/Hr) IV Continuous <Continuous>  tamsulosin 0.8 milliGRAM(s) Oral at bedtime    MEDICATIONS  (PRN):  acetaminophen   Tablet .. 650 milliGRAM(s) Oral every 6 hours PRN Mild Pain (1 - 3)  dextrose 40% Gel 15 Gram(s) Oral once PRN Blood Glucose LESS THAN 70 milliGRAM(s)/deciliter  docusate sodium 100 milliGRAM(s) Oral three times a day PRN Constipation  glucagon  Injectable 1 milliGRAM(s) IntraMuscular once PRN Glucose LESS THAN 70 milligrams/deciliter  HYDROmorphone  Injectable 0.5 milliGRAM(s) IV Push every 4 hours PRN Severe Pain (7 - 10)  HYDROmorphone  Injectable 0.25 milliGRAM(s) IV Push every 2 hours PRN Breakthrough pain  oxyCODONE    IR 5 milliGRAM(s) Oral every 4 hours PRN Severe Pain (7 - 10)  senna 2 Tablet(s) Oral at bedtime PRN Constipation  traMADol 50 milliGRAM(s) Oral every 6 hours PRN Moderate Pain (4 - 6) Mukesh Soliman  Internal Medicine PGY-1  Pager: [phone] / [phone]      PATIENT:  SHER LILLY  [id number]    OVERNIGHT EVENTS:    There were no acute events overnight. Yesterday, a conversation was had with the patient's family regarding staying in the hospital for a PPI drip vs. going home for first cycle of chemotherapy; family decided to manage acute ulcer and will follow-up with private oncologist post-discharge.      SUBJECTIVE:    Patient seen and examined at bedside. The patient reports feeling "not good" this morning, though he notes that this is his baseline in recent weeks due to the cancer. His primary complaint is that he feels achy all over, as he has lost a lot of weight and the hospital bed is very uncomfortable for him. He notes that his abdominal pain and nausea have significantly improved since the procedure yesterday. He had difficulty sleeping last night. He continues to report no appetite, but he would like to try to eat food today to see if he can hold it down. He denies vomiting. His last BM was Monday prior to admission. Denies headache, lightheadedness, dizziness..      OBJECTIVE:    T(C): 36.5 (07-24-19 @ 12:06), Max: 37.3 (07-24-19 @ 00:26)  HR: 81 (07-24-19 @ 12:06) (80 - 92)  BP: 162/91 (07-24-19 @ 12:06) (142/83 - 162/94)  RR: 18 (07-24-19 @ 12:06) (18 - 18)  SpO2: 96% (07-24-19 @ 12:06) (93% - 96%)      07-23-19 @ 07:01  -  07-24-19 @ 07:00  --------------------------------------------------------  IN: 1270 mL / OUT: 1050 mL / NET: 220 mL    07-24-19 @ 07:01  -  07-24-19 @ 14:59  --------------------------------------------------------  IN: 0 mL / OUT: 0 mL / NET: 0 mL          PHYSICAL EXAM:  General: NAD, lying comfortably in bed, pleasant and conversational, speaking in full sentences  HEENR: no conjunctival erythema; notes decreased vision and "darkness" in left eye since stroke; mucus membranes moist  Neck: Neck supple, no lymphadenopathy, thyromegaly, or JVD noted  Respiratory: CTA B/L, no wheezing, rales, rhonchi  CV: RRR; no murmurs, rubs, gallops appreciated  Abdominal: +diffuse lower abdominal tenderness; soft, non-distended, normoactive bowel sounds  MSK: no focal weakness  Extremities: No edema, 2+ peripheral pulses; dressing and brace present and intact on RLE  Neurology: A&Ox3, no focal neurologic deficits  Skin: No rashes, hematoma, ecchymosis            LABS:                          10.2   5.73  )-----------( 83       ( 24 Jul 2019 09:40 )             31.5     07-24    131<L>  |  94<L>  |  11  ----------------------------<  113<H>  4.0   |  23  |  1.09    Ca    8.9      24 Jul 2019 07:07  Phos  2.5     07-24  Mg     1.5     07-24    TPro  6.5  /  Alb  3.4  /  TBili  0.2  /  DBili  x   /  AST  43<H>  /  ALT  32  /  AlkPhos  242<H>  07-22    LIVER FUNCTIONS - ( 22 Jul 2019 19:33 )  Alb: 3.4 g/dL / Pro: 6.5 g/dL / ALK PHOS: 242 U/L / ALT: 32 U/L / AST: 43 U/L / GGT: x             PT/INR - ( 22 Jul 2019 19:33 )   PT: 10.3 sec;   INR: 0.90 ratio         PTT - ( 22 Jul 2019 19:33 )  PTT:32.6 sec                    IMAGING:      MEDICATIONS:  MEDICATIONS  (STANDING):  atorvastatin 40 milliGRAM(s) Oral at bedtime  carBAMazepine Chewable 100 milliGRAM(s) Chew two times a day  dextrose 5%. 1000 milliLiter(s) (50 mL/Hr) IV Continuous <Continuous>  dextrose 50% Injectable 12.5 Gram(s) IV Push once  dextrose 50% Injectable 25 Gram(s) IV Push once  dextrose 50% Injectable 25 Gram(s) IV Push once  insulin lispro (HumaLOG) corrective regimen sliding scale   SubCutaneous every 6 hours  lactated ringers. 1000 milliLiter(s) (50 mL/Hr) IV Continuous <Continuous>  melatonin 5 milliGRAM(s) Oral at bedtime  metoprolol tartrate 12.5 milliGRAM(s) Oral two times a day  pantoprazole Infusion 8 mG/Hr (10 mL/Hr) IV Continuous <Continuous>  tamsulosin 0.8 milliGRAM(s) Oral at bedtime    MEDICATIONS  (PRN):  acetaminophen   Tablet .. 650 milliGRAM(s) Oral every 6 hours PRN Mild Pain (1 - 3)  dextrose 40% Gel 15 Gram(s) Oral once PRN Blood Glucose LESS THAN 70 milliGRAM(s)/deciliter  docusate sodium 100 milliGRAM(s) Oral three times a day PRN Constipation  glucagon  Injectable 1 milliGRAM(s) IntraMuscular once PRN Glucose LESS THAN 70 milligrams/deciliter  HYDROmorphone  Injectable 0.5 milliGRAM(s) IV Push every 4 hours PRN Severe Pain (7 - 10)  HYDROmorphone  Injectable 0.25 milliGRAM(s) IV Push every 2 hours PRN Breakthrough pain  oxyCODONE    IR 5 milliGRAM(s) Oral every 4 hours PRN Severe Pain (7 - 10)  senna 2 Tablet(s) Oral at bedtime PRN Constipation  traMADol 50 milliGRAM(s) Oral every 6 hours PRN Moderate Pain (4 - 6)

## 2019-07-24 NOTE — PROGRESS NOTE ADULT - ASSESSMENT
Patient is a 79 y.o. 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 Patient is a 79 y.o. 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

## 2019-07-24 NOTE — PROGRESS NOTE ADULT - PROBLEM SELECTOR PLAN 1
Patient presented from oncologist office with low hemoglobin, reports melanotic stool, post-prandial epigastric pain and vomiting for >4 months, episode of anemia during early July admission (Hb 6.9, requiring transfusion to Hb 11.1); patient has also been on lovenox 90mg daily since ischemic stroke in February.  - S/p upper GI endoscopy and cautery of 1.5cm bleeding ulcer  - Continue 8mg/hr pantoprazole drip for 72hrs (through 7/26 afternoon) per GI recommendations  - Restart feeding as tolerated

## 2019-07-24 NOTE — PROGRESS NOTE ADULT - PROBLEM SELECTOR PLAN 2
Patient presented with Hb of 7.7, most likely 2/2 upper GI bleed; improved to 9.2 following RBC transfusion.  - Patient received 2 units PRBCs in ED  - Maintain 2 large bore IVs, active type and screen  - Trend CBC BID per GI recommendations  - Transfuse PRBCs for Hb <8 per GI recs or symptomatic anemia

## 2019-07-24 NOTE — PROGRESS NOTE ADULT - SUBJECTIVE AND OBJECTIVE BOX
REASON FOR ADMISSION:    Patient is a 79 y.o. 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       OVERNIGHT EVENTS:    There were no acute events overnight. Yesterday, a conversation was had with the patient's family regarding staying in the hospital for a PPI drip vs. going home for first cycle of chemotherapy; family decided to manage acute ulcer and will follow-up with private oncologist post-discharge.      SUBJECTIVE:    Patient seen and examined at bedside. The patient reports feeling "not good" this morning, though he notes that this is his baseline in recent weeks due to the cancer. His primary complaint is that he feels achy all over, as he has lost a lot of weight and the hospital bed is very uncomfortable for him. He notes that his abdominal pain and nausea have significantly improved since the procedure yesterday. He continues to report no appetite, but he would like to try to eat food today to see if he can hold it down. He denies vomiting. His last BM was Monday prior to admission. Denies headache, lightheadedness, dizziness.      MEDICATIONS  (STANDING):  atorvastatin 40 milliGRAM(s) Oral at bedtime  carBAMazepine Chewable 100 milliGRAM(s) Chew two times a day  dextrose 5%. 1000 milliLiter(s) (50 mL/Hr) IV Continuous <Continuous>  dextrose 50% Injectable 12.5 Gram(s) IV Push once  dextrose 50% Injectable 25 Gram(s) IV Push once  dextrose 50% Injectable 25 Gram(s) IV Push once  insulin lispro (HumaLOG) corrective regimen sliding scale   SubCutaneous every 6 hours  lactated ringers. 1000 milliLiter(s) (50 mL/Hr) IV Continuous <Continuous>  magnesium sulfate  IVPB 2 Gram(s) IV Intermittent once  metoprolol tartrate 12.5 milliGRAM(s) Oral two times a day  pantoprazole Infusion 8 mG/Hr (10 mL/Hr) IV Continuous <Continuous>  tamsulosin 0.8 milliGRAM(s) Oral at bedtime    MEDICATIONS  (PRN):  acetaminophen   Tablet .. 650 milliGRAM(s) Oral every 6 hours PRN Mild Pain (1 - 3)  dextrose 40% Gel 15 Gram(s) Oral once PRN Blood Glucose LESS THAN 70 milliGRAM(s)/deciliter  docusate sodium 100 milliGRAM(s) Oral three times a day PRN Constipation  glucagon  Injectable 1 milliGRAM(s) IntraMuscular once PRN Glucose LESS THAN 70 milligrams/deciliter  HYDROmorphone  Injectable 0.5 milliGRAM(s) IV Push every 4 hours PRN Severe Pain (7 - 10)  HYDROmorphone  Injectable 0.25 milliGRAM(s) IV Push every 2 hours PRN Breakthrough pain  oxyCODONE    IR 5 milliGRAM(s) Oral every 4 hours PRN Severe Pain (7 - 10)  senna 2 Tablet(s) Oral at bedtime PRN Constipation  traMADol 50 milliGRAM(s) Oral every 6 hours PRN Moderate Pain (4 - 6)        PHYSICAL EXAMINATION:    Vitals: T 98.5F, HR 80-92, -162/83-94, RR 18, SpO2 93-96% on RA  General: NAD, sitting comfortably in chair, pleasant and conversational, speaking in full sentences  HEENR: no conjunctival erythema; notes decreased vision and "darkness" in left eye since stroke; mucus membranes moist  Neck: Neck supple, no lymphadenopathy, thyromegaly, or JVD noted  Respiratory: CTA B/L, no wheezing, rales, rhonchi  CV: RRR; no murmurs, rubs, gallops appreciated  Abdominal: +diffuse abdominal tenderness though notes it is significantly better than yesterday; soft, non-distended, normoactive bowel sounds  MSK: no focal weakness  Extremities: No edema, 2+ peripheral pulses; dressing and brace present and intact on RLE  Neurology: A&Ox3, no focal neurologic deficits  Skin: No rashes, hematoma, ecchymosis      OBJECTIVE DATA:    CBC pending this morning.               9.5    4.86  )-----------( 74       ( 23 Jul 2019 18:33 )             29.7     07-24    131<L>  |  94<L>  |  11  ----------------------------<  113<H>  4.0   |  23  |  1.09    Ca    8.9      24 Jul 2019 07:07  Phos  2.5     07-24  Mg     1.5     07-24    TPro  6.5  /  Alb  3.4  /  TBili  0.2  /  DBili  x   /  AST  43<H>  /  ALT  32  /  AlkPhos  242<H>  07-22    PT/INR - ( 22 Jul 2019 19:33 )   PT: 10.3 sec;   INR: 0.90 ratio    PTT - ( 22 Jul 2019 19:33 )  PTT:32.6 sec      OTHER TESTS AND IMAGING:    CT A/P (7/23) - Findings are grossly unchanged since 3/13/2019. Small pericardial effusion. Small bilateral pleural effusions with subsegmental atelectasis. Metastatic lesions noted in the liver and the osseous structures. Asymmetric right lateral wall thickening of urinary bladder. Multiple enlarged right inguinal nodes.  Upper GI endoscopy (7/23) - Oozing 1.5cm gastric ulcer with pigmented material. Injected. Treated with bipolar cautery. Red blood in the gastric antrum. Duodenal erosions without bleeding.

## 2019-07-24 NOTE — PROGRESS NOTE ADULT - ASSESSMENT
Patient is a 79 y.o. 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

## 2019-07-24 NOTE — PROGRESS NOTE ADULT - PROBLEM SELECTOR PLAN 5
Patient presents with thrombocytopenia to 74, up from 50s during prior admission in 7/2019.  - Consult heme/onc in-house to establish possible etiology of thrombocytopenia  - Transfuse if Plt <50 and active bleeding continues, or if Plt <10.

## 2019-07-24 NOTE — PROGRESS NOTE ADULT - PROBLEM SELECTOR PLAN 5
Patient presents with thrombocytopenia to 74, up from 50s during prior admission in 7/2019. Likely 2/2 to know liver disease  - Transfuse if Plt <50 and active bleeding continues, or if Plt <10.  -plt stable

## 2019-07-24 NOTE — PROGRESS NOTE ADULT - PROBLEM SELECTOR PLAN 2
Patient presented with Hb of 7.7, most likely 2/2 upper GI bleed; improved to 9.2 following RBC transfusion.  - Patient received 2 units PRBCs in ED  - Maintain 2 large bore IVs, active type and screen  - Trend CBC BID per GI recommendations  - Transfuse PRBCs for Hb <7 or symptomatic anemia

## 2019-07-24 NOTE — PROGRESS NOTE ADULT - PROBLEM SELECTOR PLAN 8
Patient has history of HLD.  - Continue with home dose of atorvastatin 40 mg qd Patient has history of HLD.  - Continue with home dose of atorvastatin 40 mg qd.

## 2019-07-24 NOTE — PHYSICAL THERAPY INITIAL EVALUATION ADULT - PLANNED THERAPY INTERVENTIONS, PT EVAL
transfer training/Stair Training: Goal: Improve to 5 steps I with single rail, step to pattern by 4 weeks./bed mobility training/gait training

## 2019-07-25 ENCOUNTER — TRANSCRIPTION ENCOUNTER (OUTPATIENT)
Age: 79
End: 2019-07-25

## 2019-07-25 VITALS
DIASTOLIC BLOOD PRESSURE: 92 MMHG | SYSTOLIC BLOOD PRESSURE: 149 MMHG | TEMPERATURE: 98 F | OXYGEN SATURATION: 97 % | HEART RATE: 85 BPM | RESPIRATION RATE: 17 BRPM

## 2019-07-25 DIAGNOSIS — D64.9 ANEMIA, UNSPECIFIED: ICD-10-CM

## 2019-07-25 LAB
ANION GAP SERPL CALC-SCNC: 11 MMOL/L — SIGNIFICANT CHANGE UP (ref 5–17)
BUN SERPL-MCNC: 10 MG/DL — SIGNIFICANT CHANGE UP (ref 7–23)
CALCIUM SERPL-MCNC: 8.4 MG/DL — SIGNIFICANT CHANGE UP (ref 8.4–10.5)
CHLORIDE SERPL-SCNC: 96 MMOL/L — SIGNIFICANT CHANGE UP (ref 96–108)
CO2 SERPL-SCNC: 25 MMOL/L — SIGNIFICANT CHANGE UP (ref 22–31)
CREAT SERPL-MCNC: 1.19 MG/DL — SIGNIFICANT CHANGE UP (ref 0.5–1.3)
GLUCOSE BLDC GLUCOMTR-MCNC: 104 MG/DL — HIGH (ref 70–99)
GLUCOSE BLDC GLUCOMTR-MCNC: 163 MG/DL — HIGH (ref 70–99)
GLUCOSE BLDC GLUCOMTR-MCNC: 96 MG/DL — SIGNIFICANT CHANGE UP (ref 70–99)
GLUCOSE SERPL-MCNC: 120 MG/DL — HIGH (ref 70–99)
HCT VFR BLD CALC: 27.7 % — LOW (ref 39–50)
HCT VFR BLD CALC: 28.5 % — LOW (ref 39–50)
HGB BLD-MCNC: 9.1 G/DL — LOW (ref 13–17)
HGB BLD-MCNC: 9.7 G/DL — LOW (ref 13–17)
MAGNESIUM SERPL-MCNC: 1.9 MG/DL — SIGNIFICANT CHANGE UP (ref 1.6–2.6)
MCHC RBC-ENTMCNC: 29.4 PG — SIGNIFICANT CHANGE UP (ref 27–34)
MCHC RBC-ENTMCNC: 31.9 GM/DL — LOW (ref 32–36)
MCHC RBC-ENTMCNC: 32 PG — SIGNIFICANT CHANGE UP (ref 27–34)
MCHC RBC-ENTMCNC: 35 GM/DL — SIGNIFICANT CHANGE UP (ref 32–36)
MCV RBC AUTO: 91.5 FL — SIGNIFICANT CHANGE UP (ref 80–100)
MCV RBC AUTO: 92.2 FL — SIGNIFICANT CHANGE UP (ref 80–100)
PHOSPHATE SERPL-MCNC: 2.1 MG/DL — LOW (ref 2.5–4.5)
PLATELET # BLD AUTO: 52 K/UL — LOW (ref 150–400)
PLATELET # BLD AUTO: 72 K/UL — LOW (ref 150–400)
POTASSIUM SERPL-MCNC: 3.9 MMOL/L — SIGNIFICANT CHANGE UP (ref 3.5–5.3)
POTASSIUM SERPL-SCNC: 3.9 MMOL/L — SIGNIFICANT CHANGE UP (ref 3.5–5.3)
RBC # BLD: 3.03 M/UL — LOW (ref 4.2–5.8)
RBC # BLD: 3.09 M/UL — LOW (ref 4.2–5.8)
RBC # FLD: 14.6 % — HIGH (ref 10.3–14.5)
RBC # FLD: 15.6 % — HIGH (ref 10.3–14.5)
SODIUM SERPL-SCNC: 132 MMOL/L — LOW (ref 135–145)
SURGICAL PATHOLOGY STUDY: SIGNIFICANT CHANGE UP
WBC # BLD: 3.97 K/UL — SIGNIFICANT CHANGE UP (ref 3.8–10.5)
WBC # BLD: 4 K/UL — SIGNIFICANT CHANGE UP (ref 3.8–10.5)
WBC # FLD AUTO: 3.97 K/UL — SIGNIFICANT CHANGE UP (ref 3.8–10.5)
WBC # FLD AUTO: 4 K/UL — SIGNIFICANT CHANGE UP (ref 3.8–10.5)

## 2019-07-25 PROCEDURE — 99239 HOSP IP/OBS DSCHRG MGMT >30: CPT

## 2019-07-25 RX ORDER — SUCRALFATE 1 G
1 TABLET ORAL
Refills: 0 | Status: DISCONTINUED | OUTPATIENT
Start: 2019-07-25 | End: 2019-07-25

## 2019-07-25 RX ORDER — PANTOPRAZOLE SODIUM 20 MG/1
1 TABLET, DELAYED RELEASE ORAL
Qty: 60 | Refills: 1
Start: 2019-07-25 | End: 2019-09-22

## 2019-07-25 RX ORDER — TAMSULOSIN HYDROCHLORIDE 0.4 MG/1
2 CAPSULE ORAL
Qty: 0 | Refills: 0 | DISCHARGE
Start: 2019-07-25

## 2019-07-25 RX ADMIN — Medication 100 MILLIGRAM(S): at 06:12

## 2019-07-25 RX ADMIN — Medication 12.5 MILLIGRAM(S): at 18:01

## 2019-07-25 RX ADMIN — Medication 100 MILLIGRAM(S): at 18:00

## 2019-07-25 RX ADMIN — Medication 12.5 MILLIGRAM(S): at 06:12

## 2019-07-25 RX ADMIN — Medication 1: at 12:39

## 2019-07-25 RX ADMIN — Medication 1 GRAM(S): at 18:04

## 2019-07-25 RX ADMIN — PANTOPRAZOLE SODIUM 10 MG/HR: 20 TABLET, DELAYED RELEASE ORAL at 06:12

## 2019-07-25 NOTE — PROGRESS NOTE ADULT - SUBJECTIVE AND OBJECTIVE BOX
REASON FOR ADMISSION:    Patient is a 79 y.o. 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       OVERNIGHT EVENTS:    There were no acute events overnight.      SUBJECTIVE:    Patient seen and examined at bedside. The patient reports feeling well this morning. He notes that he is lying more comfortably, and he says that his stomach is feeling significantly better. He was given soft foods yesterday, and he was able to eat without pain and without vomiting. He had a single loose BM yesterday, which he notes was dark as it has been in the past. He is excited to eat food again and hopefully put on weight. Denies headache, lightheadedness, dizziness. Pt expresses concern about his cancer diagnosis and prognosis.      MEDICATIONS  (STANDING):  atorvastatin 40 milliGRAM(s) Oral at bedtime  carBAMazepine Chewable 100 milliGRAM(s) Chew two times a day  dextrose 5%. 1000 milliLiter(s) (50 mL/Hr) IV Continuous <Continuous>  dextrose 50% Injectable 12.5 Gram(s) IV Push once  dextrose 50% Injectable 25 Gram(s) IV Push once  dextrose 50% Injectable 25 Gram(s) IV Push once  insulin lispro (HumaLOG) corrective regimen sliding scale   SubCutaneous three times a day with meals  insulin lispro (HumaLOG) corrective regimen sliding scale   SubCutaneous three times a day before meals  insulin lispro (HumaLOG) corrective regimen sliding scale   SubCutaneous at bedtime  lactated ringers. 1000 milliLiter(s) (50 mL/Hr) IV Continuous <Continuous>  melatonin 5 milliGRAM(s) Oral at bedtime  metoprolol tartrate 12.5 milliGRAM(s) Oral two times a day  pantoprazole Infusion 8 mG/Hr (10 mL/Hr) IV Continuous <Continuous>  tamsulosin 0.8 milliGRAM(s) Oral at bedtime    MEDICATIONS  (PRN):  acetaminophen   Tablet .. 650 milliGRAM(s) Oral every 6 hours PRN Mild Pain (1 - 3)  dextrose 40% Gel 15 Gram(s) Oral once PRN Blood Glucose LESS THAN 70 milliGRAM(s)/deciliter  docusate sodium 100 milliGRAM(s) Oral three times a day PRN Constipation  glucagon  Injectable 1 milliGRAM(s) IntraMuscular once PRN Glucose LESS THAN 70 milligrams/deciliter  HYDROmorphone  Injectable 0.5 milliGRAM(s) IV Push every 4 hours PRN Severe Pain (7 - 10)  HYDROmorphone  Injectable 0.25 milliGRAM(s) IV Push every 2 hours PRN Breakthrough pain  senna 2 Tablet(s) Oral at bedtime PRN Constipation  traMADol 50 milliGRAM(s) Oral every 6 hours PRN Moderate Pain (4 - 6)      PHYSICAL EXAMINATION:    Vitals: T 97.9F, HR 80s, /91 - 162/91, RR 18, SpO2 94% on RA  General: NAD, lying comfortably in red, pleasant and conversational, speaking in full sentences  HEENT: no conjunctival erythema; notes decreased vision and "darkness" in left eye since stroke; mucus membranes moist  Neck: Neck supple, no lymphadenopathy, thyromegaly, or JVD noted  Respiratory: CTA B/L, no wheezing, rales, rhonchi  CV: RRR; no murmurs, rubs, gallops appreciated  Abdominal: +diffuse abdominal tenderness though notes it is significantly better than yesterday; soft, non-distended, normoactive bowel sounds  MSK: no focal weakness  Extremities: No edema, 2+ peripheral pulses; dressing and brace present and intact on RLE  Neurology: A&Ox3, no focal neurologic deficits  Skin: No rashes, hematoma, ecchymosis      OBJECTIVE DATA:                          10.1   4.3   )-----------( 66       ( 24 Jul 2019 18:26 )             29.9     07-25    132<L>  |  96  |  10  ----------------------------<  120<H>  3.9   |  25  |  1.19    Ca    8.4      25 Jul 2019 06:47  Phos  2.1     07-25  Mg     1.9     07-25      OTHER TESTS AND IMAGING:    CT A/P (7/23) - Findings are grossly unchanged since 3/13/2019. Small pericardial effusion. Small bilateral pleural effusions with subsegmental atelectasis. Metastatic lesions noted in the liver and the osseous structures. Asymmetric right lateral wall thickening of urinary bladder. Multiple enlarged right inguinal nodes.  Upper GI endoscopy (7/23) - Oozing 1.5cm gastric ulcer with pigmented material. Injected. Treated with bipolar cautery. Red blood in the gastric antrum. Duodenal erosions without bleeding.

## 2019-07-25 NOTE — DISCHARGE NOTE PROVIDER - NSDCCPCAREPLAN_GEN_ALL_CORE_FT
PRINCIPAL DISCHARGE DIAGNOSIS  Diagnosis: GI bleed  Assessment and Plan of Treatment: You were diagnosed with a bleeding ulcer in your stomach. This bleeding ulcer explains why you have been having stomach pain after eating, no appetite, and black tarry stools. The blood loss you had from you ulcer was also causing you to be anemic, which means having low hemoglobin in your blood and less ability to deliver oxygen to your body. This anemia may have been causing you to be fatigued.  While you were in the hospital we started you on an IV medication to inhibit acid production in your stomach. The acid that is normally in the stomach can make it harder for ulcers to heal so we wanted to reduce the acid as much as possible. While you were you also underwent an upper endoscopy which involves sticking a camera and other instruments down your esophagus to look at your stomach. They found a 1.5 cm ulcer that was bleeding. They used a cautery to stop the bleeding. After the procedure we monitored you for further bleeding and checked your hemoglobin to make sure you weren't losing blood. These lab values indicated that you were not losing more blood.  You can resume a regular diet after you leave the hospital. You should not take your lovenox shots until you have a follow up appointment with a GI doctor. When you follow up with your GI doctor you should ask them about resuming the lovenox shots.   We are also discharging you on an oral acid reducing medication that you should take twice per day. You should also ask your GI doctors how long you should take that when you follow up with them.          SECONDARY DISCHARGE DIAGNOSES  Diagnosis: Cerebrovascular accident (CVA) due to embolism of right middle cerebral artery  Assessment and Plan of Treatment: You should hold off on taking your lovenox shots until you have a follow up appointment with your GI doctor. Lovenox can increase your risk of bleeding.  You should also follow up with your primary doctor and see if you should have an echocardiogram. Based on your records it is unclear whether your stroke was caused by a clot that formed on one of your heart valves or in your heart. There was no acute need for you to have an echocardiogram while you were in the hospital so we recommend that you follow this up as an outpatient.    Diagnosis: Liver cancer  Assessment and Plan of Treatment:     Diagnosis: Anemia  Assessment and Plan of Treatment: Anemia PRINCIPAL DISCHARGE DIAGNOSIS  Diagnosis: GI bleed  Assessment and Plan of Treatment: You were diagnosed with a bleeding ulcer in your stomach. This bleeding ulcer explains why you have been having stomach pain after eating, no appetite, and black tarry stools. The blood loss you had from you ulcer was also causing you to be anemic, which means having low hemoglobin in your blood and less ability to deliver oxygen to your body. This anemia may have been causing you to be fatigued.  While you were in the hospital we started you on an IV medication to inhibit acid production in your stomach. The acid that is normally in the stomach can make it harder for ulcers to heal so we wanted to reduce the acid as much as possible. While you were you also underwent an upper endoscopy which involves sticking a camera and other instruments down your esophagus to look at your stomach. They found a 1.5 cm ulcer that was bleeding. They used a cautery to stop the bleeding. After the procedure we monitored you for further bleeding and checked your hemoglobin to make sure you weren't losing blood. These lab values indicated that you were not losing more blood.  You can resume a regular diet after you leave the hospital. You should not take your lovenox shots until you have a follow up appointment with a GI doctor. When you follow up with your GI doctor you should ask them about resuming the lovenox shots.   We are also discharging you on an oral acid reducing medication that you should take twice per day. You should also ask your GI doctors how long you should take that when you follow up with them.          SECONDARY DISCHARGE DIAGNOSES  Diagnosis: Cerebrovascular accident (CVA) due to embolism of right middle cerebral artery  Assessment and Plan of Treatment: You should hold off on taking your lovenox shots until you have a follow up appointment with your GI doctor. Lovenox can increase your risk of bleeding.  You should also follow up with your primary doctor and see if you should have an echocardiogram. Based on your records it is unclear whether your stroke was caused by a clot that formed on one of your heart valves or in your heart. There was no acute need for you to have an echocardiogram while you were in the hospital so we recommend that you follow this up as an outpatient.    Diagnosis: Liver cancer  Assessment and Plan of Treatment: Please follow up with your outpatient oncologist for further management of your cancer. Don't take pain medication on empty stomach

## 2019-07-25 NOTE — DISCHARGE NOTE PROVIDER - CARE PROVIDER_API CALL
Cary Tello)  Gastroenterology  [address] [place] [address] [address]  [place] [address]  Phone: [phone]  Fax: [phone]  Follow Up Time: Cary Tello)  Gastroenterology  [address] [place] [address], [address]  [place] [address]  Phone: [phone]  Fax: [phone]  Follow Up Time:     Anmol Dauhgerty)  Internal Medicine; Medical Oncology  [address] [place] [address]  Phone: [phone]  Fax: [phone]  Follow Up Time:

## 2019-07-25 NOTE — DISCHARGE NOTE PROVIDER - NSDCFUADDAPPT_GEN_ALL_CORE_FT
GI Follow Up Appointment is scheduled for August 12 at 1pm with Dr. Cary Tello GI Follow Up Appointment is scheduled for August 12 at 1pm with Dr. Cary Tello    Oncology Follow Up Appointment with Dr. Daugherty is scheduled for August 5th at 10 am.

## 2019-07-25 NOTE — DISCHARGE NOTE NURSING/CASE MANAGEMENT/SOCIAL WORK - NSDCFUADDAPPT_GEN_ALL_CORE_FT
GI Follow Up Appointment is scheduled for August 12 at 1pm with Dr. Cary Tello    Oncology Follow Up Appointment with Dr. Daugherty is scheduled for August 5th at 10 am.

## 2019-07-25 NOTE — CONSULT NOTE ADULT - SUBJECTIVE AND OBJECTIVE BOX
79y Male admitted for GI bleed. Patient is s/p right proximal tibial intralesional curettage and bone graft on 7/12/19 with Dr. Huizar. No orthopedic complaints at this time. Currently in a knee immobilizer. Denies numbness/tingling. Denies fever/chills. Denies pain/injury elsewhere.     HEALTH ISSUES - PROBLEM Dx:  Hepatocellular carcinoma: Hepatocellular carcinoma  Prostate cancer metastatic to liver: Prostate cancer metastatic to liver  Cerebrovascular accident (CVA) due to embolism of right middle cerebral artery: Cerebrovascular accident (CVA) due to embolism of right middle cerebral artery  Anemia: Anemia  Thrombocythemia: Thrombocythemia  Marantic endocarditis: Marantic endocarditis  Need for prophylactic measure: Need for prophylactic measure  HLD (hyperlipidemia): HLD (hyperlipidemia)  HTN (hypertension): HTN (hypertension)  DM (diabetes mellitus): DM (diabetes mellitus)  Liver cancer: Liver cancer  GI bleed: GI bleed        MEDICATIONS  (STANDING):  atorvastatin 40 milliGRAM(s) Oral at bedtime  carBAMazepine Chewable 100 milliGRAM(s) Chew two times a day  dextrose 5%. 1000 milliLiter(s) IV Continuous <Continuous>  dextrose 50% Injectable 12.5 Gram(s) IV Push once  dextrose 50% Injectable 25 Gram(s) IV Push once  dextrose 50% Injectable 25 Gram(s) IV Push once  insulin lispro (HumaLOG) corrective regimen sliding scale   SubCutaneous three times a day with meals  insulin lispro (HumaLOG) corrective regimen sliding scale   SubCutaneous three times a day before meals  insulin lispro (HumaLOG) corrective regimen sliding scale   SubCutaneous at bedtime  lactated ringers. 1000 milliLiter(s) IV Continuous <Continuous>  melatonin 5 milliGRAM(s) Oral at bedtime  metoprolol tartrate 12.5 milliGRAM(s) Oral two times a day  pantoprazole Infusion 8 mG/Hr IV Continuous <Continuous>  tamsulosin 0.8 milliGRAM(s) Oral at bedtime    Allergies    No Known Allergies    Intolerances                            10.1   4.3   )-----------( 66       ( 24 Jul 2019 18:26 )             29.9     24 Jul 2019 07:07    131    |  94     |  11     ----------------------------<  113    4.0     |  23     |  1.09     Ca    8.9        24 Jul 2019 07:07  Phos  2.5       24 Jul 2019 07:07  Mg     1.5       24 Jul 2019 07:07        Vital Signs Last 24 Hrs  T(C): 36.5 (07-24-19 @ 23:51), Max: 36.9 (07-24-19 @ 06:02)  T(F): 97.7 (07-24-19 @ 23:51), Max: 98.5 (07-24-19 @ 06:02)  HR: 81 (07-24-19 @ 23:51) (80 - 98)  BP: 147/84 (07-24-19 @ 23:51) (142/83 - 162/91)  BP(mean): --  RR: 18 (07-24-19 @ 23:51) (18 - 18)  SpO2: 96% (07-24-19 @ 23:51) (93% - 96%)    Physical Exam  Gen: NAD  Right LE: in  and ace bandage, inicision c/d/i healing well without signs of infection, No erythema. Nothing to suggest sepsis. Able to SLR, Neg log roll, Negative Heel strike, no ttp elsewhere, +ehl/fhl/ta/gs function, no calf ttp, DP/PT pulses intact, compartments soft. Calf soft, nontender.      A/P: 80 y/o M s/p intralesional curettage with bone graft, right tibia  WBAT in   has an appointment to see Dr. Huizar on 7/31/19  as per patient, plan to discharge from hospital on 7/25/19  Analgesia  Antiinflammatories as tolerated  WBAT, rolling walker, PT  ICE/Cryotherapy  DVT PE ppx  FU with Orthopedist as outpt  Orthopedically stable for DC  will discuss with Dr. Huizar and advise if plan changes

## 2019-07-25 NOTE — PROGRESS NOTE ADULT - ASSESSMENT
Patient is a 79 y.o. 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

## 2019-07-25 NOTE — DISCHARGE NOTE NURSING/CASE MANAGEMENT/SOCIAL WORK - NSDCDPATPORTLINK_GEN_ALL_CORE
You can access the [facility] Patient Portal, offered by [facility], by registering with the following website: [email or web address]

## 2019-07-25 NOTE — CHART NOTE - NSCHARTNOTEFT_GEN_A_CORE
Staples removed this AM. No further orthopedic intervention at this time. Will need to follow up with Dr. Huizar as outpatient during scheduled appt.     Luis Salgado PGY3  Orthopedic Surgery

## 2019-07-25 NOTE — DISCHARGE NOTE NURSING/CASE MANAGEMENT/SOCIAL WORK - NSDCPEPTSTRK_GEN_ALL_CORE
Stroke support groups for patients, families, and friends/Call 911 for stroke/Need for follow up after discharge/Prescribed medications/Risk factors for stroke/Stroke education booklet/Stroke warning signs and symptoms/Signs and symptoms of stroke

## 2019-07-25 NOTE — PROGRESS NOTE ADULT - PROBLEM SELECTOR PROBLEM 4
Cerebrovascular accident (CVA) due to embolism of right middle cerebral artery

## 2019-07-25 NOTE — PROGRESS NOTE ADULT - PROBLEM SELECTOR PLAN 1
Patient presented from oncologist office with low hemoglobin, reports melanotic stool, post-prandial epigastric pain and vomiting for >4 months, episode of anemia during early July admission (Hb 6.9, requiring transfusion to Hb 11.1); patient has also been on lovenox 90mg daily since ischemic stroke in February.  - S/p upper GI endoscopy and cautery of 1.5cm bleeding ulcer  - Continue 8mg/hr pantoprazole drip for 72hrs (through 7/26 afternoon) per GI recommendations  - Advance diet as tolerated

## 2019-07-25 NOTE — DISCHARGE NOTE PROVIDER - HOSPITAL COURSE
HPI:    Mr. Balderrama is a 79-year-old male with a history of HTN, DM II, HLD, CKD III, right parietal CVA without residual deficits of unclear etiology on lovenox c/b secondary hemorrhage s/p MCA clot retrieval at [facility] on 3/19, prostate cancer, metastatic adenocarcinoma of unclear etiology, with active lesions in liver and bone s/p recent R Tibia Intralesional curettage/bone graft at [facility] presenting with low H&H from his Oncologist's office.        Pt describes progressive weakness and episode of left temporal headache this morning. Patient was discharged from [facility] July 15 after wide Resection and curettage of proximal tibia lesion, repair with cement and bone graft. He reports weakness since that admission that has not improved. He describes stomach pain that is worse with eating as well as 1 month of dark black soft stools. Also endorses cough w/ phlegm weight loss, and nausea/vomiting. Describes his emesis as "food," son reports pt stated he vomited up phlegm. During last admission (7/6) pt presented with hg of 6.9 and was transfused to 11.1, but has downtrended since. Pt Denies fever/chills, SOB, CP, palpitations, or focal weakness. No sick contacts. Reports Hx of EGD in [place] last year, unsure of results. Pt reports his last colonoscopy was "many years ago".         Received 2u pRBCs and reglan/zofran in the ED        Hospital Course:     #GI Bleed    After his admission he was placed on a 8mg/hr pantoprazole drip. He underwent EGD and this showed a normal esophagus, medium sized hiatal hernia as well as a 1.5 cm oozing gastric ulcer with pigmented material. Injected. Treated with bipolar cautery. There were also duodenal erosions without bleeding. Following his EGD he was continued on the PPI drip and monitored for 72 hours. He had one episode of melena but his Hgb was stable throughout his admission. He had a outpatient chemotherapy appointment for his active cancer that he had to miss while he was being monitored. The risks and benefits were discussed with the family and they elected to stay for 72 hour monitoring given high risk of re-bleeding and also risk of perforation. He was kept on a clear liquid diet but at the time of his discharge he was tolerating a full diet. His lovenox as well as his amlodipine were held since his admission.        #bicytopenia    Patient received 2 units PRBCs in ED. His Hgb was stable during the rest of his admission. Platelets low but stable throughout his admission and >50.        #Active Cancer    Outpatient oncologist is Dr. Anmol Daugherty, who was consulted on 7/23 regarding patient status. His sorafenib was held  in context of GI bleed/anemia. Follow up with oncologist as outpatient.        #History of CVA    right MCA stroke in 2/19 s/p thrombectomy. He is currently receiving lovenox 90mg sq at home for stroke prevention and for hx of marantic endocarditis (s/p TTE showing no significant findings). His AC was held while he was inpatient 2/2 active GI bleed and anemia. There was no acute need at this time to obtain a TTE to evaluate for marantic endocarditis. Recommend following up as outpatient.

## 2019-07-25 NOTE — PROGRESS NOTE ADULT - PROBLEM SELECTOR PLAN 1
Patient presented from oncologist office with low hemoglobin, reports melanotic stool, post-prandial epigastric pain and vomiting for >4 months, episode of anemia during early July admission (Hb 6.9, requiring transfusion to Hb 11.1); patient has also been on lovenox 90mg daily since ischemic stroke in February.  - S/p upper GI endoscopy and cautery of 1.5cm bleeding ulcer  - Continue 8mg/hr pantoprazole drip  - Per GI recs on clear liquid diet  -planning for possible discharge today pending clearance from GI Patient presented from oncologist office with low hemoglobin, reports melanotic stool, post-prandial epigastric pain and vomiting for >4 months, episode of anemia during early July admission (Hb 6.9, requiring transfusion to Hb 11.1); patient has also been on lovenox 90mg daily since ischemic stroke in February.  - S/p upper GI endoscopy and cautery of 1.5cm bleeding ulcer  - was on 8mg/hr pantoprazole drip  - Per GI recs on clear liquid diet  -planning for possible discharge today pending clearance from GI

## 2019-07-25 NOTE — PROGRESS NOTE ADULT - PROBLEM SELECTOR PLAN 7
Patient has history of HTN, on home metoprolol tartrate 12.5 mg BID and amlodipine 10 mg qd  - Anti-hypertensives held in ED in setting of GI bleed; patient restarted on home dose of metoprolol tartrate (12.5mg BID), but amlodipine held
Patient has history of HTN, on home metoprolol tartrate 12.5 mg BID and amlodipine 10 mg qd  - Anti-hypertensives held in ED in setting of GI bleed  - Ok to restart anti-hypertensive home doses (patient is hemodynamically stable with no evidence of acute bleed)
Patient has history of HTN, on home metoprolol tartrate 12.5 mg BID and amlodipine 10 mg qd  - Anti-hypertensives held in ED in setting of GI bleed; patient restarted on home dose of metoprolol tartrate (12.5mg BID), but amlodipine held
Patient has history of HTN, on home metoprolol tartrate 12.5 mg BID and amlodipine 10 mg qd  - Anti-hypertensives held in ED in setting of GI bleed  - restarted metoprolol today. Will continue to hold amlodipine
Patient has history of HTN, on home metoprolol tartrate 12.5 mg BID and amlodipine 10 mg qd  - Anti-hypertensives held in ED in setting of GI bleed; patient restarted on home dose of metoprolol tartrate (12.5mg BID), but amlodipine held
Patient has history of HTN, on home metoprolol tartrate 12.5 mg BID and amlodipine 10 mg qd  - Anti-hypertensives held in ED in setting of GI bleed; patient restarted on home dose of metoprolol tartrate (12.5mg BID), but amlodipine held

## 2019-07-25 NOTE — CHART NOTE - NSCHARTNOTEFT_GEN_A_CORE
GI Chart Note    Pt based on chart review and monitoring. Pt s/p endoscopic treatment of bleeding ulcer with epinephrine and cautery. Pt completed course of IV PPI and hgb has been stable. Pt is optimized for discharge from GI standpoint. Please discharge on ppi BID for 6-8 weeks and follow up with GI in clinic for surveillance EGD. Please have patient make appointment for follow up in the GI office ([phone]) with Dr. Keren Peterson, PGY-4  Pager Number: [phone]

## 2019-07-25 NOTE — PROGRESS NOTE ADULT - PROBLEM SELECTOR PLAN 2
Patient presented with Hb of 7.7, most likely 2/2 upper GI bleed; improved to 9.2 following RBC transfusion.  - Patient received 2 units PRBCs in ED  - Maintain 2 large bore IVs, active type and screen  - Trend CBC BID per GI recommendations  - Transfuse PRBCs for Hb <8 per GI recs or symptomatic anemia  -Hgb has been stable >10 Patient presented with Hb of 7.7, most likely 2/2 upper GI bleed; improved to 9.2 following RBC transfusion.  - Patient received 2 units PRBCs in ED  - Hb is stable   - Transfuse PRBCs for Hb <8 per GI recs or symptomatic anemia

## 2019-07-25 NOTE — PROGRESS NOTE ADULT - PROBLEM SELECTOR PLAN 4
Patient has history of CVA, most recently a right MCA stroke in 2/19 s/p thrombectomy. He is currently receiving lovenox 90mg sq at home for stroke prevention and for hx of marantic endocarditis (s/p TTE showing no significant findings).  - Hold AC for now 2/2 active GI bleed and anemia  - Consult cardiology and GI for evaluation and AC selection  - Continue home dose carbamazepine
Patient has history of CVA, most recently a right MCA stroke in 2/19 s/p thrombectomy. He is currently receiving lovenox 90mg sq at home for stroke prevention and for hx of marantic endocarditis (s/p TTE showing no significant findings).  - Hold AC for now 2/2 active GI bleed and anemia  - Consult cardiology for evaluation and AC selection  - TTE with definity for evaluation of marantic endocarditis  - Continue home dose carbamazepine
Patient has history of CVA, most recently a right MCA stroke in 2/19 s/p thrombectomy. He is currently receiving lovenox 90mg sq at home for stroke prevention and for hx of marantic endocarditis (s/p TTE showing no significant findings).  - Hold AC for now 2/2 active GI bleed and anemia  - Consult cardiology for evaluation and AC selection  - Continue home dose carbamazepine  -No acute need for TTE at this time to evaluate for marantic endocarditis. Will recommend following up as an outpatient.
Patient has history of CVA, most recently a right MCA stroke in 2/19 s/p thrombectomy. He is currently receiving lovenox 90mg sq at home for stroke prevention and for hx of marantic endocarditis (s/p TTE showing no significant findings).  - Hold AC for now 2/2 active GI bleed and anemia  - Consult cardiology for evaluation and AC selection  - TTE with definity for evaluation of marantic endocarditis  - Continue home dose carbamazepine
Patient has history of CVA, most recently a right MCA stroke in 2/19 s/p thrombectomy. He is currently receiving lovenox 90mg sq at home for stroke prevention and for hx of marantic endocarditis (s/p TTE showing no significant findings).  - Hold AC for now 2/2 active GI bleed and anemia  - Consult cardiology for evaluation and AC selection  - TTE with definity for evaluation of possible marantic endocarditis  - Continue home dose carbamazepine
Patient has history of CVA, most recently a right MCA stroke in 2/19 s/p thrombectomy. He is currently receiving lovenox 90mg sq at home for stroke prevention and for hx of marantic endocarditis (s/p TTE showing no significant findings).  - Hold AC for now 2/2 active GI bleed and anemia  - Consult cardiology for evaluation and AC selection  - Continue home dose carbamazepine  -No acute need for TTE at this time to evaluate for marantic endocarditis. Will recommend following up as an outpatient.

## 2019-07-25 NOTE — PROGRESS NOTE ADULT - PROBLEM SELECTOR PLAN 3
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 Was set to begin first cycle of chemotherapy with avastin, sorafenib, and nivolumab immunotherapy on 7/24 AM but will be postponed for management of GI bleed. Outpatient oncologist is Dr. Anmol Daugherty, who was consulted on 7/23 regarding patient status.  - Oncology in-house following  - Consult palliative care and psychiatry for patient evaluation as he expresses concern and anxiety regarding his diagnosis  - Continue to hold sorafenib in context of GI bleed/anemia
Patient has multifocal metastatic disease, with primary cancer in the prostate. He is known to have metastases to bone (s/p right tibial resection this month) and liver (unclear etiology, may be prostate metastases or mets from other primary cancer). Currently receiving Sorafenib, direct chemotherapy to liver, and radiation therapy with Dr. Daugherty and Dr. Weeks.   - Consult Dr. Daugherty and Dr. Weeks to confirm cancer etiology and treatment history  - Consult oncology in-house to follow  - Consult palliative care  - Continue to hold sorafenib
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 Was set to begin first cycle of chemotherapy with avastin, sorafenib, and nivolumab immunotherapy on 7/24 AM but will be postponed for management of GI bleed. Outpatient oncologist is Dr. Anmol Daugherty, who was consulted on 7/23 regarding patient status.  - Oncology in-house following  - Continue to hold sorafenib in context of GI bleed/anemia
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 Was set to begin first cycle of chemotherapy with avastin, sorafenib, and nivolumab immunotherapy on 7/24 AM but will be postponed for management of GI bleed. Outpatient oncologist is Dr. Anmol Daugherty, who was consulted on 7/23 regarding patient status.  - Oncology in-house following  - Consult palliative care  - Continue to hold sorafenib in context of GI bleed/anemia
Per outpatient oncologist pt has a history of prostate cancer that is s/p treatment and non-active. Pt also has a second primary multifocal HCC, now with mets to bone.  Currently receiving Sorafenib, direct chemotherapy to liver, and radiation therapy with Dr. Daugherty and Dr. Weeks.  -Planned for outpatient chemo/immunotherapy on Wednesday 7/24   - Consult Dr. Daugherty and Dr. Weeks to confirm cancer etiology and treatment history  - Consult oncology in-house to follow  - Consult palliative care  - Continue to hold sorafenib
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 Was set to begin first cycle of chemotherapy with avastin, sorafenib, and nivolumab immunotherapy on 7/24 AM but will be postponed for management of GI bleed. Outpatient oncologist is Dr. Anmol Daugherty, who was consulted on 7/23 regarding patient status.  - Oncology in-house following  - Consult palliative care  - Continue to hold sorafenib in context of GI bleed/anemia

## 2019-07-25 NOTE — PROGRESS NOTE ADULT - PROVIDER SPECIALTY LIST ADULT
Gastroenterology
Internal Medicine

## 2019-07-25 NOTE — PROGRESS NOTE ADULT - SUBJECTIVE AND OBJECTIVE BOX
Mukesh Soliman  Internal Medicine PGY-1  Pager: [phone] / [phone]      PATIENT:  SHER LILLY  [id number]    INTERVAL HISTORY/OVERNIGHT EVENTS:      SUBJECTIVE:  Pt reports a bowel movement that was black. He has not had any diarrhea or BRBPR. Denies abdominal pain. Pt expressing anxiety about his diagnosis and prognosis today.     OBJECTIVE:    T(C): 36.6 (07-25-19 @ 06:06), Max: 36.6 (07-24-19 @ 21:29)  HR: 83 (07-25-19 @ 06:06) (81 - 98)  BP: 151/96 (07-25-19 @ 06:06) (145/91 - 162/91)  RR: 18 (07-25-19 @ 06:06) (18 - 18)  SpO2: 94% (07-25-19 @ 06:06) (94% - 96%)      07-24-19 @ 07:01  -  07-25-19 @ 07:00  --------------------------------------------------------  IN: 810 mL / OUT: 250 mL / NET: 560 mL          PHYSICAL EXAM:  General: NAD, resting comfortably, tearful at times during evaluation due to concern about prognosis  HEENR: no conjunctival erythema; notes decreased vision and "darkness" in left eye since stroke; mucus membranes moist  Neck: Neck supple, no lymphadenopathy, thyromegaly, or JVD noted  Respiratory: CTA B/L, no wheezing, rales, rhonchi  CV: RRR; no murmurs, rubs, gallops appreciated  Abdominal: +diffuse lower abdominal tenderness; soft, non-distended, normoactive bowel sounds  MSK: no focal weakness  Extremities: No edema, 2+ peripheral pulses; dressing and brace present and intact on RLE  Neurology: A&Ox3, no focal neurologic deficits  Skin: No rashes, hematoma, ecchymosis            LABS:                          10.1   4.3   )-----------( 66       ( 24 Jul 2019 18:26 )             29.9     07-25    132<L>  |  96  |  10  ----------------------------<  120<H>  3.9   |  25  |  1.19    Ca    8.4      25 Jul 2019 06:47  Phos  2.1     07-25  Mg     1.9     07-25                              IMAGING:      MEDICATIONS:  MEDICATIONS  (STANDING):  atorvastatin 40 milliGRAM(s) Oral at bedtime  carBAMazepine Chewable 100 milliGRAM(s) Chew two times a day  dextrose 5%. 1000 milliLiter(s) (50 mL/Hr) IV Continuous <Continuous>  dextrose 50% Injectable 12.5 Gram(s) IV Push once  dextrose 50% Injectable 25 Gram(s) IV Push once  dextrose 50% Injectable 25 Gram(s) IV Push once  insulin lispro (HumaLOG) corrective regimen sliding scale   SubCutaneous three times a day with meals  insulin lispro (HumaLOG) corrective regimen sliding scale   SubCutaneous three times a day before meals  insulin lispro (HumaLOG) corrective regimen sliding scale   SubCutaneous at bedtime  lactated ringers. 1000 milliLiter(s) (50 mL/Hr) IV Continuous <Continuous>  melatonin 5 milliGRAM(s) Oral at bedtime  metoprolol tartrate 12.5 milliGRAM(s) Oral two times a day  pantoprazole Infusion 8 mG/Hr (10 mL/Hr) IV Continuous <Continuous>  tamsulosin 0.8 milliGRAM(s) Oral at bedtime    MEDICATIONS  (PRN):  acetaminophen   Tablet .. 650 milliGRAM(s) Oral every 6 hours PRN Mild Pain (1 - 3)  dextrose 40% Gel 15 Gram(s) Oral once PRN Blood Glucose LESS THAN 70 milliGRAM(s)/deciliter  docusate sodium 100 milliGRAM(s) Oral three times a day PRN Constipation  glucagon  Injectable 1 milliGRAM(s) IntraMuscular once PRN Glucose LESS THAN 70 milligrams/deciliter  HYDROmorphone  Injectable 0.5 milliGRAM(s) IV Push every 4 hours PRN Severe Pain (7 - 10)  HYDROmorphone  Injectable 0.25 milliGRAM(s) IV Push every 2 hours PRN Breakthrough pain  senna 2 Tablet(s) Oral at bedtime PRN Constipation  traMADol 50 milliGRAM(s) Oral every 6 hours PRN Moderate Pain (4 - 6)

## 2019-07-25 NOTE — DISCHARGE NOTE PROVIDER - PROVIDER TOKENS
PROVIDER:[TOKEN:[[id number]]] PROVIDER:[TOKEN:[[id number]]],PROVIDER:[TOKEN:[[id number]]]

## 2019-07-25 NOTE — PROGRESS NOTE ADULT - PROBLEM SELECTOR PROBLEM 3
Hepatocellular carcinoma
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
Hepatocellular carcinoma
Hepatocellular carcinoma
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
Hepatocellular carcinoma

## 2019-07-25 NOTE — PROGRESS NOTE ADULT - PROBLEM SELECTOR PLAN 6
Patient has history of DM2, with last A1C of 5.8, on home metformin.  - Continue with insulin sliding scale during admission

## 2019-07-25 NOTE — PROGRESS NOTE ADULT - ATTENDING COMMENTS
Patient seen and examined . No events. No further melena. HB is stable. HD stable. Dr. Hatch has spoken to the GI fellow and the GI team is ok with going home.   to hold Lovenox for now. avoid NSAID. continue Protonix 40 mg BID s/p 72 hours of PPI drip and repeat CBC as an out patient.  Out patient follow up with PMD , Oncology and orthopedics.   I d/w patient and called his daughter and discussed the course and plan of care. They agree with the plan   I called his PMD Dr. Coats at  and d/w him the hospital course. he would see the patient on Monday   Discharge time 35 minutes Patient seen and examined . No events. No further melena. HB is stable. HD stable. Dr. Hatch has spoken to the GI fellow and the GI team is ok with going home.   to hold Lovenox for now. avoid NSAID. continue Protonix 40 mg BID s/p 72 hours of PPI drip and repeat CBC as an out patient.  Out patient follow up with PMD , Oncology and orthopedics and neurologist.  I d/w patient and called his daughter and discussed the course and plan of care. They agree with the plan   I called his PMD Dr. Coats at  and d/w him the hospital course. he would see the patient on Monday   Discharge time 35 minutes

## 2019-07-25 NOTE — PROGRESS NOTE ADULT - PROBLEM/PLAN-7
Yes
DISPLAY PLAN FREE TEXT

## 2019-07-25 NOTE — DISCHARGE NOTE PROVIDER - CARE PROVIDERS DIRECT ADDRESSES
[email or web address]

## 2019-07-25 NOTE — PROGRESS NOTE ADULT - PROBLEM SELECTOR PLAN 9
- DVT - hold AC in setting of GI bleed; place compression prophylaxis device on LLE (RLE has dressing and brace s/p tibial resection)  - Diet - maintain NPO pending GI eval.
- DVT - hold AC in setting of GI bleed; place compression prophylaxis device on LLE (RLE has dressing and brace s/p tibial resection)  - Diet - maintain clear liquid diet pending GI eval.
- DVT - hold AC in setting of GI bleed; place compression prophylaxis device on LLE (RLE has dressing and brace s/p tibial resection)  - Diet - maintain NPO pending GI eval.

## 2019-07-26 PROCEDURE — 74177 CT ABD & PELVIS W/CONTRAST: CPT

## 2019-07-26 PROCEDURE — 36430 TRANSFUSION BLD/BLD COMPNT: CPT

## 2019-07-26 PROCEDURE — 83540 ASSAY OF IRON: CPT

## 2019-07-26 PROCEDURE — 71046 X-RAY EXAM CHEST 2 VIEWS: CPT

## 2019-07-26 PROCEDURE — 85027 COMPLETE CBC AUTOMATED: CPT

## 2019-07-26 PROCEDURE — 86850 RBC ANTIBODY SCREEN: CPT

## 2019-07-26 PROCEDURE — P9016: CPT

## 2019-07-26 PROCEDURE — 86901 BLOOD TYPING SEROLOGIC RH(D): CPT

## 2019-07-26 PROCEDURE — 82746 ASSAY OF FOLIC ACID SERUM: CPT

## 2019-07-26 PROCEDURE — 80048 BASIC METABOLIC PNL TOTAL CA: CPT

## 2019-07-26 PROCEDURE — 86923 COMPATIBILITY TEST ELECTRIC: CPT

## 2019-07-26 PROCEDURE — 96375 TX/PRO/DX INJ NEW DRUG ADDON: CPT

## 2019-07-26 PROCEDURE — 99285 EMERGENCY DEPT VISIT HI MDM: CPT | Mod: 25

## 2019-07-26 PROCEDURE — 83735 ASSAY OF MAGNESIUM: CPT

## 2019-07-26 PROCEDURE — 97161 PT EVAL LOW COMPLEX 20 MIN: CPT

## 2019-07-26 PROCEDURE — 83010 ASSAY OF HAPTOGLOBIN QUANT: CPT

## 2019-07-26 PROCEDURE — 80053 COMPREHEN METABOLIC PANEL: CPT

## 2019-07-26 PROCEDURE — 84100 ASSAY OF PHOSPHORUS: CPT

## 2019-07-26 PROCEDURE — 82272 OCCULT BLD FECES 1-3 TESTS: CPT

## 2019-07-26 PROCEDURE — 83550 IRON BINDING TEST: CPT

## 2019-07-26 PROCEDURE — 82962 GLUCOSE BLOOD TEST: CPT

## 2019-07-26 PROCEDURE — 85730 THROMBOPLASTIN TIME PARTIAL: CPT

## 2019-07-26 PROCEDURE — 85610 PROTHROMBIN TIME: CPT

## 2019-07-26 PROCEDURE — 83615 LACTATE (LD) (LDH) ENZYME: CPT

## 2019-07-26 PROCEDURE — 82607 VITAMIN B-12: CPT

## 2019-07-26 PROCEDURE — 96374 THER/PROPH/DIAG INJ IV PUSH: CPT

## 2019-07-26 PROCEDURE — 86900 BLOOD TYPING SEROLOGIC ABO: CPT

## 2019-07-30 ENCOUNTER — APPOINTMENT (OUTPATIENT)
Dept: GASTROENTEROLOGY | Facility: CLINIC | Age: 79
End: 2019-07-30
Payer: MEDICARE

## 2019-07-30 VITALS
WEIGHT: 135 LBS | HEIGHT: 65 IN | DIASTOLIC BLOOD PRESSURE: 70 MMHG | SYSTOLIC BLOOD PRESSURE: 120 MMHG | OXYGEN SATURATION: 99 % | TEMPERATURE: 98.7 F | HEART RATE: 68 BPM | BODY MASS INDEX: 22.49 KG/M2

## 2019-07-30 DIAGNOSIS — E11.9 TYPE 2 DIABETES MELLITUS W/OUT COMPLICATIONS: ICD-10-CM

## 2019-07-30 DIAGNOSIS — I63.512 CEREBRAL INFARCTION DUE TO UNSPECIFIED OCCLUSION OR STENOSIS OF LEFT MIDDLE CEREBRAL ARTERY: ICD-10-CM

## 2019-07-30 DIAGNOSIS — C61 MALIGNANT NEOPLASM OF PROSTATE: ICD-10-CM

## 2019-07-30 DIAGNOSIS — R14.1 GAS PAIN: ICD-10-CM

## 2019-07-30 DIAGNOSIS — C22.9 MALIGNANT NEOPLASM OF LIVER, NOT SPECIFIED AS PRIMARY OR SECONDARY: ICD-10-CM

## 2019-07-30 DIAGNOSIS — C78.7 MALIGNANT NEOPLASM OF PROSTATE: ICD-10-CM

## 2019-07-30 PROCEDURE — 99215 OFFICE O/P EST HI 40 MIN: CPT

## 2019-07-30 RX ORDER — PANTOPRAZOLE 40 MG/1
40 TABLET, DELAYED RELEASE ORAL
Qty: 60 | Refills: 0 | Status: ACTIVE | COMMUNITY
Start: 2019-07-25

## 2019-07-30 RX ORDER — TERAZOSIN 5 MG/1
5 CAPSULE ORAL
Qty: 30 | Refills: 0 | Status: ACTIVE | COMMUNITY
Start: 2019-07-30

## 2019-07-30 RX ORDER — OXYCODONE 5 MG/1
5 TABLET ORAL
Qty: 42 | Refills: 0 | Status: ACTIVE | COMMUNITY
Start: 2019-07-15

## 2019-07-30 RX ORDER — TRAMADOL HYDROCHLORIDE 50 MG/1
50 TABLET, COATED ORAL
Qty: 28 | Refills: 0 | Status: ACTIVE | COMMUNITY
Start: 2019-07-15

## 2019-07-30 RX ORDER — BICALUTAMIDE 50 MG/1
50 TABLET ORAL
Refills: 0 | Status: COMPLETED | COMMUNITY
End: 2019-07-30

## 2019-07-30 RX ORDER — BEVACIZUMAB 400 MG/16ML
400 INJECTION, SOLUTION INTRAVENOUS
Qty: 64 | Refills: 0 | Status: ACTIVE | COMMUNITY
Start: 2019-07-18

## 2019-07-30 RX ORDER — NIVOLUMAB 10 MG/ML
240 INJECTION INTRAVENOUS
Qty: 48 | Refills: 0 | Status: ACTIVE | COMMUNITY
Start: 2019-07-18

## 2019-07-30 RX ORDER — SIMETHICONE 125 MG/1
125 TABLET, CHEWABLE ORAL
Qty: 1 | Refills: 0 | Status: ACTIVE | OUTPATIENT
Start: 2019-07-30

## 2019-07-30 RX ORDER — ENOXAPARIN SODIUM 100 MG/ML
100 INJECTION SUBCUTANEOUS DAILY
Qty: 30 | Refills: 2 | Status: DISCONTINUED | COMMUNITY
Start: 2019-04-02 | End: 2019-07-30

## 2019-07-30 RX ORDER — FAMOTIDINE 20 MG/1
20 TABLET, FILM COATED ORAL
Qty: 30 | Refills: 3 | Status: ACTIVE | OUTPATIENT
Start: 2019-07-30

## 2019-07-30 RX ORDER — TERAZOSIN 10 MG/1
10 CAPSULE ORAL
Refills: 0 | Status: DISCONTINUED | COMMUNITY
End: 2019-07-30

## 2019-07-30 RX ORDER — ENOXAPARIN SODIUM 100 MG/ML
60 INJECTION SUBCUTANEOUS
Qty: 60 | Refills: 2 | Status: DISCONTINUED | COMMUNITY
Start: 2019-03-19 | End: 2019-07-30

## 2019-07-31 ENCOUNTER — APPOINTMENT (OUTPATIENT)
Dept: ORTHOPEDIC SURGERY | Facility: CLINIC | Age: 79
End: 2019-07-31
Payer: MEDICARE

## 2019-07-31 DIAGNOSIS — C61 MALIGNANT NEOPLASM OF PROSTATE: ICD-10-CM

## 2019-07-31 PROCEDURE — 99024 POSTOP FOLLOW-UP VISIT: CPT

## 2019-07-31 PROCEDURE — 73560 X-RAY EXAM OF KNEE 1 OR 2: CPT | Mod: RT

## 2019-07-31 NOTE — HISTORY OF PRESENT ILLNESS
[de-identified] : Patient was seen today in followup 2 weeks following exploration white intralesional curettage and cementing of a metastatic prostate carcinoma involving the right proximal tibia the surgical procedure was without any indication from the pain has subsided following surgery and he states patient is in quite good [de-identified] : On examination surgical wound healing nice patient again name for active extension and flexion up to about 80° no swelling no palpable last x-ray demonstrated with sedimentation of the proximal tibia and patient is very pleased from the surgical outcome patient will be followed and was seen again in 4 months for followup

## 2019-08-02 NOTE — PHYSICAL EXAM
[General Appearance - Alert] : alert [General Appearance - In No Acute Distress] : in no acute distress [Neck Appearance] : the appearance of the neck was normal [Neck Cervical Mass (___cm)] : no neck mass was observed [Thyroid Diffuse Enlargement] : the thyroid was not enlarged [Jugular Venous Distention Increased] : there was no jugular-venous distention [Thyroid Nodule] : there were no palpable thyroid nodules [Auscultation Breath Sounds / Voice Sounds] : lungs were clear to auscultation bilaterally [Heart Rate And Rhythm] : heart rate was normal and rhythm regular [Heart Sounds Gallop] : no gallops [Heart Sounds] : normal S1 and S2 [Heart Sounds Pericardial Friction Rub] : no pericardial rub [Murmurs] : no murmurs [Abdomen Soft] : soft [Bowel Sounds] : normal bowel sounds [Abdomen Tenderness] : non-tender [] : no hepato-splenomegaly [Cervical Lymph Nodes Enlarged Posterior Bilaterally] : posterior cervical [Abdomen Mass (___ Cm)] : no abdominal mass palpated [Cervical Lymph Nodes Enlarged Anterior Bilaterally] : anterior cervical [Supraclavicular Lymph Nodes Enlarged Bilaterally] : supraclavicular [No CVA Tenderness] : no ~M costovertebral angle tenderness [No Spinal Tenderness] : no spinal tenderness

## 2019-08-02 NOTE — ASSESSMENT
[FreeTextEntry1] : Will speak with Neurology. In view of recent GI bleed from acute  while on Lovenox suggest holding the Lovenox until repeat EGD and healing of the ulcer is demonstrated. Discussed with he patients son and he is aware of the risk of CVA off the Lovenox. However, considering his recent significant GI bleed remaining on the Lovenox would likely result in recurrent bleeding.

## 2019-08-02 NOTE — HISTORY OF PRESENT ILLNESS
[de-identified] : 79 year old man with HTN, DM, HCC on RT and CVA s/p cerebral embolectomy. He was on Lovenox for stroke prevention. who was recently hospitalized for UGI bleed 7/22-25. He was found to have an acute  with oozing that was treated. He has no further evidence of bleeding and has remained off his Lovenox. He denies rectal bleeding, melena or hematemesis since discharge. He complains of abdominal bloating

## 2019-08-12 ENCOUNTER — OTHER (OUTPATIENT)
Age: 79
End: 2019-08-12

## 2019-08-12 ENCOUNTER — APPOINTMENT (OUTPATIENT)
Dept: GASTROENTEROLOGY | Facility: CLINIC | Age: 79
End: 2019-08-12
Payer: MEDICARE

## 2019-08-12 ENCOUNTER — MOBILE ON CALL (OUTPATIENT)
Age: 79
End: 2019-08-12

## 2019-08-12 VITALS
WEIGHT: 130 LBS | OXYGEN SATURATION: 98 % | RESPIRATION RATE: 18 BRPM | DIASTOLIC BLOOD PRESSURE: 70 MMHG | BODY MASS INDEX: 21.66 KG/M2 | SYSTOLIC BLOOD PRESSURE: 110 MMHG | HEIGHT: 65 IN | TEMPERATURE: 97.6 F | HEART RATE: 95 BPM

## 2019-08-12 DIAGNOSIS — K25.0 ACUTE GASTRIC ULCER WITH HEMORRHAGE: ICD-10-CM

## 2019-08-12 PROCEDURE — 99213 OFFICE O/P EST LOW 20 MIN: CPT

## 2019-08-12 PROCEDURE — 99203 OFFICE O/P NEW LOW 30 MIN: CPT

## 2019-08-12 NOTE — PHYSICAL EXAM
[Sclera] : the sclera and conjunctiva were normal [General Appearance - In No Acute Distress] : in no acute distress [General Appearance - Alert] : alert [Outer Ear] : the ears and nose were normal in appearance [Auscultation Breath Sounds / Voice Sounds] : lungs were clear to auscultation bilaterally [Heart Rate And Rhythm] : heart rate was normal and rhythm regular [Heart Sounds] : normal S1 and S2 [Heart Sounds Gallop] : no gallops [Murmurs] : no murmurs [Heart Sounds Pericardial Friction Rub] : no pericardial rub [Edema] : there was no peripheral edema [Abdomen Soft] : soft [Bowel Sounds] : normal bowel sounds [] : no hepato-splenomegaly [Abdomen Tenderness] : non-tender [Abdomen Mass (___ Cm)] : no abdominal mass palpated [Skin Color & Pigmentation] : normal skin color and pigmentation [No Focal Deficits] : no focal deficits

## 2019-08-12 NOTE — HISTORY OF PRESENT ILLNESS
[FreeTextEntry1] : This is a pleasant 79-year-old man with history of diabetes, hypertension, hepatocellular carcinoma stage IV, status post embolectomy, was on Lovenox but was admitted to [facility] July 22 with UGI bleed. An upper endoscopy was performed on July 23 showing a large prepyloric gastric ulcer with stigmata of recent bleeding and oozing blood. The ulcer was treated endoscopically With epinephrine injection and bipolar coagulation. He was monitored for a few days in the hospital and discharged in stable condition. He is now here for followup. He states have a loss of appetite. He denies heartburn symptoms. He denies abdominal pain, nausea or vomiting. He denies changes in bowel habits. He denies rectal bleeding or melena. He is currently taking pantoprazole 40 mg twice a day. He is off anticoagulation therapy. He is accompanied by his son. He reports undergoing blood work today by his oncologist and was told to have a hemoglobin above 9. Discharge hemoglobin was 9.7.

## 2019-08-12 NOTE — ASSESSMENT
[FreeTextEntry1] : This is a 79-year-old man with history of stage IV hepatocellular carcinoma on Lovenox for prophylaxis admitted to [place] 3 weeks ago with an upper GI bleed and was found to have a large gastric ulcer with stigmata of recent bleeding. The ulcer was treated endoscopically. The question is whether or not he can go back on Lovenox. I recommend repeating an upper endoscopy to reevaluate the ulcer. I explained to the patient and his son the risks, alternatives and benefits to an upper endoscopy. Questions were answered. He stated understanding.

## 2019-08-14 ENCOUNTER — OUTPATIENT (OUTPATIENT)
Dept: OUTPATIENT SERVICES | Facility: HOSPITAL | Age: 79
LOS: 1 days | End: 2019-08-14
Payer: MEDICARE

## 2019-08-14 VITALS
HEART RATE: 90 BPM | WEIGHT: 128.09 LBS | RESPIRATION RATE: 16 BRPM | SYSTOLIC BLOOD PRESSURE: 109 MMHG | OXYGEN SATURATION: 99 % | TEMPERATURE: 98 F | HEIGHT: 68 IN | DIASTOLIC BLOOD PRESSURE: 75 MMHG

## 2019-08-14 DIAGNOSIS — Z98.890 OTHER SPECIFIED POSTPROCEDURAL STATES: Chronic | ICD-10-CM

## 2019-08-14 DIAGNOSIS — K25.9 GASTRIC ULCER, UNSPECIFIED AS ACUTE OR CHRONIC, WITHOUT HEMORRHAGE OR PERFORATION: ICD-10-CM

## 2019-08-14 DIAGNOSIS — E11.9 TYPE 2 DIABETES MELLITUS WITHOUT COMPLICATIONS: ICD-10-CM

## 2019-08-14 DIAGNOSIS — Z01.818 ENCOUNTER FOR OTHER PREPROCEDURAL EXAMINATION: ICD-10-CM

## 2019-08-14 DIAGNOSIS — K25.0 ACUTE GASTRIC ULCER WITH HEMORRHAGE: ICD-10-CM

## 2019-08-14 PROCEDURE — G0463: CPT

## 2019-08-14 RX ORDER — METFORMIN HYDROCHLORIDE 850 MG/1
1 TABLET ORAL
Qty: 0 | Refills: 0 | DISCHARGE

## 2019-08-14 NOTE — H&P PST ADULT - NEGATIVE CARDIOVASCULAR SYMPTOMS
no chest pain/no paroxysmal nocturnal dyspnea/no orthopnea/no palpitations/no dyspnea on exertion/no peripheral edema/no claudication

## 2019-08-14 NOTE — H&P PST ADULT - NSICDXPROBLEM_GEN_ALL_CORE_FT
4
PROBLEM DIAGNOSES  Problem: Gastric ulcer  Assessment and Plan: EGD ANES  PST instructions provided, patient and son verbalized understanding.   Blood work 8/6/19 on the chart   recent hospitalization , EKG in [facility], ECHO [facility]     Problem: Type 2 diabetes mellitus  Assessment and Plan: FS on admission   Hold Metformin 24 hr prior procedure

## 2019-08-14 NOTE — H&P PST ADULT - NSICDXPASTMEDICALHX_GEN_ALL_CORE_FT
PAST MEDICAL HISTORY:  DM (diabetes mellitus)     Hernia     HLD (hyperlipidemia)     HLD (hyperlipidemia)     HTN (hypertension)     Ischemic stroke 01/2019    Liver cancer stage 4    Prostate CA     Prostate cancer PAST MEDICAL HISTORY:  Anemia     Cancer, metastatic to bone on chemo    DM (diabetes mellitus) type 2, HgA1c 5.8% ( 6/5/19)    Gastric ulcer was hospitalized for GI bleed 7/22/19-7/25/19, taking Protonix    HLD (hyperlipidemia)     HTN (hypertension)     Ischemic stroke 01/2019, no residuals    Liver cancer stage 4    Prostate cancer 2004 radiotherapy seeds, now metastatic to liver and bone

## 2019-08-14 NOTE — H&P PST ADULT - HISTORY OF PRESENT ILLNESS
Mr. Balderrama is a 79-year-old male with a history of HTN, DM II, HLD, CKD III, multiple CVA without residual deficits, including right parietal stroke of unclear etiology on lovenox c/b secondary hemorrhage s/p MCA clot retrieval at [place] on 3/19, metastatic prostate cancer, metastatic adenocarcinoma of unclear etiology, with metastases to liver and bone s/p recent R Tibia Intralesional curettage/bone graft presenting with low H&H from his Oncologist's office.    Pt describes progressive weakness and episode of left temporal headache this morning. Patient was discharged from [facility] July 15 after wide Resection and curettage of proximal tibia lesion, repair with cement and bone graft. He reports weakness since that admission that has not improved. He describes stomach pain that is worse with eating as well as 1 month of dark black soft stools. Also endorses cough w/ phlegm weight loss, and nausea/vomiting. Describes his emesis as "food," son reports pt stated he vomited up phlegm. During last admission (7/6) pt presented with hg of 6.9 and was transfused to 11.1, but has downtrended since. Pt Denies fever/chills, SOB, CP, palpitations, or focal weakness. No sick contacts. Reports Hx of EGD in [place] last year, unsure of results. Pt reports his last colonoscopy was "many years ago".     Received 1u pRBCs and reglan/zofran in the ED Mr. Balderrama is a 79-year-old male with a history of HTN, DM II, HLD, CKD III, multiple CVA without residual deficits, including right parietal stroke on  Lovenox c/b secondary hemorrhage s/p MCA clot retrieval at [place] on 3/19, metastatic prostate cancer, metastatic adenocarcinoma of unclear etiology, with metastases to liver and bone s/p recent R Tibia Intralesional curettage/bone graft ( 7/11/19), patient was hospitalized @ [facility]  (7/22/19-7/25/19 ) due to bleeding Gastric ulcer, Lovenox on hold, received 2 blood transfusions, treated endoscopically, bipolar cautery on 7/23/19, discharged with Hg 9.7. Patient presents to [facility] today for scheduled follow up endoscopy under anesthesia on 8/15/19. Patient is in wheelchair, accompanied by son . Mr. Balderrama is a 79-year-old male with a history of HTN, DM II, HLD, CKD III, multiple CVA without residual deficits, including right parietal stroke on  Lovenox c/b secondary hemorrhage s/p MCA clot retrieval at [place] on 3/19, metastatic prostate cancer, metastatic adenocarcinoma of unclear etiology, with metastases to liver and bone s/p recent R Tibia Intralesional curettage/bone graft ( 7/11/19), patient was hospitalized @ [facility]  (7/22/19-7/25/19 ) due to bleeding Gastric ulcer, Lovenox on hold, received 2 blood transfusions, treated endoscopically, bipolar cautery on 7/23/19, discharged with Hg 9.7. Patient presents to PST today for scheduled follow up endoscopy under anesthesia on 8/15/19 to follow up on gastric ulcer. Patient is in wheelchair, accompanied by son .

## 2019-08-14 NOTE — H&P PST ADULT - NSICDXPASTSURGICALHX_GEN_ALL_CORE_FT
PAST SURGICAL HISTORY:  H/O hernia repair right inguinal hernia with mesh  9/2013    History of right knee surgery 7/2019 PAST SURGICAL HISTORY:  H/O hernia repair right inguinal hernia with mesh  9/2013    History of eye surgery right    History of right knee surgery R Tibia Intralesional curettage/bone graft  7/11/2019

## 2019-08-14 NOTE — H&P PST ADULT - NSANTHOSAYNRD_GEN_A_CORE
No. DALIA screening performed.  STOP BANG Legend: 0-2 = LOW Risk; 3-4 = INTERMEDIATE Risk; 5-8 = HIGH Risk/never tested

## 2019-08-15 ENCOUNTER — OUTPATIENT (OUTPATIENT)
Dept: OUTPATIENT SERVICES | Facility: HOSPITAL | Age: 79
LOS: 1 days | End: 2019-08-15
Payer: MEDICARE

## 2019-08-15 ENCOUNTER — RESULT REVIEW (OUTPATIENT)
Age: 79
End: 2019-08-15

## 2019-08-15 ENCOUNTER — APPOINTMENT (OUTPATIENT)
Dept: GASTROENTEROLOGY | Facility: HOSPITAL | Age: 79
End: 2019-08-15

## 2019-08-15 DIAGNOSIS — Z98.890 OTHER SPECIFIED POSTPROCEDURAL STATES: Chronic | ICD-10-CM

## 2019-08-15 DIAGNOSIS — K25.0 ACUTE GASTRIC ULCER WITH HEMORRHAGE: ICD-10-CM

## 2019-08-15 LAB — GLUCOSE BLDC GLUCOMTR-MCNC: 128 MG/DL — HIGH (ref 70–99)

## 2019-08-15 PROCEDURE — 88312 SPECIAL STAINS GROUP 1: CPT

## 2019-08-15 PROCEDURE — 43239 EGD BIOPSY SINGLE/MULTIPLE: CPT | Mod: GC

## 2019-08-15 PROCEDURE — 88305 TISSUE EXAM BY PATHOLOGIST: CPT

## 2019-08-15 PROCEDURE — 88312 SPECIAL STAINS GROUP 1: CPT | Mod: 26

## 2019-08-15 PROCEDURE — 82962 GLUCOSE BLOOD TEST: CPT

## 2019-08-15 PROCEDURE — 43239 EGD BIOPSY SINGLE/MULTIPLE: CPT

## 2019-08-15 PROCEDURE — 88305 TISSUE EXAM BY PATHOLOGIST: CPT | Mod: 26

## 2019-08-16 LAB — SURGICAL PATHOLOGY STUDY: SIGNIFICANT CHANGE UP

## 2019-08-18 ENCOUNTER — MOBILE ON CALL (OUTPATIENT)
Age: 79
End: 2019-08-18

## 2019-08-21 ENCOUNTER — APPOINTMENT (OUTPATIENT)
Dept: ORTHOPEDIC SURGERY | Facility: CLINIC | Age: 79
End: 2019-08-21
Payer: MEDICARE

## 2019-08-21 ENCOUNTER — APPOINTMENT (OUTPATIENT)
Dept: NEUROLOGY | Facility: CLINIC | Age: 79
End: 2019-08-21
Payer: MEDICARE

## 2019-08-21 DIAGNOSIS — I63.9 CEREBRAL INFARCTION, UNSPECIFIED: ICD-10-CM

## 2019-08-21 PROBLEM — C61 MALIGNANT NEOPLASM OF PROSTATE: Chronic | Status: ACTIVE | Noted: 2019-03-04

## 2019-08-21 PROBLEM — E11.9 TYPE 2 DIABETES MELLITUS WITHOUT COMPLICATIONS: Chronic | Status: ACTIVE | Noted: 2017-06-15

## 2019-08-21 PROBLEM — C79.51 SECONDARY MALIGNANT NEOPLASM OF BONE: Chronic | Status: ACTIVE | Noted: 2019-08-14

## 2019-08-21 PROBLEM — K25.9 GASTRIC ULCER, UNSPECIFIED AS ACUTE OR CHRONIC, WITHOUT HEMORRHAGE OR PERFORATION: Chronic | Status: ACTIVE | Noted: 2019-08-14

## 2019-08-21 PROBLEM — D64.9 ANEMIA, UNSPECIFIED: Chronic | Status: ACTIVE | Noted: 2019-08-14

## 2019-08-21 PROCEDURE — 99215 OFFICE O/P EST HI 40 MIN: CPT

## 2019-08-21 PROCEDURE — 99024 POSTOP FOLLOW-UP VISIT: CPT

## 2019-08-21 NOTE — HISTORY OF PRESENT ILLNESS
[de-identified] : Patient was seen today in followup several weeks post exploration wide intralesional curettage and cementing of metastatic prostate carcinoma and the surgical procedure was without any complications and the patient was sent to a good level of activity at this stage having a burning sensation along the right lower leg. Recently the patient started radiation therapy to the right knee area [de-identified] : On examination today his surgical wound healing nice patient again with motion over the right knee no swelling no palpable mass or patient having kind of a burning sensation along the lower leg. At this stage patient is scheduled to have radiation therapy

## 2019-09-10 ENCOUNTER — APPOINTMENT (OUTPATIENT)
Dept: NEUROLOGY | Facility: CLINIC | Age: 79
End: 2019-09-10

## 2019-10-04 ENCOUNTER — INPATIENT (INPATIENT)
Facility: HOSPITAL | Age: 79
LOS: 4 days | Discharge: HOME | DRG: 543 | End: 2019-10-09
Attending: HOSPITALIST | Admitting: HOSPITALIST
Payer: MEDICARE

## 2019-10-04 VITALS
DIASTOLIC BLOOD PRESSURE: 96 MMHG | TEMPERATURE: 98 F | SYSTOLIC BLOOD PRESSURE: 147 MMHG | RESPIRATION RATE: 17 BRPM | HEART RATE: 83 BPM | WEIGHT: 139.99 LBS | HEIGHT: 66 IN | OXYGEN SATURATION: 94 %

## 2019-10-04 DIAGNOSIS — Z98.890 OTHER SPECIFIED POSTPROCEDURAL STATES: Chronic | ICD-10-CM

## 2019-10-04 LAB
ALBUMIN SERPL ELPH-MCNC: 2.3 G/DL — LOW (ref 3.3–5)
ALP SERPL-CCNC: 245 U/L — HIGH (ref 40–120)
ALT FLD-CCNC: 86 U/L — HIGH (ref 10–45)
ANION GAP SERPL CALC-SCNC: 9 MMOL/L — SIGNIFICANT CHANGE UP (ref 5–17)
APTT BLD: 23.2 SEC — LOW (ref 27.5–36.3)
AST SERPL-CCNC: 104 U/L — HIGH (ref 10–40)
BILIRUB SERPL-MCNC: 0.3 MG/DL — SIGNIFICANT CHANGE UP (ref 0.2–1.2)
BUN SERPL-MCNC: 7 MG/DL — SIGNIFICANT CHANGE UP (ref 7–23)
CALCIUM SERPL-MCNC: 6.8 MG/DL — LOW (ref 8.4–10.5)
CHLORIDE SERPL-SCNC: 104 MMOL/L — SIGNIFICANT CHANGE UP (ref 96–108)
CO2 SERPL-SCNC: 18 MMOL/L — LOW (ref 22–31)
CREAT SERPL-MCNC: 0.98 MG/DL — SIGNIFICANT CHANGE UP (ref 0.5–1.3)
GLUCOSE SERPL-MCNC: 107 MG/DL — HIGH (ref 70–99)
HCT VFR BLD CALC: 29.6 % — LOW (ref 39–50)
HGB BLD-MCNC: 9.2 G/DL — LOW (ref 13–17)
INR BLD: 0.92 RATIO — SIGNIFICANT CHANGE UP (ref 0.88–1.16)
MCHC RBC-ENTMCNC: 29.5 PG — SIGNIFICANT CHANGE UP (ref 27–34)
MCHC RBC-ENTMCNC: 31.1 GM/DL — LOW (ref 32–36)
MCV RBC AUTO: 94.9 FL — SIGNIFICANT CHANGE UP (ref 80–100)
POTASSIUM SERPL-MCNC: 4.6 MMOL/L — SIGNIFICANT CHANGE UP (ref 3.5–5.3)
POTASSIUM SERPL-SCNC: 4.6 MMOL/L — SIGNIFICANT CHANGE UP (ref 3.5–5.3)
PROT SERPL-MCNC: 5.5 G/DL — LOW (ref 6–8.3)
PROTHROM AB SERPL-ACNC: 10.5 SEC — SIGNIFICANT CHANGE UP (ref 10–12.9)
RBC # BLD: 3.12 M/UL — LOW (ref 4.2–5.8)
RBC # FLD: 17.2 % — HIGH (ref 10.3–14.5)
SODIUM SERPL-SCNC: 131 MMOL/L — LOW (ref 135–145)
WBC # BLD: 2.35 K/UL — LOW (ref 3.8–10.5)
WBC # FLD AUTO: 2.35 K/UL — LOW (ref 3.8–10.5)

## 2019-10-04 PROCEDURE — 76705 ECHO EXAM OF ABDOMEN: CPT | Mod: 26

## 2019-10-04 PROCEDURE — 93971 EXTREMITY STUDY: CPT | Mod: 26

## 2019-10-04 PROCEDURE — 99285 EMERGENCY DEPT VISIT HI MDM: CPT | Mod: GC

## 2019-10-04 NOTE — ED ADULT NURSE NOTE - PSH
H/O hernia repair  right inguinal hernia with mesh  9/2013  History of eye surgery  right  History of right knee surgery  R Tibia Intralesional curettage/bone graft  7/11/2019

## 2019-10-04 NOTE — ED ADULT NURSE NOTE - PAIN RATING/NUMBER SCALE (0-10): REST
If it was the ibuprofen that triggered the hives, they should clear within 24-48 hours of stopping it. It is fine to keep using the Benadryl. If that is causing too much drowsiness she could switch to Zyrtec which is nonsedating. If it is not clearing by tomorrow, she should probably be seen to determine if it something else causing the hives   3

## 2019-10-04 NOTE — ED ADULT NURSE NOTE - PMH
Anemia    Cancer, metastatic to bone  on chemo  DM (diabetes mellitus)  type 2, HgA1c 5.8% ( 6/5/19)  Gastric ulcer  was hospitalized for GI bleed 7/22/19-7/25/19, taking Protonix  HLD (hyperlipidemia)    HTN (hypertension)    Ischemic stroke  01/2019, no residuals  Liver cancer  stage 4  Prostate cancer  2004 radiotherapy seeds, now metastatic to liver and bone

## 2019-10-04 NOTE — ED ADULT NURSE NOTE - CHPI ED NUR SYMPTOMS NEG
no decreased eating/drinking/no nausea/no tingling/no weakness/no pain/no vomiting/no dizziness/no fever/no chills

## 2019-10-04 NOTE — ED ADULT NURSE NOTE - OBJECTIVE STATEMENT
80 yo M w/ PMHx of prostate/liver CA, DM, HLD, HTN, CVA w/ residual deficits presents to ED via EMS from home c/o RLE sweeling. Pt states he had R knee surgery to "clean out" the joint, since then has experienced mild pain and swelling. Over past few days pt states he has noticed increased swelling of RLE, denies increase in pain. RLE noted to be larger in size than LLE distal to hip, no warmth or redness noted. +pedal pulses b/l, cap refill < 2 secs, gross neuro intact. Pt denies any difficulty ambulating. Pt currently receiving chemo q2 weeks, last tx 10/1/19. Pt denies any CP, SOB, N/V, fever, chills, urinary complaints, constipation, diarrhea, HA, dizziness, weakness. Pt A&Ox4, lungs CTA, +central pulses. Abdomen soft, not tender, not distended. Ambulating w/ steady gait, safety and comfort maintained, no acute distress noted at this time.

## 2019-10-05 DIAGNOSIS — E11.9 TYPE 2 DIABETES MELLITUS WITHOUT COMPLICATIONS: ICD-10-CM

## 2019-10-05 DIAGNOSIS — M79.89 OTHER SPECIFIED SOFT TISSUE DISORDERS: ICD-10-CM

## 2019-10-05 DIAGNOSIS — I63.9 CEREBRAL INFARCTION, UNSPECIFIED: ICD-10-CM

## 2019-10-05 DIAGNOSIS — C79.51 SECONDARY MALIGNANT NEOPLASM OF BONE: ICD-10-CM

## 2019-10-05 DIAGNOSIS — D61.818 OTHER PANCYTOPENIA: ICD-10-CM

## 2019-10-05 LAB
ALBUMIN SERPL ELPH-MCNC: 2.5 G/DL — LOW (ref 3.3–5)
ALP SERPL-CCNC: 258 U/L — HIGH (ref 40–120)
ALT FLD-CCNC: 78 U/L — HIGH (ref 10–45)
ANION GAP SERPL CALC-SCNC: 12 MMOL/L — SIGNIFICANT CHANGE UP (ref 5–17)
ANISOCYTOSIS BLD QL: SLIGHT — SIGNIFICANT CHANGE UP
ANISOCYTOSIS BLD QL: SLIGHT — SIGNIFICANT CHANGE UP
AST SERPL-CCNC: 89 U/L — HIGH (ref 10–40)
BASOPHILS # BLD AUTO: 0 K/UL — SIGNIFICANT CHANGE UP (ref 0–0.2)
BASOPHILS # BLD AUTO: 0.01 K/UL — SIGNIFICANT CHANGE UP (ref 0–0.2)
BASOPHILS NFR BLD AUTO: 0 % — SIGNIFICANT CHANGE UP (ref 0–2)
BASOPHILS NFR BLD AUTO: 0.4 % — SIGNIFICANT CHANGE UP (ref 0–2)
BILIRUB SERPL-MCNC: 0.3 MG/DL — SIGNIFICANT CHANGE UP (ref 0.2–1.2)
BLD GP AB SCN SERPL QL: NEGATIVE — SIGNIFICANT CHANGE UP
BUN SERPL-MCNC: 8 MG/DL — SIGNIFICANT CHANGE UP (ref 7–23)
BURR CELLS BLD QL SMEAR: PRESENT — SIGNIFICANT CHANGE UP
CALCIUM SERPL-MCNC: 7.2 MG/DL — LOW (ref 8.4–10.5)
CHLORIDE SERPL-SCNC: 101 MMOL/L — SIGNIFICANT CHANGE UP (ref 96–108)
CO2 SERPL-SCNC: 21 MMOL/L — LOW (ref 22–31)
CREAT SERPL-MCNC: 1.01 MG/DL — SIGNIFICANT CHANGE UP (ref 0.5–1.3)
DACRYOCYTES BLD QL SMEAR: SLIGHT — SIGNIFICANT CHANGE UP
EOSINOPHIL # BLD AUTO: 0 K/UL — SIGNIFICANT CHANGE UP (ref 0–0.5)
EOSINOPHIL # BLD AUTO: 0.01 K/UL — SIGNIFICANT CHANGE UP (ref 0–0.5)
EOSINOPHIL NFR BLD AUTO: 0 % — SIGNIFICANT CHANGE UP (ref 0–6)
EOSINOPHIL NFR BLD AUTO: 0.4 % — SIGNIFICANT CHANGE UP (ref 0–6)
GLUCOSE SERPL-MCNC: 125 MG/DL — HIGH (ref 70–99)
HCT VFR BLD CALC: 32.1 % — LOW (ref 39–50)
HGB BLD-MCNC: 9.6 G/DL — LOW (ref 13–17)
HIV 1+2 AB+HIV1 P24 AG SERPL QL IA: SIGNIFICANT CHANGE UP
HYPOCHROMIA BLD QL: SLIGHT — SIGNIFICANT CHANGE UP
HYPOCHROMIA BLD QL: SLIGHT — SIGNIFICANT CHANGE UP
IMM GRANULOCYTES NFR BLD AUTO: 0.4 % — SIGNIFICANT CHANGE UP (ref 0–1.5)
LYMPHOCYTES # BLD AUTO: 0.18 K/UL — LOW (ref 1–3.3)
LYMPHOCYTES # BLD AUTO: 0.21 K/UL — LOW (ref 1–3.3)
LYMPHOCYTES # BLD AUTO: 7.6 % — LOW (ref 13–44)
LYMPHOCYTES # BLD AUTO: 9 % — LOW (ref 13–44)
MAGNESIUM SERPL-MCNC: 1.6 MG/DL — SIGNIFICANT CHANGE UP (ref 1.6–2.6)
MANUAL SMEAR VERIFICATION: SIGNIFICANT CHANGE UP
MANUAL SMEAR VERIFICATION: SIGNIFICANT CHANGE UP
MCHC RBC-ENTMCNC: 28.7 PG — SIGNIFICANT CHANGE UP (ref 27–34)
MCHC RBC-ENTMCNC: 29.9 GM/DL — LOW (ref 32–36)
MCV RBC AUTO: 96.1 FL — SIGNIFICANT CHANGE UP (ref 80–100)
MONOCYTES # BLD AUTO: 0.24 K/UL — SIGNIFICANT CHANGE UP (ref 0–0.9)
MONOCYTES # BLD AUTO: 0.32 K/UL — SIGNIFICANT CHANGE UP (ref 0–0.9)
MONOCYTES NFR BLD AUTO: 10 % — SIGNIFICANT CHANGE UP (ref 2–14)
MONOCYTES NFR BLD AUTO: 13.5 % — SIGNIFICANT CHANGE UP (ref 2–14)
NEUTROPHILS # BLD AUTO: 1.84 K/UL — SIGNIFICANT CHANGE UP (ref 1.8–7.4)
NEUTROPHILS # BLD AUTO: 1.9 K/UL — SIGNIFICANT CHANGE UP (ref 1.8–7.4)
NEUTROPHILS NFR BLD AUTO: 77.7 % — HIGH (ref 43–77)
NEUTROPHILS NFR BLD AUTO: 81 % — HIGH (ref 43–77)
NRBC # BLD: 0 /100 — SIGNIFICANT CHANGE UP (ref 0–0)
PHOSPHATE SERPL-MCNC: 1.2 MG/DL — LOW (ref 2.5–4.5)
PLAT MORPH BLD: NORMAL — SIGNIFICANT CHANGE UP
PLAT MORPH BLD: NORMAL — SIGNIFICANT CHANGE UP
PLATELET # BLD AUTO: 32 K/UL — LOW (ref 150–400)
PLATELET # BLD AUTO: 37 K/UL — LOW (ref 150–400)
POIKILOCYTOSIS BLD QL AUTO: SLIGHT — SIGNIFICANT CHANGE UP
POLYCHROMASIA BLD QL SMEAR: SLIGHT — SIGNIFICANT CHANGE UP
POTASSIUM SERPL-MCNC: 4.3 MMOL/L — SIGNIFICANT CHANGE UP (ref 3.5–5.3)
POTASSIUM SERPL-SCNC: 4.3 MMOL/L — SIGNIFICANT CHANGE UP (ref 3.5–5.3)
PROT SERPL-MCNC: 5.9 G/DL — LOW (ref 6–8.3)
RAPID RVP RESULT: SIGNIFICANT CHANGE UP
RBC # BLD: 3.34 M/UL — LOW (ref 4.2–5.8)
RBC # BLD: 3.34 M/UL — LOW (ref 4.2–5.8)
RBC # FLD: 17.1 % — HIGH (ref 10.3–14.5)
RBC BLD AUTO: ABNORMAL
RBC BLD AUTO: ABNORMAL
RETICS #: 67.3 K/UL — SIGNIFICANT CHANGE UP (ref 25–125)
RETICS/RBC NFR: 2 % — SIGNIFICANT CHANGE UP (ref 0.5–2.5)
RH IG SCN BLD-IMP: POSITIVE — SIGNIFICANT CHANGE UP
SCHISTOCYTES BLD QL AUTO: SLIGHT — SIGNIFICANT CHANGE UP
SODIUM SERPL-SCNC: 134 MMOL/L — LOW (ref 135–145)
WBC # BLD: 2.37 K/UL — LOW (ref 3.8–10.5)
WBC # FLD AUTO: 2.37 K/UL — LOW (ref 3.8–10.5)

## 2019-10-05 PROCEDURE — 99223 1ST HOSP IP/OBS HIGH 75: CPT

## 2019-10-05 PROCEDURE — 71046 X-RAY EXAM CHEST 2 VIEWS: CPT | Mod: 26

## 2019-10-05 PROCEDURE — 12345: CPT | Mod: NC

## 2019-10-05 RX ORDER — TRAMADOL HYDROCHLORIDE 50 MG/1
50 TABLET ORAL EVERY 12 HOURS
Refills: 0 | Status: DISCONTINUED | OUTPATIENT
Start: 2019-10-05 | End: 2019-10-09

## 2019-10-05 RX ORDER — METOPROLOL TARTRATE 50 MG
12.5 TABLET ORAL
Refills: 0 | Status: DISCONTINUED | OUTPATIENT
Start: 2019-10-05 | End: 2019-10-09

## 2019-10-05 RX ORDER — ATORVASTATIN CALCIUM 80 MG/1
40 TABLET, FILM COATED ORAL AT BEDTIME
Refills: 0 | Status: DISCONTINUED | OUTPATIENT
Start: 2019-10-05 | End: 2019-10-09

## 2019-10-05 RX ORDER — OXYCODONE HYDROCHLORIDE 5 MG/1
5 TABLET ORAL EVERY 12 HOURS
Refills: 0 | Status: DISCONTINUED | OUTPATIENT
Start: 2019-10-05 | End: 2019-10-09

## 2019-10-05 RX ORDER — ONDANSETRON 8 MG/1
4 TABLET, FILM COATED ORAL EVERY 6 HOURS
Refills: 0 | Status: DISCONTINUED | OUTPATIENT
Start: 2019-10-05 | End: 2019-10-09

## 2019-10-05 RX ORDER — DOXAZOSIN MESYLATE 4 MG
4 TABLET ORAL AT BEDTIME
Refills: 0 | Status: DISCONTINUED | OUTPATIENT
Start: 2019-10-05 | End: 2019-10-09

## 2019-10-05 RX ORDER — ACETAMINOPHEN 500 MG
650 TABLET ORAL EVERY 6 HOURS
Refills: 0 | Status: DISCONTINUED | OUTPATIENT
Start: 2019-10-05 | End: 2019-10-09

## 2019-10-05 RX ORDER — ENOXAPARIN SODIUM 100 MG/ML
40 INJECTION SUBCUTANEOUS DAILY
Refills: 0 | Status: DISCONTINUED | OUTPATIENT
Start: 2019-10-05 | End: 2019-10-05

## 2019-10-05 RX ORDER — AMLODIPINE BESYLATE 2.5 MG/1
10 TABLET ORAL DAILY
Refills: 0 | Status: DISCONTINUED | OUTPATIENT
Start: 2019-10-05 | End: 2019-10-09

## 2019-10-05 RX ORDER — PANTOPRAZOLE SODIUM 20 MG/1
40 TABLET, DELAYED RELEASE ORAL
Refills: 0 | Status: DISCONTINUED | OUTPATIENT
Start: 2019-10-05 | End: 2019-10-09

## 2019-10-05 RX ADMIN — Medication 12.5 MILLIGRAM(S): at 18:54

## 2019-10-05 RX ADMIN — OXYCODONE HYDROCHLORIDE 5 MILLIGRAM(S): 5 TABLET ORAL at 18:54

## 2019-10-05 RX ADMIN — TRAMADOL HYDROCHLORIDE 50 MILLIGRAM(S): 50 TABLET ORAL at 16:24

## 2019-10-05 RX ADMIN — PANTOPRAZOLE SODIUM 40 MILLIGRAM(S): 20 TABLET, DELAYED RELEASE ORAL at 18:54

## 2019-10-05 RX ADMIN — ATORVASTATIN CALCIUM 40 MILLIGRAM(S): 80 TABLET, FILM COATED ORAL at 21:24

## 2019-10-05 RX ADMIN — Medication 62.5 MILLIMOLE(S): at 16:26

## 2019-10-05 RX ADMIN — Medication 4 MILLIGRAM(S): at 21:24

## 2019-10-05 RX ADMIN — Medication 100 MILLIGRAM(S): at 21:23

## 2019-10-05 RX ADMIN — TRAMADOL HYDROCHLORIDE 50 MILLIGRAM(S): 50 TABLET ORAL at 17:24

## 2019-10-05 NOTE — ED PROVIDER NOTE - PROGRESS NOTE DETAILS
evaluated pt. s/o hx of liver disease, leg swelling, abd swelling. labs show worsened pancytopenia w/ new leukopenia. pt not having f/c, n/v,. abd soft. leg is swollen w/ no obvious signs of infection. dvt neg. pt stable. evaluated leg. nv intact. pt admitted to medicine

## 2019-10-05 NOTE — CHART NOTE - NSCHARTNOTEFT_GEN_A_CORE
Search Terms: Ti Balderrama, 1940 Search Date: 10/05/2019 06:17:15 AM    This report was requested by: Douglas Kay | Reference #: [id number]    Others' Prescriptions  Patient Name:	Ti Balderrama	YOB: 1940  Address:	[address] [place] [id number]	Sex:	Male  Rx Written	Rx Dispensed	Drug	Quantity	Days Supply	Prescriber Name  09/18/2019	09/20/2019	codeine-guaifen  mg/5 ml	250ml	12	Anmol Daugherty MD  Patient Name:	Ti Balderrama	YOB: 1940  Address:	[address] [place] [id number]	Sex:	Male  Rx Written	Rx Dispensed	Drug	Quantity	Days Supply	Prescriber Name  07/15/2019	07/15/2019	tramadol hcl 50 mg tablet	28	7	Lamin Bradshaw  07/15/2019	07/15/2019	oxycodone hcl 5 mg tablet	42	7	Lamin Bradshaw

## 2019-10-05 NOTE — H&P ADULT - NSICDXPASTSURGICALHX_GEN_ALL_CORE_FT
PAST SURGICAL HISTORY:  H/O hernia repair right inguinal hernia with mesh  9/2013    History of eye surgery right    History of right knee surgery R Tibia Intralesional curettage/bone graft  7/11/2019

## 2019-10-05 NOTE — H&P ADULT - PROBLEM SELECTOR PROBLEM 3
Type 2 diabetes mellitus without complication, without long-term current use of insulin Cancer, metastatic to bone

## 2019-10-05 NOTE — H&P ADULT - PROBLEM SELECTOR PLAN 2
On nexavar for his cancer.  Will need oncology consult to help order chemotherapy, would also benefit from discussion possible needed changes to his therapy to help better control his disease. Pt with pancytopenia, however has been receiving opdivo and nexavar. Is however also on carbamazepine as seizure ppx after prior ich which can also induce pancytopenia.  Will temporarily hold carbamazepine until heme/onc input obtained to help clarify whether safe to continue from bone marrow perspective  Has had pancytopenia in the past with improvement.  Consider heme/onc consult here Pt with pancytopenia, however has been receiving opdivo and nexavar. Is however also on carbamazepine as seizure ppx after prior ich which can also induce pancytopenia.  Will temporarily hold carbamazepine until heme/onc input obtained to help clarify whether safe to continue from bone marrow perspective  Has had pancytopenia in the past with improvement.  Will need heme/onc consult here to help order chemo as well Pt with pancytopenia, however has been receiving opdivo and nexavar. Is however also on carbamazepine as seizure ppx after prior ich which can also induce pancytopenia.  Will temporarily hold carbamazepine until heme/onc input obtained to help clarify whether safe to continue from bone marrow perspective  Has had pancytopenia in the past with improvement.  Will need heme/onc consult here to help order pt's home regimen

## 2019-10-05 NOTE — PROGRESS NOTE ADULT - PROBLEM SELECTOR PLAN 4
Will order consistent carb diet, pt's last a1c <6 so may no longer technically be diabetic, is only on low dose metformin

## 2019-10-05 NOTE — H&P ADULT - NSHPPOADEEPVENOUSTHROMB_GEN_A_CORE
"  Subjective:       Patient ID:  Jina Castro is a 41 y.o. 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  Letter of medical necessity and prescription sent to [employer] for home light unit.  Awaiting response.    FINAL PATHOLOGIC DIAGNOSIS  Skin, right back, punch biopsy:  -CUTANEOUS T-CELL LYMPHOMA, consistent with  COMMENT: The clinical history along with the histological and immunohistochemical features seen are consistent  with cutaneous T-cell lymphoma. Areas of enlarged lymphocytes or large cell transformation are not seen. T-cell  gene rearrangement studies been ordered and will be reported in an addendum to follow.  Diagnosed by: Martin Almaguer  (Electronically Signed: 2018-10-31 13:45:31)  Microscopic Examination  Punch biopsy sections show a superficial perivascular and interstitial lymphohistiocytic infiltrate with admixed  melanophages. Very rare eosinophils are present. The lymphocytes overall appear small to medium in size with focal  nuclear irregularities. Many of the lymphocytes show exocytosis in a mildly spongiotic epidermis. CD3 highlights the  numerous lymphocytes present within the epidermis which extend to the mid levels of the epidermis. Many  lymphocytes are also noted along the dermoepidermal junction. The lymphocytes in the superficial dermis show CD4  to CD8 ratio of at least 10:1. The majority of the intraepidermal lymphocytes are positive for CD8. There is loss of  CD7. PAS stain is negative for fungal organisms. Stains were reviewed in conjunction with adequate positive controls.  This case was also reviewed by Dr. Allen Cates who agrees with the above diagnosis.     PMH: Patient with h/o "parasporiasis" since the age of 8.  Diagnosed with CTCL by Asia Andre in 2015 - path report read by Brock " MD Star reviewed by me and scanned into Epic.  Treated by Dr. McBurney in the past, but has not seen her in several years.  Cleared with nbUVB in 2015, but job/time commitments made continued light tx difficult to continue at the time.  Stayed clear for at least a year.  Now getting return of erythematous, scaly patches and plaques in areas that had formerly cleared with hypopigmentation.  No associated pruritus.    Has a multinodular goiter that has been biopsied in the past - benign.  Stable in size per patient.  She had FNA last month (December 2018):  FINAL PATHOLOGIC DIAGNOSIS  THYROID GLAND, LEFT LOBE, FNA:  [facility] Thyroid Cytology Category: Benign.  Benign follicular epithelial cells, colloid and macrophages, consistent with a benign follicular nodule.     CT showed:  Impression         1. Mild bilateral axillary and inguinal adenopathy.  2. Markedly enlarged heterogeneous thyroid extending below the sternoclavicular junction.  Recommend additional evaluation with thyroid ultrasound, as clinically indicated.  This report was flagged in Epic as abnormal.      Sezary prep:  Buffy coat from blood sample submitted for flow cytometric analysis   is reviewed. Granulocytes are morphologically unremarkable.  Small   mature lymphocytes are seen.  Scattered atypical mononuclear cells   are present.  Correlation with flow cytometric immunophenotyping is   required to further characterize the lymphocyte population.  Red   blood cells are morphologically unremarkable.  Platelets display   occasional large forms.      HTLVI/II Ab negative, RPR negative, LDH WNL, CBC and CMP WNL, IL-2 receptor and lipid panel unremarkable.    PET/CT November 2018:  There are axillary hypermetabolic skin lesions.  There are mildly hypermetabolic axillary and inguinal lymph nodes.    Index right axillary skin lesion SUV max 3.15.    Index left axillary skin lesion SUV max 2.08.    Index right axillary lymph node SUV max  1.5.    Index left inguinal lymph node SUV max 1.61.    Multinodular substernal goiter with hypermetabolic nodules.    Index right SUV max 5.42.    Index left SUV max 3.69.    FNA may be warranted.  Hypermetabolic thyroid nodules have up to a 40% chance of being malignant.    Patient is going to be followed q3 months with labs by Heme/Onc.    Review of Systems   Constitutional: Negative for fever, chills, weight loss, weight gain, fatigue, night sweats and malaise.   Skin: Positive for rash. Negative for itching, daily sunscreen use and activity-related sunscreen use.        Objective:    Physical Exam   Constitutional: She appears well-developed and well-nourished.   Neurological: She is alert and oriented to person, place, and time.   Psychiatric: She has a normal mood and affect.   Skin:   Areas Examined (abnormalities noted in diagram):   Scalp / Hair Palpated and Inspected  Head / Face Inspection Performed  Neck Inspection Performed  Chest / Axilla Inspection Performed  Abdomen Inspection Performed  Genitals / Buttocks / Groin Inspection Performed  Back Inspection Performed  RUE Inspected  LUE Inspection Performed  RLE Inspected  LLE Inspection Performed  Nails and Digits Inspection Performed  Gland Inspection Performed              Diagram Legend     Erythematous scaling macule/papule c/w actinic keratosis       Vascular papule c/w angioma      Pigmented verrucoid papule/plaque c/w seborrheic keratosis      Yellow umbilicated papule c/w sebaceous hyperplasia      Irregularly shaped tan macule c/w lentigo     1-2 mm smooth white papules consistent with Milia      Movable subcutaneous cyst with punctum c/w epidermal inclusion cyst      Subcutaneous movable cyst c/w pilar cyst      Firm pink to brown papule c/w dermatofibroma      Pedunculated fleshy papule(s) c/w skin tag(s)      Evenly pigmented macule c/w junctional nevus     Mildly variegated pigmented, slightly irregular-bordered macule c/w mildly atypical  nevus      Flesh colored to evenly pigmented papule c/w intradermal nevus       Pink pearly papule/plaque c/w basal cell carcinoma      Erythematous hyperkeratotic cursted plaque c/w SCC      Surgical scar with no sign of skin cancer recurrence      Open and closed comedones      Inflammatory papules and pustules      Verrucoid papule consistent consistent with wart     Erythematous eczematous patches and plaques     Dystrophic onycholytic nail with subungual debris c/w onychomycosis     Umbilicated papule    Erythematous-base heme-crusted tan verrucoid plaque consistent with inflamed seborrheic keratosis     Erythematous Silvery Scaling Plaque c/w Psoriasis     See annotation      Assessment / Plan:        Mycosis fungoides, unspecified body region - flow cytometry negative but TCR shows positive clone in blood.  Some multifocal LAD on PET/CT - unclear etiology.  Pt is being followed by Heme/Onc regularly.  Thyroid goiter FNA negative for malignancy.    - improving on nbUVB.  Continue nbUVB per protocol 2x weekly.  Will continue to try to get patient home light unit, since current co-pay is $40 for each light treatment in the office.  - pt to see Dr. McBurney next week with copy of all of her records  - f/u with me in 3 months  - labs/imaging and heme/onc notes reviewed             No Follow-up on file.   no

## 2019-10-05 NOTE — ED PROVIDER NOTE - ATTENDING CONTRIBUTION TO CARE
Patient presenting complaining of R leg swelling and scrotal swelling.  Per patient and family patient has had issues with RLE swelling in past however current degree of swelling is beyond prior levels.  There is some discomfort in leg but no severe pains.  Reporting swelling all occurred in last 24 hours.  History of liver cancer with mets to lymph nodes and L clavicle per family.  Denying abdominal pains, fevers, chills.  Having slight abdominal swelling.  No difficulty breathing.    A 14 point review of systems is negative except as in HPI or otherwise documented.    Exam:  General: Patient well appearing, vital signs within normal limits  HEENT: airway patent with moist mucous membranes  Cardiac: RRR S1/S2 no murmurs, rubs or gallops  Respiratory: lungs clear without respiratory distress  GI: abdomen soft, non tender, slightly distended with ? fluid wave, no caput medusa  Neuro: no gross neurologic deficits  Ext: RLE 2+ pitting edema throughout without warmth, erythema or point tenderness  : mild scrotal edema without redness, warmth or point tenderness  Skin: warm, well perfused  Psych: normal mood and affect    Patient presenting with RLE edema likely dependent edema related to liver disease, however exactly why it would be so asymmetrical is unclear (however family reporting that he always swells in that leg alone so possibly due to underlying issue with vascular return in extremity?).  Given history of malignancy will obtain US to rule out DVT.  Will also obtain abdominal US to evaluate for presence of significant ascites.  Labs to evaluate underlying hepatic and renal function.

## 2019-10-05 NOTE — H&P ADULT - PROBLEM SELECTOR PROBLEM 4
Ischemic stroke Type 2 diabetes mellitus without complication, without long-term current use of insulin

## 2019-10-05 NOTE — PROGRESS NOTE ADULT - PROBLEM SELECTOR PLAN 2
Pt with pancytopenia, however has been receiving opdivo and nexavar. Is however also on carbamazepine as seizure ppx after prior ich which can also induce pancytopenia.  Will temporarily hold carbamazepine until heme/onc input obtained to help clarify whether safe to continue from bone marrow perspective  Has had pancytopenia in the past with improvement.  Heme/onc consult here to help order pt's home regimen  Given cough, will check RVP and PA/LAT CXR. However low suspicion for infectious process.   Monitor cbc.

## 2019-10-05 NOTE — H&P ADULT - PROBLEM SELECTOR PLAN 4
Hx of ischemic stroke  Was placed on carbamazepine afterwards for seizure ppx, has never had an actual seizure.   Given pancytopenia will temporarily hold carbamazepine until clarified whether pancytopenia is from ongoing cancer treatment vs from carbamazepine. Will order consistent carb diet, pt's last a1c <6 so may no longer technically be diabetic, is only on low dose metformin

## 2019-10-05 NOTE — H&P ADULT - ASSESSMENT
Mr. Balderrama is a 79-year-old male with a history of HTN, DM II, HLD, CKD III, multiple CVA without residual deficits, including right parietal stroke of unclear etiology on lovenox c/b secondary hemorrhage s/p MCA clot retrieval at [place] on 3/19, metastatic prostate cancer, metastatic adenocarcinoma of unclear etiology, with metastases to liver and bone s/p recent R Tibia Intralesional curettage/bone graft, admission for anemia here in July, now presenting with acute onset of RLE swelling x 2 days

## 2019-10-05 NOTE — H&P ADULT - HISTORY OF PRESENT ILLNESS
Mr. Balderrama is a 79-year-old male with a history of HTN, DM II, HLD, CKD III, multiple CVA without residual deficits, including right parietal stroke of unclear etiology on lovenox c/b secondary hemorrhage s/p MCA clot retrieval at [place] on 3/19, metastatic prostate cancer, metastatic adenocarcinoma of unclear etiology, with metastases to liver and bone s/p recent R Tibia Intralesional curettage/bone graft, admission for anemia here in July, now presenting with acute onset of RLE swelling x 2 days. Pt reports that he had been diagnosed with R hydronephrosis several months ago likely due to mass effect from cancer and had been referred to a urologist who then referred him to another specialist for treatment though he had not yet undergone any treatment (unclear if this was to be a stent vs other treatment?). Patient has had radiation therapy as well as chemotherapy with his radiation and medical oncologists in [place]. However, 2 days ago began to notice significant edema/swelling of his RLE. This was not painful, just very swollen. Patient also reports continued abdominal pain diffusely and nausea after eating though he reports that this is unchanged for many months. Patient uses zofran at home for control of nausea, tramadol and oxycodone for control of his pain. Patient reports that he had a CT scan approximately 2 weeks ago for follow up and has the CD at home, he is unsure what the CT had shown though he is aware of the R hydronephrosis. Patient denies orthopnea, denies fevers/chills. +cough intermittently for many months, occasionally productive of yellowish sputum though currently w/o cough.

## 2019-10-05 NOTE — ED PROVIDER NOTE - OBJECTIVE STATEMENT
80 yo M with hx of prostate cancer with mets to liver and bone presents with R leg swelling: non-painful, started yesterday from right hip to foot, no redness. Denies skin cuts, or lesions. No fevers or chills. No SOB or chest pain. No hx of DVT. Had recent knee surgery 3 months ago on the R.

## 2019-10-05 NOTE — H&P ADULT - PROBLEM SELECTOR PLAN 3
Will order consistent carb diet, pt's last a1c <6 so may no longer technically be diabetic, is only on low dose metformin On nexavar for his cancer.  Will need oncology consult to help order chemotherapy, would also benefit from discussion possible needed changes to his therapy to help better control his disease.

## 2019-10-05 NOTE — H&P ADULT - NSICDXPASTMEDICALHX_GEN_ALL_CORE_FT
PAST MEDICAL HISTORY:  Anemia     Cancer, metastatic to bone on chemo    DM (diabetes mellitus) type 2, HgA1c 5.8% ( 6/5/19)    Gastric ulcer was hospitalized for GI bleed 7/22/19-7/25/19, taking Protonix    HLD (hyperlipidemia)     HTN (hypertension)     Ischemic stroke 01/2019, no residuals    Liver cancer stage 4    Prostate cancer 2004 radiotherapy seeds, now metastatic to liver and bone

## 2019-10-05 NOTE — PROGRESS NOTE ADULT - PROBLEM SELECTOR PLAN 1
Likely due to mass effect from metastatic disease. Patient with known hydronephrosis likely related to mass effect, now with increased RLE swelling. Negative for DVT on duplex US but suspicious for mass effect on IVC or lymphatic drainage. Given his known hydronephrosis will hold off on diuresing for symptom control for now.  Patient's son to bring in CT that was recently performed to allow review.  F/U CT to help guide further management  Can consider repeat CT though given recent CT will hold off for now until disc brought in.  Leg elevation   No pain currently. Patient reported to me that this has been going on for weeks. Likely due to mass effect from metastatic disease and bone graft. Patient with known hydronephrosis likely related to mass effect, now with increased RLE swelling. Negative for DVT on duplex US but suspicious for mass effect on IVC or lymphatic drainage. Given his known hydronephrosis will hold off on diuresing for symptom control for now.  Patient's son to bring in CT that was recently performed to allow review.  F/U CT to help guide further management  Can consider repeat CT though given recent CT will hold off for now until disc brought in.  Leg elevation   No pain currently. Patient reported to me that this has been going on for weeks.

## 2019-10-05 NOTE — PROGRESS NOTE ADULT - PROBLEM SELECTOR PLAN 3
On nexavar for his cancer.  Will need oncology consult to help order chemotherapy, would also benefit from discussion possible needed changes to his therapy to help better control his disease.  patients chemo with report of causing transaminitis. AST elevated here.

## 2019-10-05 NOTE — ED PROVIDER NOTE - PHYSICAL EXAMINATION
Gen: AAOx3, non-toxic  Head: NCAT  HEENT: EOMI, oral mucosa moist, normal conjunctiva  Lung: CTAB, no respiratory distress, no wheezes/rhonchi/rales B/L, speaking in full sentences  CV: RRR, no murmurs, rubs or gallops  Abd: soft, NTND, no guarding, no CVA tenderness  MSK: no visible deformities  Neuro: No focal sensory or motor deficits, normal CN exam   Skin: Warm, well perfused, no rash. Symmetrical swelling of whole right lower extremity. No redness, no calf tenderness. no skin injuries.   Psych: normal affect.

## 2019-10-05 NOTE — ED PROVIDER NOTE - CLINICAL SUMMARY MEDICAL DECISION MAKING FREE TEXT BOX
80 yo M with hx of prostate cancer with mets to liver and bone presents with R leg swelling. Vitals WNL. Physical exam shows diffuse right lower extremity swelling. Will do US to evaluate for DVT given hypercoagulable state. Will re-assess.

## 2019-10-05 NOTE — H&P ADULT - PROBLEM SELECTOR PLAN 5
Hx of ischemic stroke  Was placed on carbamazepine afterwards for seizure ppx, has never had an actual seizure.   Given pancytopenia will temporarily hold carbamazepine until clarified whether pancytopenia is from ongoing cancer treatment vs from carbamazepine.

## 2019-10-05 NOTE — H&P ADULT - NSHPLABSRESULTS_GEN_ALL_CORE
Labs personally reviewed:                        9.2    2.35  )-----------( 32       ( 04 Oct 2019 23:09 )             29.6       10-04    131<L>  |  104  |  7   ----------------------------<  107<H>  4.6   |  18<L>  |  0.98    Ca    6.8<L>      04 Oct 2019 23:09    TPro  5.5<L>  /  Alb  2.3<L>  /  TBili  0.3  /  DBili  x   /  AST  104<H>  /  ALT  86<H>  /  AlkPhos  245<H>  10-04            LIVER FUNCTIONS - ( 04 Oct 2019 23:09 )  Alb: 2.3 g/dL / Pro: 5.5 g/dL / ALK PHOS: 245 U/L / ALT: 86 U/L / AST: 104 U/L / GGT: x             PT/INR - ( 04 Oct 2019 23:09 )   PT: 10.5 sec;   INR: 0.92 ratio         PTT - ( 04 Oct 2019 23:09 )  PTT:23.2 sec  Abd US reviewed-RUQ ascites  RLE duplex us w/o dvt    EKG ordered

## 2019-10-05 NOTE — PROGRESS NOTE ADULT - PROBLEM SELECTOR PLAN 5
Hx of ischemic stroke  Was placed on carbamazepine afterwards for seizure ppx, has never had an actual seizure.   Given pancytopenia will temporarily hold carbamazepine until clarified whether pancytopenia is from ongoing cancer treatment vs from carbamazepine. Hx of ischemic stroke  Was placed on carbamazepine afterwards for seizure ppx, has never had an actual seizure.   Given pancytopenia will temporarily hold carbamazepine until clarified whether pancytopenia is from ongoing cancer treatment vs from carbamazepine.    #GERD  -Continue PPI

## 2019-10-05 NOTE — PROGRESS NOTE ADULT - SUBJECTIVE AND OBJECTIVE BOX
HOSPITALIST NOTE    Dr. Dami Miller DO  Division of Hospital Medicine  [facility]  Pager:  [phone]    SUBJECTIVE  The patient reports upper abdominal discomfort for several weeks that sometimes is worse with food -- currently none. The pain is usually dull, sometimes burning, nonradiating. He also reports several weeks of right lower extremity pain and swelling. Again, none currently.     REVIEW OF SYSTEMS  CONSTITUTIONAL: No fevers or chills  EYES/ENT: No visual changes. No discharge from eyes. No vertigo. No throat pain. No dysphagia.  NECK: No pain or stiffness or rigidity.  RESPIRATORY: No cough, wheezing, or hemoptysis. No shortness of breath.  CARDIOVASCULAR: No chest pain or palpitations.   GASTROINTESTINAL: No abdominal pain. No nausea, vomiting, or hematemesis; No diarrhea or constipation. No melena or hematochezia.  GENITOURINARY: No dysuria, hesitancy, frequency or hematuria.  NEUROLOGICAL: No numbness or weakness. No change in speech. No fecal or urinary incontinence.   MSK: No joint swelling or erythema. No back pain.  SKIN: No itching or rashes.   PSYCH: Normal affect. Normal mood.    Review of systems negative except for items noted above.      PAST MEDICAL & SURGICAL HISTORY:  Anemia  Cancer, metastatic to bone: on chemo  Gastric ulcer: was hospitalized for GI bleed 7/22/19-7/25/19, taking Protonix  HTN (hypertension)  Liver cancer: stage 4  Prostate cancer: 2004 radiotherapy seeds, now metastatic to liver and bone  Ischemic stroke: 01/2019, no residuals  HLD (hyperlipidemia)  DM (diabetes mellitus): type 2, HgA1c 5.8% ( 6/5/19)  History of eye surgery: right  History of right knee surgery: R Tibia Intralesional curettage/bone graft  7/11/2019  H/O hernia repair: right inguinal hernia with mesh  9/2013      MEDICATIONS  (STANDING):  amLODIPine   Tablet 10 milliGRAM(s) Oral daily  atorvastatin 40 milliGRAM(s) Oral at bedtime  benzonatate 100 milliGRAM(s) Oral three times a day  doxazosin 4 milliGRAM(s) Oral at bedtime  metoprolol tartrate 12.5 milliGRAM(s) Oral two times a day  pantoprazole    Tablet 40 milliGRAM(s) Oral two times a day  sodium phosphate IVPB 15 milliMole(s) IV Intermittent once    MEDICATIONS  (PRN):  acetaminophen   Tablet .. 650 milliGRAM(s) Oral every 6 hours PRN Mild Pain (1 - 3)  ondansetron Injectable 4 milliGRAM(s) IV Push every 6 hours PRN Nausea and/or Vomiting  oxyCODONE    IR 5 milliGRAM(s) Oral every 12 hours PRN Severe Pain (7 - 10)  traMADol 50 milliGRAM(s) Oral every 12 hours PRN Moderate Pain (4 - 6)      Allergies    No Known Allergies    Intolerances        T(C): 36.6 (10-05-19 @ 08:36), Max: 36.8 (10-05-19 @ 01:11)  T(F): 97.9 (10-05-19 @ 08:36), Max: 98.3 (10-05-19 @ 01:11)  HR: 80 (10-05-19 @ 08:36) (74 - 83)  BP: 157/80 (10-05-19 @ 08:36) (138/89 - 162/75)  ABP: --  ABP(mean): --  RR: 18 (10-05-19 @ 08:36) (16 - 18)  SpO2: 97% (10-05-19 @ 08:36) (94% - 97%)      CONSTITUTIONAL: No acute distress.   HEENT:  Conjunctiva clear B/L. Nasal mucosa normal. Moist oral mucosa. No posterior pharyngeal lesions noted.  Cardiovascular: RRR with no murmurs. No JVD noted. 2+ RLE edema. None on left. Extremities are warm and well perfused. Radial pulses 2+ B/L. Dorsalis pedis pulses 2+ B/L. Capillary refill <2s  Respiratory: Lungs CTAB. No accessory muscle use.   Gastrointestinal:  Soft, nontender. Non-distended. Non-rigid. No CVA tenderness B/L.  MSK:  No joint swelling. No joint erythema B/L. No midline spinal tenderness.  Neurologic:  Alert and awake. Oriented x3. Moving all extremities. Following commands. Making eye contact. No focal deficits.  Skin:  No rashes noted. No skin erythema noted.   Psych:  Normal affect. Normal Mood.     LABS                        9.2    2.35  )-----------( 32       ( 04 Oct 2019 23:09 )             29.6     10-05    134<L>  |  101  |  8   ----------------------------<  125<H>  4.3   |  21<L>  |  1.01    Ca    7.2<L>      05 Oct 2019 11:23  Phos  1.2     10-05  Mg     1.6     10-05    TPro  5.9<L>  /  Alb  2.5<L>  /  TBili  0.3  /  DBili  x   /  AST  89<H>  /  ALT  78<H>  /  AlkPhos  258<H>  10-05    PT/INR - ( 04 Oct 2019 23:09 )   PT: 10.5 sec;   INR: 0.92 ratio         PTT - ( 04 Oct 2019 23:09 )  PTT:23.2 sec      ADDITIONAL STUDIES PERSONALLY REVIEWED

## 2019-10-05 NOTE — H&P ADULT - PROBLEM SELECTOR PLAN 1
Likely due to mass effect from metastatic disease. Patient with known hydronephrosis likely related to mass effect, now with increased RLE swelling. Negative for DVT on duplex US but suspicious for mass effect on IVC or lymphatic drainage. Given his known hydronephrosis will hold off on diuresing for symptom control for now.  Patient's son to bring in CT that was recently performed to allow review.  F/U CT to help guide further management  Can consider repeat CT though given recent CT will hold off for now until disc brought in.

## 2019-10-05 NOTE — ED PROVIDER NOTE - NS ED ROS FT
GENERAL: No fever or chills  EYES: no change in vision  HEENT: no trouble swallowing or speaking  CARDIAC: no chest pain or palpiations   PULMONARY: no cough or SOB  GI: no abdominal pain, nausea, vomiting, diarrhea, or constipation   : No changes in urination  SKIN: no rashes  NEURO: no headache, numbness, or weakness.  MSK: see hpi

## 2019-10-05 NOTE — PROGRESS NOTE ADULT - ASSESSMENT
Mr. Balderrama is a 79-year-old male with a history of HTN, DM II, HLD, CKD III, multiple CVA without residual deficits, including right parietal stroke of unclear etiology on lovenox c/b secondary hemorrhage s/p MCA clot retrieval at [place] on 3/19, metastatic prostate cancer, metastatic adenocarcinoma of unclear etiology, with metastases to liver and bone s/p recent R Tibia Intralesional curettage/bone graft, admission for anemia here in July, now presenting with RLE swelling.

## 2019-10-05 NOTE — H&P ADULT - NSHPPHYSICALEXAM_GEN_ALL_CORE
Vital Signs Last 24 Hrs  T(C): 36.4 (05 Oct 2019 05:09), Max: 36.8 (05 Oct 2019 01:11)  T(F): 97.6 (05 Oct 2019 05:09), Max: 98.3 (05 Oct 2019 01:11)  HR: 78 (05 Oct 2019 05:09) (74 - 83)  BP: 142/93 (05 Oct 2019 05:09) (138/89 - 147/96)  BP(mean): --  RR: 18 (05 Oct 2019 05:09) (16 - 18)  SpO2: 97% (05 Oct 2019 05:09) (94% - 97%)    GENERAL: No acute distress, well-developed  HEAD:  Atraumatic, Normocephalic  EYES: EOMI, PERRLA, conjunctiva and sclera clear  ENT: Oral mucosa moist  NECK: Neck supple  CHEST/LUNG: Clear to auscultation bilaterally; No wheeze, no rales, no rhonchi.    HEART: Regular rate and rhythm; No murmurs, rubs, or gallops  ABDOMEN: Soft, Nontender, Nondistended; Bowel sounds present  EXTREMITIES:  2+ pitting edema of RLE.   VASCULAR: Posterior tibialis pulses intact bilaterally  PSYCH: Normal behavior, normal affect  NEUROLOGY: AAOx3  SKIN: grossly warm and dry

## 2019-10-06 LAB
ALBUMIN SERPL ELPH-MCNC: 2.5 G/DL — LOW (ref 3.3–5)
ALP SERPL-CCNC: 253 U/L — HIGH (ref 40–120)
ALT FLD-CCNC: 73 U/L — HIGH (ref 10–45)
ANION GAP SERPL CALC-SCNC: 8 MMOL/L — SIGNIFICANT CHANGE UP (ref 5–17)
AST SERPL-CCNC: 77 U/L — HIGH (ref 10–40)
BASOPHILS # BLD AUTO: 0.01 K/UL — SIGNIFICANT CHANGE UP (ref 0–0.2)
BASOPHILS NFR BLD AUTO: 0.3 % — SIGNIFICANT CHANGE UP (ref 0–2)
BILIRUB SERPL-MCNC: 0.3 MG/DL — SIGNIFICANT CHANGE UP (ref 0.2–1.2)
BUN SERPL-MCNC: 6 MG/DL — LOW (ref 7–23)
CALCIUM SERPL-MCNC: 7.6 MG/DL — LOW (ref 8.4–10.5)
CHLORIDE SERPL-SCNC: 102 MMOL/L — SIGNIFICANT CHANGE UP (ref 96–108)
CO2 SERPL-SCNC: 24 MMOL/L — SIGNIFICANT CHANGE UP (ref 22–31)
CREAT SERPL-MCNC: 1.04 MG/DL — SIGNIFICANT CHANGE UP (ref 0.5–1.3)
EOSINOPHIL # BLD AUTO: 0.06 K/UL — SIGNIFICANT CHANGE UP (ref 0–0.5)
EOSINOPHIL NFR BLD AUTO: 1.9 % — SIGNIFICANT CHANGE UP (ref 0–6)
GLUCOSE SERPL-MCNC: 129 MG/DL — HIGH (ref 70–99)
HCT VFR BLD CALC: 32.5 % — LOW (ref 39–50)
HGB BLD-MCNC: 9.9 G/DL — LOW (ref 13–17)
IMM GRANULOCYTES NFR BLD AUTO: 0.3 % — SIGNIFICANT CHANGE UP (ref 0–1.5)
LIDOCAIN IGE QN: 22 U/L — SIGNIFICANT CHANGE UP (ref 7–60)
LYMPHOCYTES # BLD AUTO: 0.44 K/UL — LOW (ref 1–3.3)
LYMPHOCYTES # BLD AUTO: 13.8 % — SIGNIFICANT CHANGE UP (ref 13–44)
MAGNESIUM SERPL-MCNC: 1.8 MG/DL — SIGNIFICANT CHANGE UP (ref 1.6–2.6)
MCHC RBC-ENTMCNC: 28.7 PG — SIGNIFICANT CHANGE UP (ref 27–34)
MCHC RBC-ENTMCNC: 30.5 GM/DL — LOW (ref 32–36)
MCV RBC AUTO: 94.2 FL — SIGNIFICANT CHANGE UP (ref 80–100)
MONOCYTES # BLD AUTO: 0.49 K/UL — SIGNIFICANT CHANGE UP (ref 0–0.9)
MONOCYTES NFR BLD AUTO: 15.3 % — HIGH (ref 2–14)
NEUTROPHILS # BLD AUTO: 2.19 K/UL — SIGNIFICANT CHANGE UP (ref 1.8–7.4)
NEUTROPHILS NFR BLD AUTO: 68.4 % — SIGNIFICANT CHANGE UP (ref 43–77)
NRBC # BLD: 0 /100 WBCS — SIGNIFICANT CHANGE UP (ref 0–0)
PLATELET # BLD AUTO: 41 K/UL — LOW (ref 150–400)
POTASSIUM SERPL-MCNC: 4.5 MMOL/L — SIGNIFICANT CHANGE UP (ref 3.5–5.3)
POTASSIUM SERPL-SCNC: 4.5 MMOL/L — SIGNIFICANT CHANGE UP (ref 3.5–5.3)
PROT SERPL-MCNC: 5.8 G/DL — LOW (ref 6–8.3)
RBC # BLD: 3.45 M/UL — LOW (ref 4.2–5.8)
RBC # FLD: 17.2 % — HIGH (ref 10.3–14.5)
SODIUM SERPL-SCNC: 134 MMOL/L — LOW (ref 135–145)
WBC # BLD: 3.2 K/UL — LOW (ref 3.8–10.5)
WBC # FLD AUTO: 3.2 K/UL — LOW (ref 3.8–10.5)

## 2019-10-06 PROCEDURE — 99232 SBSQ HOSP IP/OBS MODERATE 35: CPT

## 2019-10-06 PROCEDURE — 99233 SBSQ HOSP IP/OBS HIGH 50: CPT | Mod: GC

## 2019-10-06 RX ORDER — SORAFENIB 200 MG/1
400 TABLET, FILM COATED ORAL EVERY 24 HOURS
Refills: 0 | Status: DISCONTINUED | OUTPATIENT
Start: 2019-10-06 | End: 2019-10-06

## 2019-10-06 RX ORDER — IPRATROPIUM/ALBUTEROL SULFATE 18-103MCG
3 AEROSOL WITH ADAPTER (GRAM) INHALATION EVERY 6 HOURS
Refills: 0 | Status: DISCONTINUED | OUTPATIENT
Start: 2019-10-06 | End: 2019-10-09

## 2019-10-06 RX ORDER — FUROSEMIDE 40 MG
20 TABLET ORAL ONCE
Refills: 0 | Status: COMPLETED | OUTPATIENT
Start: 2019-10-06 | End: 2019-10-06

## 2019-10-06 RX ORDER — CARBAMAZEPINE 200 MG
100 TABLET ORAL
Refills: 0 | Status: DISCONTINUED | OUTPATIENT
Start: 2019-10-06 | End: 2019-10-09

## 2019-10-06 RX ADMIN — Medication 20 MILLIGRAM(S): at 17:05

## 2019-10-06 RX ADMIN — AMLODIPINE BESYLATE 10 MILLIGRAM(S): 2.5 TABLET ORAL at 05:19

## 2019-10-06 RX ADMIN — Medication 100 MILLIGRAM(S): at 05:19

## 2019-10-06 RX ADMIN — Medication 12.5 MILLIGRAM(S): at 05:19

## 2019-10-06 RX ADMIN — Medication 12.5 MILLIGRAM(S): at 17:05

## 2019-10-06 RX ADMIN — Medication 100 MILLIGRAM(S): at 22:50

## 2019-10-06 RX ADMIN — Medication 3 MILLILITER(S): at 22:51

## 2019-10-06 RX ADMIN — Medication 100 MILLIGRAM(S): at 13:00

## 2019-10-06 RX ADMIN — Medication 3 MILLILITER(S): at 17:05

## 2019-10-06 RX ADMIN — PANTOPRAZOLE SODIUM 40 MILLIGRAM(S): 20 TABLET, DELAYED RELEASE ORAL at 17:05

## 2019-10-06 RX ADMIN — PANTOPRAZOLE SODIUM 40 MILLIGRAM(S): 20 TABLET, DELAYED RELEASE ORAL at 05:20

## 2019-10-06 RX ADMIN — Medication 100 MILLIGRAM(S): at 17:05

## 2019-10-06 RX ADMIN — OXYCODONE HYDROCHLORIDE 5 MILLIGRAM(S): 5 TABLET ORAL at 10:24

## 2019-10-06 RX ADMIN — ATORVASTATIN CALCIUM 40 MILLIGRAM(S): 80 TABLET, FILM COATED ORAL at 22:50

## 2019-10-06 RX ADMIN — Medication 4 MILLIGRAM(S): at 22:50

## 2019-10-06 RX ADMIN — OXYCODONE HYDROCHLORIDE 5 MILLIGRAM(S): 5 TABLET ORAL at 09:27

## 2019-10-06 NOTE — PROGRESS NOTE ADULT - PROBLEM SELECTOR PLAN 3
On nexavar for his cancer.     #. Mild AST elevation. US with small ascites likely from HCC.   Continue to monitor.   Likely from malignancy, less likely Nexavar. Pt with pancytopenia, however has been receiving opdivo and nexavar.   Heme/onc ok with resuming home carbamazepine.

## 2019-10-06 NOTE — PROGRESS NOTE ADULT - ASSESSMENT
Mr. Balderrama is a 79-year-old male with a history of HTN, DM II, HLD, CKD III, multiple CVA without residual deficits, including right parietal stroke of unclear etiology on lovenox c/b secondary hemorrhage s/p MCA clot retrieval at [place] on 3/19, metastatic prostate cancer, metastatic adenocarcinoma of unclear etiology, with metastases to liver and bone s/p recent R Tibia Intralesional curettage/bone graft, admission for anemia here in July, now presenting with RLE swelling.

## 2019-10-06 NOTE — PROGRESS NOTE ADULT - PROBLEM SELECTOR PLAN 1
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 Patient with known hydronephrosis likely related to mass effect, now with increased RLE swelling. Negative for DVT on duplex US. Given his known hydronephrosis will hold off on diuresing for symptom control for now.  Patient's son to bring in CT that was recently performed to allow review.  F/U CT to help guide further management  Can consider repeat CT though given recent CT will hold off for now until disc brought in.  Leg elevation   No pain currently. Patient reported to me that this has been going on for weeks. 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   -DVT neg  -will consult ortho and image tibia given previous surgical procedure, and now worsening swelling   -keep RLE elevated   -pt's CT imaging report reviewed, indicating extensive disease including hepatic, LN involvement, as well as R hydroureteronephrosis and pericardial effusion along with b/l pleural effusion as well as moderate ascites

## 2019-10-06 NOTE — CONSULT NOTE ADULT - ASSESSMENT
A/P:  Mr. Balderrama is a 79-year-old male with a history of HTN, DM II, HLD, CKD III, multiple CVA without residual deficits, including right parietal stroke of unclear etiology on lovenox c/b secondary hemorrhage s/p MCA clot retrieval at [place] on 3/19, Also hx of metastatic prostate cancer dx 2004, metastatic adenocarcinoma of unclear etiology, with metastases to liver and bone s/p recent R Tibia Intralesional curettage/bone graft, Patient is on Opdiva and Nexavar, Oncologist is Dr. Anmol Daugherty, admission for anemia here in July, now presenting with acute onset of RLE swelling, per patient going on for 2 weeks.  Oncology was consulted regarding pancytopenia and holding Carbamezapine. I talked to Dr. 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       -Can c/w Carbamazepine  -Patient will follow with his private oncologist for chemotherapy.      Latasha Magallon PGY4  Heme/Onc fellow A/P:  Mr. Balderrama is a 79-year-old male with a history of HTN, DM II, HLD, CKD III, multiple CVA without residual deficits, including right parietal stroke of unclear etiology on lovenox c/b secondary hemorrhage s/p MCA clot retrieval at [place] on 3/19, Also hx of metastatic prostate cancer dx 2004, metastatic adenocarcinoma of unclear etiology, with metastases to liver and bone s/p recent R Tibia Intralesional curettage/bone graft, Patient is on Opdiva and Nexavar, Oncologist is Dr. Anmol Daugherty, admission for anemia here in July, now presenting with acute onset of RLE swelling, per patient going on for 2 weeks. Oncology was consulted regarding pancytopenia and holding Carbamezapine. I talked to Dr. 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      Latasha Magallon PGY4  Heme/Onc fellow

## 2019-10-06 NOTE — CONSULT NOTE ADULT - SUBJECTIVE AND OBJECTIVE BOX
HPI:  Mr. Balderrama is a 79-year-old male with a history of HTN, DM II, HLD, CKD III, multiple CVA without residual deficits, including right parietal stroke of unclear etiology on lovenox c/b secondary hemorrhage s/p MCA clot retrieval at [place] on 3/19, Also hx of metastatic prostate cancer dx 2004, metastatic adenocarcinoma of unclear etiology, with metastases to liver and bone s/p recent R Tibia Intralesional curettage/bone graft, Patient is on Opdiva and Nexavar, Oncologist is Dr. Anmol Daugherty, admission for anemia here in July, now presenting with acute onset of RLE swelling, per patient going on for 2 weeks.  Oncology was consulted regarding pancytopenia and holding Carbamezapine. I talked to Dr. 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  Patient last chemo was last Tuesday.       REVIEW OF SYSTEMS:    CONSTITUTIONAL: No weakness, fevers or chills  EYES/ENT: No visual changes, no throat pain   RESPIRATORY: No cough, wheezing, hemoptysis; No shortness of breath  CARDIOVASCULAR: No chest pain or palpitations  GASTROINTESTINAL: No abdominal pain, nausea, vomiting, or hematemesis; No diarrhea or constipation. No melena or hematochezia.  GENITOURINARY: No dysuria, frequency or hematuria  MUSCULOSKELETAL: No joint pain.  NEUROLOGICAL: No dizziness, numbness, or weakness  SKIN: No itching, burning, rashes, or lesions   All other review of systems is negative unless indicated above.  Allergies    No Known Allergies    Intolerances        PAST MEDICAL & SURGICAL HISTORY:  Anemia  Cancer, metastatic to bone: on chemo  Gastric ulcer: was hospitalized for GI bleed 7/22/19-7/25/19, taking Protonix  HTN (hypertension)  Liver cancer: stage 4  Prostate cancer: 2004 radiotherapy seeds, now metastatic to liver and bone  Ischemic stroke: 01/2019, no residuals  HLD (hyperlipidemia)  DM (diabetes mellitus): type 2, HgA1c 5.8% ( 6/5/19)  History of eye surgery: right  History of right knee surgery: R Tibia Intralesional curettage/bone graft  7/11/2019  H/O hernia repair: right inguinal hernia with mesh  9/2013      FAMILY HISTORY:  FH: HTN (hypertension)  FH: diabetes mellitus      Social History:          VITAL SIGNS:  Vital Signs Last 24 Hrs  T(C): 36.4 (06 Oct 2019 08:41), Max: 36.7 (06 Oct 2019 00:11)  T(F): 97.6 (06 Oct 2019 08:41), Max: 98 (06 Oct 2019 00:11)  HR: 80 (06 Oct 2019 08:41) (77 - 85)  BP: 129/83 (06 Oct 2019 08:41) (129/83 - 156/96)  BP(mean): --  RR: 18 (06 Oct 2019 08:41) (18 - 18)  SpO2: 96% (06 Oct 2019 08:41) (93% - 96%)      PHYSICAL EXAM:     GENERAL: no acute distress  HEENT: EOMI, neck supple  RESPIRATORY: LCTAB/L, no rhonchi, rales, or wheezing  CARDIOVASCULAR: RRR, no murmurs, gallops, rubs  ABDOMINAL: soft, non-tender, non-distended, positive bowel sounds   EXTREMITIES: no clubbing, cyanosis, or edema  NEUROLOGICAL: alert and oriented x 3, non-focal  SKIN: no rashes or lesions   MUSCULOSKELETAL: no gross joint deformity                          9.6    2.37  )-----------( 37       ( 05 Oct 2019 14:47 )             32.1     10-05    134<L>  |  101  |  8   ----------------------------<  125<H>  4.3   |  21<L>  |  1.01    Ca    7.2<L>      05 Oct 2019 11:23  Phos  1.2     10-05  Mg     1.6     10-05    TPro  5.9<L>  /  Alb  2.5<L>  /  TBili  0.3  /  DBili  x   /  AST  89<H>  /  ALT  78<H>  /  AlkPhos  258<H>  10-05      MEDICATIONS  (STANDING):  amLODIPine   Tablet 10 milliGRAM(s) Oral daily  atorvastatin 40 milliGRAM(s) Oral at bedtime  benzonatate 100 milliGRAM(s) Oral three times a day  carBAMazepine Chewable 100 milliGRAM(s) Chew two times a day  doxazosin 4 milliGRAM(s) Oral at bedtime  metoprolol tartrate 12.5 milliGRAM(s) Oral two times a day  pantoprazole    Tablet 40 milliGRAM(s) Oral two times a day HPI:  Mr. Balderrama is a 79-year-old male with a history of HTN, DM II, HLD, CKD III, multiple CVA without residual deficits, including right parietal stroke of unclear etiology on lovenox c/b secondary hemorrhage s/p MCA clot retrieval at [place] on 3/19, Also hx of metastatic prostate cancer dx 2004, metastatic adenocarcinoma of unclear etiology, with metastases to liver and bone s/p recent R Tibia Intralesional curettage/bone graft, Patient is on Opdiva and Nexavar, Oncologist is Dr. Anmol Daugherty, admission for anemia here in July, now presenting with acute onset of RLE swelling, per patient going on for 2 weeks.  Oncology was consulted regarding pancytopenia and holding Carbamezapine. I talked to Dr. 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  Patient last chemo was last Tuesday.       REVIEW OF SYSTEMS:    CONSTITUTIONAL: No weakness, fevers or chills  EYES/ENT: No visual changes, no throat pain   RESPIRATORY: No cough, wheezing, hemoptysis; No shortness of breath  CARDIOVASCULAR: No chest pain or palpitations  GASTROINTESTINAL: No abdominal pain, nausea, vomiting, or hematemesis; No diarrhea or constipation. No melena or hematochezia.  GENITOURINARY: No dysuria, frequency or hematuria  MUSCULOSKELETAL: No joint pain.  NEUROLOGICAL: No dizziness, numbness, or weakness  SKIN: No itching, burning, rashes, or lesions   All other review of systems is negative unless indicated above.  Allergies    No Known Allergies    Intolerances        PAST MEDICAL & SURGICAL HISTORY:  Anemia  Cancer, metastatic to bone: on chemo  Gastric ulcer: was hospitalized for GI bleed 7/22/19-7/25/19, taking Protonix  HTN (hypertension)  Liver cancer: stage 4  Prostate cancer: 2004 radiotherapy seeds, now metastatic to liver and bone  Ischemic stroke: 01/2019, no residuals  HLD (hyperlipidemia)  DM (diabetes mellitus): type 2, HgA1c 5.8% ( 6/5/19)  History of eye surgery: right  History of right knee surgery: R Tibia Intralesional curettage/bone graft  7/11/2019  H/O hernia repair: right inguinal hernia with mesh  9/2013      FAMILY HISTORY:  FH: HTN (hypertension)  FH: diabetes mellitus      Social History:          VITAL SIGNS:  Vital Signs Last 24 Hrs  T(C): 36.4 (06 Oct 2019 08:41), Max: 36.7 (06 Oct 2019 00:11)  T(F): 97.6 (06 Oct 2019 08:41), Max: 98 (06 Oct 2019 00:11)  HR: 80 (06 Oct 2019 08:41) (77 - 85)  BP: 129/83 (06 Oct 2019 08:41) (129/83 - 156/96)  BP(mean): --  RR: 18 (06 Oct 2019 08:41) (18 - 18)  SpO2: 96% (06 Oct 2019 08:41) (93% - 96%)      PHYSICAL EXAM:   GENERAL: no acute distress   HEENT:  Conjunctiva clear B/L. Nasal mucosa normal. Moist oral mucosa. No posterior pharyngeal lesions noted.  Cardiovascular: RRR with no murmurs. No JVD noted. 2+ RLE edema. None on left. Extremities are warm and well perfused. Radial pulses 2+ B/L. Dorsalis pedis pulses 2+ B/L. Capillary refill <2s  Respiratory: Lungs CTAB. No accessory muscle use.   Gastrointestinal:  Soft, nontender. Non-distended. Non-rigid. No CVA tenderness B/L.  MSK:  No joint swelling. No joint erythema B/L. No midline spinal tenderness.  EXTREMITIES: RLE swelling  Neurologic:  Alert and awake. Oriented x3. Moving all extremities. Following commands. Making eye contact. No focal deficits.  Skin:  No rashes noted. No skin erythema noted.   Psych:  Normal affect. Normal Mood.                             9.6    2.37  )-----------( 37       ( 05 Oct 2019 14:47 )             32.1     10-05    134<L>  |  101  |  8   ----------------------------<  125<H>  4.3   |  21<L>  |  1.01    Ca    7.2<L>      05 Oct 2019 11:23  Phos  1.2     10-05  Mg     1.6     10-05    TPro  5.9<L>  /  Alb  2.5<L>  /  TBili  0.3  /  DBili  x   /  AST  89<H>  /  ALT  78<H>  /  AlkPhos  258<H>  10-05      MEDICATIONS  (STANDING):  amLODIPine   Tablet 10 milliGRAM(s) Oral daily  atorvastatin 40 milliGRAM(s) Oral at bedtime  benzonatate 100 milliGRAM(s) Oral three times a day  carBAMazepine Chewable 100 milliGRAM(s) Chew two times a day  doxazosin 4 milliGRAM(s) Oral at bedtime  metoprolol tartrate 12.5 milliGRAM(s) Oral two times a day  pantoprazole    Tablet 40 milliGRAM(s) Oral two times a day

## 2019-10-06 NOTE — PROGRESS NOTE ADULT - SUBJECTIVE AND OBJECTIVE BOX
HOSPITALIST NOTE    Dr. Dami Miller, DO  Division of Hospital Medicine  [facility]  Pager:  [phone]    SUBJECTIVE  Reports some cough that is the same cough he has had for several weeks.   Again, the patient reports upper abdominal discomfort for several weeks that sometimes is worse with food -- currently none. The pain is usually dull, sometimes burning, nonradiating. He again reports several weeks of right lower extremity pain and swelling. Again, none currently.     REVIEW OF SYSTEMS  CONSTITUTIONAL: No fevers or chills  EYES/ENT: No visual changes. No discharge from eyes. No vertigo. No throat pain. No dysphagia.  NECK: No pain or stiffness or rigidity.  RESPIRATORY: +cough. No wheezing or hemoptysis. No shortness of breath.  CARDIOVASCULAR: No chest pain or palpitations.   GASTROINTESTINAL: +abdominal pain. No nausea, vomiting, or hematemesis; No diarrhea or constipation. No melena or hematochezia.  GENITOURINARY: No dysuria, hesitancy, frequency or hematuria.  NEUROLOGICAL: No numbness or weakness. No change in speech. No fecal or urinary incontinence.   MSK: +RLE pain. No joint swelling or erythema. No back pain.  SKIN: No itching or rashes.   PSYCH: Normal affect. Normal mood.    Review of systems negative except for items noted above.        PAST MEDICAL & SURGICAL HISTORY:  Anemia  Cancer, metastatic to bone: on chemo  Gastric ulcer: was hospitalized for GI bleed 7/22/19-7/25/19, taking Protonix  HTN (hypertension)  Liver cancer: stage 4  Prostate cancer: 2004 radiotherapy seeds, now metastatic to liver and bone  Ischemic stroke: 01/2019, no residuals  HLD (hyperlipidemia)  DM (diabetes mellitus): type 2, HgA1c 5.8% ( 6/5/19)  History of eye surgery: right  History of right knee surgery: R Tibia Intralesional curettage/bone graft  7/11/2019  H/O hernia repair: right inguinal hernia with mesh  9/2013      MEDICATIONS  (STANDING):  ALBUTerol/ipratropium for Nebulization 3 milliLiter(s) Nebulizer every 6 hours  amLODIPine   Tablet 10 milliGRAM(s) Oral daily  atorvastatin 40 milliGRAM(s) Oral at bedtime  benzonatate 100 milliGRAM(s) Oral three times a day  carBAMazepine Chewable 100 milliGRAM(s) Chew two times a day  doxazosin 4 milliGRAM(s) Oral at bedtime  furosemide    Tablet 20 milliGRAM(s) Oral once  metoprolol tartrate 12.5 milliGRAM(s) Oral two times a day  pantoprazole    Tablet 40 milliGRAM(s) Oral two times a day  SORAfenib 400 milliGRAM(s) Oral every 24 hours    MEDICATIONS  (PRN):  acetaminophen   Tablet .. 650 milliGRAM(s) Oral every 6 hours PRN Mild Pain (1 - 3)  ondansetron Injectable 4 milliGRAM(s) IV Push every 6 hours PRN Nausea and/or Vomiting  oxyCODONE    IR 5 milliGRAM(s) Oral every 12 hours PRN Severe Pain (7 - 10)  traMADol 50 milliGRAM(s) Oral every 12 hours PRN Moderate Pain (4 - 6)      Allergies    No Known Allergies    Intolerances        T(C): 36.6 (10-06-19 @ 16:13), Max: 36.7 (10-06-19 @ 00:11)  T(F): 97.8 (10-06-19 @ 16:13), Max: 98 (10-06-19 @ 00:11)  HR: 74 (10-06-19 @ 16:13) (74 - 85)  BP: 148/92 (10-06-19 @ 16:13) (129/83 - 156/96)  ABP: --  ABP(mean): --  RR: 18 (10-06-19 @ 16:13) (18 - 18)  SpO2: 100% (10-06-19 @ 16:13) (93% - 100%)      CONSTITUTIONAL: No acute distress.   HEENT:  Conjunctiva clear B/L. Nasal mucosa normal. Moist oral mucosa. No posterior pharyngeal lesions noted.  Cardiovascular: RRR with no murmurs. No JVD noted. 2+ RLE edema. None on left. Extremities are warm and well perfused. Radial pulses 2+ B/L. Dorsalis pedis pulses 2+ B/L. Capillary refill <2s  Respiratory: Lungs CTAB. No accessory muscle use.   Gastrointestinal:  Soft, nontender. Non-distended. Non-rigid. No CVA tenderness B/L.  MSK:  No joint swelling. No joint erythema B/L. No midline spinal tenderness.  Neurologic:  Alert and awake. Oriented x3. Moving all extremities. Following commands. Making eye contact. No focal deficits.  Skin:  No rashes noted. No skin erythema noted.   Psych:  Normal affect. Normal Mood.     LABS                        9.6    2.37  )-----------( 37       ( 05 Oct 2019 14:47 )             32.1     10-05    134<L>  |  101  |  8   ----------------------------<  125<H>  4.3   |  21<L>  |  1.01    Ca    7.2<L>      05 Oct 2019 11:23  Phos  1.2     10-05  Mg     1.6     10-05    TPro  5.9<L>  /  Alb  2.5<L>  /  TBili  0.3  /  DBili  x   /  AST  89<H>  /  ALT  78<H>  /  AlkPhos  258<H>  10-05    PT/INR - ( 04 Oct 2019 23:09 )   PT: 10.5 sec;   INR: 0.92 ratio         PTT - ( 04 Oct 2019 23:09 )  PTT:23.2 sec      ADDITIONAL STUDIES PERSONALLY REVIEWED  CXR: with some vascular congestion and fluid detected in lung fissure on right lung field. Likely linear atelectasis b/l. Do not appreciate pleural effusions. Spin sign negative on lateral view.

## 2019-10-06 NOTE — PROGRESS NOTE ADULT - PROBLEM SELECTOR PLAN 2
Pt with pancytopenia, however has been receiving opdivo and nexavar.   Heme/onc ok with resuming home carbamazepine.   Per patients heme/onc doctor, Dr. 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     Will resume home Nexavar as this is unlikely cause of pancytopenia. Also would want to minimize risk of tumor resistance. Patient has tolerated medication for weeks.   No clear signs or symptoms of infection. RVP negative. PA/LAT CXR with vascular congestion and likely atelectasis. Low suspicion for pneumonia given lack of fever and dyspnea. Continue to monitor off of abx. Will dose lasix 20 mg po x1 to see if cough improves. Incentive spirometer and standing duonebs ordered.   Monitor cbc with diff.  Heme/onc consult greatly appreciated. Pt with pancytopenia, however has been receiving opdivo and nexavar.   Heme/onc ok with resuming home carbamazepine.   Per patients heme/onc doctor, Dr. 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     Will resume home Nexavar as this is unlikely cause of pancytopenia. Also would want to minimize risk of tumor resistance. Patient has tolerated medication for weeks.   No clear signs or symptoms of infection. RVP negative. PA/LAT CXR with vascular congestion and likely atelectasis. Low suspicion for pneumonia given lack of fever and dyspnea. Continue to monitor off of abx. Will dose lasix 20 mg po x1 to see if cough improves. Incentive spirometer and standing duonebs ordered. Can get outpatient echo. Will hold off on more advance imaging unless acute clinical change.  Monitor cbc with diff.  Heme/onc consult greatly appreciated. Pt with pancytopenia, however has been receiving opdivo and nexavar.   Heme/onc ok with resuming home carbamazepine.   Per patients heme/onc doctor, Dr. 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     Will resume home Nexavar as this is unlikely cause of pancytopenia will need Heme/onc to approve Nexavar to be restarted. Would want to minimize risk of tumor resistance. Patient has tolerated medication for weeks.   No clear signs or symptoms of infection. RVP negative. PA/LAT CXR with vascular congestion and likely atelectasis. Low suspicion for pneumonia given lack of fever and dyspnea. Continue to monitor off of abx. Will dose lasix 20 mg po x1 to see if cough improves. Incentive spirometer and standing duonebs ordered. Can get outpatient echo. Will hold off on more advance imaging unless acute clinical change.  Monitor cbc with diff.  Heme/onc consult greatly appreciated. L groin hernia, tender and painful  will get surgery consult  pain control for now   reducible on exam

## 2019-10-06 NOTE — PROGRESS NOTE ADULT - PROBLEM SELECTOR PLAN 5
Hx of ischemic stroke  Was placed on carbamazepine afterwards for seizure ppx, has never had an actual seizure.   Given pancytopenia will temporarily hold carbamazepine until clarified whether pancytopenia is from ongoing cancer treatment vs from carbamazepine.    #GERD  -Continue PPI Will order consistent carb diet, pt's last a1c <6 so may no longer technically be diabetic, is only on low dose metformin

## 2019-10-06 NOTE — PROGRESS NOTE ADULT - PROBLEM SELECTOR PLAN 4
Will order consistent carb diet, pt's last a1c <6 so may no longer technically be diabetic, is only on low dose metformin on nexvar  outpt f/u with oncologist Dr. Daugherty upon discharge

## 2019-10-07 DIAGNOSIS — K46.9 UNSPECIFIED ABDOMINAL HERNIA WITHOUT OBSTRUCTION OR GANGRENE: ICD-10-CM

## 2019-10-07 PROCEDURE — 99233 SBSQ HOSP IP/OBS HIGH 50: CPT

## 2019-10-07 PROCEDURE — 73590 X-RAY EXAM OF LOWER LEG: CPT | Mod: 26,RT

## 2019-10-07 RX ORDER — FUROSEMIDE 40 MG
40 TABLET ORAL ONCE
Refills: 0 | Status: COMPLETED | OUTPATIENT
Start: 2019-10-07 | End: 2019-10-07

## 2019-10-07 RX ADMIN — Medication 100 MILLIGRAM(S): at 15:13

## 2019-10-07 RX ADMIN — Medication 100 MILLIGRAM(S): at 21:11

## 2019-10-07 RX ADMIN — ATORVASTATIN CALCIUM 40 MILLIGRAM(S): 80 TABLET, FILM COATED ORAL at 21:11

## 2019-10-07 RX ADMIN — Medication 3 MILLILITER(S): at 22:57

## 2019-10-07 RX ADMIN — Medication 40 MILLIGRAM(S): at 17:28

## 2019-10-07 RX ADMIN — Medication 12.5 MILLIGRAM(S): at 05:19

## 2019-10-07 RX ADMIN — PANTOPRAZOLE SODIUM 40 MILLIGRAM(S): 20 TABLET, DELAYED RELEASE ORAL at 05:19

## 2019-10-07 RX ADMIN — Medication 12.5 MILLIGRAM(S): at 17:27

## 2019-10-07 RX ADMIN — Medication 100 MILLIGRAM(S): at 05:19

## 2019-10-07 RX ADMIN — Medication 3 MILLILITER(S): at 17:28

## 2019-10-07 RX ADMIN — Medication 3 MILLILITER(S): at 05:20

## 2019-10-07 RX ADMIN — Medication 3 MILLILITER(S): at 14:03

## 2019-10-07 RX ADMIN — PANTOPRAZOLE SODIUM 40 MILLIGRAM(S): 20 TABLET, DELAYED RELEASE ORAL at 17:27

## 2019-10-07 RX ADMIN — AMLODIPINE BESYLATE 10 MILLIGRAM(S): 2.5 TABLET ORAL at 05:20

## 2019-10-07 RX ADMIN — Medication 100 MILLIGRAM(S): at 14:03

## 2019-10-07 RX ADMIN — Medication 4 MILLIGRAM(S): at 21:11

## 2019-10-07 RX ADMIN — OXYCODONE HYDROCHLORIDE 5 MILLIGRAM(S): 5 TABLET ORAL at 10:03

## 2019-10-07 RX ADMIN — Medication 100 MILLIGRAM(S): at 18:04

## 2019-10-07 NOTE — PROGRESS NOTE ADULT - SUBJECTIVE AND OBJECTIVE BOX
Patient is a 79y old  Male who presents with a chief complaint of Right leg swelling x 2 days (06 Oct 2019 16:36)    SUBJECTIVE / OVERNIGHT EVENTS: patient complains of pain in his left groin as well as in his R knee     MEDICATIONS  (STANDING):  ALBUTerol/ipratropium for Nebulization 3 milliLiter(s) Nebulizer every 6 hours  amLODIPine   Tablet 10 milliGRAM(s) Oral daily  atorvastatin 40 milliGRAM(s) Oral at bedtime  benzonatate 100 milliGRAM(s) Oral three times a day  carBAMazepine Chewable 100 milliGRAM(s) Chew two times a day  doxazosin 4 milliGRAM(s) Oral at bedtime  metoprolol tartrate 12.5 milliGRAM(s) Oral two times a day  pantoprazole    Tablet 40 milliGRAM(s) Oral two times a day    MEDICATIONS  (PRN):  acetaminophen   Tablet .. 650 milliGRAM(s) Oral every 6 hours PRN Mild Pain (1 - 3)  ondansetron Injectable 4 milliGRAM(s) IV Push every 6 hours PRN Nausea and/or Vomiting  oxyCODONE    IR 5 milliGRAM(s) Oral every 12 hours PRN Severe Pain (7 - 10)  traMADol 50 milliGRAM(s) Oral every 12 hours PRN Moderate Pain (4 - 6)      Vital Signs Last 24 Hrs  T(C): 36.7 (07 Oct 2019 08:16), Max: 36.8 (06 Oct 2019 23:50)  T(F): 98.1 (07 Oct 2019 08:16), Max: 98.3 (06 Oct 2019 23:50)  HR: 88 (07 Oct 2019 08:16) (74 - 89)  BP: 113/77 (07 Oct 2019 08:16) (113/77 - 152/89)  BP(mean): --  RR: 18 (07 Oct 2019 08:16) (18 - 18)  SpO2: 96% (07 Oct 2019 08:16) (93% - 100%)  CAPILLARY BLOOD GLUCOSE        I&O's Summary    06 Oct 2019 07:01  -  07 Oct 2019 07:00  --------------------------------------------------------  IN: 560 mL / OUT: 400 mL / NET: 160 mL        PHYSICAL EXAM:  GENERAL: NAD  EYES: conjunctiva and sclera clear  NECK: Supple, No JVD  CHEST/LUNG: Clear to auscultation bilaterally; No wheeze  HEART: +s1/S2, reg   ABDOMEN: Soft, distended, non-tender  GROIN: tenderness in the R groin area, hernia noted, reducible   EXTREMITIES: +RLE edema   PSYCH: AAOx3    LABS:                        9.9    3.20  )-----------( 41       ( 06 Oct 2019 16:34 )             32.5     10-06    134<L>  |  102  |  6<L>  ----------------------------<  129<H>  4.5   |  24  |  1.04    Ca    7.6<L>      06 Oct 2019 16:34  Mg     1.8     10-06    TPro  5.8<L>  /  Alb  2.5<L>  /  TBili  0.3  /  DBili  x   /  AST  77<H>  /  ALT  73<H>  /  AlkPhos  253<H>  10-06              RADIOLOGY & ADDITIONAL TESTS:    Imaging Personally Reviewed:    Consultant(s) Notes Reviewed:      Care Discussed with Consultants/Other Providers:

## 2019-10-08 DIAGNOSIS — R05 COUGH: ICD-10-CM

## 2019-10-08 LAB
ANION GAP SERPL CALC-SCNC: 9 MMOL/L — SIGNIFICANT CHANGE UP (ref 5–17)
BUN SERPL-MCNC: 6 MG/DL — LOW (ref 7–23)
CALCIUM SERPL-MCNC: 7.1 MG/DL — LOW (ref 8.4–10.5)
CHLORIDE SERPL-SCNC: 99 MMOL/L — SIGNIFICANT CHANGE UP (ref 96–108)
CO2 SERPL-SCNC: 22 MMOL/L — SIGNIFICANT CHANGE UP (ref 22–31)
CREAT SERPL-MCNC: 1.07 MG/DL — SIGNIFICANT CHANGE UP (ref 0.5–1.3)
GLUCOSE SERPL-MCNC: 103 MG/DL — HIGH (ref 70–99)
HCT VFR BLD CALC: 30.6 % — LOW (ref 39–50)
HGB BLD-MCNC: 9.5 G/DL — LOW (ref 13–17)
MCHC RBC-ENTMCNC: 28.7 PG — SIGNIFICANT CHANGE UP (ref 27–34)
MCHC RBC-ENTMCNC: 31 GM/DL — LOW (ref 32–36)
MCV RBC AUTO: 92.4 FL — SIGNIFICANT CHANGE UP (ref 80–100)
PLATELET # BLD AUTO: 34 K/UL — LOW (ref 150–400)
POTASSIUM SERPL-MCNC: 3.6 MMOL/L — SIGNIFICANT CHANGE UP (ref 3.5–5.3)
POTASSIUM SERPL-SCNC: 3.6 MMOL/L — SIGNIFICANT CHANGE UP (ref 3.5–5.3)
RBC # BLD: 3.31 M/UL — LOW (ref 4.2–5.8)
RBC # FLD: 17.2 % — HIGH (ref 10.3–14.5)
SODIUM SERPL-SCNC: 130 MMOL/L — LOW (ref 135–145)
WBC # BLD: 2.54 K/UL — LOW (ref 3.8–10.5)
WBC # FLD AUTO: 2.54 K/UL — LOW (ref 3.8–10.5)

## 2019-10-08 PROCEDURE — 74177 CT ABD & PELVIS W/CONTRAST: CPT | Mod: 26

## 2019-10-08 PROCEDURE — 99233 SBSQ HOSP IP/OBS HIGH 50: CPT

## 2019-10-08 PROCEDURE — 71260 CT THORAX DX C+: CPT | Mod: 26

## 2019-10-08 RX ORDER — FUROSEMIDE 40 MG
40 TABLET ORAL ONCE
Refills: 0 | Status: COMPLETED | OUTPATIENT
Start: 2019-10-08 | End: 2019-10-08

## 2019-10-08 RX ADMIN — PANTOPRAZOLE SODIUM 40 MILLIGRAM(S): 20 TABLET, DELAYED RELEASE ORAL at 16:51

## 2019-10-08 RX ADMIN — Medication 100 MILLIGRAM(S): at 21:30

## 2019-10-08 RX ADMIN — Medication 3 MILLILITER(S): at 16:52

## 2019-10-08 RX ADMIN — Medication 100 MILLIGRAM(S): at 16:51

## 2019-10-08 RX ADMIN — Medication 12.5 MILLIGRAM(S): at 18:15

## 2019-10-08 RX ADMIN — Medication 3 MILLILITER(S): at 21:30

## 2019-10-08 RX ADMIN — Medication 100 MILLIGRAM(S): at 06:08

## 2019-10-08 RX ADMIN — Medication 100 MILLIGRAM(S): at 06:27

## 2019-10-08 RX ADMIN — Medication 3 MILLILITER(S): at 06:08

## 2019-10-08 RX ADMIN — Medication 100 MILLIGRAM(S): at 16:52

## 2019-10-08 RX ADMIN — Medication 12.5 MILLIGRAM(S): at 06:08

## 2019-10-08 RX ADMIN — PANTOPRAZOLE SODIUM 40 MILLIGRAM(S): 20 TABLET, DELAYED RELEASE ORAL at 06:09

## 2019-10-08 RX ADMIN — Medication 100 MILLIGRAM(S): at 17:36

## 2019-10-08 RX ADMIN — ATORVASTATIN CALCIUM 40 MILLIGRAM(S): 80 TABLET, FILM COATED ORAL at 21:30

## 2019-10-08 RX ADMIN — AMLODIPINE BESYLATE 10 MILLIGRAM(S): 2.5 TABLET ORAL at 06:08

## 2019-10-08 RX ADMIN — Medication 4 MILLIGRAM(S): at 21:30

## 2019-10-08 RX ADMIN — Medication 40 MILLIGRAM(S): at 18:14

## 2019-10-08 RX ADMIN — Medication 100 MILLIGRAM(S): at 02:47

## 2019-10-08 NOTE — PROGRESS NOTE ADULT - PROBLEM SELECTOR PLAN 1
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   -DVT neg  -keep RLE elevated   -pt's CT imaging report reviewed, indicating extensive disease including hepatic, LN involvement, as well as R hydroureteronephrosis and pericardial effusion along with b/l pleural effusion as well as moderate ascites  -xray done, no acute fx  -will consult ortho given previous surgical procedure, and now worsening swelling   -check CT A/P to evaluate for worsening LAD that may be causing symptoms

## 2019-10-08 NOTE — CONSULT NOTE ADULT - SUBJECTIVE AND OBJECTIVE BOX
79y Male community ambulatory with a cane who had R proximal tibia wide resection/curettage with cement/bone graft of a prostate cancer metastasis with Dr. Huizar 7/12/19 presents with diffuse RLE edema. Patient denies trauma, fever, and chills. Reports occasional R knee pain but no change since surgery. Is able to ambulate on the leg without pain. Ortho consult for RLE edema     HEALTH ISSUES - PROBLEM Dx:  Hernia: Hernia  Pancytopenia: Pancytopenia  Ischemic stroke: Ischemic stroke  Type 2 diabetes mellitus without complication, without long-term current use of insulin: Type 2 diabetes mellitus without complication, without long-term current use of insulin  Cancer, metastatic to bone: Cancer, metastatic to bone  Right leg swelling: Right leg swelling        MEDICATIONS  (STANDING):  ALBUTerol/ipratropium for Nebulization 3 milliLiter(s) Nebulizer every 6 hours  amLODIPine   Tablet 10 milliGRAM(s) Oral daily  atorvastatin 40 milliGRAM(s) Oral at bedtime  benzonatate 100 milliGRAM(s) Oral three times a day  carBAMazepine Chewable 100 milliGRAM(s) Chew two times a day  doxazosin 4 milliGRAM(s) Oral at bedtime  metoprolol tartrate 12.5 milliGRAM(s) Oral two times a day  pantoprazole    Tablet 40 milliGRAM(s) Oral two times a day    Allergies    No Known Allergies    Intolerances                              9.5    2.54  )-----------( 34       ( 08 Oct 2019 09:50 )             30.6     08 Oct 2019 07:01    130    |  99     |  6      ----------------------------<  103    3.6     |  22     |  1.07     Ca    7.1        08 Oct 2019 07:01  Mg     1.8       06 Oct 2019 16:34    TPro  5.8    /  Alb  2.5    /  TBili  0.3    /  DBili  x      /  AST  77     /  ALT  73     /  AlkPhos  253    06 Oct 2019 16:34      Vital Signs Last 24 Hrs  T(C): 37.1 (10-08-19 @ 09:10), Max: 37.1 (10-08-19 @ 09:10)  T(F): 98.7 (10-08-19 @ 09:10), Max: 98.7 (10-08-19 @ 09:10)  HR: 98 (10-08-19 @ 09:10) (91 - 98)  BP: 109/73 (10-08-19 @ 09:10) (104/67 - 152/93)  BP(mean): --  RR: 18 (10-08-19 @ 09:10) (17 - 18)  SpO2: 100% (10-08-19 @ 09:10) (95% - 100%)    Imaging: XR RLE demonstrates no acute changes    Physical Exam  Gen: Nad  RLE: Skin intact, NTTP throughout, +ehl/fhl/ta/gs function, L3-S1 SILT, dp/pt pulse intact, negative log roll, able to SLR, compartments soft/compressive, extremity warm/well perfused, Knee ROM 0-120 without any pain, no palpable joint effusion, no erythema, able to ambulate without pain, diffuse edema of RLE        A/P: 79y Male with RLE edema, has hx of R proximal tibia wide resection/curettage with cement/bone graft of a prostate cancer metastasis with Dr. Huizar 7/12/19     R knee without any acute pathology  WBAT  Edema of RLE extends from above knee and down to foot (not related to knee surgery)  No acute surgical intervention   Medical management per primary team   Can follow up with Dr. Huizar 1-2 weeks on discharge from hospital. [phone]   page [phone] with any further questions/concerns 79y Male community ambulatory with a cane who had R proximal tibia wide resection/curettage with cement/bone graft of a prostate cancer metastasis with Dr. Huizar 7/12/19 presents with diffuse RLE edema. Patient denies trauma, fever, and chills. Reports occasional R knee pain but no change since surgery. Is able to ambulate on the leg without pain. Ortho consult for RLE edema     HEALTH ISSUES - PROBLEM Dx:  Hernia: Hernia  Pancytopenia: Pancytopenia  Ischemic stroke: Ischemic stroke  Type 2 diabetes mellitus without complication, without long-term current use of insulin: Type 2 diabetes mellitus without complication, without long-term current use of insulin  Cancer, metastatic to bone: Cancer, metastatic to bone  Right leg swelling: Right leg swelling        MEDICATIONS  (STANDING):  ALBUTerol/ipratropium for Nebulization 3 milliLiter(s) Nebulizer every 6 hours  amLODIPine   Tablet 10 milliGRAM(s) Oral daily  atorvastatin 40 milliGRAM(s) Oral at bedtime  benzonatate 100 milliGRAM(s) Oral three times a day  carBAMazepine Chewable 100 milliGRAM(s) Chew two times a day  doxazosin 4 milliGRAM(s) Oral at bedtime  metoprolol tartrate 12.5 milliGRAM(s) Oral two times a day  pantoprazole    Tablet 40 milliGRAM(s) Oral two times a day    Allergies    No Known Allergies    Intolerances                              9.5    2.54  )-----------( 34       ( 08 Oct 2019 09:50 )             30.6     08 Oct 2019 07:01    130    |  99     |  6      ----------------------------<  103    3.6     |  22     |  1.07     Ca    7.1        08 Oct 2019 07:01  Mg     1.8       06 Oct 2019 16:34    TPro  5.8    /  Alb  2.5    /  TBili  0.3    /  DBili  x      /  AST  77     /  ALT  73     /  AlkPhos  253    06 Oct 2019 16:34      Vital Signs Last 24 Hrs  T(C): 37.1 (10-08-19 @ 09:10), Max: 37.1 (10-08-19 @ 09:10)  T(F): 98.7 (10-08-19 @ 09:10), Max: 98.7 (10-08-19 @ 09:10)  HR: 98 (10-08-19 @ 09:10) (91 - 98)  BP: 109/73 (10-08-19 @ 09:10) (104/67 - 152/93)  BP(mean): --  RR: 18 (10-08-19 @ 09:10) (17 - 18)  SpO2: 100% (10-08-19 @ 09:10) (95% - 100%)    Imaging: XR RLE demonstrates no acute changes    Physical Exam  Gen: Nad  RLE: Skin intact, NTTP throughout, +ehl/fhl/ta/gs function, L3-S1 SILT, dp/pt pulse intact, negative log roll, able to SLR, compartments soft/compressive, extremity warm/well perfused, Knee ROM 0-120 without any pain, no palpable joint effusion, no erythema, able to ambulate without pain, diffuse edema of RLE        A/P: 79y Male with RLE edema, has hx of R proximal tibia wide resection/curettage with cement/bone graft of a prostate cancer metastasis with Dr. Huizar 7/12/19     R knee without any acute pathology  WBAT  Edema of RLE extends from above knee and down to foot (not related to knee surgery)  No acute surgical intervention   Recommend elevation of the RLE   Medical management per primary team   Can follow up with Dr. Huizar 1-2 weeks on discharge from hospital. [phone]   page [phone] with any further questions/concerns

## 2019-10-08 NOTE — PROGRESS NOTE ADULT - SUBJECTIVE AND OBJECTIVE BOX
Patient is a 79y old  Male who presents with a chief complaint of Right leg swelling x 2 days (07 Oct 2019 14:48)    SUBJECTIVE / OVERNIGHT EVENTS: no acute events overnight, still has pain in his leg and swelling as well.     MEDICATIONS  (STANDING):  ALBUTerol/ipratropium for Nebulization 3 milliLiter(s) Nebulizer every 6 hours  amLODIPine   Tablet 10 milliGRAM(s) Oral daily  atorvastatin 40 milliGRAM(s) Oral at bedtime  benzonatate 100 milliGRAM(s) Oral three times a day  carBAMazepine Chewable 100 milliGRAM(s) Chew two times a day  doxazosin 4 milliGRAM(s) Oral at bedtime  metoprolol tartrate 12.5 milliGRAM(s) Oral two times a day  pantoprazole    Tablet 40 milliGRAM(s) Oral two times a day    MEDICATIONS  (PRN):  acetaminophen   Tablet .. 650 milliGRAM(s) Oral every 6 hours PRN Mild Pain (1 - 3)  guaiFENesin    Syrup 100 milliGRAM(s) Oral every 6 hours PRN Cough  ondansetron Injectable 4 milliGRAM(s) IV Push every 6 hours PRN Nausea and/or Vomiting  oxyCODONE    IR 5 milliGRAM(s) Oral every 12 hours PRN Severe Pain (7 - 10)  traMADol 50 milliGRAM(s) Oral every 12 hours PRN Moderate Pain (4 - 6)      Vital Signs Last 24 Hrs  T(C): 37.1 (08 Oct 2019 09:10), Max: 37.1 (08 Oct 2019 09:10)  T(F): 98.7 (08 Oct 2019 09:10), Max: 98.7 (08 Oct 2019 09:10)  HR: 98 (08 Oct 2019 09:10) (91 - 98)  BP: 109/73 (08 Oct 2019 09:10) (104/67 - 152/93)  BP(mean): --  RR: 18 (08 Oct 2019 09:10) (17 - 18)  SpO2: 100% (08 Oct 2019 09:10) (95% - 100%)  CAPILLARY BLOOD GLUCOSE        I&O's Summary    07 Oct 2019 07:01  -  08 Oct 2019 07:00  --------------------------------------------------------  IN: 200 mL / OUT: 300 mL / NET: -100 mL        PHYSICAL EXAM:  GENERAL: NAD  EYES:  conjunctiva and sclera clear  NECK: Supple, No JVD  CHEST/LUNG: Clear to auscultation bilaterally; No wheeze  HEART: +s1/S2, reg   ABDOMEN: Soft, distended   EXTREMITIES: RLE edema   PSYCH: AAOx3      LABS:                        9.5    2.54  )-----------( 34       ( 08 Oct 2019 09:50 )             30.6     10-08    130<L>  |  99  |  6<L>  ----------------------------<  103<H>  3.6   |  22  |  1.07    Ca    7.1<L>      08 Oct 2019 07:01  Mg     1.8     10-06    TPro  5.8<L>  /  Alb  2.5<L>  /  TBili  0.3  /  DBili  x   /  AST  77<H>  /  ALT  73<H>  /  AlkPhos  253<H>  10-06

## 2019-10-08 NOTE — PROGRESS NOTE ADULT - ASSESSMENT
Mr. 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

## 2019-10-08 NOTE — PROGRESS NOTE ADULT - PROBLEM SELECTOR PLAN 7
Hx of ischemic stroke  Was placed on carbamazepine afterwards for seizure ppx, has never had an actual seizure.   Continue for now

## 2019-10-08 NOTE — PROGRESS NOTE ADULT - PROBLEM SELECTOR PLAN 4
Pt with pancytopenia, however has been receiving opdivo and nexavar.   Heme/onc ok with resuming home carbamazepine.

## 2019-10-08 NOTE — PROGRESS NOTE ADULT - PROBLEM SELECTOR PLAN 3
L groin hernia, tender and painful  reducible on exam  NP discussed with surgery yesterday, pain control for now

## 2019-10-08 NOTE — CHART NOTE - NSCHARTNOTEFT_GEN_A_CORE
79-year-old male with a history of HTN, DM II, HLD, CKD III, multiple CVA without residual deficits, including right parietal stroke of unclear etiology on lovenox c/b secondary hemorrhage s/p MCA clot retrieval at [facility] [facility] on 3/19, Also hx of metastatic prostate cancer dx 2004, metastatic adenocarcinoma of unclear etiology, with metastases to liver and bone s/p recent R Tibia Intralesional curettage/bone graft, Patient is on Opdiva and Nexavar, Oncologist is Dr. Anmol Daugherty, admission for anemia here in July, now presenting with acute onset of RLE swelling, per patient going on for 2 weeks. Oncology was consulted regarding pancytopenia and holding Carbamezapine. Oncology spoke with Dr. 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    Oncology will sign off. Patient to follow up with his oncologist as outpatient    Kimberli Camarena  Hematology-Oncology PGY-5  [phone]

## 2019-10-09 ENCOUNTER — TRANSCRIPTION ENCOUNTER (OUTPATIENT)
Age: 79
End: 2019-10-09

## 2019-10-09 VITALS
TEMPERATURE: 98 F | OXYGEN SATURATION: 93 % | DIASTOLIC BLOOD PRESSURE: 89 MMHG | HEART RATE: 86 BPM | SYSTOLIC BLOOD PRESSURE: 136 MMHG | RESPIRATION RATE: 18 BRPM

## 2019-10-09 PROCEDURE — 87798 DETECT AGENT NOS DNA AMP: CPT

## 2019-10-09 PROCEDURE — 93971 EXTREMITY STUDY: CPT

## 2019-10-09 PROCEDURE — 84100 ASSAY OF PHOSPHORUS: CPT

## 2019-10-09 PROCEDURE — 73590 X-RAY EXAM OF LOWER LEG: CPT

## 2019-10-09 PROCEDURE — 76705 ECHO EXAM OF ABDOMEN: CPT

## 2019-10-09 PROCEDURE — 83690 ASSAY OF LIPASE: CPT

## 2019-10-09 PROCEDURE — 71260 CT THORAX DX C+: CPT

## 2019-10-09 PROCEDURE — 85045 AUTOMATED RETICULOCYTE COUNT: CPT

## 2019-10-09 PROCEDURE — 80048 BASIC METABOLIC PNL TOTAL CA: CPT

## 2019-10-09 PROCEDURE — 87486 CHLMYD PNEUM DNA AMP PROBE: CPT

## 2019-10-09 PROCEDURE — 80053 COMPREHEN METABOLIC PANEL: CPT

## 2019-10-09 PROCEDURE — 87389 HIV-1 AG W/HIV-1&-2 AB AG IA: CPT

## 2019-10-09 PROCEDURE — 85027 COMPLETE CBC AUTOMATED: CPT

## 2019-10-09 PROCEDURE — 85730 THROMBOPLASTIN TIME PARTIAL: CPT

## 2019-10-09 PROCEDURE — 83735 ASSAY OF MAGNESIUM: CPT

## 2019-10-09 PROCEDURE — 71046 X-RAY EXAM CHEST 2 VIEWS: CPT

## 2019-10-09 PROCEDURE — 99285 EMERGENCY DEPT VISIT HI MDM: CPT

## 2019-10-09 PROCEDURE — 99239 HOSP IP/OBS DSCHRG MGMT >30: CPT

## 2019-10-09 PROCEDURE — 86900 BLOOD TYPING SEROLOGIC ABO: CPT

## 2019-10-09 PROCEDURE — 86850 RBC ANTIBODY SCREEN: CPT

## 2019-10-09 PROCEDURE — 74177 CT ABD & PELVIS W/CONTRAST: CPT

## 2019-10-09 PROCEDURE — 85610 PROTHROMBIN TIME: CPT

## 2019-10-09 PROCEDURE — 87633 RESP VIRUS 12-25 TARGETS: CPT

## 2019-10-09 PROCEDURE — 86901 BLOOD TYPING SEROLOGIC RH(D): CPT

## 2019-10-09 PROCEDURE — 87581 M.PNEUMON DNA AMP PROBE: CPT

## 2019-10-09 PROCEDURE — 94640 AIRWAY INHALATION TREATMENT: CPT

## 2019-10-09 RX ORDER — OXYCODONE HYDROCHLORIDE 5 MG/1
1 TABLET ORAL
Qty: 8 | Refills: 0
Start: 2019-10-09 | End: 2019-10-12

## 2019-10-09 RX ORDER — NIVOLUMAB 10 MG/ML
0 INJECTION INTRAVENOUS
Qty: 48 | Refills: 0 | DISCHARGE

## 2019-10-09 RX ORDER — CHLORHEXIDINE GLUCONATE 213 G/1000ML
1 SOLUTION TOPICAL DAILY
Refills: 0 | Status: DISCONTINUED | OUTPATIENT
Start: 2019-10-09 | End: 2019-10-09

## 2019-10-09 RX ORDER — SORAFENIB 200 MG/1
2 TABLET, FILM COATED ORAL
Qty: 0 | Refills: 0 | DISCHARGE

## 2019-10-09 RX ORDER — BEVACIZUMAB 400 MG/16ML
0 INJECTION, SOLUTION INTRAVENOUS
Qty: 64 | Refills: 0 | DISCHARGE

## 2019-10-09 RX ADMIN — AMLODIPINE BESYLATE 10 MILLIGRAM(S): 2.5 TABLET ORAL at 05:50

## 2019-10-09 RX ADMIN — Medication 12.5 MILLIGRAM(S): at 05:50

## 2019-10-09 RX ADMIN — Medication 3 MILLILITER(S): at 05:50

## 2019-10-09 RX ADMIN — Medication 100 MILLIGRAM(S): at 05:50

## 2019-10-09 RX ADMIN — Medication 100 MILLIGRAM(S): at 14:37

## 2019-10-09 RX ADMIN — CHLORHEXIDINE GLUCONATE 1 APPLICATION(S): 213 SOLUTION TOPICAL at 14:36

## 2019-10-09 RX ADMIN — Medication 100 MILLIGRAM(S): at 04:25

## 2019-10-09 RX ADMIN — PANTOPRAZOLE SODIUM 40 MILLIGRAM(S): 20 TABLET, DELAYED RELEASE ORAL at 05:51

## 2019-10-09 NOTE — DIETITIAN INITIAL EVALUATION ADULT. - ENERGY NEEDS
Ht: 66"   Wt: 139.7 lbs   BMI: 22.6 kg/m2   IBW: 142 lbs (+/-10%)    98 % IBW  Edema: 2+ right leg;  right foot;  right ankle           Skin: no pressure injuries noted

## 2019-10-09 NOTE — DIETITIAN INITIAL EVALUATION ADULT. - PHYSICAL APPEARANCE
Pt denied Nutrition Focused Physical Assessment; under several blankets. Per observation of facial region, pt with overt mild-moderate body fat depletion to orbitals; muscle mass depletion to buccals, clavicles.

## 2019-10-09 NOTE — DIETITIAN INITIAL EVALUATION ADULT. - ADD RECOMMEND
1) Continue diet as ordered (defer consistency to medical team, SLP). 2) Continue Ensure Enlive twice daily. 3) Encourage pt to focus on protein foods first and to order nutrient dense foods with meals to save for in-between meals to mimic smaller, meal frequent meals. 4) Encourage utilization of daily menus; encourage PO intake. RD to monitor. 5) Monitor nutrition related labs, skin integrity, hydration status, bowel regularity.

## 2019-10-09 NOTE — DISCHARGE NOTE NURSING/CASE MANAGEMENT/SOCIAL WORK - PATIENT PORTAL LINK FT
You can access the FollowMyHealth Patient Portal offered by [facility] by registering at the following website: [email or web address]. By joining [employer]’s FollowMyHealth portal, you will also be able to view your health information using other applications (apps) compatible with our system.

## 2019-10-09 NOTE — DIETITIAN INITIAL EVALUATION ADULT. - REASON INDICATOR FOR ASSESSMENT
Nutrition Assessment warranted for nutrition consult.  Information obtained from: pt, comprehensive chart review

## 2019-10-09 NOTE — DIETITIAN INITIAL EVALUATION ADULT. - OTHER INFO
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     Diet History:     Per discussion with pt, reports "not much of an appetite", initiating 2-3 months ago s/p "since getting sick". Reports consuming 2-3 meals/day, does not eat meat. Prefers soups, "a little fish". Endorsed consuming Boost supplement 2x daily PTA, and endorsed enjoyment of Ensure Enlive received 2x daily in-house. Pt reports "I can't digest" certain foods since initiation of illness, and endorsed intermittent abdominal pain/nausea, with occasional constipation (on bowel regimen). Pt denies adherence to therapeutic diet PTA. Per review of MD note 8/9/19, last A1c <6. Pt denies checking FSBG PTA. On Metformin as ordered. Denies allergies, denies taking vitamin/supplement PTA.     Pt reports he consumed cereal this AM at breakfast; not yet received lunch tray. Denies hunger. Per nursing flow sheet, pt consuming ~50-75% of meals in-house. Pt encouraged to utilize daily menus; Encourage pt to focus on protein foods first and to order nutrient dense foods with meals to save for in-between meals to mimic smaller, meal frequent meals. Discussed ways to obtain protein via plant sources/fish/dairy (pt reports he does not eat meat).     Per nursing flow sheets, last BM: (8/9): x 1. On bowel regimen as ordered.    Pt reports  lbs; believes to have lost 2-3 lbs in last few months. Dosing wt 10/5/19: 139.7 lbs. Appears consistent with UBW. Will monitor.

## 2019-10-09 NOTE — PROGRESS NOTE ADULT - PROBLEM SELECTOR PLAN 3
L groin hernia, tender and painful when standing   reducible on exam  NP discussed with surgery, pain control for now and conservative management

## 2019-10-09 NOTE — DIETITIAN INITIAL EVALUATION ADULT. - PROBLEM SELECTOR PLAN 2
Pt with pancytopenia, however has been receiving opdivo and nexavar. Is however also on carbamazepine as seizure ppx after prior ich which can also induce pancytopenia.  Will temporarily hold carbamazepine until heme/onc input obtained to help clarify whether safe to continue from bone marrow perspective  Has had pancytopenia in the past with improvement.  Will need heme/onc consult here to help order pt's home regimen

## 2019-10-09 NOTE — DISCHARGE NOTE PROVIDER - CARE PROVIDER_API CALL
Tapan Huizar)  Orthopedics  [address] [place] [address], [address]  [place] [address]  Phone: [phone]  Fax: [phone]  Follow Up Time:     Dr. Kyle WOODARD,   Primary Care Provider  Phone: (   )    -  Fax: (   )    -  Follow Up Time:     Dr. Daugherty,   Patient's private oncologist  Phone: (   )    -  Fax: (   )    -  Follow Up Time:

## 2019-10-09 NOTE — PROGRESS NOTE ADULT - PROBLEM SELECTOR PLAN 1
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   -DVT neg  -keep RLE elevated   -pt's CT imaging report reviewed, indicating extensive disease including hepatic, LN involvement, as well as R hydroureteronephrosis and pericardial effusion along with b/l pleural effusion as well as moderate ascites  -xray done, no acute fx  -ortho eval appreciated, not related to procedure per ortho recs   -discussed CT A/P with radiology, no bulky lymphadenopathy or compressive pathology seen in pelvis that could be causing RLE edema  -pt should get outpt echo done

## 2019-10-09 NOTE — DISCHARGE NOTE PROVIDER - PROVIDER TOKENS
PROVIDER:[TOKEN:[[id number]]],FREE:[LAST:[Dr. Kyle WOODARD],PHONE:[(   )    -],FAX:[(   )    -],ADDRESS:[Primary Care Provider]],FREE:[LAST:[Dr. Daugherty],PHONE:[(   )    -],FAX:[(   )    -],ADDRESS:[Patient's private oncologist]]

## 2019-10-09 NOTE — DISCHARGE NOTE PROVIDER - NSDCCPCAREPLAN_GEN_ALL_CORE_FT
PRINCIPAL DISCHARGE DIAGNOSIS  Diagnosis: Right leg swelling  Assessment and Plan of Treatment: RLE swelling likely secondary to lymphedema. Doppler studies negative for DVT. CT imaging report reviewed, indicating extensive disease including hepatic, LN involvement, as well as R hydroureteronephrosis and pericardial effusion along with b/l pleural effusion as well as moderate ascites. Right LE Xray- no acute Fx, seen by orthopedic: edema of RLE extends from above knee and down to foot (not related to knee surgery), no acute surgical intervention at this time, recommend elevation of the RLE. Patient to follow up with Dr. Huizar 1-2 weeks on discharge from hospital.   Pt cleared for discharge home with follow up with PCP and Hem/Onc as OP.      SECONDARY DISCHARGE DIAGNOSES  Diagnosis: Cancer, metastatic to bone  Assessment and Plan of Treatment: Pt seen by Hem/Onc: Can c/w Carbamazepine as pancytopenia is chronic and 2/2 myelophthisics syndrome. Patient will follow with his private oncologist for chemotherapy..   Pt cleared for discharge home with follow up with PCP and Hem/Onc as OP.    Diagnosis: Hernia  Assessment and Plan of Treatment: L groin hernia, tender and painful, reducible on exam. No intervention at this time.
Right Hand/Left Foot

## 2019-10-09 NOTE — DISCHARGE NOTE PROVIDER - HOSPITAL COURSE
Mr. Balderrama is a 79-year-old male with a history of HTN, DM II, HLD, CKD III, multiple CVA without residual deficits, including right parietal stroke of unclear etiology on Lovenox c/b secondary hemorrhage s/p MCA clot retrieval at [place] on 3/19, metastatic prostate cancer, metastatic adenocarcinoma of unclear etiology, with metastases to liver and bone s/p recent R Tibia Intralesional curettage/bone graft, admission for anemia here in July, now presenting with RLE swelling likely secondary to lymphedema due to mass effect from metastatic disease and bone graft. Doppler studies negative for DVT. CT imaging report reviewed, indicating extensive disease including hepatic, LN involvement, as well as R hydroureteronephrosis and pericardial effusion along with b/l pleural effusion as well as moderate ascites. Right LE Xray- no acute Fx, seen by orthopedic: edema of RLE extends from above knee and down to foot (not related to knee surgery), no acute surgical intervention at this time, recommend elevation of the RLE. Patient to follow up with Dr. Huizar 1-2 weeks on discharge from hospital. 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 Patient will follow with his private oncologist for chemotherapy. Also with  L groin hernia, tender and painful, reducible on exam. No intervention at this time.     NP discussed with surgery yesterday, pain control for now.          Problem/Plan - 4:    ·  Problem: Pancytopenia.  Plan: Pt with pancytopenia, however has been receiving opdivo and nexavar.     Heme/onc ok with resuming home carbamazepine.          Problem/Plan - 5:    ·  Problem: Cancer, metastatic to bone.  Plan: on nexvar    outpt f/u with oncologist Dr. Daugherty upon discharge.          Problem/Plan - 6:    Problem: Type 2 diabetes mellitus without complication, without long-term current use of insulin. Plan: Will order consistent carb diet, pt's last a1c <6 so may no longer technically be diabetic, is only on low dose metformin.         Problem/Plan - 7:    ·  Problem: Ischemic stroke.  Plan: Hx of ischemic stroke    Was placed on carbamazepine afterwards for seizure ppx, has never had an actual seizure.     Continue for now. Mr. Balderrama is a 79-year-old male with a history of HTN, DM II, HLD, CKD III, multiple CVA without residual deficits, including right parietal stroke of unclear etiology on Lovenox c/b secondary hemorrhage s/p MCA clot retrieval at [employer] on 3/19, metastatic prostate cancer, metastatic adenocarcinoma of unclear etiology, with metastases to liver and bone s/p recent R Tibia Intralesional curettage/bone graft, admission for anemia here in July, now presenting with RLE swelling likely secondary to lymphedema due to mass effect from metastatic disease and bone graft. Doppler studies negative for DVT. CT imaging report reviewed, indicating extensive disease including hepatic, LN involvement, as well as R hydroureteronephrosis and pericardial effusion along with b/l pleural effusion as well as moderate ascites. Right LE Xray- no acute Fx, seen by orthopedic: edema of RLE extends from above knee and down to foot (not related to knee surgery), no acute surgical intervention at this time, recommend elevation of the RLE. Patient to follow up with Dr. Huizar 1-2 weeks on discharge from hospital. 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 Patient will follow with his private oncologist for chemotherapy. Also with  L groin hernia, tender and painful, reducible on exam. No intervention at this time.     Pt cleared for discharge home with follow up with PCP and Hem/Onc as OP.     Pt cleared for DC by Dr. Chen.

## 2019-10-09 NOTE — PROGRESS NOTE ADULT - REASON FOR ADMISSION
Right leg swelling x 2 days

## 2019-10-09 NOTE — PROGRESS NOTE ADULT - PROBLEM SELECTOR PLAN 6
Will order consistent carb diet, pt's last a1c <6 so may no longer technically be diabetic, is only on low dose metformin
Will order consistent carb diet, pt's last a1c <6 so may no longer technically be diabetic, is only on low dose metformin
Hx of ischemic stroke  Was placed on carbamazepine afterwards for seizure ppx, has never had an actual seizure.   Given pancytopenia will temporarily hold carbamazepine until clarified whether pancytopenia is from ongoing cancer treatment vs from carbamazepine

## 2019-10-09 NOTE — PHARMACOTHERAPY INTERVENTION NOTE - COMMENTS
Discussed discharge medication name, dose, frequency, and side effects. Medication information handout provided from [employer] Fact Sheet (apomio® Carenotes®).

## 2019-10-09 NOTE — PROGRESS NOTE ADULT - PROBLEM SELECTOR PLAN 2
-scant phlegm production  -CXR atelectasis vs consolidation  -afebrile  -CT chest neg for PNA  -some pulm edema seen, diuresed with IV lasix 40mg x2 days

## 2019-10-09 NOTE — PROGRESS NOTE ADULT - ASSESSMENT
Mr. Balderrama is a 79-year-old male with a history of HTN, DM II, HLD, CKD III, multiple CVA without residual deficits, including right parietal stroke of unclear etiology on lovenox c/b secondary hemorrhage s/p MCA clot retrieval at [place] on 3/19, metastatic prostate cancer, metastatic adenocarcinoma of unclear etiology, with metastases to liver and bone s/p recent R Tibia Intralesional curettage/bone graft, admission for anemia here in July, now presenting with RLE swelling.

## 2019-10-09 NOTE — PROGRESS NOTE ADULT - ATTENDING COMMENTS
D/C home today  F/U with oncology   Symptoms seem to be tied to malignancy, however disease is extensive despite chemo and treatments.   Due for next chemo next week.   Outpt echo   Discussed with medicine NP     Total discharge time: 40 mins
Swelling may be from overall hypoalbuminemia in the setting of metastatic malignancy.   However, will need to rule out acute causes first.   Nutrition consult.    Discussed extensively with patient and with his son.

## 2019-10-09 NOTE — PROGRESS NOTE ADULT - PROBLEM SELECTOR PROBLEM 6
Type 2 diabetes mellitus without complication, without long-term current use of insulin
Type 2 diabetes mellitus without complication, without long-term current use of insulin
Ischemic stroke

## 2019-10-09 NOTE — DIETITIAN INITIAL EVALUATION ADULT. - PROBLEM SELECTOR PLAN 3
On nexavar for his cancer.  Will need oncology consult to help order chemotherapy, would also benefit from discussion possible needed changes to his therapy to help better control his disease.

## 2019-10-09 NOTE — DISCHARGE NOTE PROVIDER - CARE PROVIDERS DIRECT ADDRESSES
[email or web address],DirectAddress_Unknown

## 2019-10-25 ENCOUNTER — APPOINTMENT (OUTPATIENT)
Dept: SURGERY | Facility: CLINIC | Age: 79
End: 2019-10-25
Payer: MEDICARE

## 2019-10-25 VITALS
BODY MASS INDEX: 23.22 KG/M2 | DIASTOLIC BLOOD PRESSURE: 86 MMHG | WEIGHT: 144.5 LBS | OXYGEN SATURATION: 98 % | HEIGHT: 66 IN | HEART RATE: 110 BPM | TEMPERATURE: 98.4 F | SYSTOLIC BLOOD PRESSURE: 136 MMHG

## 2019-10-25 PROCEDURE — 99203 OFFICE O/P NEW LOW 30 MIN: CPT

## 2019-10-25 NOTE — END OF VISIT
[FreeTextEntry3] : All medical record entries made by the Scribe were at my, Dr. Calhoun's direction and personally dictated by me on 10/25/2019  I have reviewed the chart and agree that the record accurately reflects my personal performance of the history, physical exam, assessment and plan. I have also personally directed, reviewed, and agreed with the chart.\par \par

## 2019-10-25 NOTE — ADDENDUM
[FreeTextEntry1] : Documented by Valentin Cruz acting as a scribe for Dr. Flip Calhoun on 10/25/2019\par

## 2019-10-25 NOTE — ASSESSMENT
[FreeTextEntry1] : 79-year-old male with Left sided inguinal hernia. On exam, an inguinal hernia was palpable to the left side. I advised the patient to remain active to prevent progression of his swelling as well as to receive proper nourishment. I currently do not recommend surgical intervention given that patient is currently on chemotherapy. I informed the patient and his son that the hernia can be fixed after he finishes chemo with a Laparoscopic, Left inguinal hernia repair. Risks, benefits, and alternatives to the proposed procedure were discussed with the patient and all questions were answered to their satisfaction. Pre and Post-operative instructions were provided to the patient. He will need medical clearances given his history of metastatic cancer and age. Patient will continue with chemotherapy for now. Will speak to  about patient. He is instructed to see his urologist,  to address his scrotal swelling.

## 2019-10-25 NOTE — HISTORY OF PRESENT ILLNESS
[de-identified] : 79-year-old male with a history of HTN, DM II, HLD, CKD III, multiple CVA without residual deficits, including right parietal stroke of unclear etiology on Lovenox c/b secondary hemorrhage s/p MCA clot retrieval at [place] on 3/19, metastatic prostate cancer, metastatic adenocarcinoma of unclear etiology, with metastases to liver and bone s/p recent R Tibia Intralesional curettage/bone graft, presents here today for a left sided inguinal hernia. He was referred here by Dr.William Daugherty. He complains of severe pain that began 2 months ago. The pain is worse with laying flat. Additionally, he states that there is swelling to his left scrotum that has been present for the last 2 years as well as swelling to his right knee due to his previous tibial surgery. He is currently receiving chemotherapy with  for his metastatic disease. He presents here with his son who reports that he has gained 6 lbs in the last 2 weeks and that patient is active and walks daily.

## 2019-10-29 ENCOUNTER — INPATIENT (INPATIENT)
Facility: HOSPITAL | Age: 79
LOS: 1 days | Discharge: HOME | DRG: 988 | End: 2019-10-31
Attending: INTERNAL MEDICINE | Admitting: INTERNAL MEDICINE
Payer: MEDICARE

## 2019-10-29 VITALS
WEIGHT: 145.95 LBS | HEART RATE: 93 BPM | RESPIRATION RATE: 16 BRPM | HEIGHT: 66 IN | TEMPERATURE: 98 F | DIASTOLIC BLOOD PRESSURE: 47 MMHG | OXYGEN SATURATION: 97 % | SYSTOLIC BLOOD PRESSURE: 68 MMHG

## 2019-10-29 DIAGNOSIS — Z98.890 OTHER SPECIFIED POSTPROCEDURAL STATES: Chronic | ICD-10-CM

## 2019-10-29 LAB
ACANTHOCYTES BLD QL SMEAR: SLIGHT — SIGNIFICANT CHANGE UP
ALBUMIN SERPL ELPH-MCNC: 1.9 G/DL — LOW (ref 3.3–5)
ALP SERPL-CCNC: 240 U/L — HIGH (ref 40–120)
ALT FLD-CCNC: 35 U/L — SIGNIFICANT CHANGE UP (ref 10–45)
ANION GAP SERPL CALC-SCNC: 13 MMOL/L — SIGNIFICANT CHANGE UP (ref 5–17)
ANISOCYTOSIS BLD QL: SLIGHT — SIGNIFICANT CHANGE UP
APPEARANCE UR: CLEAR — SIGNIFICANT CHANGE UP
APTT BLD: 25.5 SEC — LOW (ref 27.5–36.3)
AST SERPL-CCNC: 61 U/L — HIGH (ref 10–40)
BASE EXCESS BLDV CALC-SCNC: -3.8 MMOL/L — SIGNIFICANT CHANGE UP
BASOPHILS # BLD AUTO: 0 K/UL — SIGNIFICANT CHANGE UP (ref 0–0.2)
BASOPHILS NFR BLD AUTO: 0 % — SIGNIFICANT CHANGE UP (ref 0–2)
BILIRUB SERPL-MCNC: 0.3 MG/DL — SIGNIFICANT CHANGE UP (ref 0.2–1.2)
BILIRUB UR-MCNC: NEGATIVE — SIGNIFICANT CHANGE UP
BLD GP AB SCN SERPL QL: NEGATIVE — SIGNIFICANT CHANGE UP
BLD GP AB SCN SERPL QL: NEGATIVE — SIGNIFICANT CHANGE UP
BUN SERPL-MCNC: 12 MG/DL — SIGNIFICANT CHANGE UP (ref 7–23)
BURR CELLS BLD QL SMEAR: PRESENT — SIGNIFICANT CHANGE UP
CA-I SERPL-SCNC: 1.01 MMOL/L — LOW (ref 1.12–1.3)
CALCIUM SERPL-MCNC: 7.3 MG/DL — LOW (ref 8.4–10.5)
CHLORIDE SERPL-SCNC: 100 MMOL/L — SIGNIFICANT CHANGE UP (ref 96–108)
CO2 SERPL-SCNC: 20 MMOL/L — LOW (ref 22–31)
COLOR SPEC: YELLOW — SIGNIFICANT CHANGE UP
CREAT SERPL-MCNC: 1.27 MG/DL — SIGNIFICANT CHANGE UP (ref 0.5–1.3)
DACRYOCYTES BLD QL SMEAR: SLIGHT — SIGNIFICANT CHANGE UP
DIFF PNL FLD: NEGATIVE — SIGNIFICANT CHANGE UP
ELLIPTOCYTES BLD QL SMEAR: SLIGHT — SIGNIFICANT CHANGE UP
EOSINOPHIL # BLD AUTO: 0 K/UL — SIGNIFICANT CHANGE UP (ref 0–0.5)
EOSINOPHIL NFR BLD AUTO: 0 % — SIGNIFICANT CHANGE UP (ref 0–6)
GAS PNL BLDV: 123 MMOL/L — LOW (ref 138–146)
GAS PNL BLDV: SIGNIFICANT CHANGE UP
GIANT PLATELETS BLD QL SMEAR: PRESENT — SIGNIFICANT CHANGE UP
GLUCOSE SERPL-MCNC: 160 MG/DL — HIGH (ref 70–99)
GLUCOSE UR QL: NEGATIVE — SIGNIFICANT CHANGE UP
HCO3 BLDV-SCNC: 21 MMOL/L — SIGNIFICANT CHANGE UP (ref 20–27)
HCT VFR BLD CALC: 26.9 % — LOW (ref 39–50)
HGB BLD-MCNC: 8 G/DL — LOW (ref 13–17)
HYPOCHROMIA BLD QL: SIGNIFICANT CHANGE UP
INR BLD: 1.07 — SIGNIFICANT CHANGE UP (ref 0.88–1.16)
KETONES UR-MCNC: NEGATIVE — SIGNIFICANT CHANGE UP
LACTATE SERPL-SCNC: 3.6 MMOL/L — HIGH (ref 0.5–2)
LACTATE SERPL-SCNC: 5.3 MMOL/L — CRITICAL HIGH (ref 0.5–2)
LEUKOCYTE ESTERASE UR-ACNC: NEGATIVE — SIGNIFICANT CHANGE UP
LYMPHOCYTES # BLD AUTO: 0.12 K/UL — LOW (ref 1–3.3)
LYMPHOCYTES # BLD AUTO: 1.8 % — LOW (ref 13–44)
MACROCYTES BLD QL: SLIGHT — SIGNIFICANT CHANGE UP
MANUAL SMEAR VERIFICATION: SIGNIFICANT CHANGE UP
MCHC RBC-ENTMCNC: 27.9 PG — SIGNIFICANT CHANGE UP (ref 27–34)
MCHC RBC-ENTMCNC: 29.7 GM/DL — LOW (ref 32–36)
MCV RBC AUTO: 93.7 FL — SIGNIFICANT CHANGE UP (ref 80–100)
MICROCYTES BLD QL: SLIGHT — SIGNIFICANT CHANGE UP
MONOCYTES # BLD AUTO: 0.12 K/UL — SIGNIFICANT CHANGE UP (ref 0–0.9)
MONOCYTES NFR BLD AUTO: 1.7 % — LOW (ref 2–14)
NEUTROPHILS # BLD AUTO: 6.59 K/UL — SIGNIFICANT CHANGE UP (ref 1.8–7.4)
NEUTROPHILS NFR BLD AUTO: 91.3 % — HIGH (ref 43–77)
NEUTS BAND # BLD: 5.2 % — SIGNIFICANT CHANGE UP (ref 0–8)
NITRITE UR-MCNC: NEGATIVE — SIGNIFICANT CHANGE UP
NRBC # BLD: 1 /100 — HIGH (ref 0–0)
NRBC # BLD: SIGNIFICANT CHANGE UP /100 WBCS (ref 0–0)
OVALOCYTES BLD QL SMEAR: SLIGHT — SIGNIFICANT CHANGE UP
PCO2 BLDV: 37 MMHG — LOW (ref 41–51)
PH BLDV: 7.37 — SIGNIFICANT CHANGE UP (ref 7.32–7.43)
PH UR: 6.5 — SIGNIFICANT CHANGE UP (ref 5–8)
PLAT MORPH BLD: ABNORMAL
PLATELET # BLD AUTO: 26 K/UL — LOW (ref 150–400)
PO2 BLDV: 20 MMHG — SIGNIFICANT CHANGE UP
POIKILOCYTOSIS BLD QL AUTO: SIGNIFICANT CHANGE UP
POLYCHROMASIA BLD QL SMEAR: SLIGHT — SIGNIFICANT CHANGE UP
POTASSIUM BLDV-SCNC: 4.1 MMOL/L — SIGNIFICANT CHANGE UP (ref 3.5–4.9)
POTASSIUM SERPL-MCNC: 4.4 MMOL/L — SIGNIFICANT CHANGE UP (ref 3.5–5.3)
POTASSIUM SERPL-SCNC: 4.4 MMOL/L — SIGNIFICANT CHANGE UP (ref 3.5–5.3)
PROT SERPL-MCNC: 5.4 G/DL — LOW (ref 6–8.3)
PROT UR-MCNC: NEGATIVE MG/DL — SIGNIFICANT CHANGE UP
PROTHROM AB SERPL-ACNC: 12.1 SEC — SIGNIFICANT CHANGE UP (ref 10–12.9)
RBC # BLD: 2.87 M/UL — LOW (ref 4.2–5.8)
RBC # FLD: 17.2 % — HIGH (ref 10.3–14.5)
RBC BLD AUTO: ABNORMAL
RH IG SCN BLD-IMP: POSITIVE — SIGNIFICANT CHANGE UP
RH IG SCN BLD-IMP: POSITIVE — SIGNIFICANT CHANGE UP
SAO2 % BLDV: 25 % — SIGNIFICANT CHANGE UP
SODIUM SERPL-SCNC: 133 MMOL/L — LOW (ref 135–145)
SP GR SPEC: 1.01 — SIGNIFICANT CHANGE UP (ref 1–1.03)
UROBILINOGEN FLD QL: 0.2 E.U./DL — SIGNIFICANT CHANGE UP
WBC # BLD: 6.83 K/UL — SIGNIFICANT CHANGE UP (ref 3.8–10.5)
WBC # FLD AUTO: 6.83 K/UL — SIGNIFICANT CHANGE UP (ref 3.8–10.5)

## 2019-10-29 PROCEDURE — 71260 CT THORAX DX C+: CPT | Mod: 26

## 2019-10-29 PROCEDURE — 99291 CRITICAL CARE FIRST HOUR: CPT

## 2019-10-29 PROCEDURE — 93010 ELECTROCARDIOGRAM REPORT: CPT

## 2019-10-29 PROCEDURE — 70450 CT HEAD/BRAIN W/O DYE: CPT | Mod: 26

## 2019-10-29 PROCEDURE — 74174 CTA ABD&PLVS W/CONTRAST: CPT | Mod: 26

## 2019-10-29 RX ORDER — OXYCODONE HYDROCHLORIDE 5 MG/1
5 TABLET ORAL EVERY 4 HOURS
Refills: 0 | Status: DISCONTINUED | OUTPATIENT
Start: 2019-10-29 | End: 2019-10-31

## 2019-10-29 RX ORDER — SODIUM CHLORIDE 9 MG/ML
1000 INJECTION INTRAMUSCULAR; INTRAVENOUS; SUBCUTANEOUS ONCE
Refills: 0 | Status: COMPLETED | OUTPATIENT
Start: 2019-10-29 | End: 2019-10-29

## 2019-10-29 RX ORDER — ACETAMINOPHEN 500 MG
650 TABLET ORAL EVERY 6 HOURS
Refills: 0 | Status: DISCONTINUED | OUTPATIENT
Start: 2019-10-29 | End: 2019-10-31

## 2019-10-29 RX ORDER — PIPERACILLIN AND TAZOBACTAM 4; .5 G/20ML; G/20ML
3.38 INJECTION, POWDER, LYOPHILIZED, FOR SOLUTION INTRAVENOUS ONCE
Refills: 0 | Status: COMPLETED | OUTPATIENT
Start: 2019-10-29 | End: 2019-10-29

## 2019-10-29 RX ORDER — SODIUM CHLORIDE 9 MG/ML
2100 INJECTION INTRAMUSCULAR; INTRAVENOUS; SUBCUTANEOUS ONCE
Refills: 0 | Status: COMPLETED | OUTPATIENT
Start: 2019-10-29 | End: 2019-10-29

## 2019-10-29 RX ORDER — VANCOMYCIN HCL 1 G
VIAL (EA) INTRAVENOUS
Refills: 0 | Status: DISCONTINUED | OUTPATIENT
Start: 2019-10-29 | End: 2019-10-29

## 2019-10-29 RX ORDER — BENZOCAINE AND MENTHOL 5; 1 G/100ML; G/100ML
1 LIQUID ORAL EVERY 4 HOURS
Refills: 0 | Status: DISCONTINUED | OUTPATIENT
Start: 2019-10-29 | End: 2019-10-31

## 2019-10-29 RX ORDER — VANCOMYCIN HCL 1 G
1000 VIAL (EA) INTRAVENOUS ONCE
Refills: 0 | Status: COMPLETED | OUTPATIENT
Start: 2019-10-29 | End: 2019-10-29

## 2019-10-29 RX ADMIN — PIPERACILLIN AND TAZOBACTAM 3.38 GRAM(S): 4; .5 INJECTION, POWDER, LYOPHILIZED, FOR SOLUTION INTRAVENOUS at 19:36

## 2019-10-29 RX ADMIN — SODIUM CHLORIDE 1000 MILLILITER(S): 9 INJECTION INTRAMUSCULAR; INTRAVENOUS; SUBCUTANEOUS at 22:29

## 2019-10-29 RX ADMIN — PIPERACILLIN AND TAZOBACTAM 200 GRAM(S): 4; .5 INJECTION, POWDER, LYOPHILIZED, FOR SOLUTION INTRAVENOUS at 18:01

## 2019-10-29 RX ADMIN — Medication 250 MILLIGRAM(S): at 20:06

## 2019-10-29 RX ADMIN — SODIUM CHLORIDE 2100 MILLILITER(S): 9 INJECTION INTRAMUSCULAR; INTRAVENOUS; SUBCUTANEOUS at 19:36

## 2019-10-29 RX ADMIN — Medication 1000 MILLIGRAM(S): at 21:56

## 2019-10-29 RX ADMIN — SODIUM CHLORIDE 2100 MILLILITER(S): 9 INJECTION INTRAMUSCULAR; INTRAVENOUS; SUBCUTANEOUS at 17:14

## 2019-10-29 NOTE — ED PROVIDER NOTE - OBJECTIVE STATEMENT
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  Noted to have increased abdominal distention over past week, increasing generalized weakness.  Yesterday was in the bathroom and felt weak/ legs gave out and he fell.  Landed on back and thinks he hit head.  Denies loc, headache.  Family notes bruising on left mid back.  Labwork done at oncologist with hgb of 7.3 and platelets of 20.  Concern for active bleeding from hematoma.  Also has history of gi bleed and says sometimes he has black stool.  Denies brbpr.  Per report, paracentesis with removal of 1800ml of serosanguinous fluid done in office today and fluid sent for cell count/ culture.

## 2019-10-29 NOTE — ED ADULT TRIAGE NOTE - HEART RATE (BEATS/MIN)
64 year old M w/ a PMH of type 1 DM, HTN, and HLD that presents with hyperglycemia. As per the patient he is usually managed well on a insulin pump that is controlled by a external controller that tells him when his pump is empty. 2 days PTA misplaced his controller while visiting [place].  He attempted to give himself a bolus of insulin at night, however, in the morning his finger stick was still "high" and he was feeling confused and disoriented He was able to call EMS, and on their arrival the patient was confused so he was bought in the [facility].    In the ED the patients vitals were: 97.0, 81, 103/54, 20, 100% on RA. He was found to have a FS that was "high." He was given 10 units of subcutaneous regular insulin. He then had a BMP which showed a anion gap of 41 with a bicarb of 5. pH on VBG was 6.99 and there was acetone in the blood. WBC was 34 and lactate was 6.3.     Patient was resistant to insulin DKA protocol initially, requiring 30u/hour. The dose was titrated down as tolerated. Patient was transitioned to lantus 30u the morning of 10/18/17 and humalog 6/6/6 per endocrine recommendations. Patient is asymptomatic and doing well now. He is stable for transfer to the floor.     Plan:   #DKA - resolved   - Continue with lantus 30 in the morning, humalog 6/6/6 premeal + insulin moderate sliding scale    - monitor BMP, replete lytes as necessary    - continue with aspirin     #HTN - blood pressure stable, home antihypertensives held due to low blood pressure   - can restart home dose when tolerated: amlodipine 10daily, enalapril 20 daily     #HLD - c/w lipitor    #Elevated cardiac enzymes    - No EKG changes , pt asymptomatic, likely type 2 MI    - monitor on tele, consider cardiology consult     # LEONARD on CKD  Cr 1.44 in 6/2017.     - Appears to be prerenal. Monitor BMP       Bel Carrillo, PGY 2 64 year old M w/ a PMH of type 1 DM, HTN, and HLD that presents with hyperglycemia. As per the patient he is usually managed well on a insulin pump that is controlled by a external controller that tells him when his pump is empty. 2 days PTA misplaced his controller while visiting [place].  He attempted to give himself a bolus of insulin at night, however, in the morning his finger stick was still "high" and he was feeling confused and disoriented He was able to call EMS, and on their arrival the patient was confused so he was bought in the [facility].    In the ED the patients vitals were: 97.0, 81, 103/54, 20, 100% on RA. He was found to have a FS that was "high." He was given 10 units of subcutaneous regular insulin. He then had a BMP which showed a anion gap of 41 with a bicarb of 5. pH on VBG was 6.99 and there was acetone in the blood. WBC was 34 and lactate was 6.3.     Patient was resistant to insulin DKA protocol initially, requiring 30u/hour. The dose was titrated down as tolerated. Patient was transitioned to lantus 30u the morning of 10/18/17 and humalog 6/6/6 per endocrine recommendations. Patient is asymptomatic and doing well now. He is stable for transfer to the floor.     Plan:   #DKA - resolved   - Continue with lantus 30 in the morning, humalog 6/6/6 premeal + insulin moderate sliding scale    - monitor BMP, replete lytes as necessary    - continue with aspirin     #HTN - blood pressure stable, home antihypertensives held due to low blood pressure   - can restart home dose when tolerated: amlodipine 10daily, enalapril 20 daily     #HLD - c/w lipitor    #Elevated cardiac enzymes    - No EKG changes , pt asymptomatic, ? demand ischemia    - monitor on tele, consider cardiology consult   - fu TTE     # LEONARD on CKD  Cr 1.44 in 6/2017.     - Appears to be prerenal. Monitor BMP       Bel Carrillo, PGY 2 64 year old M w/ a PMH of type 1 DM, HTN, and HLD that presents with hyperglycemia. As per the patient he is usually managed well on a insulin pump that is controlled by a external controller that tells him when his pump is empty. 2 days PTA misplaced his controller while visiting [place].  He attempted to give himself a bolus of insulin at night, however, in the morning his finger stick was still "high" and he was feeling confused and disoriented He was able to call EMS, and on their arrival the patient was confused so he was bought in the [facility].    In the ED the patients vitals were: 97.0, 81, 103/54, 20, 100% on RA. He was found to have a FS that was "high." He was given 10 units of subcutaneous regular insulin. He then had a BMP which showed a anion gap of 41 with a bicarb of 5. pH on VBG was 6.99 and there was acetone in the blood. WBC was 34 and lactate was 6.3.     Patient was resistant to insulin DKA protocol initially, requiring 30u/hour. The dose was titrated down as tolerated. Patient was transitioned to lantus 30u the morning of 10/18/17 and humalog 6/6/6 per endocrine recommendations.     Addendum (10/19):  Pt's FS became elevated again to 300s with AG of 17 overnight on 10/18, pt placed back on insulin gtt. He was transitioned to lantus 40U on 10/19 with improved FS.     Plan:   #DKA - resolved   - Continue with lantus 40 in the morning, humalog 7U premeal + insulin moderate sliding scale    - monitor BMP, replete lytes as necessary    - f/u Endo recs   - Of note:  Pt came from home with insulin pump, f/u Endo regarding insulin regimen when ready to discharge to home    #HTN - BP elevated to [id number]  - currently on home amlodipine 10mg  - holding home enalapril 20mg due to LEONARD, once creat back to baseline around 1.39, would d/c labetalol 100mg TID and place back on home enalapril    #HLD - c/w lipitor    # Demand ischemia:  Likely 2/2 DKA, Dana improved, TTE without wall motion abnormalities  - c/w ASA for primary prevention for CAD given DM2    # LEONARD on CKD: Likely prerenal, creat downtrending, Cr 1.39 in 7/2017  - monitor creat      Bel Carrillo, PGY 2    Updated by Dottie Lainez, PGY3 (10/19) 64 year old M w/ a PMH of type 1 DM, HTN, and HLD that presents with hyperglycemia. As per the patient he is usually managed well on a insulin pump that is controlled by a external controller that tells him when his pump is empty. 2 days PTA misplaced his controller while visiting [place].  He attempted to give himself a bolus of insulin at night, however, in the morning his finger stick was still "high" and he was feeling confused and disoriented He was able to call EMS, and on their arrival the patient was confused so he was bought in the [facility].    In the ED the patients vitals were: 97.0, 81, 103/54, 20, 100% on RA. He was found to have a FS that was "high." He was given 10 units of subcutaneous regular insulin. He then had a BMP which showed a anion gap of 41 with a bicarb of 5. pH on VBG was 6.99 and there was acetone in the blood. WBC was 34 and lactate was 6.3.     Patient was resistant to insulin DKA protocol initially, requiring 30u/hour. The dose was titrated down as tolerated. Patient was transitioned to lantus 30u the morning of 10/18/17 and humalog 6/6/6 per endocrine recommendations.     Addendum (10/19):  Pt's FS became elevated again to 300s with AG of 17 overnight on 10/18, pt placed back on insulin gtt. He was transitioned to lantus 40U on 10/19 with improved FS.     Plan:   #DKA - resolved   - Continue with lantus 40 in the morning, humalog 7U premeal + insulin moderate sliding scale    - monitor BMP, replete lytes as necessary    - f/u Endo recs   - Of note:  Pt came from home with insulin pump, f/u Endo regarding insulin regimen when ready to discharge to home    #HTN - BP elevated to [id number]  - currently on home amlodipine 10mg  - holding home enalapril 20mg due to LEONARD, once creat back to baseline around 1.39, would d/c labetalol 100mg TID and place back on home enalapril    #HLD - c/w lipitor    # Demand ischemia:  Likely 2/2 DKA, Dana improved, TTE without wall motion abnormalities  - c/w ASA for primary prevention for CAD given DM2    # LEONARD on CKD: Likely prerenal, creat downtrending, Cr 1.39 in 7/2017  - monitor creat    # ID:   SIRS:  Met SIRS criteria by leukocytosis and fever, but leukocytosis downtrending. Bcx 1/2 bottle positive for Coag neg Staph, likely contaminant. Ucx NGTD. CT A/P without acute pathology. Pt s/p 3 days of empiric zosyn  - monitor off abx    Bel Carrillo, PGY 2    Updated by Dottie Lainez, PGY3 (10/19) 64 year old M w/ a PMH of type 1 DM, HTN, and HLD that presents with hyperglycemia. As per the patient he is usually managed well on a insulin pump that is controlled by a external controller that tells him when his pump is empty. 2 days PTA misplaced his controller while visiting [place].  He attempted to give himself a bolus of insulin at night, however, in the morning his finger stick was still "high" and he was feeling confused and disoriented He was able to call EMS, and on their arrival the patient was confused so he was bought in the [facility].    In the ED the patients vitals were: 97.0, 81, 103/54, 20, 100% on RA. He was found to have a FS that was "high." He was given 10 units of subcutaneous regular insulin. He then had a BMP which showed a anion gap of 41 with a bicarb of 5. pH on VBG was 6.99 and there was acetone in the blood. WBC was 34 and lactate was 6.3.     Patient was resistant to insulin DKA protocol initially, requiring 30u/hour. The dose was titrated down as tolerated. Patient was transitioned to lantus 30u the morning of 10/18/17 and humalog 6/6/6 per endocrine recommendations.     Addendum (10/19):  Pt's FS became elevated again to 300s with AG of 17 overnight on 10/18, pt placed back on insulin gtt. He was transitioned to lantus 40U on 10/19 with improved FS.     Plan:   #DKA - resolved   - Continue with lantus 40 in the morning, humalog 7U premeal + insulin moderate sliding scale    - monitor BMP, replete lytes as necessary    - Discussed with Endocrine- plan to restart pt's home insulin pump 10/20 at 8AM if FS remain stable ( * Please see provider handoff for Endocrine recs on settings and place order on sunrise   - f/u Endo recs    #HTN - BP elevated to [id number]  - currently on home amlodipine 10mg  - holding home enalapril 20mg due to LEONARD, once creat back to baseline around 1.39, would d/c labetalol 100mg TID and place back on home enalapril    #HLD - c/w lipitor    # Demand ischemia:  Likely 2/2 DKA, Dana improved, TTE without wall motion abnormalities  - c/w ASA for primary prevention for CAD given DM2    # LEONARD on CKD: Likely prerenal, creat downtrending, Cr 1.39 in 7/2017  - monitor creat    # ID:   SIRS:  Met SIRS criteria by leukocytosis and fever, but leukocytosis downtrending. Bcx 1/2 bottle positive for Coag neg Staph, likely contaminant. Ucx NGTD. CT A/P without acute pathology. Pt s/p 3 days of empiric zosyn  - monitor off abx    Bel Carrillo, PGY 2    Updated by Dottie Lainez, PGY3 (10/19) Patient Accepted by Dr. Paget    64 year old M w/ a PMH of type 1 DM, HTN, and HLD that presents with hyperglycemia. As per the patient he is usually managed well on a insulin pump that is controlled by a external controller that tells him when his pump is empty. 2 days PTA misplaced his controller while visiting [place].  He attempted to give himself a bolus of insulin at night, however, in the morning his finger stick was still "high" and he was feeling confused and disoriented He was able to call EMS, and on their arrival the patient was confused so he was bought in the [facility].    In the ED the patients vitals were: 97.0, 81, 103/54, 20, 100% on RA. He was found to have a FS that was "high." He was given 10 units of subcutaneous regular insulin. He then had a BMP which showed a anion gap of 41 with a bicarb of 5. pH on VBG was 6.99 and there was acetone in the blood. WBC was 34 and lactate was 6.3.     Patient was resistant to insulin DKA protocol initially, requiring 30u/hour. The dose was titrated down as tolerated. Patient was transitioned to lantus 30u the morning of 10/18/17 and humalog 6/6/6 per endocrine recommendations.     Addendum (10/19):  Pt's FS became elevated again to 300s with AG of 17 overnight on 10/18, pt placed back on insulin gtt. He was transitioned to lantus 40U on 10/19 with improved FS.     Plan:   #DKA - resolved   - Continue with lantus 40 in the morning, humalog 7U premeal + insulin moderate sliding scale    - monitor BMP, replete lytes as necessary    - Discussed with Endocrine- plan to restart pt's home insulin pump 10/20 at 8AM if FS remain stable ( * Please see provider handoff for Endocrine recs on settings and place order on sunrise   - f/u Endo recs    #HTN - BP elevated to [id number]  - currently on home amlodipine 10mg  - holding home enalapril 20mg due to LEONARD, once creat back to baseline around 1.39, would d/c labetalol 100mg TID and place back on home enalapril    #HLD - c/w lipitor    # Demand ischemia:  Likely 2/2 DKA, Dana improved, TTE without wall motion abnormalities  - c/w ASA for primary prevention for CAD given DM2    # LEONARD on CKD: Likely prerenal, creat downtrending, Cr 1.39 in 7/2017  - monitor creat    # ID:   SIRS:  Met SIRS criteria by leukocytosis and fever, but leukocytosis downtrending. Bcx 1/2 bottle positive for Coag neg Staph, likely contaminant. Ucx NGTD. CT A/P without acute pathology. Pt s/p 3 days of empiric zosyn  - monitor off abx    Bel Carrillo, PGY 2    Updated by Dottie Lainez, PGY3 (10/19) 93

## 2019-10-29 NOTE — ED ADULT TRIAGE NOTE - OTHER COMPLAINTS
hx metastatic hepatocellular carcinoma, currently undergoing chemo/radiation. pt reports sob and worsening generalized weakness. per son, fall yesterday, pos head trauma. no loc. c.o headache and back pain. also reports abd pain, "hernia pain" with nausea. no v/d.

## 2019-10-29 NOTE — ED ADULT NURSE REASSESSMENT NOTE - NS ED NURSE REASSESS COMMENT FT1
Patient a/oX 3, back from CT scan, Plasma 1 unit ongoing, consents signed by Dr. Eric, no adverse rxn. , vital signs stable.  Vancomycin IVPB to follow.

## 2019-10-29 NOTE — ED ADULT NURSE REASSESSMENT NOTE - NS ED NURSE REASSESS COMMENT FT1
Plasma 1 unit,  "Vancomycin 1 gm IVPB completed, no adverse rxn.  Patient asleep, not in any pain , SOB or distress.  CT scans done.  Vital signs stable and improved. Additional NSS 1 L to be adminsitered.  For admit.  Stable and comfortable.

## 2019-10-29 NOTE — ED PROVIDER NOTE - CRITICAL CARE INDICATION, MLM
patient was critically ill... Patient was critically ill with a high probability of imminent or life threatening deterioration.  low blood pressure, abd pain, concern for sepsis vs bleed

## 2019-10-29 NOTE — ED ADULT NURSE NOTE - OBJECTIVE STATEMENT
Patient w/ Hx. of liver CA w/ metastasis, gastric ulcer, anemia, c/o of weakness , SOB, headache, chills and generalized body aches, states fell in bathroom yesterday and hit head and back, no LOC.  Immediate upgrade due to hypotension on arrival to ED.

## 2019-10-29 NOTE — ED PROVIDER NOTE - GASTROINTESTINAL, MLM
Abdomen soft, mild distention, diffuse mild tenderness, more epigastric/ upper abdomen, no guarding.  nabs

## 2019-10-29 NOTE — ED PROVIDER NOTE - CLINICAL SUMMARY MEDICAL DECISION MAKING FREE TEXT BOX
+ sirs criteria on arrival and concern for sepsis.  labs/ blood and urine cultures obtained and started 30/kg fluid bolus along with empiric antibiotic (vanc/ zosyn). also with fall yesterday and flank hematoma, possible drop in hgb per report from oncologist.  ct of chest / abd / pelvis ordered to r/o active bleeding.  will also eval possible pneumonia on ct.  reportedly with new ascites, paracentesis done and sent for labs/ culture to r/o sbp in office today.  will treat empirically pending results.

## 2019-10-29 NOTE — CONSULT NOTE ADULT - SUBJECTIVE AND OBJECTIVE BOX
78 yo M w/ hx prostate cancer, metastatic HCC, sent to ED by Dr. Daugherty for hypotension/SBP. General surgery consulted for CT finding c/f subcapsular liver hematoma. Patient reports increasing abdominal distension over past week associated with increasing abdominal pain. Underwent paracentesis today at Dr. Daugherty's office w/ 1800 ml SS fluid drained. Pt also reports fall in bathroom yesterday where hit head. Patient having bowel fxn, however reports intermittent melena since undergoing EGD/cauterization of gastric ulcers in July 2019. Endorses nausea and emesis Monday without blood. Denies fevers, chills. In ED BP initially 95/56 w/ HR 98, now AVSS after 3L and 1 unit plts.    Vital Signs Last 24 Hrs  T(C): 36.9 (29 Oct 2019 22:32), Max: 36.9 (29 Oct 2019 22:32)  T(F): 98.4 (29 Oct 2019 22:32), Max: 98.4 (29 Oct 2019 22:32)  HR: 102 (29 Oct 2019 22:32) (59 - 102)  BP: 118/72 (29 Oct 2019 22:32) (68/47 - 128/80)  BP(mean): --  RR: 16 (29 Oct 2019 22:32) (16 - 18)  SpO2: 94% (29 Oct 2019 22:32) (94% - 97%)    General: NAD  Pulm: normal resp effort and excursion  Abd: soft, distended, diffuse mild TTP, R mid flank dressing from paracentesis w/ mild spotting of dressing  Rectal: enlarged prostate, no masses, brown stool on finger                          8.0    6.83  )-----------( 26       ( 29 Oct 2019 16:56 )             26.9   10-29    133<L>  |  100  |  12  ----------------------------<  160<H>  4.4   |  20<L>  |  1.27    Ca    7.3<L>      29 Oct 2019 16:56    TPro  5.4<L>  /  Alb  1.9<L>  /  TBili  0.3  /  DBili  x   /  AST  61<H>  /  ALT  35  /  AlkPhos  240<H>  10-29    CT  IMPRESSION:     Subcapsular and perihepatic hematoma. No active extravasation.   Infiltrative hepatic metastatic disease.    No active gastrointestinal bleeding.    Increased ascites. Multifocal metastatic disease as demonstrated on 10/8.    Increased groundglass lung opacities, pulmonary edema or infection.   Lymphangitic carcinomatosis stable.    Worsened anasarca. Right thigh subcutaneous edema or cellulitis. Chronic   narrowing of the infrarenal IVC without acute thrombus.    Other incidental comments as above.

## 2019-10-29 NOTE — ED PROVIDER NOTE - CARE PLAN
Principal Discharge DX:	Weakness  Secondary Diagnosis:	Cancer, metastatic to bone  Secondary Diagnosis:	SBP (spontaneous bacterial peritonitis)

## 2019-10-29 NOTE — ED PROVIDER NOTE - PROGRESS NOTE DETAILS
discussed with Dr. Daugherty.  results of in office paracentesis will be back tomorrow.  treat empirically until for possible sbp.  requests admission to Dr. Sanders unless needs higher level of care. Dr. Mandujano from ID to be consulted once admitted amilcar: pt received at sign out from dr morrow as pending rpt lactate and ct, scan w/subcapsular hematoma w/o acute extravasation, [facility] called - spoke to dr bolden (trauma) and dr ledesma (ed) - transfer refused, stating that since terminal pt w/established care at [facility] no benefit to transfer - trauma surg states that no intervention and pt would be dc'd after serial CBCs; dr paez called back and updated - will admit pt to his service as per initial plan, surg consulted to eval pt prior to med admission [place]: received s/o pending rpt lactate, CT. lactate levated but improving. CT w/ concern for possible capsular hematoma. Pt is well appearing, abd is soft, has minimal diffuse ttp, slightly more in RUQ, no rebound/guarding. PA d/w trauma attg at [facility].   Given pt's multiple medical comorbidities and all his docs at [facility], risks of transfer for trauma likely outweighs benefits. Will admit to medicine for further care. From trauma persopective: serial abd exams, serial CBCs. surgery consulted.

## 2019-10-29 NOTE — ED ADULT NURSE NOTE - SUICIDE SCREENING QUESTION 2
Plan: Advised pt to apply Onexton twice daily to face and once nightly to chest. Initiate Treatment: Onexton Detail Level: Simple Plan: We reviewed pathology report with patient.  The most likely cause is recently prescribed Depakote.  Advised pt that she should consult with her psychiatrist.  Since it is unlikely that she will be able change medications, we will likely manage her rash with more potent topical steroids.  Advised pt to call clinic with name, dosage and if cream or ointment of topical medication pt has been applying.  Options for more potent topicals will be clobetasol or fluocinonide.  Advised pt to refrain from picking or scratching No

## 2019-10-29 NOTE — ED ADULT NURSE REASSESSMENT NOTE - NS ED NURSE REASSESS COMMENT FT1
Patient lethargic, oriented X 3, c/o of weakness, body aches, SOB and chills, no fevers.  Bruising and abrasions noted to back area, no obvious deformity.  Left AC PIV #18 in place, all labs, blood cultures, type and screen sent to lab.   Arrives hypotensive, BP and vitals improved s/p NSS 2100ml bolus.  Zosyn IVPB completed, no advers rxn.  Vancomycin, Plateletes 1 unit to follow on return from CT scan.

## 2019-10-30 DIAGNOSIS — K65.2 SPONTANEOUS BACTERIAL PERITONITIS: ICD-10-CM

## 2019-10-30 DIAGNOSIS — C79.9 SECONDARY MALIGNANT NEOPLASM OF UNSPECIFIED SITE: ICD-10-CM

## 2019-10-30 DIAGNOSIS — K25.4 CHRONIC OR UNSPECIFIED GASTRIC ULCER WITH HEMORRHAGE: ICD-10-CM

## 2019-10-30 DIAGNOSIS — S36.112A CONTUSION OF LIVER, INITIAL ENCOUNTER: ICD-10-CM

## 2019-10-30 DIAGNOSIS — Z91.89 OTHER SPECIFIED PERSONAL RISK FACTORS, NOT ELSEWHERE CLASSIFIED: ICD-10-CM

## 2019-10-30 DIAGNOSIS — E11.9 TYPE 2 DIABETES MELLITUS WITHOUT COMPLICATIONS: ICD-10-CM

## 2019-10-30 DIAGNOSIS — R63.8 OTHER SYMPTOMS AND SIGNS CONCERNING FOOD AND FLUID INTAKE: ICD-10-CM

## 2019-10-30 DIAGNOSIS — I63.9 CEREBRAL INFARCTION, UNSPECIFIED: ICD-10-CM

## 2019-10-30 LAB
CARBAMAZEPINE SERPL-MCNC: 5 UG/ML — SIGNIFICANT CHANGE UP (ref 4–12)
EXTRA GREEN TOP TUBE: SIGNIFICANT CHANGE UP
EXTRA LAVENDER TOP TUBE: SIGNIFICANT CHANGE UP
GLUCOSE BLDC GLUCOMTR-MCNC: 105 MG/DL — HIGH (ref 70–99)
GLUCOSE BLDC GLUCOMTR-MCNC: 116 MG/DL — HIGH (ref 70–99)
GLUCOSE BLDC GLUCOMTR-MCNC: 85 MG/DL — SIGNIFICANT CHANGE UP (ref 70–99)
GLUCOSE BLDC GLUCOMTR-MCNC: 88 MG/DL — SIGNIFICANT CHANGE UP (ref 70–99)
HCT VFR BLD CALC: 22.7 % — LOW (ref 39–50)
HCT VFR BLD CALC: 24.6 % — LOW (ref 39–50)
HGB BLD-MCNC: 7 G/DL — CRITICAL LOW (ref 13–17)
HGB BLD-MCNC: 7.8 G/DL — LOW (ref 13–17)
LACTATE SERPL-SCNC: 2.3 MMOL/L — HIGH (ref 0.5–2)
LACTATE SERPL-SCNC: 2.5 MMOL/L — HIGH (ref 0.5–2)
MCHC RBC-ENTMCNC: 28.7 PG — SIGNIFICANT CHANGE UP (ref 27–34)
MCHC RBC-ENTMCNC: 28.9 PG — SIGNIFICANT CHANGE UP (ref 27–34)
MCHC RBC-ENTMCNC: 30.8 GM/DL — LOW (ref 32–36)
MCHC RBC-ENTMCNC: 31.7 GM/DL — LOW (ref 32–36)
MCV RBC AUTO: 91.1 FL — SIGNIFICANT CHANGE UP (ref 80–100)
MCV RBC AUTO: 93 FL — SIGNIFICANT CHANGE UP (ref 80–100)
NRBC # BLD: 0 /100 WBCS — SIGNIFICANT CHANGE UP (ref 0–0)
NRBC # BLD: 0 /100 WBCS — SIGNIFICANT CHANGE UP (ref 0–0)
PLATELET # BLD AUTO: 45 K/UL — LOW (ref 150–400)
PLATELET # BLD AUTO: 55 K/UL — LOW (ref 150–400)
RBC # BLD: 2.44 M/UL — LOW (ref 4.2–5.8)
RBC # BLD: 2.7 M/UL — LOW (ref 4.2–5.8)
RBC # FLD: 17.1 % — HIGH (ref 10.3–14.5)
RBC # FLD: 17.4 % — HIGH (ref 10.3–14.5)
WBC # BLD: 6.64 K/UL — SIGNIFICANT CHANGE UP (ref 3.8–10.5)
WBC # BLD: 7.01 K/UL — SIGNIFICANT CHANGE UP (ref 3.8–10.5)
WBC # FLD AUTO: 6.64 K/UL — SIGNIFICANT CHANGE UP (ref 3.8–10.5)
WBC # FLD AUTO: 7.01 K/UL — SIGNIFICANT CHANGE UP (ref 3.8–10.5)

## 2019-10-30 PROCEDURE — 75726 ARTERY X-RAYS ABDOMEN: CPT | Mod: 26,59

## 2019-10-30 PROCEDURE — 36245 INS CATH ABD/L-EXT ART 1ST: CPT | Mod: 59

## 2019-10-30 PROCEDURE — 36247 INS CATH ABD/L-EXT ART 3RD: CPT

## 2019-10-30 PROCEDURE — 37243 VASC EMBOLIZE/OCCLUDE ORGAN: CPT

## 2019-10-30 PROCEDURE — 75774 ARTERY X-RAY EACH VESSEL: CPT | Mod: 26,59

## 2019-10-30 RX ORDER — MORPHINE SULFATE 50 MG/1
2 CAPSULE, EXTENDED RELEASE ORAL ONCE
Refills: 0 | Status: DISCONTINUED | OUTPATIENT
Start: 2019-10-30 | End: 2019-10-30

## 2019-10-30 RX ORDER — LANOLIN ALCOHOL/MO/W.PET/CERES
3 CREAM (GRAM) TOPICAL AT BEDTIME
Refills: 0 | Status: DISCONTINUED | OUTPATIENT
Start: 2019-10-30 | End: 2019-10-31

## 2019-10-30 RX ORDER — DEXTROSE 50 % IN WATER 50 %
25 SYRINGE (ML) INTRAVENOUS ONCE
Refills: 0 | Status: DISCONTINUED | OUTPATIENT
Start: 2019-10-30 | End: 2019-10-31

## 2019-10-30 RX ORDER — ALBUMIN HUMAN 25 %
1000 VIAL (ML) INTRAVENOUS ONCE
Refills: 0 | Status: DISCONTINUED | OUTPATIENT
Start: 2019-10-30 | End: 2019-10-31

## 2019-10-30 RX ORDER — ONDANSETRON 8 MG/1
1 TABLET, FILM COATED ORAL
Qty: 0 | Refills: 0 | DISCHARGE

## 2019-10-30 RX ORDER — SODIUM CHLORIDE 9 MG/ML
250 INJECTION INTRAMUSCULAR; INTRAVENOUS; SUBCUTANEOUS ONCE
Refills: 0 | Status: COMPLETED | OUTPATIENT
Start: 2019-10-30 | End: 2019-10-30

## 2019-10-30 RX ORDER — METFORMIN HYDROCHLORIDE 850 MG/1
0.5 TABLET ORAL
Qty: 0 | Refills: 0 | DISCHARGE

## 2019-10-30 RX ORDER — DEXTROSE 50 % IN WATER 50 %
15 SYRINGE (ML) INTRAVENOUS ONCE
Refills: 0 | Status: DISCONTINUED | OUTPATIENT
Start: 2019-10-30 | End: 2019-10-31

## 2019-10-30 RX ORDER — PIPERACILLIN AND TAZOBACTAM 4; .5 G/20ML; G/20ML
3.38 INJECTION, POWDER, LYOPHILIZED, FOR SOLUTION INTRAVENOUS EVERY 6 HOURS
Refills: 0 | Status: DISCONTINUED | OUTPATIENT
Start: 2019-10-30 | End: 2019-10-30

## 2019-10-30 RX ORDER — ALBUMIN HUMAN 25 %
50 VIAL (ML) INTRAVENOUS ONCE
Refills: 0 | Status: COMPLETED | OUTPATIENT
Start: 2019-10-30 | End: 2019-10-30

## 2019-10-30 RX ORDER — BUDESONIDE AND FORMOTEROL FUMARATE DIHYDRATE 160; 4.5 UG/1; UG/1
2 AEROSOL RESPIRATORY (INHALATION)
Refills: 0 | Status: DISCONTINUED | OUTPATIENT
Start: 2019-10-30 | End: 2019-10-31

## 2019-10-30 RX ORDER — MEGESTROL ACETATE 40 MG/ML
200 SUSPENSION ORAL DAILY
Refills: 0 | Status: DISCONTINUED | OUTPATIENT
Start: 2019-10-30 | End: 2019-10-31

## 2019-10-30 RX ORDER — APIXABAN 2.5 MG/1
1 TABLET, FILM COATED ORAL
Qty: 0 | Refills: 0 | DISCHARGE

## 2019-10-30 RX ORDER — DEXTROSE 50 % IN WATER 50 %
12.5 SYRINGE (ML) INTRAVENOUS ONCE
Refills: 0 | Status: DISCONTINUED | OUTPATIENT
Start: 2019-10-30 | End: 2019-10-31

## 2019-10-30 RX ORDER — ATORVASTATIN CALCIUM 80 MG/1
1 TABLET, FILM COATED ORAL
Qty: 0 | Refills: 0 | DISCHARGE

## 2019-10-30 RX ORDER — INSULIN LISPRO 100/ML
VIAL (ML) SUBCUTANEOUS
Refills: 0 | Status: DISCONTINUED | OUTPATIENT
Start: 2019-10-30 | End: 2019-10-31

## 2019-10-30 RX ORDER — ALBUTEROL 90 UG/1
2.5 AEROSOL, METERED ORAL EVERY 4 HOURS
Refills: 0 | Status: DISCONTINUED | OUTPATIENT
Start: 2019-10-30 | End: 2019-10-31

## 2019-10-30 RX ORDER — SODIUM CHLORIDE 9 MG/ML
1000 INJECTION, SOLUTION INTRAVENOUS
Refills: 0 | Status: DISCONTINUED | OUTPATIENT
Start: 2019-10-30 | End: 2019-10-31

## 2019-10-30 RX ORDER — CARBAMAZEPINE 200 MG
200 TABLET ORAL EVERY 12 HOURS
Refills: 0 | Status: DISCONTINUED | OUTPATIENT
Start: 2019-10-30 | End: 2019-10-31

## 2019-10-30 RX ORDER — CEFTRIAXONE 500 MG/1
2000 INJECTION, POWDER, FOR SOLUTION INTRAMUSCULAR; INTRAVENOUS EVERY 24 HOURS
Refills: 0 | Status: DISCONTINUED | OUTPATIENT
Start: 2019-10-30 | End: 2019-10-31

## 2019-10-30 RX ORDER — PANTOPRAZOLE SODIUM 20 MG/1
40 TABLET, DELAYED RELEASE ORAL DAILY
Refills: 0 | Status: DISCONTINUED | OUTPATIENT
Start: 2019-10-30 | End: 2019-10-31

## 2019-10-30 RX ORDER — GLUCAGON INJECTION, SOLUTION 0.5 MG/.1ML
1 INJECTION, SOLUTION SUBCUTANEOUS ONCE
Refills: 0 | Status: DISCONTINUED | OUTPATIENT
Start: 2019-10-30 | End: 2019-10-31

## 2019-10-30 RX ADMIN — PIPERACILLIN AND TAZOBACTAM 200 GRAM(S): 4; .5 INJECTION, POWDER, LYOPHILIZED, FOR SOLUTION INTRAVENOUS at 03:06

## 2019-10-30 RX ADMIN — MORPHINE SULFATE 2 MILLIGRAM(S): 50 CAPSULE, EXTENDED RELEASE ORAL at 09:46

## 2019-10-30 RX ADMIN — Medication 200 MILLIGRAM(S): at 18:34

## 2019-10-30 RX ADMIN — BENZOCAINE AND MENTHOL 1 LOZENGE: 5; 1 LIQUID ORAL at 22:23

## 2019-10-30 RX ADMIN — BUDESONIDE AND FORMOTEROL FUMARATE DIHYDRATE 2 PUFF(S): 160; 4.5 AEROSOL RESPIRATORY (INHALATION) at 22:23

## 2019-10-30 RX ADMIN — Medication 100 MILLIGRAM(S): at 22:05

## 2019-10-30 RX ADMIN — BUDESONIDE AND FORMOTEROL FUMARATE DIHYDRATE 2 PUFF(S): 160; 4.5 AEROSOL RESPIRATORY (INHALATION) at 09:06

## 2019-10-30 RX ADMIN — Medication 3 MILLIGRAM(S): at 22:23

## 2019-10-30 RX ADMIN — SODIUM CHLORIDE 500 MILLILITER(S): 9 INJECTION INTRAMUSCULAR; INTRAVENOUS; SUBCUTANEOUS at 06:59

## 2019-10-30 RX ADMIN — CEFTRIAXONE 100 MILLIGRAM(S): 500 INJECTION, POWDER, FOR SOLUTION INTRAMUSCULAR; INTRAVENOUS at 07:00

## 2019-10-30 RX ADMIN — Medication 200 MILLIGRAM(S): at 07:00

## 2019-10-30 RX ADMIN — Medication 3 MILLIGRAM(S): at 02:00

## 2019-10-30 NOTE — H&P ADULT - PROBLEM SELECTOR PLAN 1
-Patient w/ hx of metastatic adenocarcinoma following Dr. Daugherty.  -Worsening metastases and ascites on ct abdomen  -on radiation therapy, ramucirumab weekly, sorafenib daily, and bicalutamide  -patient expressed desire for palliative consult  -continue with megestrol daily

## 2019-10-30 NOTE — PROVIDER CONTACT NOTE (OTHER) - ACTION/TREATMENT ORDERED:
As per Dr. Jameson, administer medication to night. Will continue to monitor. Dr. Vargas made aware. Dr. Patel to find out whether pt. received albumin. Hold dose for now Will continue to monitor.

## 2019-10-30 NOTE — PROGRESS NOTE ADULT - PROBLEM SELECTOR PLAN 2
-Concern for SBP, elevated lactate 3.5 on admissions and subjective fevers at home  -1.8 L serosanguinous material drained w/ paracentesis in Dr. Daugherty's office  -s/p vanc zosyn  -c/w rocephin 2g Q24h  -f/u bld cx and paracentesis fluid studies

## 2019-10-30 NOTE — H&P ADULT - NSHPLABSRESULTS_GEN_ALL_CORE
.  LABS:                         8.0    6.83  )-----------( 26       ( 29 Oct 2019 16:56 )             26.9     10-29    133<L>  |  100  |  12  ----------------------------<  160<H>  4.4   |  20<L>  |  1.27    Ca    7.3<L>      29 Oct 2019 16:56    TPro  5.4<L>  /  Alb  1.9<L>  /  TBili  0.3  /  DBili  x   /  AST  61<H>  /  ALT  35  /  AlkPhos  240<H>  10-29    PT/INR - ( 29 Oct 2019 16:56 )   PT: 12.1 sec;   INR: 1.07          PTT - ( 29 Oct 2019 16:56 )  PTT:25.5 sec  Urinalysis Basic - ( 29 Oct 2019 22:35 )    Color: Yellow / Appearance: Clear / S.010 / pH: x  Gluc: x / Ketone: NEGATIVE  / Bili: Negative / Urobili: 0.2 E.U./dL   Blood: x / Protein: NEGATIVE mg/dL / Nitrite: NEGATIVE   Leuk Esterase: NEGATIVE / RBC: x / WBC x   Sq Epi: x / Non Sq Epi: x / Bacteria: x      Lactate, Blood: 2.5 mmoL/L (10-30 @ 01:30)  Lactate, Blood: 3.6 mmoL/L (10-29 @ 20:06)

## 2019-10-30 NOTE — PROGRESS NOTE ADULT - PROBLEM SELECTOR PLAN 4
-hx of gastric ulcer w/ bleed, cauterization recently at [facility]  -protonix 40 mg daily  -avoid NSAIDs  -maintain active type and screen  -transfuse for Hgb <7

## 2019-10-30 NOTE — PROGRESS NOTE ADULT - PROBLEM SELECTOR PLAN 6
-hx of CVA in the past  -no signs of neurologic deficits on exam  -avoid ASA/plavix as patient has had hematoma

## 2019-10-30 NOTE — H&P ADULT - ASSESSMENT
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 Daugherty on radiation therapy, ramucirumab weekly, sorafenib daily, and bicalutamide who presented to the ER from Dr. Daugherty's office due to hypotension at the office found to have hematoma in liver and SBP

## 2019-10-30 NOTE — BRIEF OPERATIVE NOTE - OPERATION/FINDINGS
-SMA angiogram shows normal arterial phase, patent portal system, hepatopetal flow  -angiogram from common hepatic artery shows no visible hepervascular lesions, possible extravasation from small distal branch of right hepatic artery  -embolization of right hepatic artery branch performed using 500-700 micron microspheres

## 2019-10-30 NOTE — H&P ADULT - PROBLEM SELECTOR PLAN 2
-Concern for SBP, elevated lactate 3.5 on admissions and subjective fevers at home  -1.8 L serosanguinous material drained w/ paracentesis  -s/p vanc zosyn, c/w rocephin 2gm Q24  -f/u bld cx and paracentesis fluid studies

## 2019-10-30 NOTE — CONSULT NOTE ADULT - ATTENDING COMMENTS
Transfuse 1 unit of packed red blood cells and 1 unit of single donor platelets prior to the bland embolization of the patient's right hepatic lobe bleeding vessels.  Nothing by mouth until after procedure.  The patient is likely coagulopathic

## 2019-10-30 NOTE — H&P ADULT - PROBLEM SELECTOR PLAN 4
-hx of gastric ulcer w/ bleed, cauterization recently at [facility]  -protonix 40 mg daily  -maintain active type and screen

## 2019-10-30 NOTE — PROGRESS NOTE ADULT - ASSESSMENT
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 Daugherty. Currently receiving radiation therapy to the cervical spine vs brain ? (unclear per pt, will require collateral information), ramucirumab weekly, sorafenib daily, and bicalutamide who presented to the ER from Dr. Daugherty's office due to hypotension at the office found to have hematoma of the liver and SBP.

## 2019-10-30 NOTE — H&P ADULT - PROBLEM SELECTOR PLAN 6
-hx of CVA in the past  -no signs of neurologic deficits on exam  -avoid asp/plavix as patient has had hematoma

## 2019-10-30 NOTE — H&P ADULT - PROBLEM SELECTOR PLAN 8
1) PCP Contacted on Admission: (Y/N) --> Name & Phone #: Dr. Daugherty  2) Date of Contact with PCP:  3) PCP Contacted at Discharge: (Y/N, N/A)  4) Summary of Handoff Given to PCP:   5) Post-Discharge Appointment Date and Location:

## 2019-10-30 NOTE — PROGRESS NOTE ADULT - PROBLEM SELECTOR PLAN 1
-Patient w/ hx of metastatic prostate and liver cancer following Dr. Daugherty  -Worsening metastases and ascites on recent ct abdomen  -currently receiving radiation therapy to cervical spine vs brain? (will require collateral information), ramucirumab weekly, sorafenib daily, and bicalutamide  -patient expressed desire for palliative consult  -continue with megestrol daily  -f/u Dr. Daugherty's recs

## 2019-10-30 NOTE — H&P ADULT - NSHPPHYSICALEXAM_GEN_ALL_CORE
PHYSICAL EXAM:  GENERAL: NAD, appears thin and cachectic   HEAD:  Atraumatic, Normocephalic  EYES: EOMI, PERRLA, conjunctiva and sclera clear  NECK: Supple, No JVD  CHEST/LUNG: Clear to auscultation bilaterally; No wheeze  HEART: Regularly irregular; No murmurs, rubs, or gallops  ABDOMEN: Soft, Nontender, Nondistended; Bowel sounds present  EXTREMITIES:  2+ Peripheral Pulses, No clubbing, cyanosis, or edema  PSYCH: AAOx3  NEUROLOGY: non-focal  SKIN: No rashes or lesions

## 2019-10-30 NOTE — H&P ADULT - PROBLEM SELECTOR PLAN 3
-subcapsular and perihepatic hematoma  -general surgery is made aware, no intervention currently high risk   -continue abdominal exams  -f/u serial CBC    #Thrombocytopenia  -platelets of 27k, received 1 unit of platelets in the ED  -likely 2/2 metastatic cancer vs chemo side effects vs carbamazepine side effect  -f/u carbamazepine level  -transfuse platelets <20 or if active bleeding <50.

## 2019-10-30 NOTE — PROVIDER CONTACT NOTE (OTHER) - SITUATION
Pt. has albumin pending that was ordered at 12:28PM. Pt. has albumin pending that was ordered at 12:28PM. Possibly given in OR. Pt. has albumin pending that was ordered at 12:28PM. Documented in chart that pt. received 5% albumin, but not documented on MAR.

## 2019-10-30 NOTE — CONSULT NOTE ADULT - SUBJECTIVE AND OBJECTIVE BOX
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 The patient fell at home and has had abdominal pain and pain on the right side ever since his fall.  His abdomen has become distended.    History of Present Illness: The patient feels fatigue with some dyspnea on exertion.  The patient has not noticed any obvious bleeding.  The patient however has noticed increasing pain in his abdomen which started this morning.  The patient reports that he fell yesterday and may have injured his abdomen.    Review of Systems: the patient denies headache, visual changes, hearing loss or tinnitus, nosebleeds or nasal discharge, oral sores, oral lesions or poor dental health.  the patient denies difficulty swallowing, heartburn, nausea, indigestion, constipation, diarrhea, rectal bleeding or change in bowel habits.  the patient denies dysuria, hematuria, urinary frequency, scrotal or penile rashes.  the patient denies arthralgias, myalgias, tremors, numbness, paresthesias.  the patient denies skin rashes, lesions, pruritus or alteration in nail bed shaped or integrity.    Past Medical History: The patient was treated for localized prostate cancer with therapeutic radiation therapy in .  Patient has been treated for hypertension for 30 years.  The patient has had symptoms of prostatism independent of his cancer since .  The patient was treated for a inguinal right hernia in     Family History: The patient's mother  at age 93 from complications of stroke.  The patient's father  at age 93 from complications of old age.  The patient's brother  at age 96 of complications of prostate cancer.  The patient had a sister who  in her 90s of liver problems.  The patient has 5 children 1 of whom has diabetes, hypertension and elevated cholesterol.    Social History: [Tobacco: Former smoker (60 pk yrs / 0 yrs quit)  Start Date: 2019    End Date: 2019]    Allergies: No Known Allergies    Medications: 1) Advair Diskus 250 mcg-50 mcg inhalation powder, as directed daily  2) amLODIPine 10 mg oral tablet, Take 1 pill by mouth QD (Daily) X 1 Month (30d)  3) bicalutamide 50 mg oral tablet, 1 tablet daily  4) carBAMazepine 200 mg oral tablet, 1 tablet twice a day  5) Cheratussin AC 10 mg-100 mg/5 mL oral syrup, 5 mL every 6 hours as needed for cough  6) Cyramza 10 mg/mL intravenous solution, INFUSE 512 MG EVERY 2 WKS  7) Eliquis 5 mg oral tablet, 1 tablet twice a day  8) ferrous gluconate 324 mg (38 mg elemental iron) oral tablet, 1 tablet daily  9) gabapentin 100 mg oral capsule, 1 tablet daily  10) lidocaine 5% topical cream, apply as needed to irritated skin  11) megestrol 40 mg/mL oral suspension, 5 ML every morning  12) metFORMIN 1000 mg oral tablet, extended release, 1 tablet twice a day  13) Metoprolol Tartrate 25 mg oral tablet, 1 tablet daily  14) NexAVAR 200 mg oral tablet, 2 daily  15) Symbicort 160 mcg-4.5 mcg/inh inhalation aerosol, use as directed  16) Vitamin D2 50,000 intl units (1.25 mg) oral capsule, 1 tablet every Monday and Thursday      Physical Examination: Wt: 147 lb  Ht/Ln: 67 in  BMI: 23.0  BP: 93/67  Pulse: 100  RR: 18  Temp: 98.6F  Sat: 96  Examination of the head ears eyes nose and throat demonstrates mucous membrane and facial pallor but no other abnormality.  Examination of the neck reveals no palpable lymphadenopathy, jugular venous distention or thyroidomegaly.  Lungs are clear to percussion and auscultation.  The heart sounds are regular with no auscultatory evidence for murmur, rub or gallop.  The patient has mild to minimal truncal obesity with no palpable or percussible hepatomegaly or splenomegaly.  The liver now measures 4 cm and 4 cm in the anterior axillary and midclavicular line versus measures 7 cm and 5 cm in the anterior axillary and midclavicular lines respectively and his last visit.  There is a large amount of pitting edema in the right flank which measures 20 cm x 16 cm.  The patient in addition has bulging flanks and shifting dullness.  The patient has a direct inguinal hernia in the left lower quadrant which is easily reduced.  The patient's genitalia are anatomically normal except for the testes which are largely atrophic.  The patient has edema of the right leg greater than the left leg with increased venous pattern.  Neurologically there is no gross abnormality in higher cortical, cerebellar, motor or sensory functions.  The patient has no rash.  The patient has very poor turgor of the skin.  Yesterday the conclusion at the end of his physical exam and treatment was:  The patient has approximately 10% dehydration.  The patient has anemia.  The patient has neutropenia and thrombocytopenia likely from myelophthisic cause.   The patient has new onset tense ascites.  This will need to be drained.  The patient has lost 1-1/2 g of hemoglobin in the 3 hours that he was here.  He needs to go to the emergency room to get a CT scan and to stop bleeding by platelet infusion.    PARACENTHESIS TO REMOVE PAINFUL SYMPTOMATIC ASCITIES    After explaining the potential benefits of pain relief against the potential risks of infection and pain and receiving the patient's consent, the skin over the area of percussible shifting dullness was prepped and draped in the usual sterile fashion. 1% lidocaine was given into the skin, subcutaneous tissue, muscle and parietal peritoneal surface utilizing 25-gauge1/2 inch and 21-gauge1-1/2 inch needles respectively and care was taken to sure of unencumbered flow of ascitic fluid in and out of the administering syringe. The needle was then withdrawn and a 10-gauge fenestrated catheter was introduced through the skin, subcutaneous tissue, muscle and parietal peritoneal  surface while constantly injecting 1% lidocaine. Care was taken to be sure of unencumbered flow of ascitic fluid in and out of the peritoneal cavity, safe from structures covered by visceral peritoneum. The fenestrated catheter was advanced while withdrawing the trocar until the catheters hub met the skin. 30 ML of fluid was withdrawn and placed into sterile containers for cell count and differential count, LDH, protein and glucose, culture and sensitivity and cytology and cell block. The fenestrated catheter having been attached to a 2 L drainage bag was then allowed by gravity to fill until all peritoneal fluid, serosanguineous ( 1800 mL ) was removed. At the completion of the paracentesis the fenestrated catheter was removed and hemostasis was applied by pressure for 5 minutes. After cleaning and defatting the wound, Steri-Strips were applied for closure and a sterile occlusive dressing was placed over the Steri-Strips. The patient was instructed to maintain the dressing for an additional 4 days. The patient tolerated the procedure well feeling relief from the discomfort of distention. Blood loss was less than 3 mL and the patient endured no pain.    Anmol Corcoran did not sorry I just am so busy right now and I reasonable over the median 3 gets treatedNo would not   The patient has had 1800 mL of serosanguineous fluid removed from his abdomen.  The patient has an area in his right flank where there is pitting edema of the hemorrhagic nature with the 3 mm incision encountering clotted blood prior to the aspiration by gravity drainage of the patient's ascites.  The patient is thrombocytopenic. The patient will be sent to the emergency room.

## 2019-10-30 NOTE — H&P ADULT - HISTORY OF PRESENT ILLNESS
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 Daugherty on radiation therapy, ramucirumab weekly, sorafenib daily, and bicalutamide who presented to the ER from Dr. Daugherty's office due to hypotension at the office. Patient had increased abdominal distention for the past week. He also has had decreased appetite and only able to have liquid food. He had paracentesis in the office with removal of 1800 mL of blood. Of note yesterday he felt dizzy while getting up fast, and had a mechanical trip in the bathroom. He denies having loss of consciousness or heart palpitations prior to this. He also has a cough that has been present for several weeks. Denies fevers, chills, nausea, vomiting, or diarrhea. Denies shortness of breath or chest pain.   ED vitals were BP of 110/78, RR 18 saturation of 96%, HR 86, temperature of 98.3 F  labs notable for platelet of 26k and Hg 8.0. lactate from 3.6 to 2.5, s/p vanc and zosyn in the ED.   CT abdomen and chest with iv contrast shows multiple hepatic lesions w/ metastases, subcapsular and perihepatic hematoma present as well. CT head old infarcts.     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 Daugherty on radiation therapy, ramucirumab weekly, sorafenib daily, and bicalutamide who presented to the ER from Dr. Daugherty's office due to hypotension at the office. Patient had increased abdominal distention for the past week. He also has had decreased appetite and only able to have liquid food. He had paracentesis in the office with removal of 1800 mL of blood. Of note yesterday he felt dizzy while getting up fast, and had a mechanical trip in the bathroom. He denies having loss of consciousness or heart palpitations prior to this. He also has a cough that has been present for several weeks. Denies fevers, chills, nausea, vomiting, or diarrhea. Denies shortness of breath or chest pain.   ED vitals were BP of 110/78, RR 18 saturation of 96%, HR 86, temperature of 98.3 F received 3 L NS and 1 unit platelets  labs notable for platelet of 26k and Hg 8.0. lactate from 3.6 to 2.5, s/p vanc and zosyn in the ED.   CT abdomen and chest with iv contrast shows multiple hepatic lesions w/ metastases, subcapsular and perihepatic hematoma present as well. CT head old infarcts.     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 Daugherty on radiation therapy, ramucirumab weekly, sorafenib daily, and bicalutamide who presented to the ER from Dr. Daugherty's office due to hypotension at the office. Patient had increased abdominal distention for the past week. He also has had decreased appetite and only able to have liquid food. He had paracentesis in the office with removal of 1800 mL of blood. Of note yesterday he felt dizzy while getting up fast, and had a mechanical trip in the bathroom. He denies having loss of consciousness or heart palpitations prior to this. He also has a cough that has been present for several weeks. Denies fevers, chills, nausea, vomiting, or diarrhea. Denies shortness of breath or chest pain.   ED vitals were BP of 110/78, RR 18 saturation of 96%, HR 86, temperature of 98.3 F received 3 L NS and 1 unit platelets  labs notable for platelet of 26k and Hg 8.0. lactate from 3.6 to 2.5, s/p vanc and zosyn in the ED.   CT abdomen and chest with iv contrast shows multiple hepatic lesions w/ metastases, subcapsular and perihepatic hematoma present as well. CT head old infarcts.     EKG shows bigeminy

## 2019-10-30 NOTE — CHART NOTE - NSCHARTNOTEFT_GEN_A_CORE
Subjective: Patient requesting sleep medication but otherwise no complaints. Pain controlled.    T(C): 36.7 (10-30-19 @ 00:10), Max: 36.9 (10-29-19 @ 22:32)  HR: 100 (10-30-19 @ 00:10) (59 - 102)  BP: 115/75 (10-30-19 @ 00:10) (68/47 - 128/80)  RR: 19 (10-30-19 @ 00:10) (16 - 19)  SpO2: 97% (10-30-19 @ 00:10) (94% - 97%)    Abdominal Exam:  Soft, distended, mild TTP diffusely, no rebound tenderness, no guarding, no masses, dressing from paracentesis w/ scant dry blood.     Patient was examined by the author of this note at 23:00 and 01:20. The abdominal exam has not changed between these encounters.     Plan: Will continue to monitor with serial abdominal exams.

## 2019-10-30 NOTE — CONSULT NOTE ADULT - ASSESSMENT
It was obvious from the loss of 1/2 g of hemoglobin in 3 hours while the patient was in my office that the patient was bleeding internally.  There was evidence from the paracentesis performed yesterday that the patient had bleeding into the peritoneal space and into the subcutaneous spaces on the right flank.  It is known that hepatocellular carcinomas often bleed and need bland embolizations when they do bleed.  The patient will be sent to Dr. Christiano Jimenez's operating room in [address] [place] [address] for bland embolization from the emergency room.  The patient will be nothing by mouth prior to the embolization.

## 2019-10-30 NOTE — PROVIDER CONTACT NOTE (OTHER) - ACTION/TREATMENT ORDERED:
As per Dr. Patel, no blood transfusion at this time. Draw CBC ordered for 23:59 and will determine if transfusion needed. Will continue to monitor.

## 2019-10-30 NOTE — PROVIDER CONTACT NOTE (OTHER) - REASON
Pt. has albumin pending that was ordered at 12:28PM. Pt. has albumin pending that was ordered at 12:28PM. Documented in chart that pt. received albumin, but not on MAR. Pt. has albumin pending that was ordered at 12:28PM. Documented in chart that pt. received 5% albumin, but not documented on MAR.

## 2019-10-30 NOTE — H&P ADULT - PROBLEM SELECTOR PLAN 5
-clear liquid diet as patient can't tolerate solids  -c/w megestrol daily  -consistent carbs diet  -low dose sliding scale

## 2019-10-30 NOTE — PROGRESS NOTE ADULT - PROBLEM SELECTOR PLAN 3
-subcapsular and perihepatic hematoma on CT s/p paracentesis  -thrombocytopenia, plts at 26  -Hgb 7 this AM dropped from 8 overnight, most likely pt bleeding intraabdominally  -Surgery consulted pt not candidate for surgery  -underwent R hepatic artery angioembolization with IR in the setting of acute bledding  -received 1 unit of platelets and 1U pRBC  -continue to closely monitor CBC   -transfuse for Hgb <7 and or s/s of acute bleeding  -keep type and screen active   -f/u serial CBC    #Thrombocytopenia  -transfuse platelets <10 or if active bleeding <50.

## 2019-10-30 NOTE — BRIEF OPERATIVE NOTE - NSICDXBRIEFPROCEDURE_GEN_ALL_CORE_FT
PROCEDURES:  Angiogram, visceral 30-Oct-2019 14:35:27  Isamar Mills  Angiography for transcatheter embolization 30-Oct-2019 14:35:19  Isamar Mills

## 2019-10-30 NOTE — PROGRESS NOTE ADULT - ASSESSMENT
79M PMHx prostate cancer, metastatic HCC, admitted with abdominal pain, SBP and thrombocytopenia, found to have subcapsular and perihepatic hematoma on CT, s/p paracentesis. Hemodynamically stable. Hemoglobin drop may be responsive to fluid resuscitation.    Not an operative candidate given extent of disease.  Would suggest calling IR for angioembolization.  Consult palliative for comfort care measures.  Continue serial exams, serial CBCs, transfuse for Hgb > 8.  Team 4C will continue to follow; please page Team 4 with questions/clinical changes.  Discussed with chief resident, attending.

## 2019-10-31 ENCOUNTER — TRANSCRIPTION ENCOUNTER (OUTPATIENT)
Age: 79
End: 2019-10-31

## 2019-10-31 VITALS — TEMPERATURE: 98 F

## 2019-10-31 LAB
ALBUMIN SERPL ELPH-MCNC: 2.2 G/DL — LOW (ref 3.3–5)
ALP SERPL-CCNC: 204 U/L — HIGH (ref 40–120)
ALT FLD-CCNC: 31 U/L — SIGNIFICANT CHANGE UP (ref 10–45)
ANION GAP SERPL CALC-SCNC: 12 MMOL/L — SIGNIFICANT CHANGE UP (ref 5–17)
AST SERPL-CCNC: 43 U/L — HIGH (ref 10–40)
BASOPHILS # BLD AUTO: 0 K/UL — SIGNIFICANT CHANGE UP (ref 0–0.2)
BASOPHILS NFR BLD AUTO: 0 % — SIGNIFICANT CHANGE UP (ref 0–2)
BILIRUB SERPL-MCNC: 0.3 MG/DL — SIGNIFICANT CHANGE UP (ref 0.2–1.2)
BUN SERPL-MCNC: 14 MG/DL — SIGNIFICANT CHANGE UP (ref 7–23)
CALCIUM SERPL-MCNC: 6.6 MG/DL — LOW (ref 8.4–10.5)
CHLORIDE SERPL-SCNC: 104 MMOL/L — SIGNIFICANT CHANGE UP (ref 96–108)
CO2 SERPL-SCNC: 21 MMOL/L — LOW (ref 22–31)
CREAT SERPL-MCNC: 1.28 MG/DL — SIGNIFICANT CHANGE UP (ref 0.5–1.3)
CULTURE RESULTS: SIGNIFICANT CHANGE UP
EOSINOPHIL # BLD AUTO: 0 K/UL — SIGNIFICANT CHANGE UP (ref 0–0.5)
EOSINOPHIL NFR BLD AUTO: 0 % — SIGNIFICANT CHANGE UP (ref 0–6)
GLUCOSE BLDC GLUCOMTR-MCNC: 104 MG/DL — HIGH (ref 70–99)
GLUCOSE BLDC GLUCOMTR-MCNC: 81 MG/DL — SIGNIFICANT CHANGE UP (ref 70–99)
GLUCOSE SERPL-MCNC: 90 MG/DL — SIGNIFICANT CHANGE UP (ref 70–99)
HCT VFR BLD CALC: 23.9 % — LOW (ref 39–50)
HCT VFR BLD CALC: 25.1 % — LOW (ref 39–50)
HCT VFR BLD CALC: 25.5 % — LOW (ref 39–50)
HGB BLD-MCNC: 7.4 G/DL — LOW (ref 13–17)
HGB BLD-MCNC: 7.8 G/DL — LOW (ref 13–17)
HGB BLD-MCNC: 7.9 G/DL — LOW (ref 13–17)
LYMPHOCYTES # BLD AUTO: 0 % — LOW (ref 13–44)
LYMPHOCYTES # BLD AUTO: 0 K/UL — LOW (ref 1–3.3)
MAGNESIUM SERPL-MCNC: 1.6 MG/DL — SIGNIFICANT CHANGE UP (ref 1.6–2.6)
MCHC RBC-ENTMCNC: 28.2 PG — SIGNIFICANT CHANGE UP (ref 27–34)
MCHC RBC-ENTMCNC: 28.4 PG — SIGNIFICANT CHANGE UP (ref 27–34)
MCHC RBC-ENTMCNC: 28.7 PG — SIGNIFICANT CHANGE UP (ref 27–34)
MCHC RBC-ENTMCNC: 30.6 GM/DL — LOW (ref 32–36)
MCHC RBC-ENTMCNC: 31 GM/DL — LOW (ref 32–36)
MCHC RBC-ENTMCNC: 31.5 GM/DL — LOW (ref 32–36)
MCV RBC AUTO: 91.3 FL — SIGNIFICANT CHANGE UP (ref 80–100)
MCV RBC AUTO: 91.6 FL — SIGNIFICANT CHANGE UP (ref 80–100)
MCV RBC AUTO: 92.1 FL — SIGNIFICANT CHANGE UP (ref 80–100)
MONOCYTES # BLD AUTO: 0.06 K/UL — SIGNIFICANT CHANGE UP (ref 0–0.9)
MONOCYTES NFR BLD AUTO: 0.9 % — LOW (ref 2–14)
NEUTROPHILS # BLD AUTO: 6.63 K/UL — SIGNIFICANT CHANGE UP (ref 1.8–7.4)
NEUTROPHILS NFR BLD AUTO: 96.5 % — HIGH (ref 43–77)
NRBC # BLD: 0 /100 WBCS — SIGNIFICANT CHANGE UP (ref 0–0)
NRBC # BLD: 0 /100 WBCS — SIGNIFICANT CHANGE UP (ref 0–0)
PHOSPHATE SERPL-MCNC: 2.6 MG/DL — SIGNIFICANT CHANGE UP (ref 2.5–4.5)
PLATELET # BLD AUTO: 38 K/UL — LOW (ref 150–400)
PLATELET # BLD AUTO: 42 K/UL — LOW (ref 150–400)
PLATELET # BLD AUTO: 56 K/UL — LOW (ref 150–400)
POTASSIUM SERPL-MCNC: 4.3 MMOL/L — SIGNIFICANT CHANGE UP (ref 3.5–5.3)
POTASSIUM SERPL-SCNC: 4.3 MMOL/L — SIGNIFICANT CHANGE UP (ref 3.5–5.3)
PROT SERPL-MCNC: 5.2 G/DL — LOW (ref 6–8.3)
RBC # BLD: 2.61 M/UL — LOW (ref 4.2–5.8)
RBC # BLD: 2.75 M/UL — LOW (ref 4.2–5.8)
RBC # BLD: 2.77 M/UL — LOW (ref 4.2–5.8)
RBC # FLD: 17.2 % — HIGH (ref 10.3–14.5)
RBC # FLD: 17.4 % — HIGH (ref 10.3–14.5)
RBC # FLD: 17.4 % — HIGH (ref 10.3–14.5)
SODIUM SERPL-SCNC: 137 MMOL/L — SIGNIFICANT CHANGE UP (ref 135–145)
SPECIMEN SOURCE: SIGNIFICANT CHANGE UP
WBC # BLD: 6.69 K/UL — SIGNIFICANT CHANGE UP (ref 3.8–10.5)
WBC # BLD: 7.09 K/UL — SIGNIFICANT CHANGE UP (ref 3.8–10.5)
WBC # BLD: 7.26 K/UL — SIGNIFICANT CHANGE UP (ref 3.8–10.5)
WBC # FLD AUTO: 6.69 K/UL — SIGNIFICANT CHANGE UP (ref 3.8–10.5)
WBC # FLD AUTO: 7.09 K/UL — SIGNIFICANT CHANGE UP (ref 3.8–10.5)
WBC # FLD AUTO: 7.26 K/UL — SIGNIFICANT CHANGE UP (ref 3.8–10.5)

## 2019-10-31 RX ORDER — LIDOCAINE 4 G/100G
1 CREAM TOPICAL ONCE
Refills: 0 | Status: COMPLETED | OUTPATIENT
Start: 2019-10-31 | End: 2019-10-31

## 2019-10-31 RX ORDER — MEGESTROL ACETATE 40 MG/ML
5 SUSPENSION ORAL
Qty: 0 | Refills: 0 | DISCHARGE

## 2019-10-31 RX ORDER — FERROUS GLUCONATE 100 %
1 POWDER (GRAM) MISCELLANEOUS
Qty: 0 | Refills: 0 | DISCHARGE

## 2019-10-31 RX ORDER — LIDOCAINE 4 G/100G
0 CREAM TOPICAL
Qty: 0 | Refills: 0 | DISCHARGE

## 2019-10-31 RX ADMIN — CEFTRIAXONE 100 MILLIGRAM(S): 500 INJECTION, POWDER, FOR SOLUTION INTRAMUSCULAR; INTRAVENOUS at 06:38

## 2019-10-31 RX ADMIN — BUDESONIDE AND FORMOTEROL FUMARATE DIHYDRATE 2 PUFF(S): 160; 4.5 AEROSOL RESPIRATORY (INHALATION) at 10:20

## 2019-10-31 RX ADMIN — LIDOCAINE 1 PATCH: 4 CREAM TOPICAL at 10:19

## 2019-10-31 RX ADMIN — MEGESTROL ACETATE 200 MILLIGRAM(S): 40 SUSPENSION ORAL at 11:15

## 2019-10-31 RX ADMIN — PANTOPRAZOLE SODIUM 40 MILLIGRAM(S): 20 TABLET, DELAYED RELEASE ORAL at 11:15

## 2019-10-31 RX ADMIN — Medication 200 MILLIGRAM(S): at 06:38

## 2019-10-31 RX ADMIN — Medication 100 MILLIGRAM(S): at 07:03

## 2019-10-31 NOTE — DISCHARGE NOTE PROVIDER - NSDCCPCAREPLAN_GEN_ALL_CORE_FT
PRINCIPAL DISCHARGE DIAGNOSIS  Diagnosis: Liver hematoma  Assessment and Plan of Treatment: You were sent to the hospital by your oncologist, Dr. Daugherty after you were found to have low blood pressure. Imaging of the abdomen showed blood accumulation in your liver. Because you were bleeding intra-abdominally you required transfusion of blood and platelets. You underwent a procedure with interventional radiology to stop the bleeding in the liver. The procedure was succesfull and your hemoglobin stabilized prior to discharge. Please follow up with your oncologist for close monitoring of your hemoglobin and liver hematoma.      SECONDARY DISCHARGE DIAGNOSES  Diagnosis: SBP (spontaneous bacterial peritonitis)  Assessment and Plan of Treatment: You have fluid accumulation in your abdomen for which you require fluid removal regularly. Last fluid removal was done by your oncologist Dr. Daugherty on the day of your admission. The fluid that was removed from your abdomen appeared to have blood in it.    Diagnosis: Cancer, metastatic to bone  Assessment and Plan of Treatment: on chemo PRINCIPAL DISCHARGE DIAGNOSIS  Diagnosis: Liver hematoma  Assessment and Plan of Treatment: You were sent to the hospital by your oncologist, Dr. Daugherty after you were found to have low blood pressure. Imaging of the abdomen showed blood accumulation in your liver. Because you were bleeding intra-abdominally you required transfusion of blood and platelets. You underwent a procedure with interventional radiology to stop the bleeding in the liver. The procedure was succesfull and your hemoglobin stabilized prior to discharge. Please follow up with your oncologist for close monitoring of your hemoglobin and liver hematoma.      SECONDARY DISCHARGE DIAGNOSES  Diagnosis: SBP (spontaneous bacterial peritonitis)  Assessment and Plan of Treatment: You have fluid accumulation in your abdomen for which you require fluid removal regularly. Last fluid removal was done by your oncologist Dr. Daugherty on the day of your admission. The fluid that was removed from your abdomen appeared to have blood in it. You were started on IV antibiotics (ceftriaxone) prophylatically for spontaneous bacterial peitonitis. However, on discharge, you do not need any further antibiotics because you did not show any signs of infection.    Diagnosis: Cancer, metastatic to bone  Assessment and Plan of Treatment: Please continue your chemotherapy and treatment with Dr. Daugherty.

## 2019-10-31 NOTE — DISCHARGE NOTE PROVIDER - NSDCMRMEDTOKEN_GEN_ALL_CORE_FT
Advair Diskus 250 mcg-50 mcg inhalation powder:   amLODIPine 10 mg oral tablet: 1 tab(s) orally once a day   carBAMazepine 200 mg oral tablet: 1 tab(s) orally 2 times a day  Casodex 50 mg oral tablet: 1 tab(s) orally every 24 hours  Cheratussin AC 10 mg-100 mg/5 mL oral syrup: 10 milliliter(s) orally every 4 hours  Cyramza 10 mg/mL intravenous solution: 512 milligram(s) intravenous every 2 weeks  ferrous gluconate 320 mg oral tablet: 1 tab(s) orally once a day  gabapentin 100 mg oral capsule: 1 tab(s) orally once a day  lidocaine 0.5% topical cream:   megestrol 40 mg/mL oral suspension: 5 milliliter(s) orally once a day  metFORMIN 1000 mg oral tablet, extended release: 2 tab(s) orally once a day  metoprolol tartrate 25 mg oral tablet: 1 tab(s) orally once a day  NexAVAR 200 mg oral tablet: 2 tab(s) orally 2 times a day  Symbicort 160 mcg-4.5 mcg/inh inhalation aerosol: 2 puff(s) inhaled 2 times a day  Vitamin D2 50,000 intl units (1.25 mg) oral capsule: 1 cap(s) orally once a week Advair Diskus 250 mcg-50 mcg inhalation powder:   amLODIPine 10 mg oral tablet: 1 tab(s) orally once a day   carBAMazepine 200 mg oral tablet: 1 tab(s) orally 2 times a day  Casodex 50 mg oral tablet: 1 tab(s) orally every 24 hours  Cheratussin AC 10 mg-100 mg/5 mL oral syrup: 10 milliliter(s) orally every 4 hours  Cyramza 10 mg/mL intravenous solution: 512 milligram(s) intravenous every 2 weeks  gabapentin 100 mg oral capsule: 1 tab(s) orally once a day  metFORMIN 1000 mg oral tablet, extended release: 2 tab(s) orally once a day  metoprolol tartrate 25 mg oral tablet: 1 tab(s) orally once a day  NexAVAR 200 mg oral tablet: 2 tab(s) orally 2 times a day  Symbicort 160 mcg-4.5 mcg/inh inhalation aerosol: 2 puff(s) inhaled 2 times a day  Vitamin D2 50,000 intl units (1.25 mg) oral capsule: 1 cap(s) orally once a week

## 2019-10-31 NOTE — DISCHARGE NOTE PROVIDER - CARE PROVIDER_API CALL
Anmol Daugherty)  Internal Medicine; Medical Oncology  [address] [place] [address]  Phone: [phone]  Fax: [phone]  Follow Up Time:

## 2019-10-31 NOTE — DISCHARGE NOTE PROVIDER - NSDCFUADDAPPT_GEN_ALL_CORE_FT
Please call Dr. Daugherty's office to make an appointment with him for early next week to continue your treatment for the liver malignancy.

## 2019-10-31 NOTE — DISCHARGE NOTE NURSING/CASE MANAGEMENT/SOCIAL WORK - PATIENT PORTAL LINK FT
You can access the FollowMyHealth Patient Portal offered by [facility] by registering at the following website: [email or web address]. By joining [facility]’s FollowMyHealth portal, you will also be able to view your health information using other applications (apps) compatible with our system.

## 2019-10-31 NOTE — DISCHARGE NOTE PROVIDER - HOSPITAL COURSE
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 Received one unit of pRBC and one unit of platelets prior to the embolization. For SBT, pt was started on Ceftriaxone 2g q24hrs. Patient's hemoglobin was stable on discharge at 7.9.     Patient was medically optimized, stable and ready for discharge. Plan of care and return precautions were discussed with the patient who verbally stated understanding.

## 2019-10-31 NOTE — PROGRESS NOTE ADULT - ASSESSMENT
The patient apparently has a stable hemoglobin and hematocrit.  The patient can be discharged and will see me early next week for treatment.

## 2019-10-31 NOTE — PROGRESS NOTE ADULT - ASSESSMENT
79M PMHx prostate cancer, metastatic HCC, admitted with abdominal pain, SBP and thrombocytopenia, found to have subcapsular and perihepatic hematoma on CT, s/p paracentesis, s/p IR embolization of R hepatic artery (10/30).    Not an operative candidate given extent of disease.  Consult palliative for comfort care measures.  Continue serial exams, serial CBCs, transfuse for Hgb > 7.  Team 4C will continue to follow; please page Team 4 with questions/clinical changes.  Discussed with chief resident, attending. 79M PMHx prostate cancer, metastatic HCC, admitted with abdominal pain, SBP and thrombocytopenia, found to have subcapsular and perihepatic hematoma on CT, s/p paracentesis, s/p IR embolization of R hepatic artery (10/30).    Not an operative candidate given extent of metastatic disease. If continued bleeding, recommend consulting IR for repeated embolization.  Consult palliative for comfort care measures.  Continue serial exams, serial CBCs, transfuse for Hgb > 7.  Surgery will sign off. Please reconsult with questions.  Discussed with chief resident, attending.

## 2019-10-31 NOTE — DISCHARGE NOTE NURSING/CASE MANAGEMENT/SOCIAL WORK - NSDCPEPTSTRK_GEN_ALL_CORE
Stroke education booklet/Call 911 for stroke/Need for follow up after discharge/Signs and symptoms of stroke/Stroke warning signs and symptoms/Stroke support groups for patients, families, and friends/Prescribed medications/Risk factors for stroke

## 2019-10-31 NOTE — DISCHARGE NOTE PROVIDER - NSDCFUSCHEDAPPT_GEN_ALL_CORE_FT
SHER LILLY ; 11/01/2019 ; NPP Urology [address]  SHER LILLY ; 01/10/2020 ; NPP Neuro [address] [facility] SHER LILLY ; 11/01/2019 ; NPP Urology [address]  SHER LILLY ; 01/10/2020 ; NPP Neuro [address] [facility] SHER LILLY ; 11/01/2019 ; NPP Urology [address]  SHER LILLY ; 01/10/2020 ; NPP Neuro [address] [facility]

## 2019-10-31 NOTE — PROGRESS NOTE ADULT - SUBJECTIVE AND OBJECTIVE BOX
The patient feels better and has not had any right upper quadrant pain or discomfort after the embolization.    Examination of the head ears eyes nose and throat demonstrates mucous membrane and facial pallor but no other abnormality.  Examination of the neck reveals no palpable lymphadenopathy, jugular venous distention or thyroidomegaly.  Lungs are clear to percussion and auscultation.  The heart sounds are regular with no auscultatory evidence for murmur, rub or gallop.  The patient has mild to minimal truncal obesity with no palpable or percussible hepatomegaly or splenomegaly.  The liver now measures 4 cm and 4 cm in the anterior axillary and midclavicular line versus measures 7 cm and 5 cm in the anterior axillary and midclavicular lines respectively and his last visit.  There is a large amount of pitting edema in the right flank which measures 20 cm x 16 cm.  The patient in addition has bulging flanks and shifting dullness.  The patient has a direct inguinal hernia in the left lower quadrant which is easily reduced.  The patient's genitalia are anatomically normal except for the testes which are largely atrophic.  The patient has edema of the right leg greater than the left leg with increased venous pattern.  Neurologically there is no gross abnormality in higher cortical, cerebellar, motor or sensory functions.  The patient has no rash.  The patient has very poor turgor of the skin.
OVERNIGHT EVENTS:    SUBJECTIVE / INTERVAL HPI: Patient seen and examined at bedside.     VITAL SIGNS:  Vital Signs Last 24 Hrs  T(C): 37 (30 Oct 2019 10:00), Max: 37 (30 Oct 2019 10:00)  T(F): 98.6 (30 Oct 2019 10:00), Max: 98.6 (30 Oct 2019 10:00)  HR: 94 (30 Oct 2019 10:00) (59 - 102)  BP: 108/71 (30 Oct 2019 10:00) (68/47 - 128/80)  BP(mean): --  RR: 18 (30 Oct 2019 10:00) (16 - 19)  SpO2: 95% (30 Oct 2019 10:00) (93% - 97%)      PHYSICAL EXAM:    General: WDWN. Appears fatigued   HEENT: NC/AT; PERRL, anicteric sclera; MMM  Neck: supple, no JVD, no lymphadenopathy, TTP cervical spine with full ROM   Cardiovascular: +S1/S2; RRR, no murmurs appreciated   Respiratory: decreased breath sounds B/L in view of pt unable to take deep breaths due to abdominal and flank pain; no W/R/R  Gastrointestinal: distended, ascitis present, R flank edema, hepatomegaly, mildly tender throughout   Back: ecchymosis L flank   Extremities: WWP; no edema, clubbing or cyanosis  Vascular: 2+ radial, DP/PT pulses B/L  Neurological: AAOx3; no focal deficits    MEDICATIONS:  MEDICATIONS  (STANDING):  albumin human  5% IVPB 1000 milliLiter(s) IV Intermittent once  albumin human 25% IVPB 50 milliLiter(s) IV Intermittent once  budesonide 160 MICROgram(s)/formoterol 4.5 MICROgram(s) Inhaler 2 Puff(s) Inhalation two times a day  carBAMazepine 200 milliGRAM(s) Oral every 12 hours  cefTRIAXone   IVPB 2000 milliGRAM(s) IV Intermittent every 24 hours  dextrose 5%. 1000 milliLiter(s) (50 mL/Hr) IV Continuous <Continuous>  dextrose 50% Injectable 12.5 Gram(s) IV Push once  dextrose 50% Injectable 25 Gram(s) IV Push once  dextrose 50% Injectable 25 Gram(s) IV Push once  insulin lispro (HumaLOG) corrective regimen sliding scale   SubCutaneous Before meals and at bedtime  megestrol Suspension 200 milliGRAM(s) Oral daily  melatonin 3 milliGRAM(s) Oral at bedtime  pantoprazole  Injectable 40 milliGRAM(s) IV Push daily    MEDICATIONS  (PRN):  acetaminophen   Tablet .. 650 milliGRAM(s) Oral every 6 hours PRN Temp greater or equal to 38C (100.4F), Mild Pain (1 - 3)  ALBUTerol   0.5% 2.5 milliGRAM(s) Nebulizer every 4 hours PRN Shortness of Breath and/or Wheezing  benzocaine 15 mG/menthol 3.6 mG Lozenge 1 Lozenge Oral every 4 hours PRN Mouth Sores cough  dextrose 40% Gel 15 Gram(s) Oral once PRN Blood Glucose LESS THAN 70 milliGRAM(s)/deciliter  glucagon  Injectable 1 milliGRAM(s) IntraMuscular once PRN Glucose LESS THAN 70 milligrams/deciliter  oxyCODONE    IR 5 milliGRAM(s) Oral every 4 hours PRN Moderate Pain (4 - 6)      ALLERGIES:  Allergies    No Known Allergies    Intolerances      LABS:                        7.0    6.64  )-----------( 55       ( 30 Oct 2019 07:09 )             22.7     10-29    133<L>  |  100  |  12  ----------------------------<  160<H>  4.4   |  20<L>  |  1.27    Ca    7.3<L>      29 Oct 2019 16:56    TPro  5.4<L>  /  Alb  1.9<L>  /  TBili  0.3  /  DBili  x   /  AST  61<H>  /  ALT  35  /  AlkPhos  240<H>  10-29    PT/INR - ( 29 Oct 2019 16:56 )   PT: 12.1 sec;   INR: 1.07          PTT - ( 29 Oct 2019 16:56 )  PTT:25.5 sec  Urinalysis Basic - ( 29 Oct 2019 22:35 )    Color: Yellow / Appearance: Clear / S.010 / pH: x  Gluc: x / Ketone: NEGATIVE  / Bili: Negative / Urobili: 0.2 E.U./dL   Blood: x / Protein: NEGATIVE mg/dL / Nitrite: NEGATIVE   Leuk Esterase: NEGATIVE / RBC: x / WBC x   Sq Epi: x / Non Sq Epi: x / Bacteria: x      CAPILLARY BLOOD GLUCOSE      POCT Blood Glucose.: 85 mg/dL (30 Oct 2019 07:47)      Culture - Blood (collected 10-29-19 @ 20:51)  Source: .Blood Blood-Peripheral  Preliminary Report (10-30-19 @ 09:01):    No growth at 12 hours    Culture - Blood (collected 10-29-19 @ 20:51)  Source: .Blood Blood-Peripheral  Preliminary Report (10-30-19 @ 09:01):    No growth at 12 hours    RADIOLOGY & ADDITIONAL TESTS: Reviewed.
PT. seen and examined at bed side.    Case discussed with medical team.    Case discussed with Consultants.          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 Received one unit of pRBC and one unit of platelets prior to the embolization. For SBT, pt was started on Ceftriaxone 2g q24hrs. Patient's hemoglobin was stable on discharge at 7.9.   Patient was medically optimized, stable and ready for discharge. Plan of care and return precautions were discussed with the patient who verbally stated understanding.
STATUS POST:  10/30 IR embolization of R hepatic artery     SUBJECTIVE: This morning, he feels well; his pain is well-controlled. No nausea or vomiting. Passing flatus. No acute complaints.    Vital Signs Last 24 Hrs  T(C): 37.5 (31 Oct 2019 06:10), Max: 37.5 (31 Oct 2019 06:10)  T(F): 99.5 (31 Oct 2019 06:10), Max: 99.5 (31 Oct 2019 06:10)  HR: 95 (31 Oct 2019 06:39) (84 - 96)  BP: 135/71 (31 Oct 2019 06:39) (106/76 - 135/71)  BP(mean): --  RR: 18 (31 Oct 2019 06:39) (17 - 18)  SpO2: 99% (31 Oct 2019 00:03) (93% - 99%)  I&O's Detail    30 Oct 2019 07:01  -  31 Oct 2019 07:00  --------------------------------------------------------  IN:  Total IN: 0 mL    OUT:    Voided: 275 mL  Total OUT: 275 mL    Total NET: -275 mL          General: NAD, resting comfortably in bed  Pulm: Nonlabored breathing, no respiratory distress  Abd: softly distended, dull RUQ, nontender, paracentesis site CDI without erythema or induration  Extrem: WWP, no edema        LABS:                        7.4    7.26  )-----------( 56       ( 31 Oct 2019 00:15 )             23.9     10-29    133<L>  |  100  |  12  ----------------------------<  160<H>  4.4   |  20<L>  |  1.27    Ca    7.3<L>      29 Oct 2019 16:56    TPro  5.4<L>  /  Alb  1.9<L>  /  TBili  0.3  /  DBili  x   /  AST  61<H>  /  ALT  35  /  AlkPhos  240<H>  10-29
SUBJECTIVE: This morning, his pain is persistent and unchanged from last night. No nausea or vomiting. Passing some flatus. No lightheadedness or dizziness. No acute complaints.    Vital Signs Last 24 Hrs  T(C): 37 (30 Oct 2019 10:00), Max: 37 (30 Oct 2019 10:00)  T(F): 98.6 (30 Oct 2019 10:00), Max: 98.6 (30 Oct 2019 10:00)  HR: 94 (30 Oct 2019 10:00) (59 - 102)  BP: 108/71 (30 Oct 2019 10:00) (68/47 - 128/80)  BP(mean): --  RR: 18 (30 Oct 2019 10:00) (16 - 19)  SpO2: 95% (30 Oct 2019 10:00) (93% - 97%)  I&O's Detail      General: NAD, resting comfortably in bed  Pulm: Nonlabored breathing, no respiratory distress  Abd: softly distended, dull in RUQ, nontender, paracentesis site with Tegaderm in place, some ecchymosis  Extrem: WWP, no edema        LABS:                        7.0    6.64  )-----------( 55       ( 30 Oct 2019 07:09 )             22.7     10-29    133<L>  |  100  |  12  ----------------------------<  160<H>  4.4   |  20<L>  |  1.27    Ca    7.3<L>      29 Oct 2019 16:56    TPro  5.4<L>  /  Alb  1.9<L>  /  TBili  0.3  /  DBili  x   /  AST  61<H>  /  ALT  35  /  AlkPhos  240<H>  10-29

## 2019-11-01 ENCOUNTER — APPOINTMENT (OUTPATIENT)
Dept: UROLOGY | Facility: CLINIC | Age: 79
End: 2019-11-01

## 2019-11-03 LAB
CULTURE RESULTS: SIGNIFICANT CHANGE UP
CULTURE RESULTS: SIGNIFICANT CHANGE UP
SPECIMEN SOURCE: SIGNIFICANT CHANGE UP
SPECIMEN SOURCE: SIGNIFICANT CHANGE UP

## 2019-11-04 ENCOUNTER — INPATIENT (INPATIENT)
Facility: HOSPITAL | Age: 79
LOS: 6 days | Discharge: HOME | DRG: 919 | End: 2019-11-11
Attending: INTERNAL MEDICINE | Admitting: INTERNAL MEDICINE
Payer: MEDICARE

## 2019-11-04 VITALS
HEIGHT: 68 IN | RESPIRATION RATE: 18 BRPM | OXYGEN SATURATION: 97 % | WEIGHT: 139.99 LBS | HEART RATE: 102 BPM | TEMPERATURE: 98 F | DIASTOLIC BLOOD PRESSURE: 87 MMHG | SYSTOLIC BLOOD PRESSURE: 136 MMHG

## 2019-11-04 DIAGNOSIS — Z98.890 OTHER SPECIFIED POSTPROCEDURAL STATES: Chronic | ICD-10-CM

## 2019-11-04 DIAGNOSIS — T81.9XXA UNSPECIFIED COMPLICATION OF PROCEDURE, INITIAL ENCOUNTER: ICD-10-CM

## 2019-11-04 LAB
ALBUMIN SERPL ELPH-MCNC: 2.3 G/DL — LOW (ref 3.3–5)
ALP SERPL-CCNC: 265 U/L — HIGH (ref 40–120)
ALT FLD-CCNC: 30 U/L — SIGNIFICANT CHANGE UP (ref 10–45)
ANION GAP SERPL CALC-SCNC: 14 MMOL/L — SIGNIFICANT CHANGE UP (ref 5–17)
ANISOCYTOSIS BLD QL: SLIGHT — SIGNIFICANT CHANGE UP
APPEARANCE UR: CLEAR — SIGNIFICANT CHANGE UP
APTT BLD: 22.3 SEC — LOW (ref 27.5–36.3)
AST SERPL-CCNC: 50 U/L — HIGH (ref 10–40)
BACTERIA # UR AUTO: NEGATIVE — SIGNIFICANT CHANGE UP
BASE EXCESS BLDV CALC-SCNC: -4 MMOL/L — LOW (ref -2–2)
BASOPHILS # BLD AUTO: 0 K/UL — SIGNIFICANT CHANGE UP (ref 0–0.2)
BASOPHILS NFR BLD AUTO: 0 % — SIGNIFICANT CHANGE UP (ref 0–2)
BILIRUB SERPL-MCNC: 0.5 MG/DL — SIGNIFICANT CHANGE UP (ref 0.2–1.2)
BILIRUB UR-MCNC: NEGATIVE — SIGNIFICANT CHANGE UP
BLD GP AB SCN SERPL QL: NEGATIVE — SIGNIFICANT CHANGE UP
BUN SERPL-MCNC: 12 MG/DL — SIGNIFICANT CHANGE UP (ref 7–23)
CA-I SERPL-SCNC: 1 MMOL/L — LOW (ref 1.12–1.3)
CALCIUM SERPL-MCNC: 7.1 MG/DL — LOW (ref 8.4–10.5)
CHLORIDE BLDV-SCNC: 111 MMOL/L — HIGH (ref 96–108)
CHLORIDE SERPL-SCNC: 103 MMOL/L — SIGNIFICANT CHANGE UP (ref 96–108)
CO2 BLDV-SCNC: 22 MMOL/L — SIGNIFICANT CHANGE UP (ref 22–30)
CO2 SERPL-SCNC: 16 MMOL/L — LOW (ref 22–31)
COLOR SPEC: YELLOW — SIGNIFICANT CHANGE UP
CREAT SERPL-MCNC: 1.12 MG/DL — SIGNIFICANT CHANGE UP (ref 0.5–1.3)
DACRYOCYTES BLD QL SMEAR: SLIGHT — SIGNIFICANT CHANGE UP
DIFF PNL FLD: NEGATIVE — SIGNIFICANT CHANGE UP
EOSINOPHIL # BLD AUTO: 0 K/UL — SIGNIFICANT CHANGE UP (ref 0–0.5)
EOSINOPHIL NFR BLD AUTO: 0 % — SIGNIFICANT CHANGE UP (ref 0–6)
EPI CELLS # UR: 5 /HPF — SIGNIFICANT CHANGE UP
GAS PNL BLDV: 130 MMOL/L — LOW (ref 135–145)
GAS PNL BLDV: SIGNIFICANT CHANGE UP
GAS PNL BLDV: SIGNIFICANT CHANGE UP
GLUCOSE BLDC GLUCOMTR-MCNC: 101 MG/DL — HIGH (ref 70–99)
GLUCOSE BLDC GLUCOMTR-MCNC: 109 MG/DL — HIGH (ref 70–99)
GLUCOSE BLDV-MCNC: 129 MG/DL — HIGH (ref 70–99)
GLUCOSE SERPL-MCNC: 121 MG/DL — HIGH (ref 70–99)
GLUCOSE UR QL: NEGATIVE — SIGNIFICANT CHANGE UP
HCO3 BLDV-SCNC: 21 MMOL/L — SIGNIFICANT CHANGE UP (ref 21–29)
HCT VFR BLD CALC: 27 % — LOW (ref 39–50)
HCT VFR BLDA CALC: 28 % — LOW (ref 39–50)
HGB BLD CALC-MCNC: 8.9 G/DL — LOW (ref 13–17)
HGB BLD-MCNC: 8.4 G/DL — LOW (ref 13–17)
HOROWITZ INDEX BLDV+IHG-RTO: SIGNIFICANT CHANGE UP
HYALINE CASTS # UR AUTO: 11 /LPF — HIGH (ref 0–2)
HYPOCHROMIA BLD QL: SLIGHT — SIGNIFICANT CHANGE UP
INR BLD: 1.01 RATIO — SIGNIFICANT CHANGE UP (ref 0.88–1.16)
KETONES UR-MCNC: SIGNIFICANT CHANGE UP
LACTATE BLDV-MCNC: 6 MMOL/L — CRITICAL HIGH (ref 0.7–2)
LEUKOCYTE ESTERASE UR-ACNC: ABNORMAL
LYMPHOCYTES # BLD AUTO: 0.07 K/UL — LOW (ref 1–3.3)
LYMPHOCYTES # BLD AUTO: 2 % — LOW (ref 13–44)
MACROCYTES BLD QL: SLIGHT — SIGNIFICANT CHANGE UP
MANUAL SMEAR VERIFICATION: SIGNIFICANT CHANGE UP
MCHC RBC-ENTMCNC: 29.2 PG — SIGNIFICANT CHANGE UP (ref 27–34)
MCHC RBC-ENTMCNC: 31.1 GM/DL — LOW (ref 32–36)
MCV RBC AUTO: 93.8 FL — SIGNIFICANT CHANGE UP (ref 80–100)
MONOCYTES # BLD AUTO: 0.22 K/UL — SIGNIFICANT CHANGE UP (ref 0–0.9)
MONOCYTES NFR BLD AUTO: 6 % — SIGNIFICANT CHANGE UP (ref 2–14)
NEUTROPHILS # BLD AUTO: 3.38 K/UL — SIGNIFICANT CHANGE UP (ref 1.8–7.4)
NEUTROPHILS NFR BLD AUTO: 90 % — HIGH (ref 43–77)
NEUTS BAND # BLD: 2 % — SIGNIFICANT CHANGE UP (ref 0–8)
NITRITE UR-MCNC: NEGATIVE — SIGNIFICANT CHANGE UP
NRBC # BLD: 2 /100 — HIGH (ref 0–0)
PCO2 BLDV: 38 MMHG — SIGNIFICANT CHANGE UP (ref 35–50)
PH BLDV: 7.35 — SIGNIFICANT CHANGE UP (ref 7.35–7.45)
PH UR: 6 — SIGNIFICANT CHANGE UP (ref 5–8)
PLAT MORPH BLD: NORMAL — SIGNIFICANT CHANGE UP
PLATELET # BLD AUTO: 12 K/UL — CRITICAL LOW (ref 150–400)
PO2 BLDV: 30 MMHG — SIGNIFICANT CHANGE UP (ref 25–45)
POIKILOCYTOSIS BLD QL AUTO: SLIGHT — SIGNIFICANT CHANGE UP
POLYCHROMASIA BLD QL SMEAR: SLIGHT — SIGNIFICANT CHANGE UP
POTASSIUM BLDV-SCNC: 3.7 MMOL/L — SIGNIFICANT CHANGE UP (ref 3.5–5.3)
POTASSIUM SERPL-MCNC: 3.9 MMOL/L — SIGNIFICANT CHANGE UP (ref 3.5–5.3)
POTASSIUM SERPL-SCNC: 3.9 MMOL/L — SIGNIFICANT CHANGE UP (ref 3.5–5.3)
PROT SERPL-MCNC: 5.7 G/DL — LOW (ref 6–8.3)
PROT UR-MCNC: 100 — SIGNIFICANT CHANGE UP
PROTHROM AB SERPL-ACNC: 11.6 SEC — SIGNIFICANT CHANGE UP (ref 10–12.9)
RBC # BLD: 2.88 M/UL — LOW (ref 4.2–5.8)
RBC # FLD: 18.8 % — HIGH (ref 10.3–14.5)
RBC BLD AUTO: ABNORMAL
RBC CASTS # UR COMP ASSIST: 3 /HPF — SIGNIFICANT CHANGE UP (ref 0–4)
RH IG SCN BLD-IMP: POSITIVE — SIGNIFICANT CHANGE UP
SAO2 % BLDV: 45 % — LOW (ref 67–88)
SODIUM SERPL-SCNC: 133 MMOL/L — LOW (ref 135–145)
SP GR SPEC: 1.02 — SIGNIFICANT CHANGE UP (ref 1.01–1.02)
UROBILINOGEN FLD QL: NEGATIVE — SIGNIFICANT CHANGE UP
WBC # BLD: 3.67 K/UL — LOW (ref 3.8–10.5)
WBC # FLD AUTO: 3.67 K/UL — LOW (ref 3.8–10.5)
WBC UR QL: 20 /HPF — HIGH (ref 0–5)

## 2019-11-04 PROCEDURE — 71045 X-RAY EXAM CHEST 1 VIEW: CPT | Mod: 26

## 2019-11-04 PROCEDURE — 93308 TTE F-UP OR LMTD: CPT | Mod: 26

## 2019-11-04 PROCEDURE — 99285 EMERGENCY DEPT VISIT HI MDM: CPT | Mod: GC

## 2019-11-04 PROCEDURE — 93010 ELECTROCARDIOGRAM REPORT: CPT

## 2019-11-04 PROCEDURE — 93976 VASCULAR STUDY: CPT | Mod: 26

## 2019-11-04 RX ORDER — ERGOCALCIFEROL 1.25 MG/1
1 CAPSULE ORAL
Qty: 0 | Refills: 0 | DISCHARGE

## 2019-11-04 RX ORDER — PANTOPRAZOLE SODIUM 20 MG/1
40 TABLET, DELAYED RELEASE ORAL
Refills: 0 | Status: DISCONTINUED | OUTPATIENT
Start: 2019-11-04 | End: 2019-11-11

## 2019-11-04 RX ORDER — CARBAMAZEPINE 200 MG
1 TABLET ORAL
Qty: 0 | Refills: 0 | DISCHARGE

## 2019-11-04 RX ORDER — AMLODIPINE BESYLATE 2.5 MG/1
10 TABLET ORAL DAILY
Refills: 0 | Status: DISCONTINUED | OUTPATIENT
Start: 2019-11-04 | End: 2019-11-05

## 2019-11-04 RX ORDER — METOPROLOL TARTRATE 50 MG
25 TABLET ORAL DAILY
Refills: 0 | Status: DISCONTINUED | OUTPATIENT
Start: 2019-11-04 | End: 2019-11-05

## 2019-11-04 RX ORDER — CARBAMAZEPINE 200 MG
200 TABLET ORAL
Refills: 0 | Status: DISCONTINUED | OUTPATIENT
Start: 2019-11-04 | End: 2019-11-11

## 2019-11-04 RX ORDER — METFORMIN HYDROCHLORIDE 850 MG/1
2 TABLET ORAL
Qty: 0 | Refills: 0 | DISCHARGE

## 2019-11-04 RX ORDER — SORAFENIB 200 MG/1
2 TABLET, FILM COATED ORAL
Qty: 0 | Refills: 0 | DISCHARGE

## 2019-11-04 RX ORDER — BICALUTAMIDE 50 MG/1
50 TABLET, FILM COATED ORAL DAILY
Refills: 0 | Status: DISCONTINUED | OUTPATIENT
Start: 2019-11-04 | End: 2019-11-11

## 2019-11-04 RX ORDER — METOPROLOL TARTRATE 50 MG
1 TABLET ORAL
Qty: 0 | Refills: 0 | DISCHARGE

## 2019-11-04 RX ORDER — FLUTICASONE PROPIONATE AND SALMETEROL 50; 250 UG/1; UG/1
0 POWDER ORAL; RESPIRATORY (INHALATION)
Qty: 0 | Refills: 0 | DISCHARGE

## 2019-11-04 RX ORDER — BUDESONIDE AND FORMOTEROL FUMARATE DIHYDRATE 160; 4.5 UG/1; UG/1
2 AEROSOL RESPIRATORY (INHALATION)
Refills: 0 | Status: DISCONTINUED | OUTPATIENT
Start: 2019-11-04 | End: 2019-11-11

## 2019-11-04 RX ORDER — CEFTRIAXONE 500 MG/1
1000 INJECTION, POWDER, FOR SOLUTION INTRAMUSCULAR; INTRAVENOUS ONCE
Refills: 0 | Status: COMPLETED | OUTPATIENT
Start: 2019-11-04 | End: 2019-11-04

## 2019-11-04 RX ORDER — BICALUTAMIDE 50 MG/1
1 TABLET, FILM COATED ORAL
Qty: 0 | Refills: 0 | DISCHARGE

## 2019-11-04 RX ORDER — GABAPENTIN 400 MG/1
1 CAPSULE ORAL
Qty: 0 | Refills: 0 | DISCHARGE

## 2019-11-04 RX ORDER — LIDOCAINE HCL 20 MG/ML
20 VIAL (ML) INJECTION ONCE
Refills: 0 | Status: DISCONTINUED | OUTPATIENT
Start: 2019-11-04 | End: 2019-11-11

## 2019-11-04 RX ADMIN — Medication 200 MILLIGRAM(S): at 20:29

## 2019-11-04 RX ADMIN — BUDESONIDE AND FORMOTEROL FUMARATE DIHYDRATE 2 PUFF(S): 160; 4.5 AEROSOL RESPIRATORY (INHALATION) at 18:51

## 2019-11-04 RX ADMIN — CEFTRIAXONE 100 MILLIGRAM(S): 500 INJECTION, POWDER, FOR SOLUTION INTRAMUSCULAR; INTRAVENOUS at 15:30

## 2019-11-04 RX ADMIN — Medication 100 MILLIGRAM(S): at 18:35

## 2019-11-04 NOTE — H&P ADULT - ASSESSMENT
78 y/o M PMH prostate cancer and adenocarcinoma w/ mets p/w surgical site leakage s/p hepatic artery embolism (By Dr. Christiano Jimenez of [facility]; [id number], [id number]) using femoral site as an access site. Patient also complaining of abdominal swelling. Patient received an abdominal paracentesis x 5 days ago, became hypotensive, sent to ED for hepatic artery embolism. Patient now returning to ED. Does not report SOB, CP, f/c, n/v, headaches, blurry vision.     Oncology: Patient needs new oncologist here. Has no oncologist, all at [facility]. No active GI bleeding, HGB stabel 8.4.No abdominal   pain, SOB or fevers. May need repeat paracentesis, non-emergent. Surgery eval noted and appreciated. Oozing from procedure site, no   specific recommendations made. Continue Casodex 50 mg/day for prostate cancer. Low platlets noted. Will speak to IR in AM regarding   platlet count needed for paracentesis.     Endo: Hold metformen in hospital. Finger sticks AC and H.S.     CV: Continue Norvasc 10 mg/day and Toprol XL 25 mg/day.

## 2019-11-04 NOTE — ED PROVIDER NOTE - PROGRESS NOTE DETAILS
Hermes PGY1- spoke to surgery, indicated no need for surgical intervention. Recommended testicular ultrasound due to previous CT scans Hermes PGY1- spoke to surgery, indicated no need for surgical intervention. Recommended testicular ultrasound due to previous CT scans. Hermes PGY1- U/s shows complex pericardial/pleural effusions, b/l complex hydrocele. Spoke to Dr. Daugherty (oncologist) agreed for admission. Hermes PGY1- U/s shows complex pericardial/pleural effusions, b/l complex hydrocele. Spoke to Dr. Daugherty (oncologist) agreed for admission.  ATTG: : Patient with low platelets and sig ecchymosis, although paracentesis may be beneficial for both diagnostic and therapeutic, given low platelets and risk of bleed will not perform paracentesis at this time.

## 2019-11-04 NOTE — ED ADULT NURSE NOTE - WOUND TYPE
Physical Therapy Daily Treatment Note   Date:  2019    TIme In:       7861               Time Out:        5248    Patient Name:  Juan Basilio    :  1950  MRN: [id number]    Restrictions/Precautions:    Pertinent Medical History:  Medical/Treatment Diagnosis Information:  ·   Cronic LBP, LE pain and weakness, balance deficits     Insurance/Certification information:    Medicare  Physician Information:    JADE Frances  Plan of care signed (Y/N):    Visit# / total visits:     6/    G-Code (if applicable):      Date / Visit # G-Code Applied:         Progress Note: []  Yes  [x]  No  Next due by: Visit #10      Pain level:   5/10  Subjective:  Pt reported she has been hurting a little more over the weekend. Objective:  Observation:   Test measurements:    Palpation:    Exercises:  Exercise Resistance/Repetitions Other comments   Nustep 8' L4 27   Hip abd / add  3x10 BTB/ball 27   Mini squats 2x10 27   HS curl 2x10 RTB 27   Seated mid rows with upright posture 2x10 RTB 27   LAQ 3x10 27   Seated marching 3x10 27   Sitting gastroc belt stretch 20\"x5 27   LTR x30 27   QS / Glut set combo 3x10 27               Other Therapeutic Activities:      Manual Treatments:  MH to lumbar spine, PRN    Modalities:  STM to lumbar spine, PRN      Timed Code Treatment Minutes:  25 and rest grouped      Total Treatment Minutes:  50    Treatment/Activity Tolerance:  [x] Patient tolerated treatment well [] Patient limited by fatigue  [] Patient limited by pain  [] Patient limited by other medical complications  [x] Other:  Pt with increased c/o pain after exercises, pt denied heat today.     Pain after treatment:      6/10    Prognosis: [x] Good [] Fair  [] Poor    Patient Requires Follow-up: [x] Yes  [] No    Plan:   [x] Continue per plan of care [] Alter current plan (see comments)  [] Plan of care initiated [] Hold pending MD visit [] Discharge    Plan for Next Session:        Electronically signed by:  Mere Hernandez
incision

## 2019-11-04 NOTE — CONSULT NOTE ADULT - ASSESSMENT
ASSESSMENT  79M w/ hx prostate cancer (dx 2004), metastatic HCC, CVA in 2019 (was on Tlovenox but stopped June 2019), gastric ulcer s/p EGD/cauterization in July 2019, RIH repair several years ago, now presenting to the ED for drainage from right groin access site    - To be discussed with attending    [facility]   [id number] ASSESSMENT  79M w/ hx prostate cancer (dx 2004), metastatic HCC, CVA in 2019 (was on Tlovenox but stopped June 2019), gastric ulcer s/p EGD/cauterization in July 2019, RIH repair several years ago, now presenting to the ED for drainage from right groin access site. Likely weeping from generalize anasarca    - No surgical intervention indicated  - Could consider ultrasound of the scrotum and groin  - If right groin continues to drain, could consider placing and ostomy appliance for patient comfort  - Discussed with Dr. Gaurang [facility]   [id number]

## 2019-11-04 NOTE — ED PROVIDER NOTE - OBJECTIVE STATEMENT
78 y/o M w/ x 80 y/o M w/ Mhx prostate cancer and adenocarcinoma w/ mets p/w surgical site leakage s/p hepatic artery embolism using femoral site as an access site. Patient also complaining of abdominal swelling. Patient received an abdominal paracentesis 80 y/o M w/ Mhx prostate cancer and adenocarcinoma w/ mets p/w surgical site leakage s/p hepatic artery embolism using femoral site as an access site. Patient also complaining of abdominal swelling. Patient received an abdominal paracentesis x 5 days ago, became hypotensive, sent to ED for hepatic artery embolism. Patient now returning to ED. Does not report f/c, n/v, headaches, blurry vision, d/c. 80 y/o M w/ Mhx prostate cancer and adenocarcinoma w/ mets p/w surgical site leakage s/p hepatic artery embolism (By Dr. Christiano Jimenez of [facility]; [id number], [id number]) using femoral site as an access site. Patient also complaining of abdominal swelling. Patient received an abdominal paracentesis x 5 days ago, became hypotensive, sent to ED for hepatic artery embolism. Patient now returning to ED. Does not report f/c, n/v, headaches, blurry vision, d/c.     Patient receives care by Dr. Anmol Daugherty of [facility] ([phone])

## 2019-11-04 NOTE — ED PROVIDER NOTE - ATTENDING CONTRIBUTION TO CARE
78 y/o M with pmhx of HTN, DM2, HLD, CKD stage 3, multiple CVAs w/o residual deficits, UGIB s/p endoscopy and cauterization at [place] 3/19, prostate cancer, metastatic HCC, admitted with abdominal pain, SBP and thrombocytopenia, found to have subcapsular and perihepatic hematoma on CT, s/p paracentesis, s/p IR embolization of R hepatic artery (10/30) presents for worsening abd distention / ascites. no fevers.   Gen.  no acute resp distress  HEENT:  perrl eomi  Lungs:  b/l bs  CVS: S1S2   Abd;  no guarding. + distention / ascites with fluid shift.   Ext:   Neuro:  MSK: 78 y/o M with pmhx of HTN, DM2, HLD, CKD stage 3, multiple CVAs w/o residual deficits, UGIB s/p endoscopy and cauterization at [place] 3/19, prostate cancer, metastatic HCC, admitted with abdominal pain, SBP and thrombocytopenia, found to have subcapsular and perihepatic hematoma on CT, s/p paracentesis, s/p IR embolization of R hepatic artery (10/30) presents for worsening abd distention / ascites. no fevers.   Gen.  no acute resp distress, elderly male appears stated age.  HEENT:  perrl eomi  Lungs:  b/l bs  CVS: S1S2   Abd;  no guarding. + distention / ascites with fluid shift. ecchymosis on right side of abd into chest extending along the lateral side.   Ext: no erythema. + edema pitting  Neuro: aaox3   MSK: moves ext spont.

## 2019-11-04 NOTE — CONSULT NOTE ADULT - ASSESSMENT
80 y/o M PMH prostate cancer and adenocarcinoma w/ mets p/w surgical site leakage s/p hepatic artery embolism (By Dr. Christiano Jimenez of [facility]; [id number], [id number]) using femoral site as an access site. Patient also complaining of abdominal swelling. Patient received an abdominal paracentesis x 5 days ago, became hypotensive, sent to ED for hepatic artery embolism. Patient now returning to ED. Does not report, CP, f/c, n/v, headaches, blurry vision. ct scan   Subcapsular and perihepatic hematoma. No active extravasation.   Infiltrative hepatic metastatic disease.    No active gastrointestinal bleeding.    Increased ascites. Multifocal metastatic disease as demonstrated on 10/8.    Increased ground-glass lung opacities, pulmonary edema or infection.   Lymphangitic carcinomatosis stable.    Worsened anasarca. Right thigh subcutaneous edema or cellulitis. Chronic     pt with hx of pericardial effusion and sig abnormal ct scan  may repeat ct abdomen and pelvis to evaluate hematoma in view of low platelets  pulmonary edema in ct scan most probably sec to carcinomatosis    RLE swelling ? dvt pt has ivc filter    pt prognosis is poor sec to sig medical disease ?palliative consult if family agrees  otherwise repeat echo

## 2019-11-04 NOTE — H&P ADULT - HISTORY OF PRESENT ILLNESS
78 y/o M PMH prostate cancer and adenocarcinoma w/ mets p/w surgical site leakage s/p hepatic artery embolism (By Dr. Christiano Jimenez of [facility]; [id number], [id number]) using femoral site as an access site. Patient also complaining of abdominal swelling. Patient received an abdominal paracentesis x 5 days ago, became hypotensive, sent to ED for hepatic artery embolism. Patient now returning to ED. Does not report f/c, n/v, headaches, blurry vision, d/c 80 y/o M PMH prostate cancer and adenocarcinoma w/ mets p/w surgical site leakage s/p hepatic artery embolism (By Dr. Christiano Jimenez of [facility]; [id number], [id number]) using femoral site as an access site. Patient also complaining of abdominal swelling. Patient received an abdominal paracentesis x 5 days ago, became hypotensive, sent to ED for hepatic artery embolism. Patient now returning to ED. Does not report SOB, CP, f/c, n/v, headaches, blurry vision.

## 2019-11-04 NOTE — ED ADULT NURSE NOTE - OBJECTIVE STATEMENT
80 yo male brought to ED complaining of leaking from a surgical incision in the groin on the right side. Wound shows no signs of infection no redness. 80 yo male brought to ED complaining of leaking from a surgical incision in the groin on the right side Pt had surgery Wednesday 10/30/19 . SOB x1 week . Burning lips for x 5 days. A&Ox4 VSS Elevated BP. Wound shows no signs of infection no redness. There is clear fluid coming out and saturating gauze quickly. Pt states "its very wet". Pt right leg is obviously swollen and right side of abdomen has bruising from a fall x 1 week ago. Pt has abdominal distention but states no pain on palpation. Pt claims he has nausea and vomiting after eating. PMH prostate cancers with mets to lover clavicle and lymph nodes, hernia left groin. HTN, diabetes .Pt takes metforman and HTN meds but pt doesn't know the name of it. 78 yo male brought to ED complaining of leaking from a surgical incision in the groin on the right side and SOB x1 week. Pt had surgery Wednesday 10/30/19 . Burning lips for x 5 days. A&Ox4, pt does not ambulate independently, uses a wheelchair at home. VSS Elevated BP. Wound shows no signs of infection, no redness, no odor. There is clear fluid coming out and saturating gauze quickly. Pt states "its very wet". Pt right leg is swollen and right side of abdomen has bruising from a fall x 1 week ago. Pt has abdominal distention but states no pain on palpation. Pt claims he has nausea and vomiting after eating. PMH prostate cancers with mets to liver clavicle and lymph nodes, hernia left groin. LBM was yesterday alittle loose stool. HTN, diabetes .Pt takes metforman and HTN meds but pt doesn't know the name of it. ROM x4. Safety measures in place. Call bell in reach, bed in lowest position. Ovid provided. Comfort measures in place. Son at bedside. Will continue to monitor.

## 2019-11-04 NOTE — ED PROVIDER NOTE - CLINICAL SUMMARY MEDICAL DECISION MAKING FREE TEXT BOX
80 y/o M w/ Mhx prostate cancer and adenocarcinoma w/ mets p/w surgical site leakage s/p hepatic artery embolism using femoral site as an access site likely due to peripheral anasarca/third spacing. cbc, cmp, blood cx, lactate, cxr, us le, ua. Will f/u and reassess. 78 y/o M w/ Mhx prostate cancer and adenocarcinoma w/ mets p/w surgical site leakage s/p hepatic artery embolism using femoral site as an access site likely due to peripheral anasarca/third spacing. cbc, cmp, blood cx, lactate, cxr, us le, ua. Will f/u and reassess.  ATTG: : increase in ascites. check labs, check xray chest, consider paracentesis.

## 2019-11-04 NOTE — H&P ADULT - NSHPLABSRESULTS_GEN_ALL_CORE
LABS:                        8.4    3.67  )-----------( 12       ( 04 Nov 2019 10:45 )             27.0     11-04    133<L>  |  103  |  12  ----------------------------<  121<H>  3.9   |  16<L>  |  1.12    Ca    7.1<L>      04 Nov 2019 10:45    TPro  5.7<L>  /  Alb  2.3<L>  /  TBili  0.5  /  DBili  x   /  AST  50<H>  /  ALT  30  /  AlkPhos  265<H>  11-04    PT/INR - ( 04 Nov 2019 10:45 )   PT: 11.6 sec;   INR: 1.01 ratio         PTT - ( 04 Nov 2019 10:45 )  PTT:22.3 sec        RADIOLOGY & ADDITIONAL TESTS:

## 2019-11-04 NOTE — CONSULT NOTE ADULT - SUBJECTIVE AND OBJECTIVE BOX
GENERAL SURGERY CONSULT NOTE      HPI:  79M w/ hx prostate cancer (dx 2004), metastatic HCC, CVA in 2019 (was on Tlovenox but stopped June 2019), gastric ulcer s/p EGD/cauterization in July 2019, RIH repair several years ago, now presenting to the ED for drainage from right groin access site. He had been seen in the office by Dr. Daugherty on 10/29 for paracentesis - drained 1800 cc. He developed worsening distension after the procedure and he had a low blood count so he was sent to [facility] ED. CT was done at [facility] and was found to have subcapsular liver hematoma. At [facility] he was resuscitated, received pRBCs and platelets and then was taken to IR and underwent embolization of the R hepatic artery on 10/30. He was discharged home on 10/31. He presents today to the ED with clear drainage from the right groin access site. Per the patietn and his son, clear fluid has been draining from the right groin site and soaked two towels. He denies any abdominal pain or bloating, has been having bowel function. Denies bloody bowel movements or hematemesis. Has had a left inguinal hernia but denies any pain in the area. Also complaints of right testicular swelling that has improved from when he was hospitalized at [facility].    PMHx: Anemia  Cancer, metastatic to bone  Gastric ulcer  AICD (automatic cardioverter/defibrillator) present  HLD (hyperlipidemia)  HTN (hypertension)  Liver cancer  Prostate cancer  Ischemic stroke  HLD (hyperlipidemia)  HTN (hypertension)  Hernia  DM (diabetes mellitus)  Prostate CA    PSHx: History of eye surgery  History of right knee surgery  H/O hernia repair    Medications (inpatient):   Medications (PRN):  Allergies: No Known Allergies  (Intolerances: )  Social Hx:   Family Hx: FH: HTN (hypertension)  FH: diabetes mellitus      Physical Exam  T(C): 36.6  HR: 102 (102 - 102)  BP: 136/87 (136/87 - 136/87)  RR: 18 (18 - 18)  SpO2: 97% (97% - 97%)  Tmax: T(C): , Max: 36.6 (11-04-19 @ 09:36)    General: well developed, well nourished, NAD  Neuro: alert and oriented x3  Respiratory: nonlabored on RA  CVS: regular rate and rhythm  Abdomen: soft, nontender, nondistended, paracentesis access site covered with occlusive dressing, serosang staining of the gauze of this dressing  Groin: R groin with serous drainage from the right groin access site, no bleeding whatsoever, the area is nontender, no swelling, no pulsatile masses palpated, no ecchymosis. Left inguinal hernia easily reducible, nontender, no overlying skin changes  The right scrotum is slightly more edematous that the left side  Extremities: no edema, sensation and movement grossly intact  Skin: warm, dry, appropriate color    Labs:                        8.4    3.67  )-----------( 12       ( 04 Nov 2019 10:45 )             27.0     PT/INR - ( 04 Nov 2019 10:45 )   PT: 11.6 sec;   INR: 1.01 ratio         PTT - ( 04 Nov 2019 10:45 )  PTT:22.3 sec  11-04    133<L>  |  103  |  12  ----------------------------<  121<H>  3.9   |  16<L>  |  1.12    Ca    7.1<L>      04 Nov 2019 10:45    TPro  5.7<L>  /  Alb  2.3<L>  /  TBili  0.5  /  DBili  x   /  AST  50<H>  /  ALT  30  /  AlkPhos  265<H>  11-04        Imaging and other studies: GENERAL SURGERY CONSULT NOTE      HPI:  79M w/ hx prostate cancer (dx 2004), metastatic HCC, CVA in 2019 (was on Tlovenox but stopped June 2019), gastric ulcer s/p EGD/cauterization in July 2019, RIH repair several years ago, now presenting to the ED for drainage from right groin access site. He had been seen in the office by Dr. Daugherty on 10/29 for paracentesis - drained 1800 cc. He developed worsening distension after the procedure and he had a low blood count so he was sent to [facility] ED. CT was done at [facility] and was found to have subcapsular liver hematoma. At [facility] he was resuscitated, received pRBCs and platelets and then was taken to IR and underwent embolization of the R hepatic artery on 10/30. He was discharged home on 10/31. He presents today to the ED with clear drainage from the right groin access site. Per the patietn and his son, clear fluid has been draining from the right groin site and soaked two towels. He denies any abdominal pain or bloating, has been having bowel function. Denies bloody bowel movements or hematemesis. Has had a left inguinal hernia but denies any pain in the area. Also complaints of right testicular swelling that has improved from when he was hospitalized at [facility].    PMHx: Anemia  Cancer, metastatic to bone  Gastric ulcer  AICD (automatic cardioverter/defibrillator) present  HLD (hyperlipidemia)  HTN (hypertension)  Liver cancer  Prostate cancer  Ischemic stroke  HLD (hyperlipidemia)  HTN (hypertension)  Hernia  DM (diabetes mellitus)  Prostate CA    PSHx: History of eye surgery  History of right knee surgery  H/O hernia repair    Medications (inpatient):   Medications (PRN):  Allergies: No Known Allergies  (Intolerances: )  Social Hx:   Family Hx: FH: HTN (hypertension)  FH: diabetes mellitus      Physical Exam  T(C): 36.6  HR: 102 (102 - 102)  BP: 136/87 (136/87 - 136/87)  RR: 18 (18 - 18)  SpO2: 97% (97% - 97%)  Tmax: T(C): , Max: 36.6 (11-04-19 @ 09:36)    General: well developed, well nourished, NAD  Neuro: alert and oriented x3  Respiratory: nonlabored on RA  CVS: regular rate and rhythm  Abdomen: soft, nontender, nondistended, paracentesis access site covered with occlusive dressing, serosang staining of the gauze of this dressing  Groin: R groin with serous drainage from the right groin access site, no bleeding whatsoever, the area is nontender, no swelling, no pulsatile masses palpated, no ecchymosis. Left inguinal hernia easily reducible, nontender, no overlying skin changes  The right scrotum is slightly more edematous that the left side  Extremities: no edema, sensation and movement grossly intact  Skin: warm, dry, appropriate color    Labs:                        8.4    3.67  )-----------( 12       ( 04 Nov 2019 10:45 )             27.0     PT/INR - ( 04 Nov 2019 10:45 )   PT: 11.6 sec;   INR: 1.01 ratio         PTT - ( 04 Nov 2019 10:45 )  PTT:22.3 sec  11-04    133<L>  |  103  |  12  ----------------------------<  121<H>  3.9   |  16<L>  |  1.12    Ca    7.1<L>      04 Nov 2019 10:45    TPro  5.7<L>  /  Alb  2.3<L>  /  TBili  0.5  /  DBili  x   /  AST  50<H>  /  ALT  30  /  AlkPhos  265<H>  11-04

## 2019-11-04 NOTE — ED PROVIDER NOTE - PHYSICAL EXAMINATION
General appearance: NAD, conversant, afebrile    Eyes: anicteric sclerae, moist conjunctivae; no lid-lag   HENT: Atraumatic; oropharynx clear with moist mucous membranes    Neck: Trachea midline; Full range of motion, supple   Pulm: Lungs clear to auscultation bilaterally, with normal respiratory effort and no intercostal retractions; normal work of breathing   CV: Regular Rhythm and Rate; Normal S1, S2; No murmurs, rubs, or gallops   Abdomen: moderately distended, moderately tense, no TTP, no masses felt   Extremities: RLE 2+ pitting edema up to the knees

## 2019-11-04 NOTE — H&P ADULT - NSHPPHYSICALEXAM_GEN_ALL_CORE
PHYSICAL EXAMINATION:  Vital Signs Last 24 Hrs  T(C): 36.5 (04 Nov 2019 15:51), Max: 36.6 (04 Nov 2019 09:36)  T(F): 97.7 (04 Nov 2019 15:51), Max: 97.8 (04 Nov 2019 09:36)  HR: 94 (04 Nov 2019 15:51) (93 - 102)  BP: 135/88 (04 Nov 2019 15:51) (131/82 - 136/87)  BP(mean): --  RR: 18 (04 Nov 2019 15:51) (18 - 20)  SpO2: 95% (04 Nov 2019 15:51) (95% - 97%)  CAPILLARY BLOOD GLUCOSE          GENERAL: NAD, well-groomed, well-developed  HEAD:  atraumatic, normocephalic  EYES: sclera anicteric  ENMT: mucous membranes moist  NECK: supple, No JVD  CHEST/LUNG: clear to auscultation bilaterally; no rales, rhonchi, or wheezing b/l  HEART: normal S1, S2  ABDOMEN: BS+, soft, ND, NT   EXTREMITIES:  pulses palpable; no clubbing, cyanosis, or edema b/l LEs  NEURO: awake, alert, interactive; moves all extremities  SKIN: no rashes or lesions

## 2019-11-04 NOTE — ED PROVIDER NOTE - CHIEF COMPLAINT
The patient is a 79y Male complaining of The patient is a 79y Male complaining of surgical site leakage

## 2019-11-04 NOTE — CONSULT NOTE ADULT - SUBJECTIVE AND OBJECTIVE BOX
CHIEF COMPLAINT:Patient is a 79y old  Male who presents with a chief complaint of oozing from right groin procedure site (04 Nov 2019 17:33)      HPI:  78 y/o M PMH prostate cancer and adenocarcinoma w/ mets p/w surgical site leakage s/p hepatic artery embolism (By Dr. Christiano Jimenez of [facility]; [phone], [phone]) using femoral site as an access site. Patient also complaining of abdominal swelling. Patient received an abdominal paracentesis x 5 days ago, became hypotensive, sent to ED for hepatic artery embolism. Patient now returning to ED. Does not report, CP, f/c, n/v, headaches, blurry vision. (04 Nov 2019 17:33).  pt reports of sig weight loss and increasing sob.ct scan      PAST MEDICAL & SURGICAL HISTORY:  Anemia  Cancer, metastatic to bone: on chemo  Gastric ulcer: was hospitalized for GI bleed 7/22/19-7/25/19, taking Protonix  HTN (hypertension)  Liver cancer: stage 4  Prostate cancer: 2004 radiotherapy seeds, now metastatic to liver and bone  Ischemic stroke: 01/2019, no residuals  HLD (hyperlipidemia)  DM (diabetes mellitus): type 2, HgA1c 5.8% ( 6/5/19)  History of eye surgery: right  History of right knee surgery: R Tibia Intralesional curettage/bone graft  7/11/2019  H/O hernia repair: right inguinal hernia with mesh  9/2013      MEDICATIONS  (STANDING):  amLODIPine   Tablet 10 milliGRAM(s) Oral daily  bicalutamide 50 milliGRAM(s) Oral daily  budesonide 160 MICROgram(s)/formoterol 4.5 MICROgram(s) Inhaler 2 Puff(s) Inhalation two times a day  carBAMazepine XR Tablet 200 milliGRAM(s) Oral two times a day  lidocaine 2% Injectable 20 milliLiter(s) Local Injection Once  metoprolol succinate ER 25 milliGRAM(s) Oral daily  pantoprazole    Tablet 40 milliGRAM(s) Oral before breakfast    MEDICATIONS  (PRN):      FAMILY HISTORY:  FH: HTN (hypertension)  FH: diabetes mellitus      SOCIAL HISTORY:    [ ] Non-smoker  [ ] Smoker  [ ] Alcohol    Allergies    No Known Allergies    Intolerances    	    REVIEW OF SYSTEMS:  CONSTITUTIONAL: No fever, weight loss, or fatigue  EYES: No eye pain, visual disturbances, or discharge  ENT:  No difficulty hearing, tinnitus, vertigo; No sinus or throat pain  NECK: No pain or stiffness  RESPIRATORY: No cough, wheezing, chills or hemoptysis; No Shortness of Breath  CARDIOVASCULAR: No chest pain, palpitations, passing out, dizziness, or leg swelling  GASTROINTESTINAL: No abdominal or epigastric pain. No nausea, vomiting, or hematemesis; No diarrhea or constipation. No melena or hematochezia.  GENITOURINARY: No dysuria, frequency, hematuria, or incontinence  NEUROLOGICAL: No headaches, memory loss, loss of strength, numbness, or tremors  SKIN: No itching, burning, rashes, or lesions   LYMPH Nodes: No enlarged glands  ENDOCRINE: No heat or cold intolerance; No hair loss  MUSCULOSKELETAL: No joint pain or swelling; No muscle, back, or extremity pain  PSYCHIATRIC: No depression, anxiety, mood swings, or difficulty sleeping  HEME/LYMPH: No easy bruising, or bleeding gums  ALLERGY AND IMMUNOLOGIC: No hives or eczema	    [ ] All others negative	  [ ] Unable to obtain    PHYSICAL EXAM:  T(C): 37 (11-04-19 @ 18:19), Max: 37 (11-04-19 @ 18:19)  HR: 98 (11-04-19 @ 18:19) (93 - 102)  BP: 136/87 (11-04-19 @ 18:19) (131/82 - 136/87)  RR: 18 (11-04-19 @ 18:19) (18 - 20)  SpO2: 94% (11-04-19 @ 18:19) (94% - 97%)  Wt(kg): --  I&O's Summary      Appearance: Normal	  HEENT:   Normal oral mucosa, PERRL, EOMI	  Lymphatic: No lymphadenopathy  Cardiovascular: Normal S1 S2, No JVD, + murmurs,+ RLE  edema  Respiratory: decrease bs  Psychiatry: A & O x 3, Mood & affect appropriate  Gastrointestinal:  Soft, Non-tender, + BS, distended ? ascites  Skin: No rashes, No ecchymoses, No cyanosis	  Neurologic: Non-focal  Extremities: Normal range of motion, No clubbing, cyanosis + RLE  edema  Vascular: Peripheral pulses palpable 2+ bilaterally    TELEMETRY: 	    ECG:  	  RADIOLOGY:  OTHER: 	  	  LABS:	 	    CARDIAC MARKERS:                              8.4    3.67  )-----------( 12       ( 04 Nov 2019 10:45 )             27.0     11-04    133<L>  |  103  |  12  ----------------------------<  121<H>  3.9   |  16<L>  |  1.12    Ca    7.1<L>      04 Nov 2019 10:45    TPro  5.7<L>  /  Alb  2.3<L>  /  TBili  0.5  /  DBili  x   /  AST  50<H>  /  ALT  30  /  AlkPhos  265<H>  11-04    proBNP:   Lipid Profile:   HgA1c:   TSH:   PT/INR - ( 04 Nov 2019 10:45 )   PT: 11.6 sec;   INR: 1.01 ratio         PTT - ( 04 Nov 2019 10:45 )  PTT:22.3 sec    PREVIOUS DIAGNOSTIC TESTING:    < from: CT Angio Abdomen and Pelvis w/ IV Cont (10.29.19 @ 19:39) >  Subcapsular and perihepatic hematoma. No active extravasation.   Infiltrative hepatic metastatic disease.    No active gastrointestinal bleeding.    Increased ascites. Multifocal metastatic disease as demonstrated on 10/8.    Increased groundglass lung opacities, pulmonary edema or infection.   Lymphangitic carcinomatosis stable.    Worsened anasarca. Right thigh subcutaneous edema or cellulitis. Chronic   narrowing of the infrarenal IVC without acute thrombus.      < from: Echocardiogram (06.04.19 @ 10:22) >  Structurally normal tricuspid valve.  There is mild tricuspid regurgitation.  There is no echocardiographic evidence for pulmonary hypertension.  Pulmonic Valve  Structurally normal pulmonic valve.  No pulmonic regurgitation noted.  Arteries and Venous System  The aortic root measures 4.0 cm at sinuses (normal less than 4 cm for men,  less than 3.6 cm for women).  The aortic root measures 2.2 cm/m2at sinuses (normal less than 2.1 cm/m2   for  men, less than 2.2 cm/m2 for women).  Borderline aortic root dilatation.  Pericardium / Pleura  A small pericardial effusion noted.  There is no echocardiographic evidence for cardiac tamponade.    < from: 12 Lead ECG (11.04.19 @ 10:02) >  Diagnosis Line NORMAL SINUS RHYTHM  RIGHT BUNDLE BRANCH BLOCK  SEPTAL INFARCT , AGE UNDETERMINED  ABNORMAL ECG

## 2019-11-04 NOTE — ED PROVIDER NOTE - NS ED ROS FT
General: denies fever, chills, weight loss/weight gain.  HENT: denies nasal congestion, sore throat, rhinorrhea, ear pain  Eyes: denies visual changes, blurred vision, eye discharge, eye redness  Neck: denies neck pain, neck swelling  CV: denies chest pain, palpitations  Resp: denies difficulty breathing, cough  Abdominal: denies nausea, vomiting, diarrhea, abdominal pain, blood in stool, dark stool  MSK: denies muscle aches, bony pain, leg pain, leg swelling  Neuro: denies headaches, numbness, tingling, dizziness, lightheadedness.  Skin: denies rashes, cuts, bruises  Hematologic: denies unexplained bruises General: denies fever, chills  HENT: denies nasal congestion, sore throat, rhinorrhea  Eyes: denies visual changes, blurred vision  CV: denies chest pain  Resp: +cough denies difficulty breathing  Abdominal: denies nausea, vomiting, diarrhea, abdominal pain, blood in stool, dark stool  MSK: + leg swelling denies muscle aches, bony pain, leg pain  Neuro: denies headaches  Skin: denies rashes, cuts, bruises  Hematologic: denies unexplained bruises

## 2019-11-05 LAB
ANION GAP SERPL CALC-SCNC: 10 MMOL/L — SIGNIFICANT CHANGE UP (ref 5–17)
ANION GAP SERPL CALC-SCNC: 12 MMOL/L — SIGNIFICANT CHANGE UP (ref 5–17)
BUN SERPL-MCNC: 13 MG/DL — SIGNIFICANT CHANGE UP (ref 7–23)
BUN SERPL-MCNC: 13 MG/DL — SIGNIFICANT CHANGE UP (ref 7–23)
CALCIUM SERPL-MCNC: 7 MG/DL — LOW (ref 8.4–10.5)
CALCIUM SERPL-MCNC: 7.2 MG/DL — LOW (ref 8.4–10.5)
CHLORIDE SERPL-SCNC: 102 MMOL/L — SIGNIFICANT CHANGE UP (ref 96–108)
CHLORIDE SERPL-SCNC: 103 MMOL/L — SIGNIFICANT CHANGE UP (ref 96–108)
CO2 SERPL-SCNC: 20 MMOL/L — LOW (ref 22–31)
CO2 SERPL-SCNC: 23 MMOL/L — SIGNIFICANT CHANGE UP (ref 22–31)
CREAT SERPL-MCNC: 1.07 MG/DL — SIGNIFICANT CHANGE UP (ref 0.5–1.3)
CREAT SERPL-MCNC: 1.14 MG/DL — SIGNIFICANT CHANGE UP (ref 0.5–1.3)
GLUCOSE BLDC GLUCOMTR-MCNC: 100 MG/DL — HIGH (ref 70–99)
GLUCOSE BLDC GLUCOMTR-MCNC: 96 MG/DL — SIGNIFICANT CHANGE UP (ref 70–99)
GLUCOSE SERPL-MCNC: 112 MG/DL — HIGH (ref 70–99)
GLUCOSE SERPL-MCNC: 89 MG/DL — SIGNIFICANT CHANGE UP (ref 70–99)
HCT VFR BLD CALC: 26 % — LOW (ref 39–50)
HCT VFR BLD CALC: 26.3 % — LOW (ref 39–50)
HGB BLD-MCNC: 8.1 G/DL — LOW (ref 13–17)
HGB BLD-MCNC: 8.3 G/DL — LOW (ref 13–17)
LACTATE SERPL-SCNC: 2.5 MMOL/L — HIGH (ref 0.7–2)
MAGNESIUM SERPL-MCNC: 1.6 MG/DL — SIGNIFICANT CHANGE UP (ref 1.6–2.6)
MAGNESIUM SERPL-MCNC: 2.2 MG/DL — SIGNIFICANT CHANGE UP (ref 1.6–2.6)
MCHC RBC-ENTMCNC: 28.4 PG — SIGNIFICANT CHANGE UP (ref 27–34)
MCHC RBC-ENTMCNC: 29.7 PG — SIGNIFICANT CHANGE UP (ref 27–34)
MCHC RBC-ENTMCNC: 30.8 GM/DL — LOW (ref 32–36)
MCHC RBC-ENTMCNC: 31.9 GM/DL — LOW (ref 32–36)
MCV RBC AUTO: 92.3 FL — SIGNIFICANT CHANGE UP (ref 80–100)
MCV RBC AUTO: 93.2 FL — SIGNIFICANT CHANGE UP (ref 80–100)
NRBC # BLD: 0 /100 WBCS — SIGNIFICANT CHANGE UP (ref 0–0)
NRBC # BLD: 0 /100 WBCS — SIGNIFICANT CHANGE UP (ref 0–0)
PHOSPHATE SERPL-MCNC: 1.1 MG/DL — LOW (ref 2.5–4.5)
PHOSPHATE SERPL-MCNC: 1.2 MG/DL — LOW (ref 2.5–4.5)
PLATELET # BLD AUTO: 19 K/UL — CRITICAL LOW (ref 150–400)
PLATELET # BLD AUTO: 56 K/UL — LOW (ref 150–400)
POTASSIUM SERPL-MCNC: 3.9 MMOL/L — SIGNIFICANT CHANGE UP (ref 3.5–5.3)
POTASSIUM SERPL-MCNC: 4 MMOL/L — SIGNIFICANT CHANGE UP (ref 3.5–5.3)
POTASSIUM SERPL-SCNC: 3.9 MMOL/L — SIGNIFICANT CHANGE UP (ref 3.5–5.3)
POTASSIUM SERPL-SCNC: 4 MMOL/L — SIGNIFICANT CHANGE UP (ref 3.5–5.3)
RAPID RVP RESULT: SIGNIFICANT CHANGE UP
RBC # BLD: 2.79 M/UL — LOW (ref 4.2–5.8)
RBC # BLD: 2.85 M/UL — LOW (ref 4.2–5.8)
RBC # FLD: 18.9 % — HIGH (ref 10.3–14.5)
RBC # FLD: 19.2 % — HIGH (ref 10.3–14.5)
SODIUM SERPL-SCNC: 135 MMOL/L — SIGNIFICANT CHANGE UP (ref 135–145)
SODIUM SERPL-SCNC: 135 MMOL/L — SIGNIFICANT CHANGE UP (ref 135–145)
WBC # BLD: 3.17 K/UL — LOW (ref 3.8–10.5)
WBC # BLD: 3.6 K/UL — LOW (ref 3.8–10.5)
WBC # FLD AUTO: 3.17 K/UL — LOW (ref 3.8–10.5)
WBC # FLD AUTO: 3.6 K/UL — LOW (ref 3.8–10.5)

## 2019-11-05 PROCEDURE — 93970 EXTREMITY STUDY: CPT | Mod: 26

## 2019-11-05 PROCEDURE — 99232 SBSQ HOSP IP/OBS MODERATE 35: CPT

## 2019-11-05 PROCEDURE — 74176 CT ABD & PELVIS W/O CONTRAST: CPT | Mod: 26

## 2019-11-05 RX ORDER — ACETAMINOPHEN 500 MG
650 TABLET ORAL ONCE
Refills: 0 | Status: COMPLETED | OUTPATIENT
Start: 2019-11-05 | End: 2019-11-05

## 2019-11-05 RX ORDER — METOPROLOL TARTRATE 50 MG
25 TABLET ORAL THREE TIMES A DAY
Refills: 0 | Status: DISCONTINUED | OUTPATIENT
Start: 2019-11-05 | End: 2019-11-11

## 2019-11-05 RX ORDER — POTASSIUM PHOSPHATE, MONOBASIC POTASSIUM PHOSPHATE, DIBASIC 236; 224 MG/ML; MG/ML
30 INJECTION, SOLUTION INTRAVENOUS ONCE
Refills: 0 | Status: COMPLETED | OUTPATIENT
Start: 2019-11-05 | End: 2019-11-05

## 2019-11-05 RX ORDER — MAGNESIUM SULFATE 500 MG/ML
2 VIAL (ML) INJECTION ONCE
Refills: 0 | Status: COMPLETED | OUTPATIENT
Start: 2019-11-05 | End: 2019-11-05

## 2019-11-05 RX ADMIN — PANTOPRAZOLE SODIUM 40 MILLIGRAM(S): 20 TABLET, DELAYED RELEASE ORAL at 06:05

## 2019-11-05 RX ADMIN — Medication 650 MILLIGRAM(S): at 08:47

## 2019-11-05 RX ADMIN — Medication 650 MILLIGRAM(S): at 09:20

## 2019-11-05 RX ADMIN — Medication 25 MILLIGRAM(S): at 22:26

## 2019-11-05 RX ADMIN — Medication 200 MILLIGRAM(S): at 18:16

## 2019-11-05 RX ADMIN — BUDESONIDE AND FORMOTEROL FUMARATE DIHYDRATE 2 PUFF(S): 160; 4.5 AEROSOL RESPIRATORY (INHALATION) at 18:16

## 2019-11-05 RX ADMIN — BUDESONIDE AND FORMOTEROL FUMARATE DIHYDRATE 2 PUFF(S): 160; 4.5 AEROSOL RESPIRATORY (INHALATION) at 06:05

## 2019-11-05 RX ADMIN — Medication 200 MILLIGRAM(S): at 06:26

## 2019-11-05 RX ADMIN — Medication 100 MILLIGRAM(S): at 04:02

## 2019-11-05 RX ADMIN — Medication 25 MILLIGRAM(S): at 04:02

## 2019-11-05 RX ADMIN — Medication 25 MILLIGRAM(S): at 13:32

## 2019-11-05 RX ADMIN — POTASSIUM PHOSPHATE, MONOBASIC POTASSIUM PHOSPHATE, DIBASIC 83.33 MILLIMOLE(S): 236; 224 INJECTION, SOLUTION INTRAVENOUS at 06:50

## 2019-11-05 RX ADMIN — AMLODIPINE BESYLATE 10 MILLIGRAM(S): 2.5 TABLET ORAL at 06:05

## 2019-11-05 RX ADMIN — Medication 50 GRAM(S): at 08:49

## 2019-11-05 NOTE — CONSULT NOTE ADULT - SUBJECTIVE AND OBJECTIVE BOX
HPI:  78 y/o M PMH prostate cancer and adenocarcinoma w/ mets p/w surgical site leakage s/p hepatic artery embolism (By Dr. Christiano Jimenez of [facility]; [id number], [id number]) using femoral site as an access site. Patient also complaining of abdominal swelling. Patient received an abdominal paracentesis x 5 days ago, became hypotensive, sent to ED for hepatic artery embolism. Patient now returning to ED. Does not report SOB, CP, f/c, n/v, headaches, blurry vision. (04 Nov 2019 17:33)    PAST MEDICAL & SURGICAL HISTORY:  Anemia  Cancer, metastatic to bone: on chemo  Gastric ulcer: was hospitalized for GI bleed 7/22/19-7/25/19, taking Protonix  HTN (hypertension)  Liver cancer: stage 4  Prostate cancer: 2004 radiotherapy seeds, now metastatic to liver and bone  Ischemic stroke: 01/2019, no residuals  HLD (hyperlipidemia)  DM (diabetes mellitus): type 2, HgA1c 5.8% ( 6/5/19)  History of eye surgery: right  History of right knee surgery: R Tibia Intralesional curettage/bone graft  7/11/2019  H/O hernia repair: right inguinal hernia with mesh  9/2013    Meds:  bicalutamide 50 milliGRAM(s) Oral daily  budesonide 160 MICROgram(s)/formoterol 4.5 MICROgram(s) Inhaler 2 Puff(s) Inhalation two times a day  carBAMazepine XR Tablet 200 milliGRAM(s) Oral two times a day  lidocaine 2% Injectable 20 milliLiter(s) Local Injection Once  metoprolol tartrate 25 milliGRAM(s) Oral three times a day  pantoprazole    Tablet 40 milliGRAM(s) Oral before breakfast    Labs:  CBC Full  -  ( 05 Nov 2019 05:02 )  WBC Count : 3.17 K/uL  Hemoglobin : 8.1 g/dL  Hematocrit : 26.3 %  Platelet Count - Automated : 19 K/uL  Mean Cell Volume : 92.3 fl  Mean Cell Hemoglobin : 28.4 pg  Mean Cell Hemoglobin Concentration : 30.8 gm/dL  Auto Neutrophil # : x  Auto Lymphocyte # : x  Auto Monocyte # : x  Auto Eosinophil # : x  Auto Basophil # : x  Auto Neutrophil % : x  Auto Lymphocyte % : x  Auto Monocyte % : x  Auto Eosinophil % : x  Auto Basophil % : x    11-05    135  |  103  |  13  ----------------------------<  89  3.9   |  20<L>  |  1.07    Ca    7.0<L>      05 Nov 2019 05:02  Phos  1.2     11-05  Mg     1.6     11-05    TPro  5.7<L>  /  Alb  2.3<L>  /  TBili  0.5  /  DBili  x   /  AST  50<H>  /  ALT  30  /  AlkPhos  265<H>  11-04      Radiology:       < from: CT Angio Abdomen and Pelvis w/ IV Cont (10.29.19 @ 19:39) >  Subcapsular and perihepatic hematoma. No active extravasation.   Infiltrative hepatic metastatic disease.    No active gastrointestinal bleeding.    Increased ascites. Multifocal metastatic disease as demonstrated on 10/8.    Increased groundglass lung opacities, pulmonary edema or infection.   Lymphangitic carcinomatosis stable.    Worsened anasarca. Right thigh subcutaneous edema or cellulitis. Chronic   narrowing of the infrarenal IVC without acute thrombus.    Other incidental comments as above.    < end of copied text >        ROS:  No pain, no fever  No lumps in neck or pain  No Sob or chest pain  No palpitations   No abdominal pain. No change in bowel habit. No blood in stools  No weakness in extremities  No leg swelling      Vital Signs Last 24 Hrs  T(C): 36.9 (05 Nov 2019 07:50), Max: 37.1 (04 Nov 2019 23:03)  T(F): 98.5 (05 Nov 2019 07:50), Max: 98.7 (04 Nov 2019 23:03)  HR: 94 (05 Nov 2019 07:50) (84 - 102)  BP: 132/87 (05 Nov 2019 07:50) (123/81 - 149/86)  BP(mean): --  RR: 18 (05 Nov 2019 07:50) (18 - 20)  SpO2: 95% (05 Nov 2019 07:50) (93% - 97%)    Physical Exam:  Patient comfortable  AXOX3  Neck supple, no LN's  Chest: bilateral breath sounds, no wheeze or rales  CVS: regular heart rate without murmur  Abdomen: soft, BS+, no massess or organomegaly  CNS: no gross deficit  Ext: no edema HPI:  78 y/o M PMH prostate cancer and hepatocellular carcinoma, p/w surgical site leakage. S/p hepatic artery embolism (By Dr. Christiano Jimenez of [facility]; [id number], [id number]) using femoral site as an access site. Patient also complaining of abdominal swelling. Patient received an abdominal paracentesis x 5 days ago, became hypotensive, sent to ED for hepatic artery embolism.   He was also thrombocytopenic  PAST MEDICAL & SURGICAL HISTORY:  Anemia  Cancer, metastatic to bone: on chemo  Gastric ulcer: was hospitalized for GI bleed 7/22/19-7/25/19, taking Protonix  HTN (hypertension)  Liver cancer: stage 4  Prostate cancer: 2004 radiotherapy seeds, now metastatic to liver and bone  Ischemic stroke: 01/2019, no residuals  HLD (hyperlipidemia)  DM (diabetes mellitus): type 2, HgA1c 5.8% ( 6/5/19)  History of eye surgery: right  History of right knee surgery: R Tibia Intralesional curettage/bone graft  7/11/2019  H/O hernia repair: right inguinal hernia with mesh  9/2013    Meds:  bicalutamide 50 milliGRAM(s) Oral daily  budesonide 160 MICROgram(s)/formoterol 4.5 MICROgram(s) Inhaler 2 Puff(s) Inhalation two times a day  carBAMazepine XR Tablet 200 milliGRAM(s) Oral two times a day  lidocaine 2% Injectable 20 milliLiter(s) Local Injection Once  metoprolol tartrate 25 milliGRAM(s) Oral three times a day  pantoprazole    Tablet 40 milliGRAM(s) Oral before breakfast    Labs:  CBC Full  -  ( 05 Nov 2019 05:02 )  WBC Count : 3.17 K/uL  Hemoglobin : 8.1 g/dL  Hematocrit : 26.3 %  Platelet Count - Automated : 19 K/uL  Mean Cell Volume : 92.3 fl  Mean Cell Hemoglobin : 28.4 pg  Mean Cell Hemoglobin Concentration : 30.8 gm/dL  Auto Neutrophil # : x  Auto Lymphocyte # : x  Auto Monocyte # : x  Auto Eosinophil # : x  Auto Basophil # : x  Auto Neutrophil % : x  Auto Lymphocyte % : x  Auto Monocyte % : x  Auto Eosinophil % : x  Auto Basophil % : x  Auto Neutrophil #: 3.38 K/uL (11.04.19 @ 10:45)      11-05    135  |  103  |  13  ----------------------------<  89  3.9   |  20<L>  |  1.07    Ca    7.0<L>      05 Nov 2019 05:02  Phos  1.2     11-05  Mg     1.6     11-05    TPro  5.7<L>  /  Alb  2.3<L>  /  TBili  0.5  /  DBili  x   /  AST  50<H>  /  ALT  30  /  AlkPhos  265<H>  11-04      Radiology:       < from: CT Angio Abdomen and Pelvis w/ IV Cont (10.29.19 @ 19:39) >  Subcapsular and perihepatic hematoma. No active extravasation.   Infiltrative hepatic metastatic disease.    No active gastrointestinal bleeding.    Increased ascites. Multifocal metastatic disease as demonstrated on 10/8.    Increased groundglass lung opacities, pulmonary edema or infection.   Lymphangitic carcinomatosis stable.    Worsened anasarca. Right thigh subcutaneous edema or cellulitis. Chronic   narrowing of the infrarenal IVC without acute thrombus.    Other incidental comments as above.    < end of copied text >    < from: CT Chest w/ IV Cont (10.29.19 @ 19:38) >   Mildly increased pericardial effusion, no moderate size.    < end of copied text >      ROS:  General weakness  No lumps in neck or pain  No Sob or chest pain  No palpitations   No abdominal pain. leakage from right groin  right leg swelling chrinic      Vital Signs Last 24 Hrs  T(C): 36.9 (05 Nov 2019 07:50), Max: 37.1 (04 Nov 2019 23:03)  T(F): 98.5 (05 Nov 2019 07:50), Max: 98.7 (04 Nov 2019 23:03)  HR: 94 (05 Nov 2019 07:50) (84 - 102)  BP: 132/87 (05 Nov 2019 07:50) (123/81 - 149/86)  BP(mean): --  RR: 18 (05 Nov 2019 07:50) (18 - 20)  SpO2: 95% (05 Nov 2019 07:50) (93% - 97%)    Physical Exam:  Patient comfortable  Emaciated, son at bedside  Neck supple, no LN's  Chest: bilateral breath sounds, decreased at basis  CVS: S1S2  Abdomen: soft distension  CNS: Moves all extremities, dressing right groin  Ext: right leg swelling

## 2019-11-05 NOTE — CONSULT NOTE ADULT - ASSESSMENT
79M w/ hx prostate cancer (dx 2004), metastatic HCC, CVA in 2019 (was on Tlovenox but stopped June 2019), gastric ulcer s/p EGD/cauterization in July 2019, RIH repair several years ago, now presenting to the ED for drainage from right groin access site. Likely weeping from generalize anasarca 79M w/ hx prostate cancer (dx 2004), metastatic HCC, CVA in 2019 (was on Tlovenox but stopped June 2019), gastric ulcer s/p EGD/cauterization in July 2019, RIH repair several years ago, admitted for drainage from right groin access site. Likely weeping from generalize anasarca.  Surgery saw patient, cardiology following.   Patient was dx with HCC in March 2019, had radiosurgery and is now on chemo every 2 weeks, son does not know the name.   7 days back, he was sent to hospital after chemo, has needed platelets at least 2-3 times. Had embolization for bleeding thru groin and also needed paracentesis.  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 Not neutropenic at this time  Plan is to keep platelets around 10-15 k/ul if no bleeding and around 50k/ul if bleeding or invasive procedure. Has been getting platelets here  No active management of HCC in hospital.  Continue cardiology and surgery follow up.  No active management planned or leakage by surgery at this time

## 2019-11-05 NOTE — PROGRESS NOTE ADULT - SUBJECTIVE AND OBJECTIVE BOX
General Surgery Progress Note     Subjective/24hour Events:   Patient seen and examined.   No acute events overnight.   Pain controlled.     Vital Signs:  Vital Signs Last 24 Hrs  T(C): 37.1 (04 Nov 2019 23:03), Max: 37.1 (04 Nov 2019 23:03)  T(F): 98.7 (04 Nov 2019 23:03), Max: 98.7 (04 Nov 2019 23:03)  HR: 92 (04 Nov 2019 23:03) (92 - 102)  BP: 123/81 (04 Nov 2019 23:03) (123/81 - 136/89)  BP(mean): --  RR: 18 (04 Nov 2019 23:03) (18 - 20)  SpO2: 95% (04 Nov 2019 23:03) (93% - 97%)    CAPILLARY BLOOD GLUCOSE      POCT Blood Glucose.: 109 mg/dL (04 Nov 2019 21:20)  POCT Blood Glucose.: 101 mg/dL (04 Nov 2019 19:15)      I&O's Detail      MEDICATIONS  (STANDING):  amLODIPine   Tablet 10 milliGRAM(s) Oral daily  bicalutamide 50 milliGRAM(s) Oral daily  budesonide 160 MICROgram(s)/formoterol 4.5 MICROgram(s) Inhaler 2 Puff(s) Inhalation two times a day  carBAMazepine XR Tablet 200 milliGRAM(s) Oral two times a day  lidocaine 2% Injectable 20 milliLiter(s) Local Injection Once  metoprolol succinate ER 25 milliGRAM(s) Oral daily  pantoprazole    Tablet 40 milliGRAM(s) Oral before breakfast    MEDICATIONS  (PRN):      Physical Exam:  Gen: NAD.  Lungs: Non labored breathing.   Abdomen: soft, nontender, nondistended, paracentesis access site covered with occlusive dressing, serosang staining of the gauze of this dressing  Groin: R groin with serous drainage from the right groin access site, no bleeding whatsoever, the area is nontender, no swelling, no pulsatile masses palpated, no ecchymosis. Left inguinal hernia easily reducible, nontender, no overlying skin changes  The right scrotum is slightly more edematous that the left side    Labs:    11-04    133<L>  |  103  |  12  ----------------------------<  121<H>  3.9   |  16<L>  |  1.12    Ca    7.1<L>      04 Nov 2019 10:45    TPro  5.7<L>  /  Alb  2.3<L>  /  TBili  0.5  /  DBili  x   /  AST  50<H>  /  ALT  30  /  AlkPhos  265<H>  11-04    LIVER FUNCTIONS - ( 04 Nov 2019 10:45 )  Alb: 2.3 g/dL / Pro: 5.7 g/dL / ALK PHOS: 265 U/L / ALT: 30 U/L / AST: 50 U/L / GGT: x                                 8.4    3.67  )-----------( 12       ( 04 Nov 2019 10:45 )             27.0     PT/INR - ( 04 Nov 2019 10:45 )   PT: 11.6 sec;   INR: 1.01 ratio         PTT - ( 04 Nov 2019 10:45 )  PTT:22.3 sec    Imaging: General Surgery Progress Note     Subjective/24hour Events:   Patient seen and examined.   No acute events overnight.   Pain controlled. Continues to have drainage from the right groin puncture site.  Severe thrombocytopenia overnight, platelets transfused this AM  Dressing saturated and changed x1 overnight.     Vital Signs:  Vital Signs Last 24 Hrs  T(C): 37.1 (04 Nov 2019 23:03), Max: 37.1 (04 Nov 2019 23:03)  T(F): 98.7 (04 Nov 2019 23:03), Max: 98.7 (04 Nov 2019 23:03)  HR: 92 (04 Nov 2019 23:03) (92 - 102)  BP: 123/81 (04 Nov 2019 23:03) (123/81 - 136/89)  BP(mean): --  RR: 18 (04 Nov 2019 23:03) (18 - 20)  SpO2: 95% (04 Nov 2019 23:03) (93% - 97%)    CAPILLARY BLOOD GLUCOSE      POCT Blood Glucose.: 109 mg/dL (04 Nov 2019 21:20)  POCT Blood Glucose.: 101 mg/dL (04 Nov 2019 19:15)      MEDICATIONS  (STANDING):  amLODIPine   Tablet 10 milliGRAM(s) Oral daily  bicalutamide 50 milliGRAM(s) Oral daily  budesonide 160 MICROgram(s)/formoterol 4.5 MICROgram(s) Inhaler 2 Puff(s) Inhalation two times a day  carBAMazepine XR Tablet 200 milliGRAM(s) Oral two times a day  lidocaine 2% Injectable 20 milliLiter(s) Local Injection Once  metoprolol succinate ER 25 milliGRAM(s) Oral daily  pantoprazole    Tablet 40 milliGRAM(s) Oral before breakfast    MEDICATIONS  (PRN):      Physical Exam:  Gen: NAD.  Lungs: Non labored breathing.   Abdomen: soft, nontender, nondistended, paracentesis access site covered with occlusive dressing, serosanguineous staining of the gauze of this dressing.  Groin: R groin with serous drainage from the right groin access site, no bleeding whatsoever, the area is nontender, no swelling, no pulsatile masses palpated, no ecchymosis. Left inguinal hernia easily reducible, nontender, no overlying skin changes  The right scrotum is slightly more edematous that the left side.     Labs:    11-04    133<L>  |  103  |  12  ----------------------------<  121<H>  3.9   |  16<L>  |  1.12    Ca    7.1<L>      04 Nov 2019 10:45    TPro  5.7<L>  /  Alb  2.3<L>  /  TBili  0.5  /  DBili  x   /  AST  50<H>  /  ALT  30  /  AlkPhos  265<H>  11-04    LIVER FUNCTIONS - ( 04 Nov 2019 10:45 )  Alb: 2.3 g/dL / Pro: 5.7 g/dL / ALK PHOS: 265 U/L / ALT: 30 U/L / AST: 50 U/L / GGT: x                                 8.4    3.67  )-----------( 12       ( 04 Nov 2019 10:45 )             27.0     PT/INR - ( 04 Nov 2019 10:45 )   PT: 11.6 sec;   INR: 1.01 ratio    PTT - ( 04 Nov 2019 10:45 )  PTT:22.3 sec

## 2019-11-05 NOTE — CHART NOTE - NSCHARTNOTEFT_GEN_A_CORE
MEDICINE PA * late note entry*    EVENT SUMMARY   Notified by RN for PATs seen on tele. Pt was coughing at the time of the events. Pt is alert otherwise asymptomatic; denies CP, SOB, dyspnea, numbness, tingling, weakness. Pt admitted for R. groin site drainage was found to have large pericardial effusion with no evidence of tamponade.     MEDICATIONS  (STANDING):  amLODIPine   Tablet 10 milliGRAM(s) Oral daily  bicalutamide 50 milliGRAM(s) Oral daily  budesonide 160 MICROgram(s)/formoterol 4.5 MICROgram(s) Inhaler 2 Puff(s) Inhalation two times a day  carBAMazepine XR Tablet 200 milliGRAM(s) Oral two times a day  lidocaine 2% Injectable 20 milliLiter(s) Local Injection Once  magnesium sulfate  IVPB 2 Gram(s) IV Intermittent once  metoprolol succinate ER 25 milliGRAM(s) Oral daily  pantoprazole    Tablet 40 milliGRAM(s) Oral before breakfast    MEDICATIONS  (PRN):    VSS    Comprehensive Metabolic Panel (11.04.19 @ 10:45)    Sodium, Serum: 133 mmol/L    Potassium, Serum: 3.9 mmol/L    Chloride, Serum: 103 mmol/L    Carbon Dioxide, Serum: 16 mmol/L    Anion Gap, Serum: 14 mmol/L    Blood Urea Nitrogen, Serum: 12 mg/dL    Creatinine, Serum: 1.12 mg/dL    Glucose, Serum: 121 mg/dL    Calcium, Total Serum: 7.1 mg/dL    Protein Total, Serum: 5.7 g/dL    Albumin, Serum: 2.3 g/dL    Bilirubin Total, Serum: 0.5 mg/dL    Alkaline Phosphatase, Serum: 265 U/L    Aspartate Aminotransferase (AST/SGOT): 50: Mild hemolysis results may be falsely elevated U/L    Alanine Aminotransferase (ALT/SGPT): 30 U/L    A&P  78 y/o M PMH prostate cancer and adenocarcinoma w/ mets p/w surgical site leakage s/p hepatic artery embolism (By Dr. Christiano Jimenez of [facility]; [id number], [id number]) using femoral site as an access site. Patient also complaining of abdominal swelling. Patient received an abdominal paracentesis x 5 days ago, became hypotensive, sent to ED for hepatic artery embolism. Patient now returning to ED. Does not report SOB, CP, f/c, n/v, headaches, blurry vision. (04 Nov 2019 17:33)    PATs  - c/w tele monitoring  - STAT BMP, Mg and phos: Potassium, Serum: 3.9 mmol/L (11.05.19 @ 05:02)  Magnesium, Serum (11.05.19 @ 05:02)    Magnesium, Serum: 1.6 mg/dL  Phosphorus Level, Serum (11.05.19 @ 05:02)    Phosphorus Level, Serum: 1.2 mg/dL  MgSO4 2 g x 1 IV and Kphos 30 mmol x 1 given. Will repeat labs post repletion  - Keep K > 4 and Mg> 2  - AM dose of metoprolol given  - F/u with cards in AM  Will continue to monitor    Renee ZAMORA  #[id number]

## 2019-11-05 NOTE — PROGRESS NOTE ADULT - SUBJECTIVE AND OBJECTIVE BOX
CARDIOLOGY     PROGRESS  NOTE   ________________________________________________    CHIEF COMPLAINT:Patient is a 79y old  Male who presents with a chief complaint of oozing from right groin procedure site (05 Nov 2019 07:38)  doing better.  	  REVIEW OF SYSTEMS:  CONSTITUTIONAL: No fever, weight loss, or fatigue  EYES: No eye pain, visual disturbances, or discharge  ENT:  No difficulty hearing, tinnitus, vertigo; No sinus or throat pain  NECK: No pain or stiffness  RESPIRATORY: No cough, wheezing, chills or hemoptysis; No Shortness of Breath  CARDIOVASCULAR: No chest pain, palpitations, passing out, dizziness, or leg swelling  GASTROINTESTINAL:+ abdominal or epigastric pain. No nausea, vomiting, or hematemesis; No diarrhea or constipation. No melena or hematochezia.  GENITOURINARY: No dysuria, frequency, hematuria, or incontinence  NEUROLOGICAL: No headaches, memory loss, loss of strength, numbness, or tremors  SKIN: No itching, burning, rashes, or lesions   LYMPH Nodes: No enlarged glands  ENDOCRINE: No heat or cold intolerance; No hair loss  MUSCULOSKELETAL: No joint pain or swelling; No muscle, back, or extremity pain  PSYCHIATRIC: No depression, anxiety, mood swings, or difficulty sleeping  HEME/LYMPH: No easy bruising, or bleeding gums  ALLERGY AND IMMUNOLOGIC: No hives or eczema	    [ ] All others negative	  [ ] Unable to obtain    PHYSICAL EXAM:  T(C): 36.9 (11-05-19 @ 07:50), Max: 37.1 (11-04-19 @ 23:03)  HR: 94 (11-05-19 @ 07:50) (84 - 102)  BP: 132/87 (11-05-19 @ 07:50) (123/81 - 149/86)  RR: 18 (11-05-19 @ 07:50) (18 - 20)  SpO2: 95% (11-05-19 @ 07:50) (93% - 97%)  Wt(kg): --  I&O's Summary      Appearance: Normal	  HEENT:   Normal oral mucosa, PERRL, EOMI	  Lymphatic: No lymphadenopathy  Cardiovascular: Normal S1 S2, No JVD,  murmurs, No edema  Respiratory: Lungs clear to auscultation	  Psychiatry: A & O x 3, Mood & affect appropriate  Gastrointestinal:  Soft, Non-tender, + BS, +distention  Skin: No rashes, No ecchymoses, No cyanosis	  Neurologic: Non-focal  Extremities: Normal range of motion, No clubbing, cyanosis or edema  Vascular: Peripheral pulses palpable 2+ bilaterally    MEDICATIONS  (STANDING):  acetaminophen   Tablet .. 650 milliGRAM(s) Oral once  bicalutamide 50 milliGRAM(s) Oral daily  budesonide 160 MICROgram(s)/formoterol 4.5 MICROgram(s) Inhaler 2 Puff(s) Inhalation two times a day  carBAMazepine XR Tablet 200 milliGRAM(s) Oral two times a day  lidocaine 2% Injectable 20 milliLiter(s) Local Injection Once  magnesium sulfate  IVPB 2 Gram(s) IV Intermittent once  metoprolol tartrate 25 milliGRAM(s) Oral three times a day  pantoprazole    Tablet 40 milliGRAM(s) Oral before breakfast      TELEMETRY: 	    ECG:  	  RADIOLOGY:  OTHER: 	  	  LABS:	 	    CARDIAC MARKERS:                                8.1    3.17  )-----------( 19       ( 05 Nov 2019 05:02 )             26.3     11-05    135  |  103  |  13  ----------------------------<  89  3.9   |  20<L>  |  1.07    Ca    7.0<L>      05 Nov 2019 05:02  Phos  1.2     11-05  Mg     1.6     11-05    TPro  5.7<L>  /  Alb  2.3<L>  /  TBili  0.5  /  DBili  x   /  AST  50<H>  /  ALT  30  /  AlkPhos  265<H>  11-04    proBNP:   Lipid Profile:   HgA1c:   TSH:   PT/INR - ( 04 Nov 2019 10:45 )   PT: 11.6 sec;   INR: 1.01 ratio         PTT - ( 04 Nov 2019 10:45 )  PTT:22.3 sec      Assessment and plan  ---------------------------  80 y/o M PMH prostate cancer and adenocarcinoma w/ mets p/w surgical site leakage s/p hepatic artery embolism (By Dr. Christiano Jimenez of [facility]; [id number], [id number]) using femoral site as an access site. Patient also complaining of abdominal swelling. Patient received an abdominal paracentesis x 5 days ago, became hypotensive, sent to ED for hepatic artery embolism. Patient now returning to ED. Does not report, CP, f/c, n/v, headaches, blurry vision. ct scan   Subcapsular and perihepatic hematoma. No active extravasation.   Infiltrative hepatic metastatic disease.    No active gastrointestinal bleeding.    Increased ascites. Multifocal metastatic disease as demonstrated on 10/8.    Increased ground-glass lung opacities, pulmonary edema or infection.   Lymphangitic carcinomatosis stable.    Worsened anasarca. Right thigh subcutaneous edema or cellulitis. Chronic     pt with hx of pericardial effusion and sig abnormal ct scan  may repeat ct abdomen and pelvis to evaluate hematoma in view of low platelets  pulmonary edema in ct scan most probably sec to carcinomatosis    RLE swelling ? dvt pt has ivc filter    pt prognosis is poor sec to sig medical disease ?palliative consult if family agrees  otherwise repeat echo    events noted run of {AT  continue beta blocker, avoid hypotension  heme onc to see pt  continue cardiac meds

## 2019-11-05 NOTE — PROGRESS NOTE ADULT - ASSESSMENT
80 y/o M      h/o      prostate cancer and  HCC,  w/ mets to  liver, bone.  lympangitic  carcinomatosis       p/w surgical site leakage s/p hepatic artery embolism (By Dr. Christiano Jimenez  [id number], [id number])     via  right    femoral site as an access site.        S/P   abdominal paracentesis x 5 days ago,            Patient now returning to ED., for  drainage  from that  groin site  Surgery eval noted    groin u/s, pending    Casodex 50 mg/day for prostate cancer.   Low platelets  noted./  s/p  platelets  on arrival   echo in er. with pericardial  effusion   awaiting  official  echo.  pt  with    extensive  metastatic   disease/  pancytopenia  from  malignancy     DM 2,  folow fs/  HTN,  on toprol       < from: CT Angio Abdomen and Pelvis w/ IV Cont (10.29.19 @ 19:39) >  IMPRESION:   Subcapsular and perihepatic hematoma. No active extravasation.   Infiltrative hepatic metastatic disease.  No active gastrointestinal bleeding.  Increased ascites. Multifocal metastatic disease as demonstrated on 10/8.  Increased groundglass lung opacities, pulmonary edema or infection.   Lymphangitic carcinomatosis stable.  Worsened anasarca. Right thigh subcutaneous edema or cellulitis. Chronic   narrowing of the infrarenal IVC without acute thrombus.  Other incidental comments as above.  < end of copied text >         < from: MR Knee No Cont, Right (07.07.19 @ 14:45) >  IMPRESSION:   Large confluent infiltrative lesion involving the entirety of both the   medial and lateral tibial plateaus of the tibial metaphysis most   concerning for metastatic disease. There is evidence of a nondisplaced   pathologic fracture along the posterior intercondylar region of the   proximal tibia extending into the posterior central margin of the medial   and lateral tibial plateaus. There is extensive surrounding periosseous,   subcutaneous, and intramuscular edema which is likely reactive.   Additional smaller rounded metastatic lesions are seen within the lateral   femoral condyle and the proximal tibial metaphysis. Findings discussed   with Dr. Bolanos.  Large joint effusion.  < end of copied text > 80 y/o M      h/o      prostate cancer and  HCC,  w/ mets to  liver, bone.  lympangitic  carcinomatosis  IVC   filter , seen on ct       p/w surgical site leakage s/p hepatic artery embolism (By Dr. Christiano Jimenez  [id number], [id number])     via  right    femoral site as an access site.        S/P   abdominal paracentesis x 5 days ago,            Patient now returning to ED., for  drainage  from that  groin site  Surgery eval noted    groin u/s, pending    Casodex 50 mg/day for prostate cancer.   Low platelets  noted./  s/p  platelets  on arrival   echo in er. with pericardial  effusion   awaiting  official  echo.  pt  with    extensive  metastatic   disease/  pancytopenia  from  malignancy     DM 2,  follow  fs/  HTN,  on   lopressor/  stopped  norvasc  rpt  ct  abdomen       < from: CT Angio Abdomen and Pelvis w/ IV Cont (10.29.19 @ 19:39) >  IMPRESION:   Subcapsular and perihepatic hematoma. No active extravasation.   Infiltrative hepatic metastatic disease.  No active gastrointestinal bleeding.  Increased ascites. Multifocal metastatic disease as demonstrated on 10/8.  Increased groundglass lung opacities, pulmonary edema or infection.   Lymphangitic carcinomatosis stable.  Worsened anasarca. Right thigh subcutaneous edema or cellulitis. Chronic   narrowing of the infrarenal IVC without acute thrombus.  Other incidental comments as above.  < end of copied text >         < from: MR Knee No Cont, Right (07.07.19 @ 14:45) >  IMPRESSION:   Large confluent infiltrative lesion involving the entirety of both the   medial and lateral tibial plateaus of the tibial metaphysis most   concerning for metastatic disease. There is evidence of a nondisplaced   pathologic fracture along the posterior intercondylar region of the   proximal tibia extending into the posterior central margin of the medial   and lateral tibial plateaus. There is extensive surrounding periosseous,   subcutaneous, and intramuscular edema which is likely reactive.   Additional smaller rounded metastatic lesions are seen within the lateral   femoral condyle and the proximal tibial metaphysis. Findings discussed   with Dr. Bloanos.  Large joint effusion.  < end of copied text >

## 2019-11-05 NOTE — PROGRESS NOTE ADULT - ASSESSMENT
79M w/ hx prostate cancer (dx 2004), metastatic HCC, CVA in 2019 (was on Tlovenox but stopped June 2019), gastric ulcer s/p EGD/cauterization in July 2019, RIH repair several years ago, now presenting to the ED for drainage from right groin access site. Likely weeping from generalize anasarca    - No surgical intervention indicated  - Could consider ultrasound of the scrotum and groin  - If right groin continues to drain, could consider placing and ostomy appliance for patient comfort 79M w/ hx prostate cancer (dx 2004), metastatic HCC, CVA in 2019 (was on Tlovenox but stopped June 2019), gastric ulcer s/p EGD/cauterization in July 2019, RIH repair several years ago, now presenting to the ED for drainage from right groin access site. Likely weeping from generalize anasarca    - No surgical intervention indicated  - Could consider ultrasound of the scrotum and groin  - If right groin continues to drain, could consider placing and ostomy appliance for patient comfort    Please contact Surgery - Green Team (#[id number]) with any questions or concerns.

## 2019-11-05 NOTE — PROGRESS NOTE ADULT - SUBJECTIVE AND OBJECTIVE BOX
REVIEW OF SYSTEMS:  GEN: no fever,    no chills  RESP: no SOB,   no cough  CVS: no chest pain,   no palpitations  GI: no abdominal pain,   no nausea,   no vomiting,   no constipation,   no diarrhea  : no dysuria,   no frequency  NEURO: no headache,   no dizziness  PSYCH: no depression,   not anxious  Derm : no rash    MEDICATIONS  (STANDING):  amLODIPine   Tablet 10 milliGRAM(s) Oral daily  bicalutamide 50 milliGRAM(s) Oral daily  budesonide 160 MICROgram(s)/formoterol 4.5 MICROgram(s) Inhaler 2 Puff(s) Inhalation two times a day  carBAMazepine XR Tablet 200 milliGRAM(s) Oral two times a day  lidocaine 2% Injectable 20 milliLiter(s) Local Injection Once  magnesium sulfate  IVPB 2 Gram(s) IV Intermittent once  metoprolol succinate ER 25 milliGRAM(s) Oral daily  pantoprazole    Tablet 40 milliGRAM(s) Oral before breakfast    MEDICATIONS  (PRN):      Vital Signs Last 24 Hrs  T(C): 36.7 (2019 07:02), Max: 37.1 (2019 23:03)  T(F): 98 (2019 07:02), Max: 98.7 (2019 23:03)  HR: 87 (2019 07:02) (84 - 102)  BP: 129/78 (2019 07:02) (123/81 - 149/86)  BP(mean): --  RR: 18 (2019 07:02) (18 - 20)  SpO2: 94% (2019 07:02) (93% - 97%)  CAPILLARY BLOOD GLUCOSE      POCT Blood Glucose.: 96 mg/dL (2019 07:24)  POCT Blood Glucose.: 109 mg/dL (2019 21:20)  POCT Blood Glucose.: 101 mg/dL (2019 19:15)    I&O's Summary      PHYSICAL EXAM:  HEAD:  Atraumatic, Normocephalic  NECK: Supple, No   JVD  CHEST/LUNG:   no     rales,     no,    rhonchi  HEART: Regular rate and rhythm;         murmur  ABDOMEN: Soft, Nontender, ;   EXTREMITIES:        edema  NEUROLOGY:  alert    LABS:                        8.1    3.17  )-----------( 19       ( 2019 05:02 )             26.3     11-05    135  |  103  |  13  ----------------------------<  89  3.9   |  20<L>  |  1.07    Ca    7.0<L>      2019 05:02  Phos  1.2       Mg     1.6         TPro  5.7<L>  /  Alb  2.3<L>  /  TBili  0.5  /  DBili  x   /  AST  50<H>  /  ALT  30  /  AlkPhos  265<H>      PT/INR - ( 2019 10:45 )   PT: 11.6 sec;   INR: 1.01 ratio         PTT - ( 2019 10:45 )  PTT:22.3 sec      Urinalysis Basic - ( 2019 19:53 )    Color: Yellow / Appearance: Clear / S.024 / pH: x  Gluc: x / Ketone: Trace  / Bili: Negative / Urobili: Negative   Blood: x / Protein: 100 / Nitrite: Negative   Leuk Esterase: Small / RBC: 3 /hpf / WBC 20 /HPF   Sq Epi: x / Non Sq Epi: 5 /hpf / Bacteria: Negative           @ 11:11  3.7  30              Consultant(s) Notes Reviewed:      Care Discussed with Consultants/Other Providers: REVIEW OF SYSTEMS:  GEN: no fever,    no chills  RESP: no SOB,   no cough  CVS: no chest pain,   no palpitations  GI: no abdominal pain,   no nausea,   no vomiting,   no constipation,   no diarrhea  : no dysuria,   no frequency  NEURO: no headache,   no dizziness  PSYCH: no depression,   not anxious  Derm : no rash    MEDICATIONS  (STANDING):  amLODIPine   Tablet 10 milliGRAM(s) Oral daily  bicalutamide 50 milliGRAM(s) Oral daily  budesonide 160 MICROgram(s)/formoterol 4.5 MICROgram(s) Inhaler 2 Puff(s) Inhalation two times a day  carBAMazepine XR Tablet 200 milliGRAM(s) Oral two times a day  lidocaine 2% Injectable 20 milliLiter(s) Local Injection Once  magnesium sulfate  IVPB 2 Gram(s) IV Intermittent once  metoprolol succinate ER 25 milliGRAM(s) Oral daily  pantoprazole    Tablet 40 milliGRAM(s) Oral before breakfast    MEDICATIONS  (PRN):      Vital Signs Last 24 Hrs  T(C): 36.7 (2019 07:02), Max: 37.1 (2019 23:03)  T(F): 98 (2019 07:02), Max: 98.7 (2019 23:03)  HR: 87 (2019 07:02) (84 - 102)  BP: 129/78 (2019 07:02) (123/81 - 149/86)  BP(mean): --  RR: 18 (2019 07:02) (18 - 20)  SpO2: 94% (2019 07:02) (93% - 97%)  CAPILLARY BLOOD GLUCOSE      POCT Blood Glucose.: 96 mg/dL (2019 07:24)  POCT Blood Glucose.: 109 mg/dL (2019 21:20)  POCT Blood Glucose.: 101 mg/dL (2019 19:15)    I&O's Summary      PHYSICAL EXAM:  HEAD:  Atraumatic, Normocephalic  NECK: Supple, No   JVD  CHEST/LUNG:   no     rales,     no,    rhonchi  HEART: Regular rate and rhythm;         murmur  ABDOMEN: Soft, Nontender, ; distended   EXTREMITIES:    b/l     edema/  right fretaer  tahn left  NEUROLOGY:  alert    LABS:                        8.1    3.17  )-----------( 19       ( 2019 05:02 )             26.3     11-05    135  |  103  |  13  ----------------------------<  89  3.9   |  20<L>  |  1.07    Ca    7.0<L>      2019 05:02  Phos  1.2       Mg     1.6         TPro  5.7<L>  /  Alb  2.3<L>  /  TBili  0.5  /  DBili  x   /  AST  50<H>  /  ALT  30  /  AlkPhos  265<H>      PT/INR - ( 2019 10:45 )   PT: 11.6 sec;   INR: 1.01 ratio         PTT - ( 2019 10:45 )  PTT:22.3 sec      Urinalysis Basic - ( 2019 19:53 )    Color: Yellow / Appearance: Clear / S.024 / pH: x  Gluc: x / Ketone: Trace  / Bili: Negative / Urobili: Negative   Blood: x / Protein: 100 / Nitrite: Negative   Leuk Esterase: Small / RBC: 3 /hpf / WBC 20 /HPF   Sq Epi: x / Non Sq Epi: 5 /hpf / Bacteria: Negative           @ 11:11  3.7  30              Consultant(s) Notes Reviewed:      Care Discussed with Consultants/Other Providers:

## 2019-11-06 LAB
ANION GAP SERPL CALC-SCNC: 10 MMOL/L — SIGNIFICANT CHANGE UP (ref 5–17)
BUN SERPL-MCNC: 12 MG/DL — SIGNIFICANT CHANGE UP (ref 7–23)
CALCIUM SERPL-MCNC: 7.2 MG/DL — LOW (ref 8.4–10.5)
CHLORIDE SERPL-SCNC: 103 MMOL/L — SIGNIFICANT CHANGE UP (ref 96–108)
CO2 SERPL-SCNC: 20 MMOL/L — LOW (ref 22–31)
CREAT SERPL-MCNC: 1.01 MG/DL — SIGNIFICANT CHANGE UP (ref 0.5–1.3)
GLUCOSE BLDC GLUCOMTR-MCNC: 107 MG/DL — HIGH (ref 70–99)
GLUCOSE BLDC GLUCOMTR-MCNC: 107 MG/DL — HIGH (ref 70–99)
GLUCOSE BLDC GLUCOMTR-MCNC: 86 MG/DL — SIGNIFICANT CHANGE UP (ref 70–99)
GLUCOSE BLDC GLUCOMTR-MCNC: 94 MG/DL — SIGNIFICANT CHANGE UP (ref 70–99)
GLUCOSE SERPL-MCNC: 84 MG/DL — SIGNIFICANT CHANGE UP (ref 70–99)
HCT VFR BLD CALC: 30.3 % — LOW (ref 39–50)
HGB BLD-MCNC: 9.2 G/DL — LOW (ref 13–17)
LACTATE SERPL-SCNC: 2.3 MMOL/L — HIGH (ref 0.7–2)
MANUAL SMEAR VERIFICATION: SIGNIFICANT CHANGE UP
MCHC RBC-ENTMCNC: 29 PG — SIGNIFICANT CHANGE UP (ref 27–34)
MCHC RBC-ENTMCNC: 30.4 GM/DL — LOW (ref 32–36)
MCV RBC AUTO: 95.6 FL — SIGNIFICANT CHANGE UP (ref 80–100)
PLAT MORPH BLD: SIGNIFICANT CHANGE UP
PLATELET # BLD AUTO: 37 K/UL — LOW (ref 150–400)
POTASSIUM SERPL-MCNC: 4.2 MMOL/L — SIGNIFICANT CHANGE UP (ref 3.5–5.3)
POTASSIUM SERPL-SCNC: 4.2 MMOL/L — SIGNIFICANT CHANGE UP (ref 3.5–5.3)
RBC # BLD: 3.17 M/UL — LOW (ref 4.2–5.8)
RBC # FLD: 19.5 % — HIGH (ref 10.3–14.5)
RBC BLD AUTO: SIGNIFICANT CHANGE UP
SODIUM SERPL-SCNC: 133 MMOL/L — LOW (ref 135–145)
WBC # BLD: 3.19 K/UL — LOW (ref 3.8–10.5)
WBC # FLD AUTO: 3.19 K/UL — LOW (ref 3.8–10.5)

## 2019-11-06 PROCEDURE — 99232 SBSQ HOSP IP/OBS MODERATE 35: CPT

## 2019-11-06 RX ORDER — LANOLIN ALCOHOL/MO/W.PET/CERES
3 CREAM (GRAM) TOPICAL ONCE
Refills: 0 | Status: COMPLETED | OUTPATIENT
Start: 2019-11-06 | End: 2019-11-06

## 2019-11-06 RX ADMIN — Medication 100 MILLIGRAM(S): at 21:50

## 2019-11-06 RX ADMIN — BICALUTAMIDE 50 MILLIGRAM(S): 50 TABLET, FILM COATED ORAL at 11:05

## 2019-11-06 RX ADMIN — Medication 25 MILLIGRAM(S): at 05:15

## 2019-11-06 RX ADMIN — Medication 200 MILLIGRAM(S): at 16:27

## 2019-11-06 RX ADMIN — Medication 200 MILLIGRAM(S): at 05:12

## 2019-11-06 RX ADMIN — BUDESONIDE AND FORMOTEROL FUMARATE DIHYDRATE 2 PUFF(S): 160; 4.5 AEROSOL RESPIRATORY (INHALATION) at 16:24

## 2019-11-06 RX ADMIN — PANTOPRAZOLE SODIUM 40 MILLIGRAM(S): 20 TABLET, DELAYED RELEASE ORAL at 05:15

## 2019-11-06 RX ADMIN — Medication 25 MILLIGRAM(S): at 11:05

## 2019-11-06 RX ADMIN — Medication 25 MILLIGRAM(S): at 21:40

## 2019-11-06 RX ADMIN — BUDESONIDE AND FORMOTEROL FUMARATE DIHYDRATE 2 PUFF(S): 160; 4.5 AEROSOL RESPIRATORY (INHALATION) at 05:12

## 2019-11-06 RX ADMIN — Medication 3 MILLIGRAM(S): at 21:51

## 2019-11-06 RX ADMIN — Medication 100 MILLIGRAM(S): at 11:55

## 2019-11-06 NOTE — PROGRESS NOTE ADULT - ASSESSMENT
79M w/ hx prostate cancer (dx 2004), metastatic HCC, CVA in 2019 (was on Tlovenox but stopped June 2019), gastric ulcer s/p EGD/cauterization in July 2019, RIH repair several years ago, now presenting to the ED for drainage from right groin access site. Likely weeping from generalize anasarca    - No surgical intervention indicated  - Could consider ultrasound of the scrotum and groin  - If right groin continues to drain, could consider placing and ostomy appliance for patient comfort    Please contact Surgery - Green Team (#[phone]) with any questions or concerns.

## 2019-11-06 NOTE — PROGRESS NOTE ADULT - SUBJECTIVE AND OBJECTIVE BOX
weak/  no  cp  REVIEW OF SYSTEMS:  GEN: no fever,    no chills  RESP: no SOB,   no cough  CVS: no chest pain,   no palpitations  GI: no abdominal pain,   no nausea,   no vomiting,   no constipation,   no diarrhea  : no dysuria,   no frequency  NEURO: no headache,   no dizziness  PSYCH: no depression,   not anxious  Derm : no rash    MEDICATIONS  (STANDING):  bicalutamide 50 milliGRAM(s) Oral daily  budesonide 160 MICROgram(s)/formoterol 4.5 MICROgram(s) Inhaler 2 Puff(s) Inhalation two times a day  carBAMazepine XR Tablet 200 milliGRAM(s) Oral two times a day  lidocaine 2% Injectable 20 milliLiter(s) Local Injection Once  metoprolol tartrate 25 milliGRAM(s) Oral three times a day  pantoprazole    Tablet 40 milliGRAM(s) Oral before breakfast    MEDICATIONS  (PRN):      Vital Signs Last 24 Hrs  T(C): 36.7 (2019 04:32), Max: 36.7 (2019 20:20)  T(F): 98.1 (2019 04:32), Max: 98.1 (2019 04:32)  HR: 76 (2019 05:19) (76 - 86)  BP: 143/85 (2019 05:19) (125/80 - 143/85)  BP(mean): --  RR: 18 (2019 04:32) (18 - 18)  SpO2: 95% (2019 04:32) (94% - 96%)  CAPILLARY BLOOD GLUCOSE      POCT Blood Glucose.: 86 mg/dL (2019 07:22)  POCT Blood Glucose.: 100 mg/dL (2019 11:29)    I&O's Summary    2019 07:01  -  2019 07:00  --------------------------------------------------------  IN: 1255 mL / OUT: 0 mL / NET: 1255 mL        PHYSICAL EXAM:  HEAD:  Atraumatic, Normocephalic  NECK: Supple, No   JVD  CHEST/LUNG:   no     rales,     no,    rhonchi  HEART: Regular rate and rhythm;         murmur  ABDOMEN: Soft, Nontender, ;   EXTREMITIES:     b/l     edema  NEUROLOGY:  alert    LABS:                        8.3    3.60  )-----------( 56       ( 2019 09:52 )             26.0         133<L>  |  103  |  12  ----------------------------<  84  4.2   |  20<L>  |  1.01    Ca    7.2<L>      2019 07:09  Phos  1.1       Mg     2.2         TPro  5.7<L>  /  Alb  2.3<L>  /  TBili  0.5  /  DBili  x   /  AST  50<H>  /  ALT  30  /  AlkPhos  265<H>      PT/INR - ( 2019 10:45 )   PT: 11.6 sec;   INR: 1.01 ratio         PTT - ( 2019 10:45 )  PTT:22.3 sec      Urinalysis Basic - ( 2019 19:53 )    Color: Yellow / Appearance: Clear / S.024 / pH: x  Gluc: x / Ketone: Trace  / Bili: Negative / Urobili: Negative   Blood: x / Protein: 100 / Nitrite: Negative   Leuk Esterase: Small / RBC: 3 /hpf / WBC 20 /HPF   Sq Epi: x / Non Sq Epi: 5 /hpf / Bacteria: Negative      Lactate, Blood: 2.5 mmol/L ( @ 09:52)       @ 11:11  3.7  30              Consultant(s) Notes Reviewed:      Care Discussed with Consultants/Other Providers:

## 2019-11-06 NOTE — PROGRESS NOTE ADULT - ASSESSMENT
79M w/ hx prostate cancer (dx 2004), metastatic HCC, CVA in 2019 (was on Tlovenox but stopped June 2019), gastric ulcer s/p EGD/cauterization in July 2019, RIH repair several years ago, admitted for drainage from right groin access site. Likely weeping from generalize anasarca.  Surgery saw patient, cardiology following.   Patient was dx with HCC in March 2019, had radiosurgery and is now on chemo every 2 weeks, son does not know the name.   7 days back, he was sent to hospital after chemo, has needed platelets at least 2-3 times. Had embolization for bleeding thru groin and also needed paracentesis.  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 Not neutropenic at this time  Plan is to keep platelets around 10-15 k/ul if no bleeding and around 50k/ul if bleeding or invasive procedure. Has been getting platelets here  No active management of HCC in hospital.  Continue cardiology and surgery follow up.  No active management planned or leakage by surgery at this time 79M w/ hx prostate cancer (dx 2004), metastatic HCC, CVA in 2019 (was on Tlovenox but stopped June 2019), gastric ulcer s/p EGD/cauterization in July 2019, RIH repair several years ago, admitted for drainage from right groin access site. Likely weeping from generalize anasarca.  Surgery saw patient, cardiology following.   Patient was dx with HCC in March 2019, had radiosurgery and is now on chemo every 2 weeks, son does not know the name.   7 days back, he was sent to hospital after chemo, has needed platelets at least 2-3 times. Had embolization for bleeding thru groin and also needed paracentesis.  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 Not neutropenic at this time  Plan is to keep platelets around 10-15 k/ul if no bleeding and around 50k/ul if bleeding or invasive procedure. Has been getting platelets here, patient's platelet count is acceptable at present after transfusions    Cardiology follow-up noted, has significant pericardial infusion without tamponade  No active management planned or leakage by surgery at this time  Patient at present appears pretty weak,  he may be better served by palliative care rather then active treatment for his HCC, considering his performance status. This should be given consideration with [place]ologist [place]

## 2019-11-06 NOTE — CONSULT NOTE ADULT - ASSESSMENT
78 y/o M with PMHx of prostate cancer s/p radiation seeds on casodex dx in 2004, metastatic HCC, CVA in 2019 on chemo, gastric ulcer s/p EGD/cauterization in July 2019, now s/p abdominal paracentesis and hepatic artery embolization 10/30/19 readmitted with increased drainage from right femoral access site and Ct scan findings of stable right hydronephrosis with a normal creatinine     ***Plan to be discussed with attending 80 y/o M with PMHx of prostate cancer s/p radiation seeds on casodex dx in 2004, metastatic HCC, CVA in 2019 on chemo, gastric ulcer s/p EGD/cauterization in July 2019, now s/p abdominal paracentesis and hepatic artery embolization 10/30/19 readmitted with increased drainage from right femoral access site and Ct scan findings of stable right hydronephrosis with a normal creatinine     - No need for urgent intervention at this time as patient is pain free with unaffected renal function  - Rec CT urogram for delineation of etiology  - Trend Cr  - Monitor for right flank pain     Urology

## 2019-11-06 NOTE — CONSULT NOTE ADULT - SUBJECTIVE AND OBJECTIVE BOX
HPI:  78 y/o M with PMHx of prostate cancer s/p radiation seeds on casodex dx in , metastatic HCC, CVA in  on chemo, gastric ulcer s/p EGD/cauterization in 2019, now s/p abdominal paracentesis and hepatic artery embolization 10/30/19 readmitted with increased drainage from right femoral access site. Ct scan showed findings of stable right hydronephrosis, unchanged from . Pt denies gross hematuria, flank pain, dysuria, urgency, frequency.     PAST MEDICAL & SURGICAL HISTORY:  Anemia  Cancer, metastatic to bone: on chemo  Gastric ulcer: was hospitalized for GI bleed 19-19, taking Protonix  HTN (hypertension)  Liver cancer: stage 4  Prostate cancer: 2004 radiotherapy seeds, now metastatic to liver and bone  Ischemic stroke: 2019, no residuals  HLD (hyperlipidemia)  DM (diabetes mellitus): type 2, HgA1c 5.8% ( 19)  History of eye surgery: right  History of right knee surgery: R Tibia Intralesional curettage/bone graft  2019  H/O hernia repair: right inguinal hernia with mesh  2013    FAMILY HISTORY:  FH: HTN (hypertension)  FH: diabetes mellitus    SOCIAL HISTORY:   Tobacco hx:  MEDICATIONS  (STANDING):  bicalutamide 50 milliGRAM(s) Oral daily  budesonide 160 MICROgram(s)/formoterol 4.5 MICROgram(s) Inhaler 2 Puff(s) Inhalation two times a day  carBAMazepine XR Tablet 200 milliGRAM(s) Oral two times a day  lidocaine 2% Injectable 20 milliLiter(s) Local Injection Once  metoprolol tartrate 25 milliGRAM(s) Oral three times a day  pantoprazole    Tablet 40 milliGRAM(s) Oral before breakfast    MEDICATIONS  (PRN):    Allergies    No Known Allergies    Intolerances    REVIEW OF SYSTEMS: Pertinent positives and negatives as stated in HPI, otherwise negative    Vital signs  T(C): 37.3 (19 @ 11:00), Max: 37.3 (19 @ 11:00)  HR: 79 (19 @ 11:00)  BP: 154/86 (19 @ 11:00)  SpO2: 93% (19 @ 11:00)    Physical Exam  Gen: NAD  Pulm: No respiratory distress, no subcostal retractions  CV: RRR, no JVD  Abd: Softly distended, NT  Back: no CVAT      LABS:     @ 09:10    WBC 3.19  / Hct 30.3  / SCr --        @ 07:09    WBC --    / Hct --    / SCr 1.01         133<L>  |  103  |  12  ----------------------------<  84  4.2   |  20<L>  |  1.01    Ca    7.2<L>      2019 07:09  Phos  1.1       Mg     2.2         Urinalysis Basic - ( 2019 19:53 )    Color: Yellow / Appearance: Clear / S.024 / pH: x  Gluc: x / Ketone: Trace  / Bili: Negative / Urobili: Negative   Blood: x / Protein: 100 / Nitrite: Negative   Leuk Esterase: Small / RBC: 3 /hpf / WBC 20 /HPF   Sq Epi: x / Non Sq Epi: 5 /hpf / Bacteria: Negative    Urine Cx: Culture - Urine (10.30.19 @ 08:36)    Specimen Source: .Urine Clean Catch (Midstream)    Culture Results:   Less than 10,000 cols/cc; Insignificant amount of growth.     Radiology:  < from: CT Abdomen and Pelvis No Cont (19 @ 10:43) >  IMPRESSION:     No acute right groin pathology.     Bilobar hepatic metastatic disease.    Bilateral pleural effusions and small ascites. Anasarca.    Moderate to severe right hydroureteronephrosis to the right pelvis   without change.    < end of copied text > HPI:  78 y/o M with PMHx of prostate cancer s/p radiation seeds on casodex dx in , metastatic HCC  on chemo, CVA in , gastric ulcer s/p EGD/cauterization in 2019, now s/p abdominal paracentesis and hepatic artery embolization 10/30/19 readmitted with increased drainage from right femoral access site. Ct scan showed findings of stable right hydronephrosis, unchanged from . Pt denies gross hematuria, flank pain, dysuria, urgency, frequency.     PAST MEDICAL & SURGICAL HISTORY:  Anemia  Cancer, metastatic to bone: on chemo  Gastric ulcer: was hospitalized for GI bleed 19-19, taking Protonix  HTN (hypertension)  Liver cancer: stage 4  Prostate cancer: 2004 radiotherapy seeds, now metastatic to liver and bone  Ischemic stroke: 2019, no residuals  HLD (hyperlipidemia)  DM (diabetes mellitus): type 2, HgA1c 5.8% ( 19)  History of eye surgery: right  History of right knee surgery: R Tibia Intralesional curettage/bone graft  2019  H/O hernia repair: right inguinal hernia with mesh  2013    FAMILY HISTORY:  FH: HTN (hypertension)  FH: diabetes mellitus    SOCIAL HISTORY:   Tobacco hx:  MEDICATIONS  (STANDING):  bicalutamide 50 milliGRAM(s) Oral daily  budesonide 160 MICROgram(s)/formoterol 4.5 MICROgram(s) Inhaler 2 Puff(s) Inhalation two times a day  carBAMazepine XR Tablet 200 milliGRAM(s) Oral two times a day  lidocaine 2% Injectable 20 milliLiter(s) Local Injection Once  metoprolol tartrate 25 milliGRAM(s) Oral three times a day  pantoprazole    Tablet 40 milliGRAM(s) Oral before breakfast    MEDICATIONS  (PRN):    Allergies    No Known Allergies    Intolerances    REVIEW OF SYSTEMS: Pertinent positives and negatives as stated in HPI, otherwise negative    Vital signs  T(C): 37.3 (19 @ 11:00), Max: 37.3 (19 @ 11:00)  HR: 79 (19 @ 11:00)  BP: 154/86 (19 @ 11:00)  SpO2: 93% (19 @ 11:00)    Physical Exam  Gen: NAD  Pulm: No respiratory distress, no subcostal retractions  CV: RRR, no JVD  Abd: Softly distended, NT  Back: no CVAT      LABS:     @ 09:10    WBC 3.19  / Hct 30.3  / SCr --        @ 07:09    WBC --    / Hct --    / SCr 1.01         133<L>  |  103  |  12  ----------------------------<  84  4.2   |  20<L>  |  1.01    Ca    7.2<L>      2019 07:09  Phos  1.1       Mg     2.2         Urinalysis Basic - ( 2019 19:53 )    Color: Yellow / Appearance: Clear / S.024 / pH: x  Gluc: x / Ketone: Trace  / Bili: Negative / Urobili: Negative   Blood: x / Protein: 100 / Nitrite: Negative   Leuk Esterase: Small / RBC: 3 /hpf / WBC 20 /HPF   Sq Epi: x / Non Sq Epi: 5 /hpf / Bacteria: Negative    Urine Cx: Culture - Urine (10.30.19 @ 08:36)    Specimen Source: .Urine Clean Catch (Midstream)    Culture Results:   Less than 10,000 cols/cc; Insignificant amount of growth.     Radiology:  < from: CT Abdomen and Pelvis No Cont (19 @ 10:43) >  IMPRESSION:     No acute right groin pathology.     Bilobar hepatic metastatic disease.    Bilateral pleural effusions and small ascites. Anasarca.    Moderate to severe right hydroureteronephrosis to the right pelvis   without change.    < end of copied text >

## 2019-11-06 NOTE — PROGRESS NOTE ADULT - SUBJECTIVE AND OBJECTIVE BOX
General Surgery Progress Note     Subjective/24hour Events:   Patient seen and examined.   No acute events overnight.   Pain controlled.     Vital Signs:  Vital Signs Last 24 Hrs  T(C): 36.7 (05 Nov 2019 20:20), Max: 36.9 (05 Nov 2019 07:50)  T(F): 98 (05 Nov 2019 20:20), Max: 98.5 (05 Nov 2019 07:50)  HR: 79 (05 Nov 2019 20:20) (79 - 94)  BP: 137/87 (05 Nov 2019 20:20) (128/83 - 149/86)  BP(mean): --  RR: 18 (05 Nov 2019 20:20) (18 - 18)  SpO2: 94% (05 Nov 2019 20:20) (94% - 96%)    CAPILLARY BLOOD GLUCOSE      POCT Blood Glucose.: 100 mg/dL (05 Nov 2019 11:29)  POCT Blood Glucose.: 96 mg/dL (05 Nov 2019 07:24)      I&O's Detail    05 Nov 2019 07:01  -  06 Nov 2019 00:32  --------------------------------------------------------  IN:    IV PiggyBack: 775 mL    Oral Fluid: 480 mL  Total IN: 1255 mL    OUT:  Total OUT: 0 mL    Total NET: 1255 mL          MEDICATIONS  (STANDING):  bicalutamide 50 milliGRAM(s) Oral daily  budesonide 160 MICROgram(s)/formoterol 4.5 MICROgram(s) Inhaler 2 Puff(s) Inhalation two times a day  carBAMazepine XR Tablet 200 milliGRAM(s) Oral two times a day  lidocaine 2% Injectable 20 milliLiter(s) Local Injection Once  metoprolol tartrate 25 milliGRAM(s) Oral three times a day  pantoprazole    Tablet 40 milliGRAM(s) Oral before breakfast    MEDICATIONS  (PRN):      Physical Exam:  Gen: NAD.  Lungs: Non labored breathing.   Ab: Soft, nontender, nondistended.   Ext: Moves all 4 spontaneously.     Labs:    11-05    135  |  102  |  13  ----------------------------<  112<H>  4.0   |  23  |  1.14    Ca    7.2<L>      05 Nov 2019 09:52  Phos  1.1     11-05  Mg     2.2     11-05    TPro  5.7<L>  /  Alb  2.3<L>  /  TBili  0.5  /  DBili  x   /  AST  50<H>  /  ALT  30  /  AlkPhos  265<H>  11-04    LIVER FUNCTIONS - ( 04 Nov 2019 10:45 )  Alb: 2.3 g/dL / Pro: 5.7 g/dL / ALK PHOS: 265 U/L / ALT: 30 U/L / AST: 50 U/L / GGT: x                                 8.3    3.60  )-----------( 56       ( 05 Nov 2019 09:52 )             26.0     PT/INR - ( 04 Nov 2019 10:45 )   PT: 11.6 sec;   INR: 1.01 ratio         PTT - ( 04 Nov 2019 10:45 )  PTT:22.3 sec    Imaging: General Surgery Progress Note     Subjective/24hour Events:   Patient seen and examined.   No acute events overnight.   Pain controlled. R groin wound continues to drain serous fluid. Afebrile and HD stable.    Vital Signs:  Vital Signs Last 24 Hrs  T(C): 36.7 (05 Nov 2019 20:20), Max: 36.9 (05 Nov 2019 07:50)  T(F): 98 (05 Nov 2019 20:20), Max: 98.5 (05 Nov 2019 07:50)  HR: 79 (05 Nov 2019 20:20) (79 - 94)  BP: 137/87 (05 Nov 2019 20:20) (128/83 - 149/86)  BP(mean): --  RR: 18 (05 Nov 2019 20:20) (18 - 18)  SpO2: 94% (05 Nov 2019 20:20) (94% - 96%)    CAPILLARY BLOOD GLUCOSE      POCT Blood Glucose.: 100 mg/dL (05 Nov 2019 11:29)  POCT Blood Glucose.: 96 mg/dL (05 Nov 2019 07:24)      I&O's Detail    05 Nov 2019 07:01  -  06 Nov 2019 00:32  --------------------------------------------------------  IN:    IV PiggyBack: 775 mL    Oral Fluid: 480 mL  Total IN: 1255 mL    OUT:  Total OUT: 0 mL    Total NET: 1255 mL          MEDICATIONS  (STANDING):  bicalutamide 50 milliGRAM(s) Oral daily  budesonide 160 MICROgram(s)/formoterol 4.5 MICROgram(s) Inhaler 2 Puff(s) Inhalation two times a day  carBAMazepine XR Tablet 200 milliGRAM(s) Oral two times a day  lidocaine 2% Injectable 20 milliLiter(s) Local Injection Once  metoprolol tartrate 25 milliGRAM(s) Oral three times a day  pantoprazole    Tablet 40 milliGRAM(s) Oral before breakfast    MEDICATIONS  (PRN):      Physical Exam:  Gen: NAD.  Lungs: Non labored breathing.   Abdomen: soft, nontender, nondistended, paracentesis access site covered with occlusive dressing, serosanguineous staining of the gauze of this dressing.  Groin: R groin with serous drainage from the right groin access site, pressure dressing in place, no bleeding whatsoever, the area is nontender, no swelling, no pulsatile masses palpated, no ecchymosis.       Labs:    11-05    135  |  102  |  13  ----------------------------<  112<H>  4.0   |  23  |  1.14    Ca    7.2<L>      05 Nov 2019 09:52  Phos  1.1     11-05  Mg     2.2     11-05    TPro  5.7<L>  /  Alb  2.3<L>  /  TBili  0.5  /  DBili  x   /  AST  50<H>  /  ALT  30  /  AlkPhos  265<H>  11-04    LIVER FUNCTIONS - ( 04 Nov 2019 10:45 )  Alb: 2.3 g/dL / Pro: 5.7 g/dL / ALK PHOS: 265 U/L / ALT: 30 U/L / AST: 50 U/L / GGT: x                                 8.3    3.60  )-----------( 56       ( 05 Nov 2019 09:52 )             26.0     PT/INR - ( 04 Nov 2019 10:45 )   PT: 11.6 sec;   INR: 1.01 ratio         PTT - ( 04 Nov 2019 10:45 )  PTT:22.3 sec    Imaging: None known

## 2019-11-06 NOTE — PROGRESS NOTE ADULT - SUBJECTIVE AND OBJECTIVE BOX
HPI:  78 y/o M PMH prostate cancer and adenocarcinoma w/ mets p/w surgical site leakage s/p hepatic artery embolism (By Dr. Christiano Jimenez of [facility]; [id number], [id number]) using femoral site as an access site. Patient also complaining of abdominal swelling. Patient received an abdominal paracentesis x 5 days ago, became hypotensive, sent to ED for hepatic artery embolism. Patient now returning to ED. Does not report SOB, CP, f/c, n/v, headaches, blurry vision. (04 Nov 2019 17:33)    PAST MEDICAL & SURGICAL HISTORY:  Anemia  Cancer, metastatic to bone: on chemo  Gastric ulcer: was hospitalized for GI bleed 7/22/19-7/25/19, taking Protonix  HTN (hypertension)  Liver cancer: stage 4  Prostate cancer: 2004 radiotherapy seeds, now metastatic to liver and bone  Ischemic stroke: 01/2019, no residuals  HLD (hyperlipidemia)  DM (diabetes mellitus): type 2, HgA1c 5.8% ( 6/5/19)  History of eye surgery: right  History of right knee surgery: R Tibia Intralesional curettage/bone graft  7/11/2019  H/O hernia repair: right inguinal hernia with mesh  9/2013    Medications:  bicalutamide 50 milliGRAM(s) Oral daily  budesonide 160 MICROgram(s)/formoterol 4.5 MICROgram(s) Inhaler 2 Puff(s) Inhalation two times a day  carBAMazepine XR Tablet 200 milliGRAM(s) Oral two times a day  lidocaine 2% Injectable 20 milliLiter(s) Local Injection Once  metoprolol tartrate 25 milliGRAM(s) Oral three times a day  pantoprazole    Tablet 40 milliGRAM(s) Oral before breakfast    Labs:  CBC Full  -  ( 05 Nov 2019 09:52 )  WBC Count : 3.60 K/uL  Hemoglobin : 8.3 g/dL  Hematocrit : 26.0 %  Platelet Count - Automated : 56 K/uL  Mean Cell Volume : 93.2 fl  Mean Cell Hemoglobin : 29.7 pg  Mean Cell Hemoglobin Concentration : 31.9 gm/dL  Auto Neutrophil # : x  Auto Lymphocyte # : x  Auto Monocyte # : x  Auto Eosinophil # : x  Auto Basophil # : x  Auto Neutrophil % : x  Auto Lymphocyte % : x  Auto Monocyte % : x  Auto Eosinophil % : x  Auto Basophil % : x    11-06    133<L>  |  103  |  12  ----------------------------<  84  4.2   |  20<L>  |  1.01    Ca    7.2<L>      06 Nov 2019 07:09  Phos  1.1     11-05  Mg     2.2     11-05        Radiology:     < from: [facility] Duplex Lower Ext Vein Scan, Bilat (11.05.19 @ 14:46) >  No evidence of deep venous thrombosis in either lower extremity.      < end of copied text >    < from: CT Abdomen and Pelvis No Cont (11.05.19 @ 10:43) >  No acute right groin pathology.     Bilobar hepatic metastatic disease.    Bilateral pleural effusions and small ascites. Anasarca.      < end of copied text >        ROS:  Patient comfortable without distress  No SOB or chest pain  No palpitation  No abdominal pain, diarrhaea or constipation  No weakness of extremities  No skin changes or swelling of legs    Vital Signs Last 24 Hrs  T(C): 36.7 (06 Nov 2019 04:32), Max: 36.7 (05 Nov 2019 20:20)  T(F): 98.1 (06 Nov 2019 04:32), Max: 98.1 (06 Nov 2019 04:32)  HR: 76 (06 Nov 2019 05:19) (76 - 86)  BP: 143/85 (06 Nov 2019 05:19) (125/80 - 143/85)  BP(mean): --  RR: 18 (06 Nov 2019 04:32) (18 - 18)  SpO2: 95% (06 Nov 2019 04:32) (94% - 96%)    Physical exam:  Patient alert and oriented  No distress  CVS: S1, S2 regular or murmur  Chest: bilateral breath sound without rales  Abdomen: soft, not tender, no organomegaly or masses  No focal neuro deficit  No edema      Assessment and Plan: 80 y/o M PMH prostate cancer and hepatocellular carcinoma, p/w surgical site leakage. S/p hepatic artery embolism (By Dr. Christiano Jimenez of [facility]; [id number], [id number]) using femoral site as an access site. Patient also complaining of abdominal swelling. Patient received an abdominal paracentesis x 5 days ago, became hypotensive, sent to ED for hepatic artery embolism.   He was also thrombocytopenic    PAST MEDICAL & SURGICAL HISTORY:  Anemia  Cancer, metastatic to bone: on chemo  Gastric ulcer: was hospitalized for GI bleed 7/22/19-7/25/19, taking Protonix  HTN (hypertension)  Liver cancer: stage 4  Prostate cancer: 2004 radiotherapy seeds, now metastatic to liver and bone  Ischemic stroke: 01/2019, no residuals  HLD (hyperlipidemia)  DM (diabetes mellitus): type 2, HgA1c 5.8% ( 6/5/19)  History of eye surgery: right  History of right knee surgery: R Tibia Intralesional curettage/bone graft  7/11/2019  H/O hernia repair: right inguinal hernia with mesh  9/2013    Medications:  bicalutamide 50 milliGRAM(s) Oral daily  budesonide 160 MICROgram(s)/formoterol 4.5 MICROgram(s) Inhaler 2 Puff(s) Inhalation two times a day  carBAMazepine XR Tablet 200 milliGRAM(s) Oral two times a day  lidocaine 2% Injectable 20 milliLiter(s) Local Injection Once  metoprolol tartrate 25 milliGRAM(s) Oral three times a day  pantoprazole    Tablet 40 milliGRAM(s) Oral before breakfast    Labs:  CBC Full  -  ( 05 Nov 2019 09:52 )  WBC Count : 3.60 K/uL  Hemoglobin : 8.3 g/dL  Hematocrit : 26.0 %  Platelet Count - Automated : 56 K/uL  Mean Cell Volume : 93.2 fl  Mean Cell Hemoglobin : 29.7 pg  Mean Cell Hemoglobin Concentration : 31.9 gm/dL  Auto Neutrophil # : x  Auto Lymphocyte # : x  Auto Monocyte # : x  Auto Eosinophil # : x  Auto Basophil # : x  Auto Neutrophil % : x  Auto Lymphocyte % : x  Auto Monocyte % : x  Auto Eosinophil % : x  Auto Basophil % : x    11-06    133<L>  |  103  |  12  ----------------------------<  84  4.2   |  20<L>  |  1.01    Ca    7.2<L>      06 Nov 2019 07:09  Phos  1.1     11-05  Mg     2.2     11-05        Radiology:     < from: [facility] Duplex Lower Ext Vein Scan, Bilat (11.05.19 @ 14:46) >  No evidence of deep venous thrombosis in either lower extremity.      < end of copied text >    < from: CT Abdomen and Pelvis No Cont (11.05.19 @ 10:43) >  No acute right groin pathology.     Bilobar hepatic metastatic disease.    Bilateral pleural effusions and small ascites. Anasarca.      < end of copied text >    < from: US TTE 2D F/U, Limited w/o Contrast (ED) (11.04.19 @ 15:51) >  A large pericardial effusion without evidence of tamponade.   There was a complex left sided pleural effusion.  There were bilateral effusions with internal echoes to the lung fields   which are be consistent with a complex pleural effusions.  There was noted free fluid in the abdomen.       < end of copied text >  < from: CT Abdomen and Pelvis No Cont (11.05.19 @ 10:43) >  No acute right groin pathology.     Bilobar hepatic metastatic disease.    Bilateral pleural effusions and small ascites. Anasarca.    < end of copied text >      ROS:  Patient comfortable without distress  Weakness ++  No SOB or chest pain  No palpitation  No abdominal pain, diarrhaea or constipation, leak is less  No weakness of extremities, but general weakness      Vital Signs Last 24 Hrs  T(C): 36.7 (06 Nov 2019 04:32), Max: 36.7 (05 Nov 2019 20:20)  T(F): 98.1 (06 Nov 2019 04:32), Max: 98.1 (06 Nov 2019 04:32)  HR: 76 (06 Nov 2019 05:19) (76 - 86)  BP: 143/85 (06 Nov 2019 05:19) (125/80 - 143/85)  BP(mean): --  RR: 18 (06 Nov 2019 04:32) (18 - 18)  SpO2: 95% (06 Nov 2019 04:32) (94% - 96%)    Physical exam:  Patient alert and oriented  No distress, emaciated  CVS: S1, S2   Chest: bilateral breath sound decreased at basis  Abdomen: soft distension, groin has dressing  No focal neuro deficit  No edema      Assessment and Plan:

## 2019-11-06 NOTE — CONSULT NOTE ADULT - SUBJECTIVE AND OBJECTIVE BOX
HPI:   Patient is a 79y male with a history of prostate cancer, metastatic hepatocellular carcinoma, s/p recent paracentesis followed by admission to [facility] on 10/29 for hypotension felt secondary to acute subcapsular hepatic hematoma, s/p IR embolization of hepatic artery,discharged 10/31 who was admitted  for evaluation of drainage from IR vascular puncture site.No fevers or chills, no pain or purulent drainage.Surgery has seen, no intervention is being planned.He received  CTX in ER.CT in ER negative for groin pathology    REVIEW OF SYSTEMS:  All other review of systems negative (Comprehensive ROS): weak, RLE and scrotal edema    PAST MEDICAL & SURGICAL HISTORY:  Anemia  Cancer, metastatic to bone: on chemo  Gastric ulcer: was hospitalized for GI bleed 19-19, taking Protonix  HTN (hypertension)  Liver cancer: stage 4  Prostate cancer: 2004 radiotherapy seeds, now metastatic to liver and bone  Ischemic stroke: 2019, no residuals  HLD (hyperlipidemia)  DM (diabetes mellitus): type 2, HgA1c 5.8% ( 19)  History of eye surgery: right  History of right knee surgery: R Tibia Intralesional curettage/bone graft  2019  H/O hernia repair: right inguinal hernia with mesh  2013      Allergies    No Known Allergies    Intolerances        Antimicrobials Day #  :    Other Medications:  bicalutamide 50 milliGRAM(s) Oral daily  budesonide 160 MICROgram(s)/formoterol 4.5 MICROgram(s) Inhaler 2 Puff(s) Inhalation two times a day  carBAMazepine XR Tablet 200 milliGRAM(s) Oral two times a day  guaiFENesin   Syrup  (Sugar-Free) 100 milliGRAM(s) Oral once  lidocaine 2% Injectable 20 milliLiter(s) Local Injection Once  metoprolol tartrate 25 milliGRAM(s) Oral three times a day  pantoprazole    Tablet 40 milliGRAM(s) Oral before breakfast      FAMILY HISTORY:  FH: HTN (hypertension)  FH: diabetes mellitus      SOCIAL HISTORY:  Smoking: x    ETOH: x    Drug Use:x          T(F): 99.1 (19 @ 11:00), Max: 99.1 (19 @ 11:00)  HR: 79 (19 @ 11:00)  BP: 154/86 (19 @ 11:00)  RR: 19 (19 @ 11:00)  SpO2: 93% (19 @ 11:00)  Wt(kg): --    PHYSICAL EXAM:  General: alert, no acute distress, chronically ill appearing male  Eyes:  anicteric, no conjunctival injection, no discharge  Oropharynx: no lesions or injection 	  Neck: supple, without adenopathy  Lungs: clear to auscultation  Heart: regular rate and rhythm; no murmur, rubs or gallops  Abdomen: soft, distended, nontender, without mass or organomegaly  Skin: clear drainage from RT groin cath site, no erythema or purulent drainage  Extremities: no clubbing, cyanosis, + edema RLE  Neurologic: alert, oriented, moves all extremities   scrotal edema  LAB RESULTS:                        8.3    3.60  )-----------( 56       ( 2019 09:52 )             26.0     11    133<L>  |  103  |  12  ----------------------------<  84  4.2   |  20<L>  |  1.01    Ca    7.2<L>      2019 07:09  Phos  1.1       Mg     2.2             Urinalysis Basic - ( 2019 19:53 )    Color: Yellow / Appearance: Clear / S.024 / pH: x  Gluc: x / Ketone: Trace  / Bili: Negative / Urobili: Negative   Blood: x / Protein: 100 / Nitrite: Negative   Leuk Esterase: Small / RBC: 3 /hpf / WBC 20 /HPF   Sq Epi: x / Non Sq Epi: 5 /hpf / Bacteria: Negative        MICROBIOLOGY:  RECENT CULTURES:   @ 14:25 .Blood Blood-Peripheral     No growth to date.            RADIOLOGY REVIEWED:  < from: [facility] Duplex Lower Ext Vein Scan, Bilat (19 @ 14:46) >  IMPRESSION:     No evidence of deep venous thrombosis in either lower extremity.    < end of copied text >  < from: CT Abdomen and Pelvis No Cont (19 @ 10:43) >  IMPRESSION:     No acute right groin pathology.     Bilobar hepatic metastatic disease.    Bilateral pleural effusions and small ascites. Anasarca.    < end of copied text >

## 2019-11-06 NOTE — PROGRESS NOTE ADULT - ASSESSMENT
78 y/o M      h/o      prostate cancer and  HCC,  w/ mets to  liver, bone.  lympangitic  carcinomatosis  IVC   filter , seen on ct       p/w surgical site leakage s/p hepatic artery embolism (By Dr. Christiano Jimenez  [id number], [id number])     via  right    femoral site as an access site.        S/P   abdominal paracentesis x 5 days ago,            Patient now returning to ED., for  drainage  from that  groin site  Surgery eval noted     Casodex 50 mg/day for prostate cancer.   Low platelets  noted./  s/p  platelets  on arrival   echo in er. with pericardial  effusion   awaiting  official  echo.  pt  with    extensive  metastatic   disease/  pancytopenia  from  malignancy     DM 2,  follow  fs/  HTN,  on   lopressor/  stopped  norvasc  rpt  ct  abdomen, noted, mets  to  liver/  b/l  pl  effusions/ anasarca/  severe  right  hydro  urology PA called  yesterday  by np/  awaiting eval   no  groin pathology on ct/    suspect   on going   mild  oozing  from puncture  site, is  from  extensive  anasarca/  s/q  fluid retention.  from   hypoalbuminemia  of  1,9  to  2/  /  severe  protein calorie  malnutrition from  advance d cancer      < from: CT Abdomen and Pelvis No Cont (11.05.19 @ 10:43) >  IMPRESSION:   No acute right groin pathology.   Bilobar hepatic metastatic disease.  Bilateral pleural effusions and small ascites. Anasarca.  Moderate to severe right hydroureteronephrosis to the right pelvis   without change.  < end of copied text >       < from: CT Angio Abdomen and Pelvis w/ IV Cont (10.29.19 @ 19:39) >  IMPRESION:   Subcapsular and perihepatic hematoma. No active extravasation.   Infiltrative hepatic metastatic disease.  No active gastrointestinal bleeding.  Increased ascites. Multifocal metastatic disease as demonstrated on 10/8.  Increased groundglass lung opacities, pulmonary edema or infection.   Lymphangitic carcinomatosis stable.  Worsened anasarca. Right thigh subcutaneous edema or cellulitis. Chronic   narrowing of the infrarenal IVC without acute thrombus.  Other incidental comments as above.  < end of copied text >         < from: MR Knee No Cont, Right (07.07.19 @ 14:45) >  IMPRESSION:   Large confluent infiltrative lesion involving the entirety of both the   medial and lateral tibial plateaus of the tibial metaphysis most   concerning for metastatic disease. There is evidence of a nondisplaced   pathologic fracture along the posterior intercondylar region of the   proximal tibia extending into the posterior central margin of the medial   and lateral tibial plateaus. There is extensive surrounding periosseous,   subcutaneous, and intramuscular edema which is likely reactive.   Additional smaller rounded metastatic lesions are seen within the lateral   femoral condyle and the proximal tibial metaphysis. Findings discussed   with Dr. Bolanos.  Large joint effusion.  < end of copied text >

## 2019-11-06 NOTE — PROGRESS NOTE ADULT - SUBJECTIVE AND OBJECTIVE BOX
CARDIOLOGY     PROGRESS  NOTE   ________________________________________________    CHIEF COMPLAINT:Patient is a 79y old  Male who presents with a chief complaint of oozing from right groin procedure site (06 Nov 2019 00:32)  doing a little better.  	  REVIEW OF SYSTEMS:  CONSTITUTIONAL: No fever, weight loss, or fatigue  EYES: No eye pain, visual disturbances, or discharge  ENT:  No difficulty hearing, tinnitus, vertigo; No sinus or throat pain  NECK: No pain or stiffness  RESPIRATORY: No cough, wheezing, chills or hemoptysis; No Shortness of Breath  CARDIOVASCULAR: No chest pain, palpitations, passing out, dizziness, or leg swelling  GASTROINTESTINAL: No abdominal or epigastric pain. No nausea, vomiting, or hematemesis; No diarrhea or constipation. No melena or hematochezia.  GENITOURINARY: No dysuria, frequency, hematuria, or incontinence  NEUROLOGICAL: No headaches, memory loss, loss of strength, numbness, or tremors  SKIN: No itching, burning, rashes, or lesions   LYMPH Nodes: No enlarged glands  ENDOCRINE: No heat or cold intolerance; No hair loss  MUSCULOSKELETAL: No joint pain or swelling; No muscle, back, or extremity pain  PSYCHIATRIC: No depression, anxiety, mood swings, or difficulty sleeping  HEME/LYMPH: No easy bruising, or bleeding gums  ALLERGY AND IMMUNOLOGIC: No hives or eczema	    [ ] All others negative	  [x ] Unable to obtain    PHYSICAL EXAM:  T(C): 36.7 (11-06-19 @ 04:32), Max: 36.7 (11-05-19 @ 20:20)  HR: 76 (11-06-19 @ 05:19) (76 - 86)  BP: 143/85 (11-06-19 @ 05:19) (125/80 - 143/85)  RR: 18 (11-06-19 @ 04:32) (18 - 18)  SpO2: 95% (11-06-19 @ 04:32) (94% - 96%)  Wt(kg): --  I&O's Summary    05 Nov 2019 07:01  -  06 Nov 2019 07:00  --------------------------------------------------------  IN: 1255 mL / OUT: 0 mL / NET: 1255 mL        Appearance: Normal	  HEENT:   Normal oral mucosa, PERRL, EOMI	  Lymphatic: No lymphadenopathy  Cardiovascular: Normal S1 S2, No JVD, + murmurs, No edema  Respiratory: Lungs clear to auscultation	  Psychiatry: A & O x 3, Mood & affect appropriate  Gastrointestinal:  Soft, Non-tender, + BS	, distended abdomen  Skin: No rashes, No ecchymoses, No cyanosis	  Neurologic: Non-focal  Extremities: Normal range of motion, No clubbing, cyanosis or edema  Vascular: Peripheral pulses palpable 2+ bilaterally    MEDICATIONS  (STANDING):  bicalutamide 50 milliGRAM(s) Oral daily  budesonide 160 MICROgram(s)/formoterol 4.5 MICROgram(s) Inhaler 2 Puff(s) Inhalation two times a day  carBAMazepine XR Tablet 200 milliGRAM(s) Oral two times a day  lidocaine 2% Injectable 20 milliLiter(s) Local Injection Once  metoprolol tartrate 25 milliGRAM(s) Oral three times a day  pantoprazole    Tablet 40 milliGRAM(s) Oral before breakfast      TELEMETRY: 	    ECG:  	  RADIOLOGY:  OTHER: 	  	  LABS:	 	    CARDIAC MARKERS:                                8.3    3.60  )-----------( 56       ( 05 Nov 2019 09:52 )             26.0     11-06    133<L>  |  103  |  12  ----------------------------<  84  4.2   |  20<L>  |  1.01    Ca    7.2<L>      06 Nov 2019 07:09  Phos  1.1     11-05  Mg     2.2     11-05    TPro  5.7<L>  /  Alb  2.3<L>  /  TBili  0.5  /  DBili  x   /  AST  50<H>  /  ALT  30  /  AlkPhos  265<H>  11-04    proBNP:   Lipid Profile:   HgA1c:   TSH:   PT/INR - ( 04 Nov 2019 10:45 )   PT: 11.6 sec;   INR: 1.01 ratio         PTT - ( 04 Nov 2019 10:45 )  PTT:22.3 sec  < from: CT Abdomen and Pelvis No Cont (11.05.19 @ 10:43) >  No acute right groin pathology.     Bilobar hepatic metastatic disease.    Bilateral pleural effusions and small ascites. Anasarca.      < from: Echocardiogram (06.04.19 @ 10:22) >  Normal left ventricular size and wall thickness.The left ventricular   ejection   fraction is 71%.The left atrial size is normal.Right atrial size is   normal.The   right ventricle is normal in size and function.The tricuspid annular   plane   systolic excursion (TAPSE) is 22 mm (normal 17mm or higher).    Structurally   normal aortic valve.There is trace aortic regurgitation.Structurally   normal   mitral valve.There is mild mitral regurgitation.Structurally normal   tricuspid   valve.There is mild tricuspid regurgitation.There is no echocardiographic   evidence for pulmonary hypertension.Structurally normal pulmonic valve.No   pulmonic regurgitation noted.The aortic root measures 4.0 cm at sinuses   (normal less than 4 cm for men, less than 3.6 cm for women).  The aortic   root   measures 2.2 cm/m2 at sinuses (normal less than 2.1 cm/m2 for men, less   than   2.2 cm/m2 for women).  Borderline aortic root dilatation.A small   pericardial   effusion noted.There is no echocardiographic evidence for cardiac   tamponade.        Assessment and plan  ---------------------------  metastatic ca  pleural effusion sec to malignancy  'echo results noted  s/p platelets infusion  consider palliative care

## 2019-11-06 NOTE — PROGRESS NOTE ADULT - ATTENDING COMMENTS
I have seen and examined the patient.  I agree with the surgical resident's note.  l  There is no surgical indication for the groin drainage at the present time - please reconsult if needed    Homero Merlos contact information [phone]
I have seen and examined the patient.  I agree with the surgical resident's note.  puncture site in the right grin continues to drain serous fluid -     Homero Merlos contact information [phone]

## 2019-11-06 NOTE — CONSULT NOTE ADULT - ASSESSMENT
80 yo male with history of prostate cancer and metastatic HCC who is s/p recent paracentesis followed a few days later by IR hepatic artery embolization secondary to a subcapsular hematoma.  No systemic signs of infection.  No local signs of infection.I would take advantage of negative blood cultures, negative CT scan,,and lack of a soft tissue infection to suggest:  1.Hold antibiotics  2.Local wound care per surgery  3.Blood culture any fever spike  4.ID issues reviewed with patient and son, at this point the drainage  does not appear to be infection mediated.

## 2019-11-07 LAB
ANION GAP SERPL CALC-SCNC: 10 MMOL/L — SIGNIFICANT CHANGE UP (ref 5–17)
BUN SERPL-MCNC: 12 MG/DL — SIGNIFICANT CHANGE UP (ref 7–23)
CALCIUM SERPL-MCNC: 7.2 MG/DL — LOW (ref 8.4–10.5)
CHLORIDE SERPL-SCNC: 104 MMOL/L — SIGNIFICANT CHANGE UP (ref 96–108)
CO2 SERPL-SCNC: 20 MMOL/L — LOW (ref 22–31)
CREAT SERPL-MCNC: 1 MG/DL — SIGNIFICANT CHANGE UP (ref 0.5–1.3)
GLUCOSE BLDC GLUCOMTR-MCNC: 106 MG/DL — HIGH (ref 70–99)
GLUCOSE BLDC GLUCOMTR-MCNC: 128 MG/DL — HIGH (ref 70–99)
GLUCOSE BLDC GLUCOMTR-MCNC: 70 MG/DL — SIGNIFICANT CHANGE UP (ref 70–99)
GLUCOSE BLDC GLUCOMTR-MCNC: 85 MG/DL — SIGNIFICANT CHANGE UP (ref 70–99)
GLUCOSE SERPL-MCNC: 94 MG/DL — SIGNIFICANT CHANGE UP (ref 70–99)
HCT VFR BLD CALC: 29.2 % — LOW (ref 39–50)
HGB BLD-MCNC: 9.3 G/DL — LOW (ref 13–17)
MCHC RBC-ENTMCNC: 29.2 PG — SIGNIFICANT CHANGE UP (ref 27–34)
MCHC RBC-ENTMCNC: 31.8 GM/DL — LOW (ref 32–36)
MCV RBC AUTO: 91.8 FL — SIGNIFICANT CHANGE UP (ref 80–100)
NRBC # BLD: 0 /100 WBCS — SIGNIFICANT CHANGE UP (ref 0–0)
PLATELET # BLD AUTO: 25 K/UL — LOW (ref 150–400)
POTASSIUM SERPL-MCNC: 4.2 MMOL/L — SIGNIFICANT CHANGE UP (ref 3.5–5.3)
POTASSIUM SERPL-SCNC: 4.2 MMOL/L — SIGNIFICANT CHANGE UP (ref 3.5–5.3)
RBC # BLD: 3.18 M/UL — LOW (ref 4.2–5.8)
RBC # FLD: 19.8 % — HIGH (ref 10.3–14.5)
SODIUM SERPL-SCNC: 134 MMOL/L — LOW (ref 135–145)
WBC # BLD: 3.61 K/UL — LOW (ref 3.8–10.5)
WBC # FLD AUTO: 3.61 K/UL — LOW (ref 3.8–10.5)

## 2019-11-07 PROCEDURE — 99233 SBSQ HOSP IP/OBS HIGH 50: CPT

## 2019-11-07 PROCEDURE — 99232 SBSQ HOSP IP/OBS MODERATE 35: CPT

## 2019-11-07 PROCEDURE — 74177 CT ABD & PELVIS W/CONTRAST: CPT | Mod: 26

## 2019-11-07 RX ORDER — LANOLIN ALCOHOL/MO/W.PET/CERES
5 CREAM (GRAM) TOPICAL AT BEDTIME
Refills: 0 | Status: COMPLETED | OUTPATIENT
Start: 2019-11-07 | End: 2019-11-07

## 2019-11-07 RX ADMIN — BUDESONIDE AND FORMOTEROL FUMARATE DIHYDRATE 2 PUFF(S): 160; 4.5 AEROSOL RESPIRATORY (INHALATION) at 16:15

## 2019-11-07 RX ADMIN — BUDESONIDE AND FORMOTEROL FUMARATE DIHYDRATE 2 PUFF(S): 160; 4.5 AEROSOL RESPIRATORY (INHALATION) at 06:20

## 2019-11-07 RX ADMIN — Medication 25 MILLIGRAM(S): at 06:20

## 2019-11-07 RX ADMIN — Medication 200 MILLIGRAM(S): at 06:19

## 2019-11-07 RX ADMIN — Medication 25 MILLIGRAM(S): at 11:14

## 2019-11-07 RX ADMIN — BICALUTAMIDE 50 MILLIGRAM(S): 50 TABLET, FILM COATED ORAL at 11:18

## 2019-11-07 RX ADMIN — Medication 200 MILLIGRAM(S): at 16:16

## 2019-11-07 RX ADMIN — Medication 100 MILLIGRAM(S): at 22:29

## 2019-11-07 RX ADMIN — PANTOPRAZOLE SODIUM 40 MILLIGRAM(S): 20 TABLET, DELAYED RELEASE ORAL at 06:20

## 2019-11-07 RX ADMIN — Medication 25 MILLIGRAM(S): at 21:22

## 2019-11-07 RX ADMIN — Medication 5 MILLIGRAM(S): at 21:22

## 2019-11-07 NOTE — PROGRESS NOTE ADULT - SUBJECTIVE AND OBJECTIVE BOX
CARDIOLOGY     PROGRESS  NOTE   ________________________________________________    CHIEF COMPLAINT:Patient is a 79y old  Male who presents with a chief complaint of oozing from right groin procedure site (06 Nov 2019 13:22)  doing better, no complain.  	  REVIEW OF SYSTEMS:  CONSTITUTIONAL: No fever, weight loss, or fatigue  EYES: No eye pain, visual disturbances, or discharge  ENT:  No difficulty hearing, tinnitus, vertigo; No sinus or throat pain  NECK: No pain or stiffness  RESPIRATORY: No cough, wheezing, chills or hemoptysis; No Shortness of Breath  CARDIOVASCULAR: No chest pain, palpitations, passing out, dizziness, or leg swelling  GASTROINTESTINAL: + abdominal or epigastric pain. No nausea, vomiting, or hematemesis; No diarrhea or constipation. No melena or hematochezia.  GENITOURINARY: No dysuria, frequency, hematuria, or incontinence  NEUROLOGICAL: No headaches, memory loss, loss of strength, numbness, or tremors  SKIN: No itching, burning, rashes, or lesions   LYMPH Nodes: No enlarged glands  ENDOCRINE: No heat or cold intolerance; No hair loss  MUSCULOSKELETAL: No joint pain or swelling; No muscle, back, or extremity pain  PSYCHIATRIC: No depression, anxiety, mood swings, or difficulty sleeping  HEME/LYMPH: No easy bruising, or bleeding gums  ALLERGY AND IMMUNOLOGIC: No hives or eczema	    [ ] All others negative	  [ ] Unable to obtain    PHYSICAL EXAM:  T(C): 36.5 (11-07-19 @ 04:03), Max: 37.3 (11-06-19 @ 11:00)  HR: 75 (11-07-19 @ 06:18) (72 - 83)  BP: 131/80 (11-07-19 @ 06:18) (113/81 - 154/86)  RR: 18 (11-07-19 @ 04:03) (18 - 19)  SpO2: 93% (11-07-19 @ 04:03) (93% - 96%)  Wt(kg): --  I&O's Summary    06 Nov 2019 07:01  -  07 Nov 2019 07:00  --------------------------------------------------------  IN: 550 mL / OUT: 0 mL / NET: 550 mL        Appearance: Normal	  HEENT:   Normal oral mucosa, PERRL, EOMI	  Lymphatic: No lymphadenopathy  Cardiovascular: Normal S1 S2, No JVD,+ murmurs, No edema  Respiratory: Lungs clear to auscultation	  Psychiatry: A & O x 3, Mood & affect appropriate  Gastrointestinal:  Soft, Non-tender, + BS, + distended	  Skin: No rashes, No ecchymoses, No cyanosis	  Neurologic: Non-focal  Extremities: Normal range of motion, No clubbing, cyanosis or edema  Vascular: Peripheral pulses palpable 2+ bilaterally    MEDICATIONS  (STANDING):  bicalutamide 50 milliGRAM(s) Oral daily  budesonide 160 MICROgram(s)/formoterol 4.5 MICROgram(s) Inhaler 2 Puff(s) Inhalation two times a day  carBAMazepine XR Tablet 200 milliGRAM(s) Oral two times a day  lidocaine 2% Injectable 20 milliLiter(s) Local Injection Once  metoprolol tartrate 25 milliGRAM(s) Oral three times a day  pantoprazole    Tablet 40 milliGRAM(s) Oral before breakfast      TELEMETRY: 	    ECG:  	  RADIOLOGY:  OTHER: 	  	  LABS:	 	    CARDIAC MARKERS:                                9.3    3.61  )-----------( 25       ( 07 Nov 2019 06:11 )             29.2     11-07    134<L>  |  104  |  12  ----------------------------<  94  4.2   |  20<L>  |  1.00    Ca    7.2<L>      07 Nov 2019 06:11  Phos  1.1     11-05  Mg     2.2     11-05      proBNP:   Lipid Profile:   HgA1c:   TSH:         Assessment and plan  ---------------------------  metastatic ca  pleural effusion sec to malignancy  echo results noted  s/p platelets infusion  consider palliative care  discussed with family, onc as out pt

## 2019-11-07 NOTE — PROGRESS NOTE ADULT - ASSESSMENT
79M w/ hx prostate cancer (dx 2004), metastatic HCC, CVA in 2019 (was on Tlovenox but stopped June 2019), gastric ulcer s/p EGD/cauterization in July 2019, RIH repair several years ago, admitted for drainage from right groin access site. Likely weeping from generalize anasarca.  Surgery saw patient, cardiology following.   Patient was dx with HCC in March 2019, had radiosurgery and is now on chemo every 2 weeks, son does not know the name.   7 days back, he was sent to hospital after chemo, has needed platelets at least 2-3 times. Had embolization for bleeding thru groin and also needed paracentesis.  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 Not neutropenic at this time  Plan is to keep platelets around 10-15 k/ul if no bleeding and around 50k/ul if bleeding or invasive procedure. Had received platelets, trending down now as expected but safe for observation in no invasive procedure being done.  Cardiology follow-up noted, has significant pericardial infusion without tamponade  Urology following for hydronephrosis, creatinine OK  No active management planned or leakage by surgery at this time  Patient at present appears pretty weak,  he may be better served by palliative care rather then active treatment for his HCC, considering his performance status. This should be given consideration with primary oncologist in [facility]

## 2019-11-07 NOTE — PROGRESS NOTE ADULT - ASSESSMENT
80 y/o M      h/o      prostate cancer and  HCC,  w/ mets to  liver, bone.  lympangitic  carcinomatosis  IVC   filter , seen on ct       p/w surgical site leakage s/p hepatic artery embolism (By Dr. Christiano Jimenez  [id number], [id number])     via  right    femoral site as an access site.        S/P   abdominal paracentesis x 5 days ago,            Patient now returning to ED., for  drainage  from that  groin site  Surgery eval noted     Casodex 50 mg/day for prostate cancer.   Low platelets  noted./  s/p  platelets  on arrival   echo in er. with pericardial  effusion   awaiting  official  echo.  pt  with    extensive  metastatic   disease/  pancytopenia  from  malignancy     DM 2,  follow  fs/  HTN,  on   lopressor/  stopped  norvasc  rpt  ct  abdomen, noted, mets  to  liver/  b/l  pl  effusions/ anasarca/  severe  right  hydro   no  groin pathology on ct/    suspect   on going   mild  oozing  from puncture  site, is  from  extensive  anasarca/  s/q  fluid retention.  from   hypoalbuminemia  of  1,9  to  2/  /  severe  protein calorie  malnutrition from  advanceD  cancer   per   urology/  awaiting  ct urogram  no  ab per  ID      < from: CT Abdomen and Pelvis No Cont (11.05.19 @ 10:43) >  IMPRESSION:   No acute right groin pathology.   Bilobar hepatic metastatic disease.  Bilateral pleural effusions and small ascites. Anasarca.  Moderate to severe right hydroureteronephrosis to the right pelvis   without change.  < end of copied text >       < from: CT Angio Abdomen and Pelvis w/ IV Cont (10.29.19 @ 19:39) >  IMPRESION:   Subcapsular and perihepatic hematoma. No active extravasation.   Infiltrative hepatic metastatic disease.  No active gastrointestinal bleeding.  Increased ascites. Multifocal metastatic disease as demonstrated on 10/8.  Increased groundglass lung opacities, pulmonary edema or infection.   Lymphangitic carcinomatosis stable.  Worsened anasarca. Right thigh subcutaneous edema or cellulitis. Chronic   narrowing of the infrarenal IVC without acute thrombus.  Other incidental comments as above.  < end of copied text >         < from: MR Knee No Cont, Right (07.07.19 @ 14:45) >  IMPRESSION:   Large confluent infiltrative lesion involving the entirety of both the   medial and lateral tibial plateaus of the tibial metaphysis most   concerning for metastatic disease. There is evidence of a nondisplaced   pathologic fracture along the posterior intercondylar region of the   proximal tibia extending into the posterior central margin of the medial   and lateral tibial plateaus. There is extensive surrounding periosseous,   subcutaneous, and intramuscular edema which is likely reactive.   Additional smaller rounded metastatic lesions are seen within the lateral   femoral condyle and the proximal tibial metaphysis. Findings discussed   with Dr. Bolanos.  Large joint effusion.  < end of copied text >

## 2019-11-07 NOTE — PROGRESS NOTE ADULT - SUBJECTIVE AND OBJECTIVE BOX
78 y/o M PMH prostate cancer and hepatocellular carcinoma, p/w surgical site leakage. S/p hepatic artery embolism (By Dr. Christiano Jimenez of [facility]; [id number], [id number]) using femoral site as an access site. Patient also complaining of abdominal swelling. Patient received an abdominal paracentesis x 5 days ago, became hypotensive, sent to ED for hepatic artery embolism.   He was also thrombocytopenic    PAST MEDICAL & SURGICAL HISTORY:  Anemia  Cancer, metastatic to bone: on chemo  Gastric ulcer: was hospitalized for GI bleed 7/22/19-7/25/19, taking Protonix  HTN (hypertension)  Liver cancer: stage 4  Prostate cancer: 2004 radiotherapy seeds, now metastatic to liver and bone  Ischemic stroke: 01/2019, no residuals  HLD (hyperlipidemia)  DM (diabetes mellitus): type 2, HgA1c 5.8% ( 6/5/19)  History of eye surgery: right  History of right knee surgery: R Tibia Intralesional curettage/bone graft  7/11/2019  H/O hernia repair: right inguinal hernia with mesh  9/2013    Medications:  bicalutamide 50 milliGRAM(s) Oral daily  budesonide 160 MICROgram(s)/formoterol 4.5 MICROgram(s) Inhaler 2 Puff(s) Inhalation two times a day  carBAMazepine XR Tablet 200 milliGRAM(s) Oral two times a day  lidocaine 2% Injectable 20 milliLiter(s) Local Injection Once  metoprolol tartrate 25 milliGRAM(s) Oral three times a day  pantoprazole    Tablet 40 milliGRAM(s) Oral before breakfast    Labs:  CBC Full  -  ( 07 Nov 2019 06:11 )  WBC Count : 3.61 K/uL  Hemoglobin : 9.3 g/dL  Hematocrit : 29.2 %  Platelet Count - Automated : 25 K/uL  Mean Cell Volume : 91.8 fl  Mean Cell Hemoglobin : 29.2 pg  Mean Cell Hemoglobin Concentration : 31.8 gm/dL  Auto Neutrophil # : x  Auto Lymphocyte # : x  Auto Monocyte # : x  Auto Eosinophil # : x  Auto Basophil # : x  Auto Neutrophil % : x  Auto Lymphocyte % : x  Auto Monocyte % : x  Auto Eosinophil % : x  Auto Basophil % : x    11-07    134<L>  |  104  |  12  ----------------------------<  94  4.2   |  20<L>  |  1.00    Ca    7.2<L>      07 Nov 2019 06:11        Radiology:     < from: CT Abdomen and Pelvis w/ IV Cont (11.07.19 @ 14:42) >  Moderate right hydronephrosis to the UPJ is unchanged. No urothelial mass   visualized.    Asymmetric right bladder thickening again noted.    Multifocal HCC unchanged.    Small splenic infarct.    Innumerable pulmonary nodules again noted.    < end of copied text >          ROS:  Patient comfortable without distress  General weakness  No SOB or chest pain  No palpitation  No abdominal pain, mild drainage from groin    Vital Signs Last 24 Hrs  T(C): 36.6 (07 Nov 2019 11:34), Max: 37 (06 Nov 2019 20:12)  T(F): 97.8 (07 Nov 2019 11:34), Max: 98.6 (06 Nov 2019 20:12)  HR: 69 (07 Nov 2019 11:34) (69 - 83)  BP: 144/84 (07 Nov 2019 11:34) (113/81 - 144/84)  BP(mean): --  RR: 18 (07 Nov 2019 11:34) (18 - 18)  SpO2: 96% (07 Nov 2019 11:34) (93% - 96%)    Physical exam:  Patient alert and oriented  No distress, general emaciation  CVS: S1, S2 regular or murmur  Chest: bilateral breath sound without rales  Abdomen: soft, not tender, no organomegaly or masses  No focal neuro deficit        Assessment and Plan:

## 2019-11-07 NOTE — PROGRESS NOTE ADULT - SUBJECTIVE AND OBJECTIVE BOX
weak/  no  cp/  abd pain  REVIEW OF SYSTEMS:  GEN: no fever,    no chills  RESP: no SOB,   no cough  CVS: no chest pain,   no palpitations  GI: no abdominal pain,   no nausea,   no vomiting,   no constipation,   no diarrhea  : no dysuria,   no frequency  NEURO: no headache,   no dizziness  PSYCH: no depression,   not anxious  Derm : no rash    MEDICATIONS  (STANDING):  bicalutamide 50 milliGRAM(s) Oral daily  budesonide 160 MICROgram(s)/formoterol 4.5 MICROgram(s) Inhaler 2 Puff(s) Inhalation two times a day  carBAMazepine XR Tablet 200 milliGRAM(s) Oral two times a day  lidocaine 2% Injectable 20 milliLiter(s) Local Injection Once  metoprolol tartrate 25 milliGRAM(s) Oral three times a day  pantoprazole    Tablet 40 milliGRAM(s) Oral before breakfast    MEDICATIONS  (PRN):      Vital Signs Last 24 Hrs  T(C): 36.5 (07 Nov 2019 04:03), Max: 37.3 (06 Nov 2019 11:00)  T(F): 97.7 (07 Nov 2019 04:03), Max: 99.1 (06 Nov 2019 11:00)  HR: 75 (07 Nov 2019 06:18) (72 - 83)  BP: 131/80 (07 Nov 2019 06:18) (113/81 - 154/86)  BP(mean): --  RR: 18 (07 Nov 2019 04:03) (18 - 19)  SpO2: 93% (07 Nov 2019 04:03) (93% - 96%)  CAPILLARY BLOOD GLUCOSE      POCT Blood Glucose.: 70 mg/dL (07 Nov 2019 07:22)  POCT Blood Glucose.: 107 mg/dL (06 Nov 2019 21:34)  POCT Blood Glucose.: 107 mg/dL (06 Nov 2019 16:34)  POCT Blood Glucose.: 94 mg/dL (06 Nov 2019 11:38)    I&O's Summary    06 Nov 2019 07:01  -  07 Nov 2019 07:00  --------------------------------------------------------  IN: 550 mL / OUT: 0 mL / NET: 550 mL        PHYSICAL EXAM:  HEAD:  Atraumatic, Normocephalic  NECK: Supple, No   JVD  CHEST/LUNG:   no     rales,     no,    rhonchi  HEART: Regular rate and rhythm;         murmur  ABDOMEN: Soft, Nontender, ;   EXTREMITIES:      b/l  edema  NEUROLOGY:  alert    LABS:                        9.3    3.61  )-----------( 25       ( 07 Nov 2019 06:11 )             29.2     11-07    134<L>  |  104  |  12  ----------------------------<  94  4.2   |  20<L>  |  1.00    Ca    7.2<L>      07 Nov 2019 06:11  Phos  1.1     11-05  Mg     2.2     11-05              Lactate, Blood: 2.3 mmol/L (11-06 @ 07:11)  Lactate, Blood: 2.5 mmol/L (11-05 @ 09:52)                  Consultant(s) Notes Reviewed:      Care Discussed with Consultants/Other Providers:

## 2019-11-08 ENCOUNTER — APPOINTMENT (OUTPATIENT)
Dept: UROLOGY | Facility: CLINIC | Age: 79
End: 2019-11-08

## 2019-11-08 DIAGNOSIS — R53.81 OTHER MALAISE: ICD-10-CM

## 2019-11-08 DIAGNOSIS — C79.51 SECONDARY MALIGNANT NEOPLASM OF BONE: ICD-10-CM

## 2019-11-08 DIAGNOSIS — C61 MALIGNANT NEOPLASM OF PROSTATE: ICD-10-CM

## 2019-11-08 DIAGNOSIS — Z51.5 ENCOUNTER FOR PALLIATIVE CARE: ICD-10-CM

## 2019-11-08 DIAGNOSIS — R63.0 ANOREXIA: ICD-10-CM

## 2019-11-08 DIAGNOSIS — C22.7 OTHER SPECIFIED CARCINOMAS OF LIVER: ICD-10-CM

## 2019-11-08 LAB
ANION GAP SERPL CALC-SCNC: 15 MMOL/L — SIGNIFICANT CHANGE UP (ref 5–17)
BUN SERPL-MCNC: 13 MG/DL — SIGNIFICANT CHANGE UP (ref 7–23)
CALCIUM SERPL-MCNC: 7.4 MG/DL — LOW (ref 8.4–10.5)
CHLORIDE SERPL-SCNC: 102 MMOL/L — SIGNIFICANT CHANGE UP (ref 96–108)
CO2 SERPL-SCNC: 19 MMOL/L — LOW (ref 22–31)
CREAT SERPL-MCNC: 1.07 MG/DL — SIGNIFICANT CHANGE UP (ref 0.5–1.3)
GLUCOSE BLDC GLUCOMTR-MCNC: 101 MG/DL — HIGH (ref 70–99)
GLUCOSE BLDC GLUCOMTR-MCNC: 79 MG/DL — SIGNIFICANT CHANGE UP (ref 70–99)
GLUCOSE BLDC GLUCOMTR-MCNC: 85 MG/DL — SIGNIFICANT CHANGE UP (ref 70–99)
GLUCOSE BLDC GLUCOMTR-MCNC: 98 MG/DL — SIGNIFICANT CHANGE UP (ref 70–99)
GLUCOSE SERPL-MCNC: 87 MG/DL — SIGNIFICANT CHANGE UP (ref 70–99)
HCT VFR BLD CALC: 32.6 % — LOW (ref 39–50)
HGB BLD-MCNC: 10 G/DL — LOW (ref 13–17)
MCHC RBC-ENTMCNC: 28.5 PG — SIGNIFICANT CHANGE UP (ref 27–34)
MCHC RBC-ENTMCNC: 30.7 GM/DL — LOW (ref 32–36)
MCV RBC AUTO: 92.9 FL — SIGNIFICANT CHANGE UP (ref 80–100)
NRBC # BLD: 0 /100 WBCS — SIGNIFICANT CHANGE UP (ref 0–0)
PLATELET # BLD AUTO: 22 K/UL — LOW (ref 150–400)
POTASSIUM SERPL-MCNC: 4.5 MMOL/L — SIGNIFICANT CHANGE UP (ref 3.5–5.3)
POTASSIUM SERPL-SCNC: 4.5 MMOL/L — SIGNIFICANT CHANGE UP (ref 3.5–5.3)
RBC # BLD: 3.51 M/UL — LOW (ref 4.2–5.8)
RBC # FLD: 20.2 % — HIGH (ref 10.3–14.5)
SODIUM SERPL-SCNC: 136 MMOL/L — SIGNIFICANT CHANGE UP (ref 135–145)
WBC # BLD: 3.28 K/UL — LOW (ref 3.8–10.5)
WBC # FLD AUTO: 3.28 K/UL — LOW (ref 3.8–10.5)

## 2019-11-08 PROCEDURE — 99232 SBSQ HOSP IP/OBS MODERATE 35: CPT

## 2019-11-08 PROCEDURE — 99223 1ST HOSP IP/OBS HIGH 75: CPT

## 2019-11-08 PROCEDURE — 99497 ADVNCD CARE PLAN 30 MIN: CPT | Mod: 25

## 2019-11-08 RX ADMIN — BUDESONIDE AND FORMOTEROL FUMARATE DIHYDRATE 2 PUFF(S): 160; 4.5 AEROSOL RESPIRATORY (INHALATION) at 18:11

## 2019-11-08 RX ADMIN — Medication 200 MILLIGRAM(S): at 18:12

## 2019-11-08 RX ADMIN — Medication 100 MILLIGRAM(S): at 05:28

## 2019-11-08 RX ADMIN — BUDESONIDE AND FORMOTEROL FUMARATE DIHYDRATE 2 PUFF(S): 160; 4.5 AEROSOL RESPIRATORY (INHALATION) at 05:29

## 2019-11-08 RX ADMIN — PANTOPRAZOLE SODIUM 40 MILLIGRAM(S): 20 TABLET, DELAYED RELEASE ORAL at 05:29

## 2019-11-08 RX ADMIN — Medication 100 MILLIGRAM(S): at 18:11

## 2019-11-08 RX ADMIN — Medication 200 MILLIGRAM(S): at 05:29

## 2019-11-08 RX ADMIN — BICALUTAMIDE 50 MILLIGRAM(S): 50 TABLET, FILM COATED ORAL at 11:51

## 2019-11-08 RX ADMIN — Medication 25 MILLIGRAM(S): at 14:47

## 2019-11-08 RX ADMIN — Medication 25 MILLIGRAM(S): at 22:16

## 2019-11-08 RX ADMIN — Medication 25 MILLIGRAM(S): at 05:29

## 2019-11-08 NOTE — PHYSICAL THERAPY INITIAL EVALUATION ADULT - ADDITIONAL COMMENTS
patient lives with his son and dtr in private house and has about 4 steps to go in the house , uses RW patient states dtr and son assist as needed, wife stays in [place] she added.

## 2019-11-08 NOTE — CONSULT NOTE ADULT - ASSESSMENT
HPI:  80 y/o M PMH [facility]  prostate cancer and adenocarcinoma w/ mets p/w surgical site leakage s/p hepatic artery embolism (By Dr. Christiano Jimenez of [facility]; [id number], [id number]) consulted for assistance with medical decision making in the context of a  geriatric cancer patient with  advanced cancer

## 2019-11-08 NOTE — CONSULT NOTE ADULT - REASON FOR ADMISSION
oozing from right groin procedure site

## 2019-11-08 NOTE — PROGRESS NOTE ADULT - ASSESSMENT
78 yo male with history of prostate cancer and metastatic HCC who is s/p recent paracentesis followed a few days later by IR hepatic artery embolization secondary to a subcapsular hematoma. No systemic signs of infection.  Reviewed notes in the chart he is being followed by surgery regarding groin drainage  Reviewed his flow sheets he is afebrile   blood culture reviewed and reported as no growth   no signs of infection. no indications for antibiotics use  will sign off re consult us as needed

## 2019-11-08 NOTE — PHYSICAL THERAPY INITIAL EVALUATION ADULT - DISCHARGE DISPOSITION, PT EVAL
home w/ assist/home w/ home PT/home PT for strength, gait and balance training , asssit for functional activity as needed.

## 2019-11-08 NOTE — PHYSICAL THERAPY INITIAL EVALUATION ADULT - PERTINENT HX OF CURRENT PROBLEM, REHAB EVAL
80 y/o M PMH prostate cancer and adenocarcinoma w/ mets p/w surgical site leakage s/p hepatic artery embolism (By Dr. Christiano Jimenez of [facility]; [id number], [id number]) using femoral site as an access site. Patient also complaining of abdominal swelling. Patient received an abdominal paracentesis x 5 days ago, became hypotensive, sent to ED for hepatic artery embolism. Patient now returning to ED.

## 2019-11-08 NOTE — CONSULT NOTE ADULT - SUBJECTIVE AND OBJECTIVE BOX
HPI:  78 y/o M PMH prostate cancer and adenocarcinoma w/ mets p/w surgical site leakage s/p hepatic artery embolism (By Dr. Christiano Jimenez of [facility]; [id number], [id number]) using femoral site as an access site. Patient also complaining of abdominal swelling. Patient received an abdominal paracentesis x 5 days ago, became hypotensive, sent to ED for hepatic artery embolism. Patient now returning to ED. Does not report SOB, CP, f/c, n/v, headaches, blurry vision. (04 Nov 2019 17:33)    Slight pain at site of ascites  Minimal anorexia  No constipation  No n/v  Good spirits    PERTINENT PM/SXH:   Anemia  Cancer, metastatic to bone  Gastric ulcer  AICD (automatic cardioverter/defibrillator) present  HLD (hyperlipidemia)  HTN (hypertension)  Liver cancer  Prostate cancer  Ischemic stroke  HLD (hyperlipidemia)  HTN (hypertension)  Hernia  DM (diabetes mellitus)  Prostate CA    History of eye surgery  History of right knee surgery  H/O hernia repair    FAMILY HISTORY:  FH: HTN (hypertension)  FH: diabetes mellitus    ITEMS NOT CHECKED ARE NOT PRESENT    SOCIAL HISTORY:   Significant other/partner:  [x ]  Children:  [ ]  Zoroastrian/Spirituality:  Substance hx:  [ ]   Tobacco hx:  [ ]   Alcohol hx: [ ]   Home Opioid hx:  [ ] I-Stop Reference No:  Living Situation: [x ]Home  [ ]Long term care  [ ]Rehab [ ]Other    ADVANCE DIRECTIVES:    DNR  MOLST  [ ]  Living Will  [ ]   DECISION MAKER(s):  [ ] Health Care Proxy(s)  [ ] Surrogate(s)  [ ] Guardian           Name(s):   Phone Number(s):    BASELINE (I)ADL(s) (prior to admission):  [place]: [ ]Total  [ ] Moderate [ ]Dependent    Allergies    No Known Allergies    Intolerances      MEDICATIONS  (STANDING):  bicalutamide 50 milliGRAM(s) Oral daily  budesonide 160 MICROgram(s)/formoterol 4.5 MICROgram(s) Inhaler 2 Puff(s) Inhalation two times a day  carBAMazepine XR Tablet 200 milliGRAM(s) Oral two times a day  lidocaine 2% Injectable 20 milliLiter(s) Local Injection Once  metoprolol tartrate 25 milliGRAM(s) Oral three times a day  pantoprazole    Tablet 40 milliGRAM(s) Oral before breakfast    MEDICATIONS  (PRN):  guaiFENesin    Syrup 100 milliGRAM(s) Oral every 6 hours PRN Cough    PRESENT SYMPTOMS: [ ]Unable to obtain due to poor mentation   Source if other than patient:  [ ]Family   [ ]Team  [ ] Staff [ ] Inferred    Pain: [ ] yes [x ] no  QOL impact -   Location -                    Aggravating factors -  Quality -  Radiation -  Timing-  Severity (0-10 scale):  Minimal acceptable level (0-10 scale):       Pain Assessment (scores are out of 10)    O  L  D  C  A  R  T  S    Pain Now:  Pain average over 24 hours:  Pain maximum:  Onset of prn (minutes):  Pain minimum:  Time to pain minimum (minutes):  Personalized pain goal:  Duration of prn (hours):  Ascent of pain:  Pain score at which prn is requested:    Does the pain have a negative impact on the following domains  An "X" denotes yes    General activity                        []  Walking ability                          []  Mood                                          []  Sleep                                           []  Normal Work                            []  Relations with other people  []  Enjoyment of life                     []  Cognitive Tasks                        []      PAIN AD Score: 0    [email or web address]/painad.pdf (press ctrl +  left click to view)          Dyspnea:                           [ ]Mild [ ]Moderate [ ]Severe  Anxiety:                             [ ]Mild [ ]Moderate [ ]Severe  Fatigue:                             [ ]Mild [ ]Moderate [ ]Severe  Nausea:                             [ ]Mild [ ]Moderate [ ]Severe  Loss of appetite:              [ ]Mild [ ]Moderate [ ]Severe  Constipation:                    [ ]Mild [ ]Moderate [ ]Severe    Other Symptoms:  [x ]All other review of systems negative     Karnofsky Performance Score/Palliative Performance Status Version 2:      50   %      http://npcrc.org/files/news/palliative_performance_scale_ppsv2.pdf      PHYSICAL EXAM:  Vital Signs Last 24 Hrs  T(C): 36.7 (08 Nov 2019 11:37), Max: 36.8 (07 Nov 2019 20:00)  T(F): 98 (08 Nov 2019 11:37), Max: 98.2 (07 Nov 2019 20:00)  HR: 77 (08 Nov 2019 11:37) (69 - 82)  BP: 146/88 (08 Nov 2019 11:37) (128/76 - 149/89)  BP(mean): --  RR: 18 (08 Nov 2019 11:37) (18 - 18)  SpO2: 94% (08 Nov 2019 11:37) (93% - 96%) I&O's Summary    07 Nov 2019 07:01  -  08 Nov 2019 07:00  --------------------------------------------------------  IN: 480 mL / OUT: 325 mL / NET: 155 mL    08 Nov 2019 07:01  -  08 Nov 2019 14:21  --------------------------------------------------------  IN: 500 mL / OUT: 0 mL / NET: 500 mL      GENERAL:  [ x]Alert  [ ]Oriented x   [ ]Lethargic  [ ]Cachexia  [ ]Unarousable  [ ]Verbal  [ ]Non-Verbal [  x ] No distress   [   ] Gaunt  Behavioral:   [ ] Anxiety  [ ] Delirium [ ] Agitation  [ x  ]  Calm [ ] Other  HEENT:  [x ]Normal   [ ]Dry mouth   [ ]ET Tube/Trach  [ ]Oral lesions  [ ] Temporal Wasting  [ ]  Mucositis [ ]  Grade   PULMONARY:   [x ]Clear [ ]Tachypnea  [ ]Audible excessive secretions   [ ]Rhonchi        [ ]Right [ ]Left [ ]Bilateral  [ ]Crackles        [ ]Right [ ]Left [ ]Bilateral  [ ]Wheezing     [ ]Right [ ]Left [ ]Bilateral  [ ] Diminished  [ ] Right  [  ] Left   [ ] Bilaterally  CARDIOVASCULAR:    [x ]Regular [ ]Irregular [ ]Tachy  [ ]Cosmo [ ]Murmur [  ]   Edema  [ ]Other  GASTROINTESTINAL:  [ x]Soft  [ ]Distended   [ ]+BS  [ x]Non tender [ ]Tender  [ ]PEG [ ]OGT/ NGT    Last BM:       GENITOURINARY:  [x ]Normal [ ] Incontinent   [ ]Oliguria/Anuria   [ ]Jackson [   ] Suprapubic tenderness  MUSCULOSKELETAL:   [x ]Normal   [ ]Weakness  [ ]Bed/Wheelchair bound [ ]Edema [  ] amputation  [  ] contraction  NEUROLOGIC:   [ x]No focal deficits  [ ] Cognitive impairment  [ ] Dysphagia [ ]Dysarthria [ ] Paresis [  ] Aphasia  [ ]Other   SKIN:   [ x]Normal   [ ]Pressure ulcer(s)  [ ]Rash [   ]  Wound    [  ] hyperpigmentation    CRITICAL CARE:  [ ] Shock Present  [ ]Septic [ ]Cardiogenic [ ]Neurologic [ ]Hypovolemic  [ ]  Vasopressors [ ]  Inotropes   [ ] Respiratory failure present [ ] mechanical ventilation [ ] non-invasive ventilatory support [ ] High flow  [ ] Acute  [ ] Chronic [ ] Hypoxic  [ ] Hypercarbic [ ] Other  [ ] Other organ failure       LABS:                        10.0   3.28  )-----------( 22       ( 08 Nov 2019 07:06 )             32.6   11-08    136  |  102  |  13  ----------------------------<  87  4.5   |  19<L>  |  1.07    Ca    7.4<L>      08 Nov 2019 07:06                  RADIOLOGY & ADDITIONAL STUDIES:    PROTEIN CALORIE MALNUTRITION PRESENT: [ ] Yes [ ] No  [ ] PPSV2 < or = to 30% [ ] significant weight loss  [ ] poor nutritional intake [ ] catabolic state [ ] anasarca     Albumin, Serum: 2.3 g/dL (11-04-19 @ 10:45)  Artificial Nutrition [ ]     REFERRALS:   [ ]Chaplaincy  [ ] Hospice  [ ]Child Life  [ ]Social Work  [ ]Case management [ ]Holistic Therapy     Goals of Care Document:   Care Coordination Assessment [C. Provider] (11-06-19 @ 14:47)   Progress Notes - Care Coordination [C. Provider] (11-06-19 @ 15:00)

## 2019-11-08 NOTE — PHYSICAL THERAPY INITIAL EVALUATION ADULT - CRITERIA FOR SKILLED THERAPEUTIC INTERVENTIONS
rehab potential/functional limitations in following categories/risk reduction/prevention/impairments found/therapy frequency/anticipated discharge recommendation

## 2019-11-08 NOTE — PROGRESS NOTE ADULT - SUBJECTIVE AND OBJECTIVE BOX
CC: f/u for oozing from groin site being managed  by surgery     Patient is awake and alert , he reports feeling the same, no fevers     REVIEW OF SYSTEMS:  All other review of systems negative (Comprehensive ROS)      T(F): 97.8 (11-08-19 @ 04:08), Max: 98.2 (11-07-19 @ 20:00)  HR: 76 (11-08-19 @ 04:08)  BP: 129/81 (11-08-19 @ 04:08)  RR: 18 (11-08-19 @ 04:08)  SpO2: 96% (11-08-19 @ 04:08)  Wt(kg): --    PHYSICAL EXAM:  General: alert, no acute distress  Eyes:  anicteric, no conjunctival injection, no discharge  Oropharynx: no lesions or injection 	  Neck: supple, without adenopathy  Lungs: clear to auscultation  Heart: regular rate and rhythm; no murmur, rubs or gallops  Abdomen: soft, nondistended, nontender, without mass or organomegaly  Skin: no lesions  Extremities: no clubbing, cyanosis, or edema  Neurologic: alert, oriented, moves all extremities    LAB RESULTS:                        10.0   3.28  )-----------( 22       ( 08 Nov 2019 07:06 )             32.6     11-08    136  |  102  |  13  ----------------------------<  87  4.5   |  19<L>  |  1.07    Ca    7.4<L>      08 Nov 2019 07:06          MICROBIOLOGY:  RECENT CULTURES:  11-04 @ 14:25 .Blood Blood-Peripheral     No growth to date.          RADIOLOGY REVIEWED:        Antimicrobials Day #      Other Medications Reviewed

## 2019-11-08 NOTE — PROGRESS NOTE ADULT - SUBJECTIVE AND OBJECTIVE BOX
weak/  no cp/sob    REVIEW OF SYSTEMS:  GEN: no fever,    no chills  RESP: no SOB,   no cough  CVS: no chest pain,   no palpitations  GI: no abdominal pain,   no nausea,   no vomiting,   no constipation,   no diarrhea  : no dysuria,   no frequency  NEURO: no headache,   no dizziness  PSYCH: no depression,   not anxious  Derm : no rash    MEDICATIONS  (STANDING):  bicalutamide 50 milliGRAM(s) Oral daily  budesonide 160 MICROgram(s)/formoterol 4.5 MICROgram(s) Inhaler 2 Puff(s) Inhalation two times a day  carBAMazepine XR Tablet 200 milliGRAM(s) Oral two times a day  lidocaine 2% Injectable 20 milliLiter(s) Local Injection Once  metoprolol tartrate 25 milliGRAM(s) Oral three times a day  pantoprazole    Tablet 40 milliGRAM(s) Oral before breakfast    MEDICATIONS  (PRN):  guaiFENesin    Syrup 100 milliGRAM(s) Oral every 6 hours PRN Cough      Vital Signs Last 24 Hrs  T(C): 36.6 (08 Nov 2019 04:08), Max: 36.8 (07 Nov 2019 20:00)  T(F): 97.8 (08 Nov 2019 04:08), Max: 98.2 (07 Nov 2019 20:00)  HR: 76 (08 Nov 2019 04:08) (69 - 82)  BP: 129/81 (08 Nov 2019 04:08) (128/76 - 149/89)  BP(mean): --  RR: 18 (08 Nov 2019 04:08) (18 - 18)  SpO2: 96% (08 Nov 2019 04:08) (93% - 96%)  CAPILLARY BLOOD GLUCOSE      POCT Blood Glucose.: 85 mg/dL (08 Nov 2019 07:27)  POCT Blood Glucose.: 106 mg/dL (07 Nov 2019 21:14)  POCT Blood Glucose.: 128 mg/dL (07 Nov 2019 16:34)  POCT Blood Glucose.: 85 mg/dL (07 Nov 2019 11:26)    I&O's Summary    07 Nov 2019 07:01  -  08 Nov 2019 07:00  --------------------------------------------------------  IN: 480 mL / OUT: 325 mL / NET: 155 mL        PHYSICAL EXAM:  HEAD:  Atraumatic, Normocephalic  NECK: Supple, No   JVD  CHEST/LUNG:   no     rales,     no,    rhonchi  HEART: Regular rate and rhythm;         murmur  ABDOMEN: Soft, Nontender, ;   EXTREMITIES:  b/l       edema  NEUROLOGY:  alert    LABS:                        10.0   3.28  )-----------( 22       ( 08 Nov 2019 07:06 )             32.6     11-08    136  |  102  |  13  ----------------------------<  87  4.5   |  19<L>  |  1.07    Ca    7.4<L>      08 Nov 2019 07:06                              Consultant(s) Notes Reviewed:      Care Discussed with Consultants/Other Providers:

## 2019-11-08 NOTE — PROGRESS NOTE ADULT - SUBJECTIVE AND OBJECTIVE BOX
CARDIOLOGY     PROGRESS  NOTE   ________________________________________________    CHIEF COMPLAINT:Patient is a 79y old  Male who presents with a chief complaint of oozing from right groin procedure site (08 Nov 2019 09:17)  doing better  	  REVIEW OF SYSTEMS:  CONSTITUTIONAL: No fever, weight loss, or fatigue  EYES: No eye pain, visual disturbances, or discharge  ENT:  No difficulty hearing, tinnitus, vertigo; No sinus or throat pain  NECK: No pain or stiffness  RESPIRATORY: No cough, wheezing, chills or hemoptysis; + Shortness of Breath  CARDIOVASCULAR: No chest pain, palpitations, passing out, dizziness, or leg swelling  GASTROINTESTINAL: No abdominal or epigastric pain. No nausea, vomiting, or hematemesis; No diarrhea or constipation. No melena or hematochezia.  GENITOURINARY: No dysuria, frequency, hematuria, or incontinence  NEUROLOGICAL: No headaches, memory loss, loss of strength, numbness, or tremors  SKIN: No itching, burning, rashes, or lesions   LYMPH Nodes: No enlarged glands  ENDOCRINE: No heat or cold intolerance; No hair loss  MUSCULOSKELETAL: No joint pain or swelling; No muscle, back, or extremity pain  PSYCHIATRIC: No depression, anxiety, mood swings, or difficulty sleeping  HEME/LYMPH: No easy bruising, or bleeding gums  ALLERGY AND IMMUNOLOGIC: No hives or eczema	    [ ] All others negative	  [ ] Unable to obtain    PHYSICAL EXAM:  T(C): 36.6 (11-08-19 @ 04:08), Max: 36.8 (11-07-19 @ 20:00)  HR: 76 (11-08-19 @ 04:08) (69 - 82)  BP: 129/81 (11-08-19 @ 04:08) (128/76 - 149/89)  RR: 18 (11-08-19 @ 04:08) (18 - 18)  SpO2: 96% (11-08-19 @ 04:08) (93% - 96%)  Wt(kg): --  I&O's Summary    07 Nov 2019 07:01  -  08 Nov 2019 07:00  --------------------------------------------------------  IN: 480 mL / OUT: 325 mL / NET: 155 mL        Appearance: Normal	  HEENT:   Normal oral mucosa, PERRL, EOMI	  Lymphatic: No lymphadenopathy  Cardiovascular: Normal S1 S2, No JVD, + murmurs, No edema  Respiratory: Lungs clear to auscultation	  Psychiatry: A & O x 3, Mood & affect appropriate  Gastrointestinal:  Soft, Non-tender, + BS	  Skin: No rashes, No ecchymoses, No cyanosis	  Neurologic: Non-focal  Extremities: Normal range of motion, No clubbing, cyanosis or edema  Vascular: Peripheral pulses palpable 2+ bilaterally    MEDICATIONS  (STANDING):  bicalutamide 50 milliGRAM(s) Oral daily  budesonide 160 MICROgram(s)/formoterol 4.5 MICROgram(s) Inhaler 2 Puff(s) Inhalation two times a day  carBAMazepine XR Tablet 200 milliGRAM(s) Oral two times a day  lidocaine 2% Injectable 20 milliLiter(s) Local Injection Once  metoprolol tartrate 25 milliGRAM(s) Oral three times a day  pantoprazole    Tablet 40 milliGRAM(s) Oral before breakfast      TELEMETRY: 	    ECG:  	  RADIOLOGY:  OTHER: 	  	  LABS:	 	    CARDIAC MARKERS:                                10.0   3.28  )-----------( 22       ( 08 Nov 2019 07:06 )             32.6     11-08    136  |  102  |  13  ----------------------------<  87  4.5   |  19<L>  |  1.07    Ca    7.4<L>      08 Nov 2019 07:06      proBNP:   Lipid Profile:   HgA1c:   TSH:         Assessment and plan  ---------------------------  metastatic ca  pleural effusion sec to malignancy  echo results noted  s/p platelets infusion  consider palliative care  discussed with family, onc as out pt  le edema and abdominal ascites sec to underlying disaese

## 2019-11-08 NOTE — CONSULT NOTE ADULT - ATTENDING COMMENTS
I have seen and examined the patient.  I agree with the surgical resident's note.  There is no indication for any operative intervention.  The drainage site should have an ostomy appliance placed.    Homero Merlos contact information [phone]
Palliative Global Assessment  A review of the paper chart has been conducted  HCP form present:               Yes and valid []               Yes but invalid []                No [x]   MOLST present:                   Yes and valid []               Yes but invalid []                No [x]  Incapacity form present:   Yes and valid []                Yes but invalid []                No []                 N/A [x]  Living Will:                            Yes and valid []                Yes but invalid []                No [x]      Family Heatlh Care Decision Act Surrogate Decision Maker Hierarchy  [facility] Article [address] Guardian-->Spouse or domestic partner--> Adult child-->Parent-->Sibling--> Close friend      I spoke with the patient about HCP assignment.  I inquired if they had filled out a health care proxy form in the past and he claims his daughter Maria Victoria is his HCP  Next, I explained the [facility] hierarchy for surrogate decision maker selection  I highlighted the impact of not assigning a HCP  Questions were answered to his satisfaction      ACP Time > 16 min    Partner: Wife, he has two homes, on in [facility] [place] [facility] and one in [place]  Family Wife lives in [place] but is here due to his hospitalization  His four children alternate and take care of him: Maria Victoria/Ti/Collin/Ruben  Residence: A home is [facility]  Functionality: Requires assistance with some IADLs  Engagement in the home: Needs help going outside  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

## 2019-11-08 NOTE — PROGRESS NOTE ADULT - ASSESSMENT
80 y/o M      h/o      prostate cancer and  HCC,  w/ mets to  liver, bone.  lympangitic  carcinomatosis  IVC   filter , seen on ct       p/w surgical site leakage s/p hepatic artery embolism (By Dr. Christiano Jimenez  [id number], [id number])     via  right    femoral site as an access site.        S/P   abdominal paracentesis x 5 days ago,            Patient now returning to ED., for  drainage  from that  groin site  Surgery eval noted     Casodex 50 mg/day for prostate cancer.   Low platelets  noted./  s/p  platelets  on arrival   echo in er. with pericardial  effusion   awaiting  official  echo.  pt  with    extensive  metastatic   disease/  pancytopenia  from  malignancy     DM 2,  follow  fs/  HTN,  on   lopressor/  stopped  norvasc  rpt  ct  abdomen, noted, mets  to  liver/  b/l  pl  effusions/ anasarca/  severe  right  hydro   no  groin pathology on ct/    suspect   on going   mild  oozing  from puncture  site, is  from  extensive  anasarca/  s/q  fluid retention.  from   hypoalbuminemia  of  1,9  to  2/  /  severe  protein calorie  malnutrition from  advanceD  cancer/  no ab  per  ID   per   urology/   ct urogram,  poor  excretion , right renal system /   moderate  right hydro/ no urothelial mass    await  urology f/p   if no intervention  planned, then  may start  d/c  planning  still awaiting palliative  care ev   < from: CT Abdomen and Pelvis w/ IV Cont (11.07.19 @ 14:42) >  IMPRESSION:   Moderate right hydronephrosis to the UPJ is unchanged. No urothelial mass   visualized.  Asymmetric right bladder thickening again noted.  Multifocal HCC unchanged.  Small splenic infarct.  Innumerable pulmonary nodules again noted.  < end of copied text >      < from: CT Abdomen and Pelvis No Cont (11.05.19 @ 10:43) >  IMPRESSION:   No acute right groin pathology.   Bilobar hepatic metastatic disease.  Bilateral pleural effusions and small ascites. Anasarca.  Moderate to severe right hydroureteronephrosis to the right pelvis   without change.  < end of copied text >       < from: CT Angio Abdomen and Pelvis w/ IV Cont (10.29.19 @ 19:39) >  IMPRESION:   Subcapsular and perihepatic hematoma. No active extravasation.   Infiltrative hepatic metastatic disease.  No active gastrointestinal bleeding.  Increased ascites. Multifocal metastatic disease as demonstrated on 10/8.  Increased groundglass lung opacities, pulmonary edema or infection.   Lymphangitic carcinomatosis stable.  Worsened anasarca. Right thigh subcutaneous edema or cellulitis. Chronic   narrowing of the infrarenal IVC without acute thrombus.  Other incidental comments as above.  < end of copied text >         < from: MR Knee No Cont, Right (07.07.19 @ 14:45) >  IMPRESSION:   Large confluent infiltrative lesion involving the entirety of both the   medial and lateral tibial plateaus of the tibial metaphysis most   concerning for metastatic disease. There is evidence of a nondisplaced   pathologic fracture along the posterior intercondylar region of the   proximal tibia extending into the posterior central margin of the medial   and lateral tibial plateaus. There is extensive surrounding periosseous,   subcutaneous, and intramuscular edema which is likely reactive.   Additional smaller rounded metastatic lesions are seen within the lateral   femoral condyle and the proximal tibial metaphysis. Findings discussed   with Dr. Bolanos.  Large joint effusion.  < end of copied text >

## 2019-11-08 NOTE — PROGRESS NOTE ADULT - ASSESSMENT
78 y/o M with PMHx of prostate cancer s/p radiation seeds on casodex dx in 2004, metastatic HCC, CVA in 2019 on chemo, gastric ulcer s/p EGD/cauterization in July 2019, now s/p abdominal paracentesis and hepatic artery embolization 10/30/19 readmitted with increased drainage from right femoral access site and Ct scan findings of stable right hydronephrosis with a normal creatinine.  CT urogram shows a R UPJ Obstruction.  No definitive crossing vessel seen.    - No need for urgent intervention at this time as patient is pain free with unaffected renal function  - Will need definitive management with a pyeloplasty vs drainage of the right kidney after discussion in the office of his options  - No further urologic work up necessary while in hospital, but will likely need a MAG3 Renal Lasix scan   - Urology to sign off, please re-consult as necessary.  Patient to follow up at the [facility] at [phone] after discharge.

## 2019-11-08 NOTE — PROGRESS NOTE ADULT - SUBJECTIVE AND OBJECTIVE BOX
Subjective    Seen & examined at bedside  CT Urogram reviewed with patient, appears to be UPJ obstruction    Objective    Vital signs  T(F): , Max: 98.2 (11-07-19 @ 20:00)  HR: 77 (11-08-19 @ 11:37)  BP: 146/88 (11-08-19 @ 11:37)  SpO2: 94% (11-08-19 @ 11:37)  Wt(kg): --    Output     11-07 @ 07:01  -  11-08 @ 07:00  --------------------------------------------------------  IN: 480 mL / OUT: 325 mL / NET: 155 mL    11-08 @ 07:01  -  11-08 @ 12:50  --------------------------------------------------------  IN: 200 mL / OUT: 0 mL / NET: 200 mL    Physical Exam  Gen: NAD  Abd: Softly distended, NT  Back: no CVAT     Labs  11-08 @ 07:06  WBC 3.28  / Hct 32.6  / SCr 1.07     11-07 @ 06:11  WBC 3.61  / Hct 29.2  / SCr 1.00     Urine Cx: Culture - Urine (10.30.19 @ 08:36)    Specimen Source: .Urine Clean Catch (Midstream)    Culture Results:   Less than 10,000 cols/cc; Insignificant amount of growth.    Blood Cx: Culture - Blood (11.04.19 @ 14:25)    Specimen Source: .Blood Blood-Peripheral    Culture Results:   No growth to date.    Imaging    < from: CT Abdomen and Pelvis w/ IV Cont (11.07.19 @ 14:42) >  IMPRESSION:   Moderate right hydronephrosis to the UPJ is unchanged. No urothelial mass   visualized.    Asymmetric right bladder thickening again noted.    Multifocal HCC unchanged.    Small splenic infarct.    Innumerable pulmonary nodules again noted.    < end of copied text >

## 2019-11-08 NOTE — CONSULT NOTE ADULT - PROBLEM SELECTOR RECOMMENDATION 2
Follows with Dr. Jesus Weeks in the [facility] for EBRT  He is unsure about the name of his oncologist but knows that Carlos referred him, and was formerly at [facility]  Will find out the outpatient plan from the primary team

## 2019-11-08 NOTE — CONSULT NOTE ADULT - CONSULT REASON
Right groin drainage
consulted for assistance with medical decision making in the context of a geriatric patient with advanced cancer
cva/ HTN/opericardial effusion
groin drainage
right hydronephrosis
Throbombocytopenia, hepatocellular carcinoma

## 2019-11-09 ENCOUNTER — TRANSCRIPTION ENCOUNTER (OUTPATIENT)
Age: 79
End: 2019-11-09

## 2019-11-09 LAB
ANION GAP SERPL CALC-SCNC: 12 MMOL/L — SIGNIFICANT CHANGE UP (ref 5–17)
BUN SERPL-MCNC: 12 MG/DL — SIGNIFICANT CHANGE UP (ref 7–23)
CALCIUM SERPL-MCNC: 7.5 MG/DL — LOW (ref 8.4–10.5)
CHLORIDE SERPL-SCNC: 103 MMOL/L — SIGNIFICANT CHANGE UP (ref 96–108)
CO2 SERPL-SCNC: 20 MMOL/L — LOW (ref 22–31)
CREAT SERPL-MCNC: 1.03 MG/DL — SIGNIFICANT CHANGE UP (ref 0.5–1.3)
CULTURE RESULTS: SIGNIFICANT CHANGE UP
CULTURE RESULTS: SIGNIFICANT CHANGE UP
GLUCOSE BLDC GLUCOMTR-MCNC: 109 MG/DL — HIGH (ref 70–99)
GLUCOSE BLDC GLUCOMTR-MCNC: 90 MG/DL — SIGNIFICANT CHANGE UP (ref 70–99)
GLUCOSE SERPL-MCNC: 93 MG/DL — SIGNIFICANT CHANGE UP (ref 70–99)
HCT VFR BLD CALC: 27.6 % — LOW (ref 39–50)
HCT VFR BLD CALC: 31 % — LOW (ref 39–50)
HGB BLD-MCNC: 10 G/DL — LOW (ref 13–17)
HGB BLD-MCNC: 9 G/DL — LOW (ref 13–17)
MCHC RBC-ENTMCNC: 29.9 PG — SIGNIFICANT CHANGE UP (ref 27–34)
MCHC RBC-ENTMCNC: 30.1 PG — SIGNIFICANT CHANGE UP (ref 27–34)
MCHC RBC-ENTMCNC: 32.3 GM/DL — SIGNIFICANT CHANGE UP (ref 32–36)
MCHC RBC-ENTMCNC: 32.6 GM/DL — SIGNIFICANT CHANGE UP (ref 32–36)
MCV RBC AUTO: 92.3 FL — SIGNIFICANT CHANGE UP (ref 80–100)
MCV RBC AUTO: 92.8 FL — SIGNIFICANT CHANGE UP (ref 80–100)
NRBC # BLD: 0 /100 WBCS — SIGNIFICANT CHANGE UP (ref 0–0)
NRBC # BLD: 0 /100 WBCS — SIGNIFICANT CHANGE UP (ref 0–0)
PLATELET # BLD AUTO: 17 K/UL — CRITICAL LOW (ref 150–400)
PLATELET # BLD AUTO: 17 K/UL — CRITICAL LOW (ref 150–400)
POTASSIUM SERPL-MCNC: 4.1 MMOL/L — SIGNIFICANT CHANGE UP (ref 3.5–5.3)
POTASSIUM SERPL-SCNC: 4.1 MMOL/L — SIGNIFICANT CHANGE UP (ref 3.5–5.3)
RBC # BLD: 2.99 M/UL — LOW (ref 4.2–5.8)
RBC # BLD: 3.34 M/UL — LOW (ref 4.2–5.8)
RBC # FLD: 20.3 % — HIGH (ref 10.3–14.5)
RBC # FLD: 20.4 % — HIGH (ref 10.3–14.5)
SODIUM SERPL-SCNC: 135 MMOL/L — SIGNIFICANT CHANGE UP (ref 135–145)
SPECIMEN SOURCE: SIGNIFICANT CHANGE UP
SPECIMEN SOURCE: SIGNIFICANT CHANGE UP
WBC # BLD: 3.9 K/UL — SIGNIFICANT CHANGE UP (ref 3.8–10.5)
WBC # BLD: 3.96 K/UL — SIGNIFICANT CHANGE UP (ref 3.8–10.5)
WBC # FLD AUTO: 3.9 K/UL — SIGNIFICANT CHANGE UP (ref 3.8–10.5)
WBC # FLD AUTO: 3.96 K/UL — SIGNIFICANT CHANGE UP (ref 3.8–10.5)

## 2019-11-09 PROCEDURE — 99232 SBSQ HOSP IP/OBS MODERATE 35: CPT

## 2019-11-09 RX ADMIN — BICALUTAMIDE 50 MILLIGRAM(S): 50 TABLET, FILM COATED ORAL at 13:03

## 2019-11-09 RX ADMIN — Medication 200 MILLIGRAM(S): at 17:11

## 2019-11-09 RX ADMIN — PANTOPRAZOLE SODIUM 40 MILLIGRAM(S): 20 TABLET, DELAYED RELEASE ORAL at 05:42

## 2019-11-09 RX ADMIN — Medication 25 MILLIGRAM(S): at 13:03

## 2019-11-09 RX ADMIN — Medication 100 MILLIGRAM(S): at 17:12

## 2019-11-09 RX ADMIN — Medication 200 MILLIGRAM(S): at 05:42

## 2019-11-09 RX ADMIN — BUDESONIDE AND FORMOTEROL FUMARATE DIHYDRATE 2 PUFF(S): 160; 4.5 AEROSOL RESPIRATORY (INHALATION) at 05:41

## 2019-11-09 RX ADMIN — Medication 25 MILLIGRAM(S): at 21:59

## 2019-11-09 RX ADMIN — Medication 25 MILLIGRAM(S): at 05:42

## 2019-11-09 RX ADMIN — Medication 100 MILLIGRAM(S): at 02:12

## 2019-11-09 RX ADMIN — BUDESONIDE AND FORMOTEROL FUMARATE DIHYDRATE 2 PUFF(S): 160; 4.5 AEROSOL RESPIRATORY (INHALATION) at 17:11

## 2019-11-09 NOTE — PROVIDER CONTACT NOTE (CRITICAL VALUE NOTIFICATION) - BACKGROUND
Patient admitted with  R Groin Drainage, S/P recent embolization of HCC using R groin site        Thrombocytopenia s/p 2 units platelets        Moderate Right hydronephrosis        HCC, no plans for further treatments at this time

## 2019-11-09 NOTE — PROVIDER CONTACT NOTE (CRITICAL VALUE NOTIFICATION) - SITUATION
Patient noted with hematoma 41.5cm x 18.5 cm in size to right sub scapular region (previously present per patient- not new) Per family has been there but appears to be larger now. Site marked and STAT CBC obtained

## 2019-11-09 NOTE — DISCHARGE NOTE PROVIDER - NSDCQMPCI_CARD_ALL_CORE
Spoke with patient to let him know that biopsy for mass in the right anterior chest wall adjacent to 7th rib would be able to be done per consult with KEVAN Betancourt RPA.  Advised patient that someone from our scheduling will be in touch with him for date and time.     No

## 2019-11-09 NOTE — DISCHARGE NOTE PROVIDER - CARE PROVIDER_API CALL
Jerson Merlos)  Neurology; Vascular Neurology  [address], [address] [place] [address]  Phone: [phone]  Fax: [phone]  Follow Up Time: Routine    Anmol Daugherty)  Internal Medicine; Medical Oncology  [address] [place] [address]  Phone: [phone]  Fax: [phone]  Follow Up Time: Routine    Tapan Phillips  [address] [place] [address]  Phone: [phone]  Fax: (   )    -  Follow Up Time: Routine    Dami Mock  [facility]   [address] [place] [address]  Phone: [phone]  Fax: (   )    -  Follow Up Time: Routine    Alice Trinidad (DO)  Cardiovascular Disease  [address] [facility], [address]  [place] [address]  Phone: [phone]  Fax: [phone]  Follow Up Time: Routine

## 2019-11-09 NOTE — PROGRESS NOTE ADULT - SUBJECTIVE AND OBJECTIVE BOX
ij bed/ family atbedisde    REVIEW OF SYSTEMS:  GEN: no fever,    no chills  RESP: no SOB,   no cough  CVS: no chest pain,   no palpitations  GI: no abdominal pain,   no nausea,   no vomiting,   no constipation,   no diarrhea  : no dysuria,   no frequency  NEURO: no headache,   no dizziness  PSYCH: no depression,   not anxious  Derm : no rash    MEDICATIONS  (STANDING):  bicalutamide 50 milliGRAM(s) Oral daily  budesonide 160 MICROgram(s)/formoterol 4.5 MICROgram(s) Inhaler 2 Puff(s) Inhalation two times a day  carBAMazepine XR Tablet 200 milliGRAM(s) Oral two times a day  lidocaine 2% Injectable 20 milliLiter(s) Local Injection Once  metoprolol tartrate 25 milliGRAM(s) Oral three times a day  pantoprazole    Tablet 40 milliGRAM(s) Oral before breakfast    MEDICATIONS  (PRN):  guaiFENesin    Syrup 100 milliGRAM(s) Oral every 6 hours PRN Cough      Vital Signs Last 24 Hrs  T(C): 36.8 (09 Nov 2019 12:08), Max: 36.9 (08 Nov 2019 20:58)  T(F): 98.2 (09 Nov 2019 12:08), Max: 98.4 (08 Nov 2019 20:58)  HR: 70 (09 Nov 2019 12:08) (70 - 84)  BP: 177/72 (09 Nov 2019 12:08) (119/74 - 177/72)  BP(mean): --  RR: 18 (09 Nov 2019 12:08) (17 - 18)  SpO2: 99% (09 Nov 2019 12:08) (95% - 99%)  CAPILLARY BLOOD GLUCOSE      POCT Blood Glucose.: 98 mg/dL (08 Nov 2019 21:29)  POCT Blood Glucose.: 101 mg/dL (08 Nov 2019 16:30)    I&O's Summary    08 Nov 2019 07:01  -  09 Nov 2019 07:00  --------------------------------------------------------  IN: 840 mL / OUT: 300 mL / NET: 540 mL    09 Nov 2019 07:01  -  09 Nov 2019 12:12  --------------------------------------------------------  IN: 240 mL / OUT: 0 mL / NET: 240 mL        PHYSICAL EXAM:  HEAD:  Atraumatic, Normocephalic  NECK: Supple, No   JVD  CHEST/LUNG:   no     rales,     no,    rhonchi  HEART: Regular rate and rhythm;         murmur  ABDOMEN: Soft, Nontender, ;   EXTREMITIES:   c/c     edema  NEUROLOGY:  alert  ecchymoses. ols s/p fall  at home, about 8  days ago, of  right lateral back/ appears to be resolving. involving linear length of  right lower  . lateral back    LABS:                        10.0   3.96  )-----------( 17       ( 09 Nov 2019 06:09 )             31.0     11-09    135  |  103  |  12  ----------------------------<  93  4.1   |  20<L>  |  1.03    Ca    7.5<L>      09 Nov 2019 06:09                              Consultant(s) Notes Reviewed:      Care Discussed with Consultants/Other Providers:

## 2019-11-09 NOTE — PROGRESS NOTE ADULT - ASSESSMENT
80 y/o M      h/o      prostate cancer and  HCC,  w/ mets to  liver, bone.  lympangitic  carcinomatosis  IVC   filter , seen on ct       p/w surgical site leakage s/p hepatic artery embolism (By Dr. Christiano Jimenez  [id number], [id number])     via  right    femoral site as an access site.        S/P   abdominal paracentesis x 5 days ago,/  s/p fall at home. with ecchymoses  right lower/ lateral  back            Patient now returning to ED., for  drainage  from that  groin site  Surgery eval noted     Casodex 50 mg/day for prostate cancer.   Low platelets  noted./  s/p  platelets  on arrival   echo in er. with pericardial  effusion   awaiting  official  echo.  pt  with    extensive  metastatic   disease/  pancytopenia  from  malignancy     DM 2,  follow  fs/  HTN,  on   lopressor/  stopped  norvasc  rpt  ct  abdomen, noted, mets  to  liver/  b/l  pl  effusions/ anasarca/  severe  right  hydro   no  groin pathology on ct/    suspect   on going   mild  oozing  from puncture  site, is  from  extensive  anasarca/  s/q  fluid retention.  from   hypoalbuminemia  of  1,9  to  2/  /  severe  protein calorie  malnutrition from  advanceD  cancer/  no ab  per  ID   per   urology/   ct urogram,  poor  excretion , right renal system /   moderate  right hydro/ no urothelial mass   no intervention, per  urology    seen by  palliative  care    home  hospice  would  be ideal  follow platelets in am       < from: CT Abdomen and Pelvis w/ IV Cont (11.07.19 @ 14:42) >  IMPRESSION:   Moderate right hydronephrosis to the UPJ is unchanged. No urothelial mass   visualized.  Asymmetric right bladder thickening again noted.  Multifocal HCC unchanged.  Small splenic infarct.  Innumerable pulmonary nodules again noted.  < end of copied text >    < from: CT Abdomen and Pelvis No Cont (11.05.19 @ 10:43) >  IMPRESSION:   No acute right groin pathology.   Bilobar hepatic metastatic disease.  Bilateral pleural effusions and small ascites. Anasarca.  Moderate to severe right hydroureteronephrosis to the right pelvis   without change.  < end of copied text >     < from: CT Angio Abdomen and Pelvis w/ IV Cont (10.29.19 @ 19:39) >  IMPRESION:   Subcapsular and perihepatic hematoma. No active extravasation.   Infiltrative hepatic metastatic disease.  No active gastrointestinal bleeding.  Increased ascites. Multifocal metastatic disease as demonstrated on 10/8.  Increased groundglass lung opacities, pulmonary edema or infection.   Lymphangitic carcinomatosis stable.  Worsened anasarca. Right thigh subcutaneous edema or cellulitis. Chronic   narrowing of the infrarenal IVC without acute thrombus.  Other incidental comments as above.  < end of copied text >         < from: MR Knee No Cont, Right (07.07.19 @ 14:45) >  IMPRESSION:   Large confluent infiltrative lesion involving the entirety of both the   medial and lateral tibial plateaus of the tibial metaphysis most   concerning for metastatic disease. There is evidence of a nondisplaced   pathologic fracture along the posterior intercondylar region of the   proximal tibia extending into the posterior central margin of the medial   and lateral tibial plateaus. There is extensive surrounding periosseous,   subcutaneous, and intramuscular edema which is likely reactive.   Additional smaller rounded metastatic lesions are seen within the lateral   femoral condyle and the proximal tibial metaphysis. Findings discussed   with Dr. Bolanos.  Large joint effusion.  < end of copied text >

## 2019-11-09 NOTE — DISCHARGE NOTE PROVIDER - CARE PROVIDERS DIRECT ADDRESSES
[email or web address],DirectAddress_Unknown,DirectAddress_Unknown,DirectAddress_Unknown,DirectAddress_Unknown

## 2019-11-09 NOTE — DISCHARGE NOTE PROVIDER - NSDCMRMEDTOKEN_GEN_ALL_CORE_FT
Advair Diskus 250 mcg-50 mcg inhalation powder: 1 puff(s) inhaled , As Needed  amLODIPine 10 mg oral tablet: 1 tab(s) orally once a day   atorvastatin 40 mg oral tablet: 1 tab(s) orally once a day  Avastin: intravenous every 2 weeks  carBAMazepine 100 mg oral tablet, chewable: 1 tab(s) orally 2 times a day  Colace 100 mg oral capsule: 1 cap(s) orally , As Needed  Cyramza 10 mg/mL intravenous solution: 512 milligram(s) intravenous every 2 weeks  metFORMIN 1000 mg oral tablet: 1 tab(s) orally 2 times a day  metoprolol tartrate 25 mg oral tablet: 0.5 tab(s) orally 2 times a day  NexAVAR 200 mg oral tablet: 2 tab(s) orally once a day  NexAVAR 200 mg oral tablet: 2 tab(s) orally once a day  ondansetron 8 mg oral tablet: 1 tab(s) orally 2 times a day, As Needed  (only on radiation therapy)  Opdivo: intravenous every 2 weeks  pantoprazole 40 mg oral delayed release tablet: 1 tab(s) orally 2 times a day  Symbicort 160 mcg-4.5 mcg/inh inhalation aerosol: 2 puff(s) inhaled 2 times a day, As Needed  terazosin 5 mg oral capsule: 1 cap(s) orally once a day (at bedtime)  traMADol 50 mg oral tablet: 1 tab(s) orally , As Needed  Vitamin D2 50,000 intl units (1.25 mg) oral capsule: 1 cap(s) orally 2 times a week  (Mon &amp; Thurs) Advair Diskus 250 mcg-50 mcg inhalation powder: 1 puff(s) inhaled , As Needed  amLODIPine 10 mg oral tablet: 1 tab(s) orally once a day   atorvastatin 40 mg oral tablet: 1 tab(s) orally once a day  Avastin: intravenous every 2 weeks  carBAMazepine 200 mg oral tablet, extended release: 1 tab(s) orally 2 times a day  Cyramza 10 mg/mL intravenous solution: 512 milligram(s) intravenous every 2 weeks  metFORMIN 1000 mg oral tablet: 1 tab(s) orally 2 times a day  metoprolol tartrate 25 mg oral tablet: 1 tab(s) orally 3 times a day  NexAVAR 200 mg oral tablet: 2 tab(s) orally once a day  ondansetron 8 mg oral tablet: 1 tab(s) orally 2 times a day, As Needed  (only on radiation therapy)  Opdivo: intravenous every 2 weeks  pantoprazole 40 mg oral delayed release tablet: 1 tab(s) orally 2 times a day  Symbicort 160 mcg-4.5 mcg/inh inhalation aerosol: 2 puff(s) inhaled 2 times a day, As Needed  terazosin 5 mg oral capsule: 1 cap(s) orally once a day (at bedtime)  traMADol 50 mg oral tablet: 1 tab(s) orally , As Needed  Vitamin D2 50,000 intl units (1.25 mg) oral capsule: 1 cap(s) orally 2 times a week  (Mon &amp; Thurs)

## 2019-11-09 NOTE — DISCHARGE NOTE PROVIDER - PROVIDER TOKENS
PROVIDER:[TOKEN:[[id number]],FOLLOWUP:[Routine]],PROVIDER:[TOKEN:[[id number]],FOLLOWUP:[Routine]],FREE:[LAST:[Akuoko],FIRST:[Tapan],PHONE:[phone]:[(   )    -],ADDRESS:[[facility]-[address] [place] [address]],FOLLOWUP:[Routine]],FREE:[LAST:[Nethala],FIRST:[Dami],PHONE:[phone]:[(   )    -],ADDRESS:[[facility]   [address] [place] [address]],FOLLOWUP:[Routine]],PROVIDER:[TOKEN:[[id number]],FOLLOWUP:[Routine]]

## 2019-11-09 NOTE — PROVIDER CONTACT NOTE (CRITICAL VALUE NOTIFICATION) - BACKGROUND
Dx: R Groin Drainage, S/P recent embolization of HCC using R groin site        Thrombocytopenia s/p 2 units platelets        Moderate Right hydronephrosis        HCC, no plans for further treatments at this time

## 2019-11-09 NOTE — PROGRESS NOTE ADULT - SUBJECTIVE AND OBJECTIVE BOX
CARDIOLOGY     PROGRESS  NOTE   ________________________________________________    CHIEF COMPLAINT:Patient is a 79y old  Male who presents with a chief complaint of oozing from right groin procedure site (08 Nov 2019 14:20)  doing better.  	  REVIEW OF SYSTEMS:  CONSTITUTIONAL: No fever, weight loss, or fatigue  EYES: No eye pain, visual disturbances, or discharge  ENT:  No difficulty hearing, tinnitus, vertigo; No sinus or throat pain  NECK: No pain or stiffness  RESPIRATORY: No cough, wheezing, chills or hemoptysis; No Shortness of Breath  CARDIOVASCULAR: No chest pain, palpitations, passing out, dizziness, or leg swelling  GASTROINTESTINAL: No abdominal or epigastric pain. No nausea, vomiting, or hematemesis; No diarrhea or constipation. No melena or hematochezia.  GENITOURINARY: No dysuria, frequency, hematuria, or incontinence  NEUROLOGICAL: No headaches, memory loss, loss of strength, numbness, or tremors  SKIN: No itching, burning, rashes, or lesions   LYMPH Nodes: No enlarged glands  ENDOCRINE: No heat or cold intolerance; No hair loss  MUSCULOSKELETAL: No joint pain or swelling; No muscle, back, or extremity pain  PSYCHIATRIC: No depression, anxiety, mood swings, or difficulty sleeping  HEME/LYMPH: No easy bruising, or bleeding gums  ALLERGY AND IMMUNOLOGIC: No hives or eczema	    [ ] All others negative	  [ ] Unable to obtain    PHYSICAL EXAM:  T(C): 36.4 (11-09-19 @ 04:46), Max: 36.9 (11-08-19 @ 20:58)  HR: 76 (11-09-19 @ 04:46) (76 - 84)  BP: 128/79 (11-09-19 @ 04:46) (119/74 - 146/88)  RR: 18 (11-09-19 @ 04:46) (17 - 18)  SpO2: 95% (11-09-19 @ 04:46) (94% - 98%)  Wt(kg): --  I&O's Summary    08 Nov 2019 07:01  -  09 Nov 2019 07:00  --------------------------------------------------------  IN: 840 mL / OUT: 300 mL / NET: 540 mL        Appearance: Normal	  HEENT:   Normal oral mucosa, PERRL, EOMI	  Lymphatic: No lymphadenopathy  Cardiovascular: Normal S1 S2, No JVD, +murmurs, No edema  Respiratory: Lungs clear to auscultation	  Psychiatry: A & O x 3, Mood & affect appropriate  Gastrointestinal:  Soft, Non-tender, + BS	  Skin: No rashes, No ecchymoses, No cyanosis	  Neurologic: Non-focal  Extremities: Normal range of motion, No clubbing, cyanosis or edema  Vascular: Peripheral pulses palpable 2+ bilaterally    MEDICATIONS  (STANDING):  bicalutamide 50 milliGRAM(s) Oral daily  budesonide 160 MICROgram(s)/formoterol 4.5 MICROgram(s) Inhaler 2 Puff(s) Inhalation two times a day  carBAMazepine XR Tablet 200 milliGRAM(s) Oral two times a day  lidocaine 2% Injectable 20 milliLiter(s) Local Injection Once  metoprolol tartrate 25 milliGRAM(s) Oral three times a day  pantoprazole    Tablet 40 milliGRAM(s) Oral before breakfast      TELEMETRY: 	    ECG:  	  RADIOLOGY:  OTHER: 	  	  LABS:	 	    CARDIAC MARKERS:                                10.0   3.96  )-----------( 17       ( 09 Nov 2019 06:09 )             31.0     11-09    135  |  103  |  12  ----------------------------<  93  4.1   |  20<L>  |  1.03    Ca    7.5<L>      09 Nov 2019 06:09      proBNP:   Lipid Profile:   HgA1c:   TSH:         Assessment and plan  ---------------------------  metastatic ca  pleural effusion sec to malignancy  echo results noted  s/p platelets infusion  consider palliative care  discussed with family, onc as out pt  le edema and abdominal ascites sec to underlying disease

## 2019-11-09 NOTE — DISCHARGE NOTE PROVIDER - NSDCCPCAREPLAN_GEN_ALL_CORE_FT
PRINCIPAL DISCHARGE DIAGNOSIS  Diagnosis: Reaction at surgical site, initial encounter  Assessment and Plan of Treatment: No systemic signs of infection.  No local signs of infection. Negative blood cultures, negative CT scan, lack of a soft tissue infection.      SECONDARY DISCHARGE DIAGNOSES  Diagnosis: Thrombocytopenia  Assessment and Plan of Treatment: HOME CARE INSTRUCTIONS  Check the skin and linings inside your mouth for bruising or bleeding as directed by your caregiver.  Check your sputum, urine, and stool for blood as directed by your caregiver.  Do not return to any activities that could cause bumps or bruises until your caregiver says it is okay.  Take extra care not to cut yourself when shaving or when using scissors, needles, knives, and other tools.  Take extra care not to burn yourself when ironing or cooking.   Ask your caregiver if it is okay for you to drink alcohol.  Only take over-the-counter or prescription medicines as directed by your caregiver.  Notify all your caregivers, including dentists and eye doctors, about your condition.  SEEK IMMEDIATE MEDICAL CARE IF:  You develop active bleeding from anywhere in your body.   You develop unexplained bruising or bleeding.  You have blood in your sputum, urine, or stool      Diagnosis: DM (diabetes mellitus)  Assessment and Plan of Treatment: Make sure you get your HgA1c checked every three months.  If you take oral diabetes medications, check your blood glucose two times a day.  If you take insulin, check your blood glucose before meals and at bedtime.  It's important not to skip any meals.  Keep a log of your blood glucose results and always take it with you to your doctor appointments.  Keep a list of your current medications including injectables and over the counter medications and bring this medication list with you to all your doctor appointments.  If you have not seen your ophthalmologist this year call for appointment.  Check your feet daily for redness, sores, or openings. Do not self treat. If no improvement in two days call your primary care physician for an appointment.  Low blood sugar (hypoglycemia) is a blood sugar below 70mg/dl. Check your blood sugar if you feel signs/symptoms of hypoglycemia. If your blood sugar is below 70 take 15 grams of carbohydrates (ex 4 oz of apple juice, 3-4 glucose tablets, or 4-6 oz of regular soda) wait 15 minutes and repeat blood sugar to make sure it comes up above 70.  If your blood sugar is above 70 and you are due for a meal, have a meal.  If you are not due for a meal have a snack.  This snack helps keeps your blood sugar at a safe range.

## 2019-11-09 NOTE — CHART NOTE - NSCHARTNOTEFT_GEN_A_CORE
Interval events    CC; Subscapular hematoma extending to Right flank area  Notified by Rn that patient with hematoma on right back. evaluated the patient at bedside. Patient lying in bed, in no acute distress. Per patient and his son patient had right subscapular hematoma prior to admission. patient stated  that hematoma was seen by providers since admission.   patient denies Ha, dizziness, SOB/CP, hematochezia, hematemesis, daniel, abdominal pain, hematuria.    Vital Signs Last 24 Hrs  T(C): 36.9 (08 Nov 2019 20:58), Max: 36.9 (08 Nov 2019 20:58)  T(F): 98.4 (08 Nov 2019 20:58), Max: 98.4 (08 Nov 2019 20:58)  HR: 79 (08 Nov 2019 22:04) (76 - 84)  BP: 146/77 (08 Nov 2019 22:04) (119/74 - 146/88)  BP(mean): --  RR: 17 (08 Nov 2019 20:58) (17 - 18)  SpO2: 98% (08 Nov 2019 20:58) (94% - 98%)                          9.0    3.90  )-----------( 17       ( 09 Nov 2019 00:14 )             27.6     < from: CT Abdomen and Pelvis w/ IV Cont (11.07.19 @ 14:42) >    IMPRESSION:   Moderate right hydronephrosis to the UPJ is unchanged. No urothelial mass   visualized.    Asymmetric right bladder thickening again noted.  Multifocal HCC unchanged.  Small splenic infarct.  Innumerable pulmonary nodules again noted.  Physical exam  Patient alert and orientedx3  CVS: S1, S2 regular or murmur  Chest: bilateral breath sound without rales, right subscapular hematoma extending to right flank arae size 41.5cmx18.5 cm  Abdomen: soft, not tender, BS+,   vascular: No cyanossi/edema B/L LE  No focal neuro deficit    80 yo male with history of prostate cancer and metastatic HCC who is s/p recent paracentesis followed a few days later by IR hepatic artery embolization secondary to a subcapsular hematoma. Patient has been followed by surgery for groin drainage    Subscapular hematoma extending to Right flank area noted now, no prior documentation of the size of hematoma  CBC stat sent, stable, PLT 22-> 17  HD stable  Spoke to surgery green team, monitor the site per surgery  Will call Attending for further plan of care, and as well will call Hem onc  CTA chest and abdomen if patient symptomatic  F/U hem onc am  Will follow    Naila Daly P [place]  [phone]

## 2019-11-09 NOTE — DISCHARGE NOTE PROVIDER - HOSPITAL COURSE
79 year old male PMH prostate cancer (dx 2004), metastatic HCC, CVA in 2019 (was on Lovenox but stopped June 2019), gastric ulcer s/p EGD/cauterization in July 2019, Right inguinal hernia repair several years ago, now presenting to the ED for drainage from right groin access site.

## 2019-11-10 DIAGNOSIS — K40.90 UNILATERAL INGUINAL HERNIA, WITHOUT OBSTRUCTION OR GANGRENE, NOT SPECIFIED AS RECURRENT: ICD-10-CM
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
## 2019-11-10 DIAGNOSIS — E11.9 TYPE 2 DIABETES MELLITUS WITHOUT COMPLICATIONS: ICD-10-CM

## 2019-11-10 DIAGNOSIS — D64.9 ANEMIA, UNSPECIFIED: ICD-10-CM

## 2019-11-10 DIAGNOSIS — D69.6 THROMBOCYTOPENIA, UNSPECIFIED: ICD-10-CM

## 2019-11-10 DIAGNOSIS — I12.9 HYPERTENSIVE CHRONIC KIDNEY DISEASE WITH STAGE 1 THROUGH STAGE 4 CHRONIC KIDNEY DISEASE, OR UNSPECIFIED CHRONIC KIDNEY DISEASE: ICD-10-CM

## 2019-11-10 DIAGNOSIS — I25.10 ATHEROSCLEROTIC HEART DISEASE OF NATIVE CORONARY ARTERY WITHOUT ANGINA PECTORIS: ICD-10-CM

## 2019-11-10 DIAGNOSIS — R53.1 WEAKNESS: ICD-10-CM

## 2019-11-10 DIAGNOSIS — N18.3 CHRONIC KIDNEY DISEASE, STAGE 3 (MODERATE): ICD-10-CM

## 2019-11-10 DIAGNOSIS — C78.7 SECONDARY MALIGNANT NEOPLASM OF LIVER AND INTRAHEPATIC BILE DUCT: ICD-10-CM

## 2019-11-10 DIAGNOSIS — R18.8 OTHER ASCITES: ICD-10-CM

## 2019-11-10 DIAGNOSIS — S36.112A CONTUSION OF LIVER, INITIAL ENCOUNTER: ICD-10-CM

## 2019-11-10 DIAGNOSIS — J45.909 UNSPECIFIED ASTHMA, UNCOMPLICATED: ICD-10-CM

## 2019-11-10 DIAGNOSIS — C79.51 SECONDARY MALIGNANT NEOPLASM OF BONE: ICD-10-CM

## 2019-11-10 DIAGNOSIS — J44.9 CHRONIC OBSTRUCTIVE PULMONARY DISEASE, UNSPECIFIED: ICD-10-CM

## 2019-11-10 DIAGNOSIS — E78.5 HYPERLIPIDEMIA, UNSPECIFIED: ICD-10-CM

## 2019-11-10 DIAGNOSIS — K25.9 GASTRIC ULCER, UNSPECIFIED AS ACUTE OR CHRONIC, WITHOUT HEMORRHAGE OR PERFORATION: ICD-10-CM

## 2019-11-10 DIAGNOSIS — C61 MALIGNANT NEOPLASM OF PROSTATE: ICD-10-CM

## 2019-11-10 DIAGNOSIS — Z86.73 PERSONAL HISTORY OF TRANSIENT ISCHEMIC ATTACK (TIA), AND CEREBRAL INFARCTION WITHOUT RESIDUAL DEFICITS: ICD-10-CM
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
## 2019-11-10 DIAGNOSIS — Z87.891 PERSONAL HISTORY OF NICOTINE DEPENDENCE: ICD-10-CM

## 2019-11-10 LAB
GLUCOSE BLDC GLUCOMTR-MCNC: 104 MG/DL — HIGH (ref 70–99)
GLUCOSE BLDC GLUCOMTR-MCNC: 119 MG/DL — HIGH (ref 70–99)
GLUCOSE BLDC GLUCOMTR-MCNC: 128 MG/DL — HIGH (ref 70–99)
GLUCOSE BLDC GLUCOMTR-MCNC: 152 MG/DL — HIGH (ref 70–99)
HCT VFR BLD CALC: 32.2 % — LOW (ref 39–50)
HGB BLD-MCNC: 9.9 G/DL — LOW (ref 13–17)
MCHC RBC-ENTMCNC: 28.8 PG — SIGNIFICANT CHANGE UP (ref 27–34)
MCHC RBC-ENTMCNC: 30.7 GM/DL — LOW (ref 32–36)
MCV RBC AUTO: 93.6 FL — SIGNIFICANT CHANGE UP (ref 80–100)
PLATELET # BLD AUTO: 21 K/UL — LOW (ref 150–400)
RBC # BLD: 3.44 M/UL — LOW (ref 4.2–5.8)
RBC # FLD: 20.5 % — HIGH (ref 10.3–14.5)
WBC # BLD: 4.34 K/UL — SIGNIFICANT CHANGE UP (ref 3.8–10.5)
WBC # FLD AUTO: 4.34 K/UL — SIGNIFICANT CHANGE UP (ref 3.8–10.5)

## 2019-11-10 PROCEDURE — 99232 SBSQ HOSP IP/OBS MODERATE 35: CPT

## 2019-11-10 RX ORDER — OXYCODONE AND ACETAMINOPHEN 5; 325 MG/1; MG/1
1 TABLET ORAL EVERY 6 HOURS
Refills: 0 | Status: DISCONTINUED | OUTPATIENT
Start: 2019-11-10 | End: 2019-11-11

## 2019-11-10 RX ADMIN — BUDESONIDE AND FORMOTEROL FUMARATE DIHYDRATE 2 PUFF(S): 160; 4.5 AEROSOL RESPIRATORY (INHALATION) at 17:24

## 2019-11-10 RX ADMIN — OXYCODONE AND ACETAMINOPHEN 1 TABLET(S): 5; 325 TABLET ORAL at 13:14

## 2019-11-10 RX ADMIN — Medication 25 MILLIGRAM(S): at 05:07

## 2019-11-10 RX ADMIN — OXYCODONE AND ACETAMINOPHEN 1 TABLET(S): 5; 325 TABLET ORAL at 21:38

## 2019-11-10 RX ADMIN — Medication 200 MILLIGRAM(S): at 05:07

## 2019-11-10 RX ADMIN — BICALUTAMIDE 50 MILLIGRAM(S): 50 TABLET, FILM COATED ORAL at 13:14

## 2019-11-10 RX ADMIN — Medication 100 MILLIGRAM(S): at 21:38

## 2019-11-10 RX ADMIN — Medication 200 MILLIGRAM(S): at 17:24

## 2019-11-10 RX ADMIN — OXYCODONE AND ACETAMINOPHEN 1 TABLET(S): 5; 325 TABLET ORAL at 22:00

## 2019-11-10 RX ADMIN — OXYCODONE AND ACETAMINOPHEN 1 TABLET(S): 5; 325 TABLET ORAL at 14:00

## 2019-11-10 RX ADMIN — BUDESONIDE AND FORMOTEROL FUMARATE DIHYDRATE 2 PUFF(S): 160; 4.5 AEROSOL RESPIRATORY (INHALATION) at 05:07

## 2019-11-10 RX ADMIN — Medication 25 MILLIGRAM(S): at 13:14

## 2019-11-10 RX ADMIN — Medication 100 MILLIGRAM(S): at 05:07

## 2019-11-10 RX ADMIN — PANTOPRAZOLE SODIUM 40 MILLIGRAM(S): 20 TABLET, DELAYED RELEASE ORAL at 05:07

## 2019-11-10 RX ADMIN — Medication 25 MILLIGRAM(S): at 21:38

## 2019-11-10 NOTE — PROGRESS NOTE ADULT - SUBJECTIVE AND OBJECTIVE BOX
resting/ eager to go home    REVIEW OF SYSTEMS:  GEN: no fever,    no chills  RESP: no SOB,   no cough  CVS: no chest pain,   no palpitations  GI: no abdominal pain,   no nausea,   no vomiting,   no constipation,   no diarrhea  : no dysuria,   no frequency  NEURO: no headache,   no dizziness  PSYCH: no depression,   not anxious  Derm : no rash    MEDICATIONS  (STANDING):  bicalutamide 50 milliGRAM(s) Oral daily  budesonide 160 MICROgram(s)/formoterol 4.5 MICROgram(s) Inhaler 2 Puff(s) Inhalation two times a day  carBAMazepine XR Tablet 200 milliGRAM(s) Oral two times a day  lidocaine 2% Injectable 20 milliLiter(s) Local Injection Once  metoprolol tartrate 25 milliGRAM(s) Oral three times a day  pantoprazole    Tablet 40 milliGRAM(s) Oral before breakfast    MEDICATIONS  (PRN):  guaiFENesin    Syrup 100 milliGRAM(s) Oral every 6 hours PRN Cough      Vital Signs Last 24 Hrs  T(C): 36.7 (10 Nov 2019 04:35), Max: 36.8 (09 Nov 2019 12:08)  T(F): 98.1 (10 Nov 2019 04:35), Max: 98.3 (09 Nov 2019 20:26)  HR: 78 (10 Nov 2019 04:35) (70 - 90)  BP: 145/87 (10 Nov 2019 04:35) (115/70 - 177/72)  BP(mean): --  RR: 18 (10 Nov 2019 04:35) (17 - 18)  SpO2: 95% (10 Nov 2019 04:35) (94% - 99%)  CAPILLARY BLOOD GLUCOSE      POCT Blood Glucose.: 104 mg/dL (10 Nov 2019 07:38)  POCT Blood Glucose.: 90 mg/dL (09 Nov 2019 21:12)  POCT Blood Glucose.: 109 mg/dL (09 Nov 2019 16:37)    I&O's Summary    09 Nov 2019 07:01  -  10 Nov 2019 07:00  --------------------------------------------------------  IN: 680 mL / OUT: 200 mL / NET: 480 mL        PHYSICAL EXAM:  HEAD:  Atraumatic, Normocephalic  NECK: Supple, No   JVD  CHEST/LUNG:   no     rales,     no,    rhonchi  HEART: Regular rate and rhythm;         murmur  ABDOMEN: Soft, Nontender, ;   EXTREMITIES:     b/ l   edema  NEUROLOGY:  alert  ecchymoses. back/  c/c    LABS:                        10.0   3.96  )-----------( 17       ( 09 Nov 2019 06:09 )             31.0     11-09    135  |  103  |  12  ----------------------------<  93  4.1   |  20<L>  |  1.03    Ca    7.5<L>      09 Nov 2019 06:09                              Consultant(s) Notes Reviewed:      Care Discussed with Consultants/Other Providers:

## 2019-11-10 NOTE — PROVIDER CONTACT NOTE (OTHER) - ACTION/TREATMENT ORDERED:
JOSE Duran made aware- STAT morning labs to be ordered.
Continue cardiac monitoring continue med management.

## 2019-11-10 NOTE — PROGRESS NOTE ADULT - ASSESSMENT
80 y/o M      h/o      prostate cancer and  HCC,  w/ mets to  liver, bone.  lympangitic  carcinomatosis  IVC   filter , seen on ct       p/w surgical site leakage s/p hepatic artery embolism (By Dr. Christiano Jimenez  [id number], [id number])     via  right    femoral site as an access site.        S/P   abdominal paracentesis x 5 days ago,/  s/p fall at home. with ecchymoses  right lower/ lateral  back            Patient now returning to ED., for  drainage  from that  groin site  Surgery eval noted     Casodex 50 mg/day for prostate cancer.   Low platelets  noted./  s/p  platelets  on arrival   echo in er. with pericardial  effusion   awaiting  official  echo.  pt  with    extensive  metastatic   disease/  pancytopenia  from  malignancy     DM 2,  follow  fs/  HTN,  on   lopressor/  stopped  norvasc  rpt  ct  abdomen, noted, mets  to  liver/  b/l  pl  effusions/ anasarca/  severe  right  hydro   no  groin pathology on ct/    suspect   on going   mild  oozing  from puncture  site, is  from  extensive  anasarca/  s/q  fluid retention.  from   hypoalbuminemia  of  1,9  to  2/  /  severe  protein calorie  malnutrition from  advanceD  cancer/  no ab  per  ID   per   urology/   ct urogram,  poor  excretion , right renal system /   moderate  right hydro/ no urothelial mass   no intervention, per  urology    seen by  palliative  care    home  hospice  would  be ideal  follow platelets , pending today  start  d/c planning/ pt  wants to  go home       < from: CT Abdomen and Pelvis w/ IV Cont (11.07.19 @ 14:42) >  IMPRESSION:   Moderate right hydronephrosis to the UPJ is unchanged. No urothelial mass   visualized.  Asymmetric right bladder thickening again noted.  Multifocal HCC unchanged.  Small splenic infarct.  Innumerable pulmonary nodules again noted.  < end of copied text >    < from: CT Abdomen and Pelvis No Cont (11.05.19 @ 10:43) >  IMPRESSION:   No acute right groin pathology.   Bilobar hepatic metastatic disease.  Bilateral pleural effusions and small ascites. Anasarca.  Moderate to severe right hydroureteronephrosis to the right pelvis   without change.  < end of copied text >     < from: CT Angio Abdomen and Pelvis w/ IV Cont (10.29.19 @ 19:39) >  IMPRESION:   Subcapsular and perihepatic hematoma. No active extravasation.   Infiltrative hepatic metastatic disease.  No active gastrointestinal bleeding.  Increased ascites. Multifocal metastatic disease as demonstrated on 10/8.  Increased groundglass lung opacities, pulmonary edema or infection.   Lymphangitic carcinomatosis stable.  Worsened anasarca. Right thigh subcutaneous edema or cellulitis. Chronic   narrowing of the infrarenal IVC without acute thrombus.  Other incidental comments as above.  < end of copied text >         < from: MR Knee No Cont, Right (07.07.19 @ 14:45) >  IMPRESSION:   Large confluent infiltrative lesion involving the entirety of both the   medial and lateral tibial plateaus of the tibial metaphysis most   concerning for metastatic disease. There is evidence of a nondisplaced   pathologic fracture along the posterior intercondylar region of the   proximal tibia extending into the posterior central margin of the medial   and lateral tibial plateaus. There is extensive surrounding periosseous,   subcutaneous, and intramuscular edema which is likely reactive.   Additional smaller rounded metastatic lesions are seen within the lateral   femoral condyle and the proximal tibial metaphysis. Findings discussed   with Dr. Bolanos.  Large joint effusion.  < end of copied text >

## 2019-11-10 NOTE — PROVIDER CONTACT NOTE (OTHER) - BACKGROUND
Admit: RT groin drainage PMH: Prostate CA, Liver CA, anemia
Dx: R Groin Drainage, S/P recent embolization of HCC using R groin site,  Thrombocytopenia s/p 2 units platelets, Moderate Right hydronephrosis, HCC, no plans for further treatments at this time

## 2019-11-10 NOTE — PROVIDER CONTACT NOTE (OTHER) - ASSESSMENT
A7Ox4, VSS. denies chest pain, sob, dizziness.
Patient alert and oriented x 4. VSS. Asymptomatic and asleep prior to assessment.

## 2019-11-11 ENCOUNTER — TRANSCRIPTION ENCOUNTER (OUTPATIENT)
Age: 79
End: 2019-11-11

## 2019-11-11 ENCOUNTER — INPATIENT (INPATIENT)
Facility: HOSPITAL | Age: 79
LOS: 10 days | Discharge: HOSPICE | End: 2019-11-22
Attending: INTERNAL MEDICINE | Admitting: INTERNAL MEDICINE
Payer: MEDICARE

## 2019-11-11 VITALS
RESPIRATION RATE: 18 BRPM | WEIGHT: 139.99 LBS | TEMPERATURE: 98 F | OXYGEN SATURATION: 99 % | HEART RATE: 63 BPM | SYSTOLIC BLOOD PRESSURE: 83 MMHG | HEIGHT: 68 IN | DIASTOLIC BLOOD PRESSURE: 51 MMHG

## 2019-11-11 VITALS
TEMPERATURE: 98 F | OXYGEN SATURATION: 97 % | DIASTOLIC BLOOD PRESSURE: 85 MMHG | HEART RATE: 85 BPM | RESPIRATION RATE: 16 BRPM | SYSTOLIC BLOOD PRESSURE: 123 MMHG

## 2019-11-11 DIAGNOSIS — Z98.890 OTHER SPECIFIED POSTPROCEDURAL STATES: Chronic | ICD-10-CM

## 2019-11-11 LAB
ALBUMIN SERPL ELPH-MCNC: 1.5 G/DL — LOW (ref 3.3–5)
ALP SERPL-CCNC: 277 U/L — HIGH (ref 40–120)
ALT FLD-CCNC: 34 U/L — SIGNIFICANT CHANGE UP (ref 12–78)
ANION GAP SERPL CALC-SCNC: 8 MMOL/L — SIGNIFICANT CHANGE UP (ref 5–17)
ANISOCYTOSIS BLD QL: SIGNIFICANT CHANGE UP
APTT BLD: 26.5 SEC — LOW (ref 27.5–36.3)
AST SERPL-CCNC: 70 U/L — HIGH (ref 15–37)
BASOPHILS # BLD AUTO: 0 K/UL — SIGNIFICANT CHANGE UP (ref 0–0.2)
BASOPHILS NFR BLD AUTO: 0 % — SIGNIFICANT CHANGE UP (ref 0–2)
BILIRUB SERPL-MCNC: 0.7 MG/DL — SIGNIFICANT CHANGE UP (ref 0.2–1.2)
BUN SERPL-MCNC: 12 MG/DL — SIGNIFICANT CHANGE UP (ref 7–23)
CALCIUM SERPL-MCNC: 6.7 MG/DL — LOW (ref 8.5–10.1)
CHLORIDE SERPL-SCNC: 107 MMOL/L — SIGNIFICANT CHANGE UP (ref 96–108)
CO2 SERPL-SCNC: 19 MMOL/L — LOW (ref 22–31)
CREAT SERPL-MCNC: 0.99 MG/DL — SIGNIFICANT CHANGE UP (ref 0.5–1.3)
ELLIPTOCYTES BLD QL SMEAR: SLIGHT — SIGNIFICANT CHANGE UP
EOSINOPHIL # BLD AUTO: 0.03 K/UL — SIGNIFICANT CHANGE UP (ref 0–0.5)
EOSINOPHIL NFR BLD AUTO: 0.8 % — SIGNIFICANT CHANGE UP (ref 0–6)
GLUCOSE BLDC GLUCOMTR-MCNC: 130 MG/DL — HIGH (ref 70–99)
GLUCOSE BLDC GLUCOMTR-MCNC: 86 MG/DL — SIGNIFICANT CHANGE UP (ref 70–99)
GLUCOSE BLDC GLUCOMTR-MCNC: 97 MG/DL — SIGNIFICANT CHANGE UP (ref 70–99)
GLUCOSE SERPL-MCNC: 113 MG/DL — HIGH (ref 70–99)
HCT VFR BLD CALC: 27.1 % — LOW (ref 39–50)
HCT VFR BLD CALC: 31.1 % — LOW (ref 39–50)
HGB BLD-MCNC: 8.2 G/DL — LOW (ref 13–17)
HGB BLD-MCNC: 9.5 G/DL — LOW (ref 13–17)
HYPOCHROMIA BLD QL: SLIGHT — SIGNIFICANT CHANGE UP
IMM GRANULOCYTES NFR BLD AUTO: 0.3 % — SIGNIFICANT CHANGE UP (ref 0–1.5)
INR BLD: 1.13 RATIO — SIGNIFICANT CHANGE UP (ref 0.88–1.16)
LACTATE SERPL-SCNC: 2.3 MMOL/L — HIGH (ref 0.7–2)
LYMPHOCYTES # BLD AUTO: 0.37 K/UL — LOW (ref 1–3.3)
LYMPHOCYTES # BLD AUTO: 9.8 % — LOW (ref 13–44)
MACROCYTES BLD QL: SLIGHT — SIGNIFICANT CHANGE UP
MANUAL SMEAR VERIFICATION: SIGNIFICANT CHANGE UP
MCHC RBC-ENTMCNC: 29 PG — SIGNIFICANT CHANGE UP (ref 27–34)
MCHC RBC-ENTMCNC: 29.5 PG — SIGNIFICANT CHANGE UP (ref 27–34)
MCHC RBC-ENTMCNC: 30.3 GM/DL — LOW (ref 32–36)
MCHC RBC-ENTMCNC: 30.5 GM/DL — LOW (ref 32–36)
MCV RBC AUTO: 94.8 FL — SIGNIFICANT CHANGE UP (ref 80–100)
MCV RBC AUTO: 97.5 FL — SIGNIFICANT CHANGE UP (ref 80–100)
MONOCYTES # BLD AUTO: 0.4 K/UL — SIGNIFICANT CHANGE UP (ref 0–0.9)
MONOCYTES NFR BLD AUTO: 10.6 % — SIGNIFICANT CHANGE UP (ref 2–14)
NEUTROPHILS # BLD AUTO: 2.96 K/UL — SIGNIFICANT CHANGE UP (ref 1.8–7.4)
NEUTROPHILS NFR BLD AUTO: 78.5 % — HIGH (ref 43–77)
PLAT MORPH BLD: NORMAL — SIGNIFICANT CHANGE UP
PLATELET # BLD AUTO: 19 K/UL — CRITICAL LOW (ref 150–400)
POIKILOCYTOSIS BLD QL AUTO: SLIGHT — SIGNIFICANT CHANGE UP
POLYCHROMASIA BLD QL SMEAR: SLIGHT — SIGNIFICANT CHANGE UP
POTASSIUM SERPL-MCNC: 4.4 MMOL/L — SIGNIFICANT CHANGE UP (ref 3.5–5.3)
POTASSIUM SERPL-SCNC: 4.4 MMOL/L — SIGNIFICANT CHANGE UP (ref 3.5–5.3)
PROT SERPL-MCNC: 4.9 GM/DL — LOW (ref 6–8.3)
PROTHROM AB SERPL-ACNC: 12.7 SEC — SIGNIFICANT CHANGE UP (ref 10–12.9)
RBC # BLD: 2.78 M/UL — LOW (ref 4.2–5.8)
RBC # BLD: 3.28 M/UL — LOW (ref 4.2–5.8)
RBC # FLD: 20.8 % — HIGH (ref 10.3–14.5)
RBC # FLD: 21 % — HIGH (ref 10.3–14.5)
RBC BLD AUTO: ABNORMAL
SCHISTOCYTES BLD QL AUTO: SLIGHT — SIGNIFICANT CHANGE UP
SODIUM SERPL-SCNC: 134 MMOL/L — LOW (ref 135–145)
TROPONIN I SERPL-MCNC: 0.03 NG/ML — SIGNIFICANT CHANGE UP (ref 0.01–0.04)
WBC # BLD: 3.44 K/UL — LOW (ref 3.8–10.5)
WBC # BLD: 3.77 K/UL — LOW (ref 3.8–10.5)
WBC # FLD AUTO: 3.44 K/UL — LOW (ref 3.8–10.5)
WBC # FLD AUTO: 3.77 K/UL — LOW (ref 3.8–10.5)

## 2019-11-11 PROCEDURE — 81001 URINALYSIS AUTO W/SCOPE: CPT

## 2019-11-11 PROCEDURE — 85610 PROTHROMBIN TIME: CPT

## 2019-11-11 PROCEDURE — 74176 CT ABD & PELVIS W/O CONTRAST: CPT

## 2019-11-11 PROCEDURE — 84132 ASSAY OF SERUM POTASSIUM: CPT

## 2019-11-11 PROCEDURE — 85730 THROMBOPLASTIN TIME PARTIAL: CPT

## 2019-11-11 PROCEDURE — 93308 TTE F-UP OR LMTD: CPT

## 2019-11-11 PROCEDURE — 99285 EMERGENCY DEPT VISIT HI MDM: CPT | Mod: 25

## 2019-11-11 PROCEDURE — 82330 ASSAY OF CALCIUM: CPT

## 2019-11-11 PROCEDURE — 83605 ASSAY OF LACTIC ACID: CPT

## 2019-11-11 PROCEDURE — 93010 ELECTROCARDIOGRAM REPORT: CPT

## 2019-11-11 PROCEDURE — 86900 BLOOD TYPING SEROLOGIC ABO: CPT

## 2019-11-11 PROCEDURE — 84100 ASSAY OF PHOSPHORUS: CPT

## 2019-11-11 PROCEDURE — 87633 RESP VIRUS 12-25 TARGETS: CPT

## 2019-11-11 PROCEDURE — 86901 BLOOD TYPING SEROLOGIC RH(D): CPT

## 2019-11-11 PROCEDURE — 82435 ASSAY OF BLOOD CHLORIDE: CPT

## 2019-11-11 PROCEDURE — 86850 RBC ANTIBODY SCREEN: CPT

## 2019-11-11 PROCEDURE — 99291 CRITICAL CARE FIRST HOUR: CPT

## 2019-11-11 PROCEDURE — 87581 M.PNEUMON DNA AMP PROBE: CPT

## 2019-11-11 PROCEDURE — 70496 CT ANGIOGRAPHY HEAD: CPT | Mod: 26

## 2019-11-11 PROCEDURE — 85014 HEMATOCRIT: CPT

## 2019-11-11 PROCEDURE — 84295 ASSAY OF SERUM SODIUM: CPT

## 2019-11-11 PROCEDURE — 82947 ASSAY GLUCOSE BLOOD QUANT: CPT

## 2019-11-11 PROCEDURE — 85027 COMPLETE CBC AUTOMATED: CPT

## 2019-11-11 PROCEDURE — 70498 CT ANGIOGRAPHY NECK: CPT | Mod: 26

## 2019-11-11 PROCEDURE — 71045 X-RAY EXAM CHEST 1 VIEW: CPT

## 2019-11-11 PROCEDURE — 36430 TRANSFUSION BLD/BLD COMPNT: CPT

## 2019-11-11 PROCEDURE — 97162 PT EVAL MOD COMPLEX 30 MIN: CPT

## 2019-11-11 PROCEDURE — 82803 BLOOD GASES ANY COMBINATION: CPT

## 2019-11-11 PROCEDURE — 74177 CT ABD & PELVIS W/CONTRAST: CPT

## 2019-11-11 PROCEDURE — 80053 COMPREHEN METABOLIC PANEL: CPT

## 2019-11-11 PROCEDURE — 93970 EXTREMITY STUDY: CPT

## 2019-11-11 PROCEDURE — 87798 DETECT AGENT NOS DNA AMP: CPT

## 2019-11-11 PROCEDURE — 82962 GLUCOSE BLOOD TEST: CPT

## 2019-11-11 PROCEDURE — 93976 VASCULAR STUDY: CPT

## 2019-11-11 PROCEDURE — 93005 ELECTROCARDIOGRAM TRACING: CPT

## 2019-11-11 PROCEDURE — 94640 AIRWAY INHALATION TREATMENT: CPT

## 2019-11-11 PROCEDURE — P9037: CPT

## 2019-11-11 PROCEDURE — 83735 ASSAY OF MAGNESIUM: CPT

## 2019-11-11 PROCEDURE — 80048 BASIC METABOLIC PNL TOTAL CA: CPT

## 2019-11-11 PROCEDURE — 87040 BLOOD CULTURE FOR BACTERIA: CPT

## 2019-11-11 PROCEDURE — 99238 HOSP IP/OBS DSCHRG MGMT 30/<: CPT

## 2019-11-11 PROCEDURE — 87486 CHLMYD PNEUM DNA AMP PROBE: CPT

## 2019-11-11 RX ORDER — SODIUM CHLORIDE 9 MG/ML
1000 INJECTION INTRAMUSCULAR; INTRAVENOUS; SUBCUTANEOUS ONCE
Refills: 0 | Status: COMPLETED | OUTPATIENT
Start: 2019-11-11 | End: 2019-11-11

## 2019-11-11 RX ORDER — SORAFENIB 200 MG/1
2 TABLET, FILM COATED ORAL
Qty: 0 | Refills: 0 | DISCHARGE

## 2019-11-11 RX ORDER — CARBAMAZEPINE 200 MG
1 TABLET ORAL
Qty: 60 | Refills: 0
Start: 2019-11-11 | End: 2019-12-10

## 2019-11-11 RX ORDER — METOPROLOL TARTRATE 50 MG
0.5 TABLET ORAL
Qty: 0 | Refills: 0 | DISCHARGE

## 2019-11-11 RX ORDER — METOPROLOL TARTRATE 50 MG
1 TABLET ORAL
Qty: 90 | Refills: 0
Start: 2019-11-11 | End: 2019-12-10

## 2019-11-11 RX ORDER — METOPROLOL TARTRATE 50 MG
1 TABLET ORAL
Qty: 0 | Refills: 0 | DISCHARGE
Start: 2019-11-11

## 2019-11-11 RX ORDER — DOCUSATE SODIUM 100 MG
1 CAPSULE ORAL
Qty: 0 | Refills: 0 | DISCHARGE

## 2019-11-11 RX ORDER — CARBAMAZEPINE 200 MG
1 TABLET ORAL
Qty: 0 | Refills: 0 | DISCHARGE

## 2019-11-11 RX ADMIN — BUDESONIDE AND FORMOTEROL FUMARATE DIHYDRATE 2 PUFF(S): 160; 4.5 AEROSOL RESPIRATORY (INHALATION) at 05:26

## 2019-11-11 RX ADMIN — SODIUM CHLORIDE 1000 MILLILITER(S): 9 INJECTION INTRAMUSCULAR; INTRAVENOUS; SUBCUTANEOUS at 23:30

## 2019-11-11 RX ADMIN — Medication 25 MILLIGRAM(S): at 16:01

## 2019-11-11 RX ADMIN — Medication 25 MILLIGRAM(S): at 05:26

## 2019-11-11 RX ADMIN — Medication 200 MILLIGRAM(S): at 05:25

## 2019-11-11 RX ADMIN — OXYCODONE AND ACETAMINOPHEN 1 TABLET(S): 5; 325 TABLET ORAL at 12:25

## 2019-11-11 RX ADMIN — SODIUM CHLORIDE 1000 MILLILITER(S): 9 INJECTION INTRAMUSCULAR; INTRAVENOUS; SUBCUTANEOUS at 23:55

## 2019-11-11 RX ADMIN — PANTOPRAZOLE SODIUM 40 MILLIGRAM(S): 20 TABLET, DELAYED RELEASE ORAL at 05:25

## 2019-11-11 RX ADMIN — BICALUTAMIDE 50 MILLIGRAM(S): 50 TABLET, FILM COATED ORAL at 11:49

## 2019-11-11 RX ADMIN — SODIUM CHLORIDE 1000 MILLILITER(S): 9 INJECTION INTRAMUSCULAR; INTRAVENOUS; SUBCUTANEOUS at 22:55

## 2019-11-11 RX ADMIN — OXYCODONE AND ACETAMINOPHEN 1 TABLET(S): 5; 325 TABLET ORAL at 11:55

## 2019-11-11 NOTE — ED ADULT NURSE NOTE - OBJECTIVE STATEMENT
pt received in triage as code stroke. This RN accompanied pt to CT scan and pt was placed in bed 1 upon return to unit. pt presented with right sided facial droop. pt received in triage as code stroke. This RN accompanied pt to CT scan and pt was placed in bed 1 upon return to unit. pt presented with right sided facial droop, with slow speech. IV lines placed and fluids administered as per Dr. Rawls. as per pt's family, pt was last seen normal at 2110 before going to the bathroom. pt then took a nap and woke up approximately 2150 and family noticed right sided facial droop, and right upper extremity weakness. pt's right leg hospital recently and as per family, the dose of his medications were adjusted. appears swollen, which is normal as per pt. pt's right side of abdomen has a bruise, which pt and family are aware of and are following with doctor. pt was at on cardiac monitor at bedside. family at bedside. pt's condition improved after receiving fluids. pt does not have facial droop and no weakness in right extremity. pt is able to speak in complete sentence and recognizes family members

## 2019-11-11 NOTE — PROGRESS NOTE ADULT - SUBJECTIVE AND OBJECTIVE BOX
CARDIOLOGY     PROGRESS  NOTE   ________________________________________________    CHIEF COMPLAINT:Patient is a 79y old  Male who presents with a chief complaint of oozing from right groin procedure site (10 Nov 2019 10:17)  no complain.  	  REVIEW OF SYSTEMS:  CONSTITUTIONAL: No fever, weight loss, or fatigue  EYES: No eye pain, visual disturbances, or discharge  ENT:  No difficulty hearing, tinnitus, vertigo; No sinus or throat pain  NECK: No pain or stiffness  RESPIRATORY: No cough, wheezing, chills or hemoptysis; + Shortness of Breath  CARDIOVASCULAR: No chest pain, palpitations, passing out, dizziness, or leg swelling  GASTROINTESTINAL: No abdominal or epigastric pain. No nausea, vomiting, or hematemesis; No diarrhea or constipation. No melena or hematochezia.  GENITOURINARY: No dysuria, frequency, hematuria, or incontinence  NEUROLOGICAL: No headaches, memory loss, loss of strength, numbness, or tremors  SKIN: No itching, burning, rashes, or lesions   LYMPH Nodes: No enlarged glands  ENDOCRINE: No heat or cold intolerance; No hair loss  MUSCULOSKELETAL: No joint pain or swelling; No muscle, back, or extremity pain  PSYCHIATRIC: No depression, anxiety, mood swings, or difficulty sleeping  HEME/LYMPH: No easy bruising, or bleeding gums  ALLERGY AND IMMUNOLOGIC: No hives or eczema	    [ ] All others negative	  [ ] Unable to obtain    PHYSICAL EXAM:  T(C): 36.7 (11-11-19 @ 04:06), Max: 37 (11-10-19 @ 11:00)  HR: 84 (11-11-19 @ 05:22) (80 - 84)  BP: 132/88 (11-11-19 @ 05:22) (124/81 - 145/92)  RR: 17 (11-11-19 @ 04:06) (17 - 18)  SpO2: 95% (11-11-19 @ 04:06) (94% - 96%)  Wt(kg): --  I&O's Summary    10 Nov 2019 07:01  -  11 Nov 2019 07:00  --------------------------------------------------------  IN: 600 mL / OUT: 250 mL / NET: 350 mL        Appearance: Normal	  HEENT:   Normal oral mucosa, PERRL, EOMI	  Lymphatic: No lymphadenopathy  Cardiovascular: Normal S1 S2, No JVD, + murmurs, No edema  Respiratory: Lungs clear to auscultation	  Psychiatry: A & O x 3, Mood & affect appropriate  Gastrointestinal:  Soft, Non-tender, + BS, + ascites  Skin: No rashes, No ecchymoses, No cyanosis	  Neurologic: Non-focal  Extremities: Normal range of motion, No clubbing, cyanosis or edema  Vascular: Peripheral pulses palpable 2+ bilaterally    MEDICATIONS  (STANDING):  bicalutamide 50 milliGRAM(s) Oral daily  budesonide 160 MICROgram(s)/formoterol 4.5 MICROgram(s) Inhaler 2 Puff(s) Inhalation two times a day  carBAMazepine XR Tablet 200 milliGRAM(s) Oral two times a day  lidocaine 2% Injectable 20 milliLiter(s) Local Injection Once  metoprolol tartrate 25 milliGRAM(s) Oral three times a day  pantoprazole    Tablet 40 milliGRAM(s) Oral before breakfast      TELEMETRY: 	    ECG:  	  RADIOLOGY:  OTHER: 	  	  LABS:	 	    CARDIAC MARKERS:                                9.5    3.77  )-----------( x        ( 11 Nov 2019 08:01 )             31.1           proBNP:   Lipid Profile:   HgA1c:   TSH:         Assessment and plan  ---------------------------  metastatic ca  pleural effusion sec to malignancy  echo results noted  s/p platelets infusion  consider palliative care  discussed with family, onc as out pt  le edema and abdominal ascites sec to underlying disease   will add ace if bp remains high

## 2019-11-11 NOTE — DISCHARGE NOTE NURSING/CASE MANAGEMENT/SOCIAL WORK - PATIENT PORTAL LINK FT
You can access the FollowMyHealth Patient Portal offered by [facility] by registering at the following website: [email or web address]. By joining [facility]’s FollowMyHealth portal, you will also be able to view your health information using other applications (apps) compatible with our system.

## 2019-11-11 NOTE — PROGRESS NOTE ADULT - ASSESSMENT
79M w/ hx prostate cancer (dx 2004), metastatic HCC, CVA in 2019 (was on Tlovenox but stopped June 2019), gastric ulcer s/p EGD/cauterization in July 2019, RIH repair several years ago, admitted for drainage from right groin access site. Likely weeping from generalize anasarca.  Surgery saw patient, cardiology following.   Patient was dx with HCC in March 2019, had radiosurgery and is now on chemo every 2 weeks, son does not know the name.   7 days back, he was sent to hospital after chemo, has needed platelets at least 2-3 times. Had embolization for bleeding thru groin and also needed paracentesis.    Cardiology follow-up noted, has significant pericardial infusion without tamponade  Urology following for hydronephrosis, creatinine OK  No active management planned or leakage by surgery at this time  His thrombocytopenia is from chemo and disease (including myelothisis). Not neutropenic at this time  Plan is to keep platelets around 10-15 k/ul if no bleeding and around 50k/ul if bleeding or invasive procedure. Has not needed platelets in last few days.   Patient at present appears pretty weak, not a candidate for chemo, and hospice seems appropriate, palliative care has seen patient 79M w/ hx prostate cancer (dx 2004), metastatic HCC, CVA in 2019 (was on Tlovenox but stopped June 2019), gastric ulcer s/p EGD/cauterization in July 2019, RIH repair several years ago, admitted for drainage from right groin access site. Likely weeping from generalize anasarca.  Surgery saw patient, cardiology following.   Patient was dx with HCC in March 2019, had radiosurgery and is now on chemo every 2 weeks, son does not know the name.   7 days back, he was sent to hospital after chemo, has needed platelets at least 2-3 times. Had embolization for bleeding thru groin and also needed paracentesis.    Cardiology follow-up noted, has significant pericardial infusion without tamponade  Urology followed for hydronephrosis, creatinine OK  No active management planned or leakage by surgery at this time, leakage is decreasing  His thrombocytopenia is from chemo and disease (including myelothisis). Not neutropenic at this time  Plan is to keep platelets around 10-15 k/ul if no bleeding and around 50k/ul if bleeding or invasive procedure. Has not needed platelets in last few days.   Patient at present appears pretty weak, not a candidate for chemo, and hospice seems appropriate, palliative care has seen patient

## 2019-11-11 NOTE — PROVIDER CONTACT NOTE (CRITICAL VALUE NOTIFICATION) - RECOMMENDATIONS
NP Notified of the critical value
Notify PA.
Np notified of the findings and Lab results
PA made aware, continue to monitor patient, no further action at this time.

## 2019-11-11 NOTE — PROGRESS NOTE ADULT - REASON FOR ADMISSION
oozing from right groin procedure site

## 2019-11-11 NOTE — ED PROVIDER NOTE - OBJECTIVE STATEMENT
9:10 - walked to the bathroom, then went to sleep, when patient awoke, patient's wife noted that right side of patient's face and right hand were different, around 9:50. Patient was not answering any questions at that time. EMS reports that patient was found to have right sided facial droop and dysarthria, right upper extremity weakness, found to be hypotensive 60/40s. Upon arrival to ED, patient was found to have BP 83/51. Stroke code called.   Patient was at [place], patient had platelet transfusion around 2PM today. 80 yo male with PMH DM, HTN, HLD, CKD, CVA without residual deficits, metastatic prostate ca and metastatic adenocarcinoma presents to ED for evaluation of neuro deficits. At 9:10, patient walked to the bathroom, then went to sleep, when patient awoke, patient's wife noted that right side of patient's face and right hand were different, time was around 9:50. Wife not sure if any deficits presents before patient went to sleep. Patient was not answering any questions at that time. EMS reports that patient was found to have right sided facial droop and dysarthria, right upper extremity weakness, found to be hypotensive 60/40s. Upon arrival to ED, patient was found to have BP 83/51. Stroke code called. Wife reports that patient was at [place] earlier, patient had platelet transfusion around 2PM today. Dr Brady, neurology, at bedside.

## 2019-11-11 NOTE — ED ADULT NURSE NOTE - NSIMPLEMENTINTERV_GEN_ALL_ED
Implemented All Fall Risk Interventions:  [place] to call system. Call bell, personal items and telephone within reach. Instruct patient to call for assistance. Room bathroom lighting operational. Non-slip footwear when patient is off stretcher. Physically safe environment: no spills, clutter or unnecessary equipment. Stretcher in lowest position, wheels locked, appropriate side rails in place. Provide visual cue, wrist band, yellow gown, etc. Monitor gait and stability. Monitor for mental status changes and reorient to person, place, and time. Review medications for side effects contributing to fall risk. Reinforce activity limits and safety measures with patient and family.

## 2019-11-11 NOTE — ED PROVIDER NOTE - PROGRESS NOTE DETAILS
I discussed the case with eICU attending, Dr. Romero, who will facilitate stroke neuro. Patient was seen and examined at bedside by neurologist, Dr. Brady, who reports that patient's symptoms are improving, family agrees. Recommends that patient is not a TPA candidate.

## 2019-11-11 NOTE — PROGRESS NOTE ADULT - SUBJECTIVE AND OBJECTIVE BOX
no  compalints  REVIEW OF SYSTEMS:  GEN: no fever,    no chills  RESP: no SOB,   no cough  CVS: no chest pain,   no palpitations  GI: no abdominal pain,   no nausea,   no vomiting,   no constipation,   no diarrhea  : no dysuria,   no frequency  NEURO: no headache,   no dizziness  PSYCH: no depression,   not anxious  Derm : no rash    MEDICATIONS  (STANDING):  bicalutamide 50 milliGRAM(s) Oral daily  budesonide 160 MICROgram(s)/formoterol 4.5 MICROgram(s) Inhaler 2 Puff(s) Inhalation two times a day  carBAMazepine XR Tablet 200 milliGRAM(s) Oral two times a day  lidocaine 2% Injectable 20 milliLiter(s) Local Injection Once  metoprolol tartrate 25 milliGRAM(s) Oral three times a day  pantoprazole    Tablet 40 milliGRAM(s) Oral before breakfast    MEDICATIONS  (PRN):  guaiFENesin    Syrup 100 milliGRAM(s) Oral every 6 hours PRN Cough  oxycodone    5 mG/acetaminophen 325 mG 1 Tablet(s) Oral every 6 hours PRN Moderate Pain (4 - 6)      Vital Signs Last 24 Hrs  T(C): 36.7 (11 Nov 2019 04:06), Max: 37 (10 Nov 2019 11:00)  T(F): 98.1 (11 Nov 2019 04:06), Max: 98.6 (10 Nov 2019 11:00)  HR: 84 (11 Nov 2019 05:22) (80 - 84)  BP: 132/88 (11 Nov 2019 05:22) (124/81 - 145/92)  BP(mean): --  RR: 17 (11 Nov 2019 04:06) (17 - 18)  SpO2: 95% (11 Nov 2019 04:06) (94% - 96%)  CAPILLARY BLOOD GLUCOSE      POCT Blood Glucose.: 86 mg/dL (11 Nov 2019 07:45)  POCT Blood Glucose.: 152 mg/dL (10 Nov 2019 21:14)  POCT Blood Glucose.: 128 mg/dL (10 Nov 2019 16:33)  POCT Blood Glucose.: 119 mg/dL (10 Nov 2019 11:50)    I&O's Summary    10 Nov 2019 07:01  -  11 Nov 2019 07:00  --------------------------------------------------------  IN: 600 mL / OUT: 250 mL / NET: 350 mL        PHYSICAL EXAM:  HEAD:  Atraumatic, Normocephalic  NECK: Supple, No   JVD  CHEST/LUNG:   no     rales,     no,    rhonchi  HEART: Regular rate and rhythm;         murmur  ABDOMEN: Soft, Nontender, ;   EXTREMITIES:     c/c     edema  NEUROLOGY:  alert    LABS:                        9.5    3.77  )-----------( x        ( 11 Nov 2019 08:01 )             31.1                                   Consultant(s) Notes Reviewed:      Care Discussed with Consultants/Other Providers:

## 2019-11-11 NOTE — ED PROVIDER NOTE - CLINICAL SUMMARY MEDICAL DECISION MAKING FREE TEXT BOX
Male presents to ED for evaluation of stroke symptoms - Will check labs, EKG, imaging, medicate, consult neuro, adm

## 2019-11-11 NOTE — ED PROVIDER NOTE - PHYSICAL EXAMINATION
Gen: no acute distress  Cardiac: regular rate and rhythm, +S1S2  Pulm: Clear to auscultation bilaterally  Abd: soft, nontender, nondistended, no guarding  Back: neg CVA ttp, nontender spine  Extremity: no edema, no deformity, warm and well perfused, FROM all extremities    Neuro: awake, alert, oriented x 3, slow speech, R upper and lower drift, decreased  strength

## 2019-11-11 NOTE — ED PROVIDER NOTE - CRITICAL CARE PROVIDED
direct patient care (not related to procedure)/consult w/ pt's family directly relating to pts condition/conducted a detailed discussion of DNR status/additional history taking/consultation with other physicians/interpretation of diagnostic studies/documentation

## 2019-11-11 NOTE — PROVIDER CONTACT NOTE (CRITICAL VALUE NOTIFICATION) - ACTION/TREATMENT ORDERED:
Continue to monitor at this time and measure the site of hematoma. Orders followed as given.
Continue to monitor. Continue cardiac monitoring.
JOSE Mcknight made aware- 1 unit of platelets to be ordered. Will continue to monitor.
PA made aware, continue to monitor patient, no further action at this time.

## 2019-11-11 NOTE — PROGRESS NOTE ADULT - ASSESSMENT
78 y/o M      h/o      prostate cancer and  HCC,  w/ mets to  liver, bone.  lympangitic  carcinomatosis  IVC   filter , seen on ct       p/w surgical site leakage s/p hepatic artery embolism (By Dr. Christiano Jimenez  [id number], [id number])     via  right    femoral site as an access site.        S/P   abdominal paracentesis x 5 days ago,/  s/p fall at home. with ecchymoses  right lower/ lateral  back            Patient now returning to ED., for  drainage  from that  groin site  Surgery eval noted     Casodex 50 mg/day for prostate cancer.   Low platelets  noted./  s/p  platelets  on arrival   echo in er. with pericardial  effusion   awaiting  official  echo.  pt  with    extensive  metastatic   disease/  pancytopenia  from  malignancy     DM 2,  follow  fs/  HTN,  on   lopressor/  stopped  norvasc  rpt  ct  abdomen, noted, mets  to  liver/  b/l  pl  effusions/ anasarca/  severe  right  hydro   no  groin pathology on ct/    suspect   on going   mild  oozing  from puncture  site, is  from  extensive  anasarca/  s/q  fluid retention.  from   hypoalbuminemia  of  1,9  to  2/  /  severe  protein calorie  malnutrition from  advanceD  cancer/  no ab  per  ID   per   urology/   ct urogram,  poor  excretion , right renal system /   moderate  right hydro/ no urothelial mass   no intervention, per  urology      seen by  palliative  care    home  hospice  would  be ideal  follow platelets , pending today  start  d/c planning/ pt  wants to  go home  called son/ unable to reach this  morning       < from: CT Abdomen and Pelvis w/ IV Cont (11.07.19 @ 14:42) >  IMPRESSION:   Moderate right hydronephrosis to the UPJ is unchanged. No urothelial mass   visualized.  Asymmetric right bladder thickening again noted.  Multifocal HCC unchanged.  Small splenic infarct.  Innumerable pulmonary nodules again noted.  < end of copied text >    < from: CT Abdomen and Pelvis No Cont (11.05.19 @ 10:43) >  IMPRESSION:   No acute right groin pathology.   Bilobar hepatic metastatic disease.  Bilateral pleural effusions and small ascites. Anasarca.  Moderate to severe right hydroureteronephrosis to the right pelvis   without change.  < end of copied text >     < from: CT Angio Abdomen and Pelvis w/ IV Cont (10.29.19 @ 19:39) >  IMPRESION:   Subcapsular and perihepatic hematoma. No active extravasation.   Infiltrative hepatic metastatic disease.  No active gastrointestinal bleeding.  Increased ascites. Multifocal metastatic disease as demonstrated on 10/8.  Increased groundglass lung opacities, pulmonary edema or infection.   Lymphangitic carcinomatosis stable.  Worsened anasarca. Right thigh subcutaneous edema or cellulitis. Chronic   narrowing of the infrarenal IVC without acute thrombus.  Other incidental comments as above.  < end of copied text >         < from: MR Knee No Cont, Right (07.07.19 @ 14:45) >  IMPRESSION:   Large confluent infiltrative lesion involving the entirety of both the   medial and lateral tibial plateaus of the tibial metaphysis most   concerning for metastatic disease. There is evidence of a nondisplaced   pathologic fracture along the posterior intercondylar region of the   proximal tibia extending into the posterior central margin of the medial   and lateral tibial plateaus. There is extensive surrounding periosseous,   subcutaneous, and intramuscular edema which is likely reactive.   Additional smaller rounded metastatic lesions are seen within the lateral   femoral condyle and the proximal tibial metaphysis. Findings discussed   with Dr. Bolanos.  Large joint effusion.  < end of copied text > 80 y/o M      h/o      prostate cancer and  HCC,  w/ mets to  liver, bone.  lympangitic  carcinomatosis  IVC   filter , seen on ct       p/w surgical site leakage s/p hepatic artery embolism (By Dr. Christiano Jimenez  [id number], [id number])     via  right    femoral site as an access site.        S/P   abdominal paracentesis x 5 days ago,/  s/p fall at home. with ecchymoses  right lower/ lateral  back            Patient now returning to ED., for  drainage  from that  groin site  Surgery eval noted     Casodex 50 mg/day for prostate cancer.   Low platelets  noted./  s/p  platelets  on arrival   echo in er. with pericardial  effusion   awaiting  official  echo.  pt  with    extensive  metastatic   disease/  pancytopenia  from  malignancy     DM 2,  follow  fs/  HTN,  on   lopressor/  stopped  norvasc  rpt  ct  abdomen, noted, mets  to  liver/  b/l  pl  effusions/ anasarca/  severe  right  hydro   no  groin pathology on ct/    suspect   on going   mild  oozing  from puncture  site, is  from  extensive  anasarca/  s/q  fluid retention.  from   hypoalbuminemia  of  1,9  to  2/  /  severe  protein calorie  malnutrition from  advanceD  cancer/  no ab  per  ID   per   urology/   ct urogram,  poor  excretion , right renal system /   moderate  right hydro/ no urothelial mass   no intervention, per  urology      seen by  palliative  care    home  hospice  would  be ideal  follow platelets , pending today  start  d/c planning/ pt  wants to  go home  per son/ eva,  , wants pt  home. and  does  not want palliative/  hospice at present   therefore, will proceed  with d/c  planning   f/p with  oncology       < from: CT Abdomen and Pelvis w/ IV Cont (11.07.19 @ 14:42) >  IMPRESSION:   Moderate right hydronephrosis to the UPJ is unchanged. No urothelial mass   visualized.  Asymmetric right bladder thickening again noted.  Multifocal HCC unchanged.  Small splenic infarct.  Innumerable pulmonary nodules again noted.  < end of copied text >    < from: CT Abdomen and Pelvis No Cont (11.05.19 @ 10:43) >  IMPRESSION:   No acute right groin pathology.   Bilobar hepatic metastatic disease.  Bilateral pleural effusions and small ascites. Anasarca.  Moderate to severe right hydroureteronephrosis to the right pelvis   without change.  < end of copied text >     < from: CT Angio Abdomen and Pelvis w/ IV Cont (10.29.19 @ 19:39) >  IMPRESION:   Subcapsular and perihepatic hematoma. No active extravasation.   Infiltrative hepatic metastatic disease.  No active gastrointestinal bleeding.  Increased ascites. Multifocal metastatic disease as demonstrated on 10/8.  Increased groundglass lung opacities, pulmonary edema or infection.   Lymphangitic carcinomatosis stable.  Worsened anasarca. Right thigh subcutaneous edema or cellulitis. Chronic   narrowing of the infrarenal IVC without acute thrombus.  Other incidental comments as above.  < end of copied text >         < from: MR Knee No Cont, Right (07.07.19 @ 14:45) >  IMPRESSION:   Large confluent infiltrative lesion involving the entirety of both the   medial and lateral tibial plateaus of the tibial metaphysis most   concerning for metastatic disease. There is evidence of a nondisplaced   pathologic fracture along the posterior intercondylar region of the   proximal tibia extending into the posterior central margin of the medial   and lateral tibial plateaus. There is extensive surrounding periosseous,   subcutaneous, and intramuscular edema which is likely reactive.   Additional smaller rounded metastatic lesions are seen within the lateral   femoral condyle and the proximal tibial metaphysis. Findings discussed   with Dr. Bolanos.  Large joint effusion.  < end of copied text >

## 2019-11-11 NOTE — PROGRESS NOTE ADULT - SUBJECTIVE AND OBJECTIVE BOX
78 y/o M PMH prostate cancer and hepatocellular carcinoma, p/w surgical site leakage. S/p hepatic artery embolism (By Dr. Christiano Jimenez of [facility]; [id number], [id number]) using femoral site as an access site. Patient also complaining of abdominal swelling. Patient received an abdominal paracentesis x 5 days ago, became hypotensive, sent to ED for hepatic artery embolism.   He was also thrombocytopenic          PAST MEDICAL & SURGICAL HISTORY:  Anemia  Cancer, metastatic to bone: on chemo  Gastric ulcer: was hospitalized for GI bleed 7/22/19-7/25/19, taking Protonix  HTN (hypertension)  Liver cancer: stage 4  Prostate cancer: 2004 radiotherapy seeds, now metastatic to liver and bone  Ischemic stroke: 01/2019, no residuals  HLD (hyperlipidemia)  DM (diabetes mellitus): type 2, HgA1c 5.8% ( 6/5/19)  History of eye surgery: right  History of right knee surgery: R Tibia Intralesional curettage/bone graft  7/11/2019  H/O hernia repair: right inguinal hernia with mesh  9/2013    Medications:  bicalutamide 50 milliGRAM(s) Oral daily  budesonide 160 MICROgram(s)/formoterol 4.5 MICROgram(s) Inhaler 2 Puff(s) Inhalation two times a day  carBAMazepine XR Tablet 200 milliGRAM(s) Oral two times a day  guaiFENesin    Syrup 100 milliGRAM(s) Oral every 6 hours PRN Cough  lidocaine 2% Injectable 20 milliLiter(s) Local Injection Once  metoprolol tartrate 25 milliGRAM(s) Oral three times a day  oxycodone    5 mG/acetaminophen 325 mG 1 Tablet(s) Oral every 6 hours PRN Moderate Pain (4 - 6)  pantoprazole    Tablet 40 milliGRAM(s) Oral before breakfast    Labs:  CBC Full  -  ( 11 Nov 2019 08:01 )  WBC Count : 3.77 K/uL  Hemoglobin : 9.5 g/dL  Hematocrit : 31.1 %  Platelet Count - Automated : x  Mean Cell Volume : 94.8 fl  Mean Cell Hemoglobin : 29.0 pg  Mean Cell Hemoglobin Concentration : 30.5 gm/dL  Auto Neutrophil # : x  Auto Lymphocyte # : x  Auto Monocyte # : x  Auto Eosinophil # : x  Auto Basophil # : x  Auto Neutrophil % : x  Auto Lymphocyte % : x  Auto Monocyte % : x  Auto Eosinophil % : x  Auto Basophil % : x            Radiology:             ROS:  Patient comfortable without distress  No SOB or chest pain  No palpitation  No abdominal pain, diarrhaea or constipation  No weakness of extremities  No skin changes or swelling of legs    Vital Signs Last 24 Hrs  T(C): 36.7 (11 Nov 2019 04:06), Max: 37 (10 Nov 2019 11:00)  T(F): 98.1 (11 Nov 2019 04:06), Max: 98.6 (10 Nov 2019 11:00)  HR: 84 (11 Nov 2019 05:22) (80 - 84)  BP: 132/88 (11 Nov 2019 05:22) (124/81 - 145/92)  BP(mean): --  RR: 17 (11 Nov 2019 04:06) (17 - 18)  SpO2: 95% (11 Nov 2019 04:06) (94% - 96%)    Physical exam:  Patient alert and oriented  No distress  CVS: S1, S2 regular or murmur  Chest: bilateral breath sound without rales  Abdomen: soft, not tender, no organomegaly or masses  No focal neuro deficit  No edema      Assessment and Plan: 78 y/o M PMH prostate cancer and hepatocellular carcinoma, p/w surgical site leakage. S/p hepatic artery embolism (By Dr. Christiano Jimenez of [facility]; [id number], [id number]) using femoral site as an access site. Patient also complaining of abdominal swelling. Patient received an abdominal paracentesis x 5 days ago, became hypotensive, sent to ED for hepatic artery embolism.   He was also thrombocytopenic          PAST MEDICAL & SURGICAL HISTORY:  Anemia  Cancer, metastatic to bone: on chemo  Gastric ulcer: was hospitalized for GI bleed 7/22/19-7/25/19, taking Protonix  HTN (hypertension)  Liver cancer: stage 4  Prostate cancer: 2004 radiotherapy seeds, now metastatic to liver and bone  Ischemic stroke: 01/2019, no residuals  HLD (hyperlipidemia)  DM (diabetes mellitus): type 2, HgA1c 5.8% ( 6/5/19)  History of eye surgery: right  History of right knee surgery: R Tibia Intralesional curettage/bone graft  7/11/2019  H/O hernia repair: right inguinal hernia with mesh  9/2013    Medications:  bicalutamide 50 milliGRAM(s) Oral daily  budesonide 160 MICROgram(s)/formoterol 4.5 MICROgram(s) Inhaler 2 Puff(s) Inhalation two times a day  carBAMazepine XR Tablet 200 milliGRAM(s) Oral two times a day  guaiFENesin    Syrup 100 milliGRAM(s) Oral every 6 hours PRN Cough  lidocaine 2% Injectable 20 milliLiter(s) Local Injection Once  metoprolol tartrate 25 milliGRAM(s) Oral three times a day  oxycodone    5 mG/acetaminophen 325 mG 1 Tablet(s) Oral every 6 hours PRN Moderate Pain (4 - 6)  pantoprazole    Tablet 40 milliGRAM(s) Oral before breakfast    Labs:  CBC Full  -  ( 11 Nov 2019 08:01 )  WBC Count : 3.77 K/uL  Hemoglobin : 9.5 g/dL  Hematocrit : 31.1 %  Platelet Count - Automated : x  Mean Cell Volume : 94.8 fl  Mean Cell Hemoglobin : 29.0 pg  Mean Cell Hemoglobin Concentration : 30.5 gm/dL  Auto Neutrophil # : x  Auto Lymphocyte # : x  Auto Monocyte # : x  Auto Eosinophil # : x  Auto Basophil # : x  Auto Neutrophil % : x  Auto Lymphocyte % : x  Auto Monocyte % : x  Auto Eosinophil % : x  Auto Basophil % : x            Radiology:             ROS:  Patient comfortable without distress  No SOB or chest pain  No palpitation  No abdominal pain, diarrhaea or constipation  General weakness      Vital Signs Last 24 Hrs  T(C): 36.7 (11 Nov 2019 04:06), Max: 37 (10 Nov 2019 11:00)  T(F): 98.1 (11 Nov 2019 04:06), Max: 98.6 (10 Nov 2019 11:00)  HR: 84 (11 Nov 2019 05:22) (80 - 84)  BP: 132/88 (11 Nov 2019 05:22) (124/81 - 145/92)  BP(mean): --  RR: 17 (11 Nov 2019 04:06) (17 - 18)  SpO2: 95% (11 Nov 2019 04:06) (94% - 96%)    Physical exam:  Patient alert, chronically ill-looking  No distress  CVS: S1, S2 regular or murmur  Chest: bilateral breath sound without rales  Abdomen: soft, not tender, no organomegaly or masses, minimal drainage from groin  No focal neuro deficit, Gen. weakness  No edema      Assessment and Plan:

## 2019-11-11 NOTE — PROVIDER CONTACT NOTE (CRITICAL VALUE NOTIFICATION) - BACKGROUND
Dx: R Groin Drainage, S/P recent embolization of HCC using R groin site        Thrombocytopenia s/p 2 units platelets        Moderate Right hydronephrosis        HCC, no plans for further treatments at this time.  Per heme/onc okay with platelet count of 10-15 if no signs of bleeding

## 2019-11-12 DIAGNOSIS — R09.89 OTHER SPECIFIED SYMPTOMS AND SIGNS INVOLVING THE CIRCULATORY AND RESPIRATORY SYSTEMS: ICD-10-CM

## 2019-11-12 LAB
ALBUMIN SERPL ELPH-MCNC: 1.5 G/DL — LOW (ref 3.3–5)
ALP SERPL-CCNC: 283 U/L — HIGH (ref 40–120)
ALT FLD-CCNC: 34 U/L — SIGNIFICANT CHANGE UP (ref 12–78)
AMMONIA BLD-MCNC: 25 UMOL/L — SIGNIFICANT CHANGE UP (ref 11–32)
ANION GAP SERPL CALC-SCNC: 7 MMOL/L — SIGNIFICANT CHANGE UP (ref 5–17)
ANISOCYTOSIS BLD QL: SLIGHT — SIGNIFICANT CHANGE UP
APPEARANCE UR: CLEAR — SIGNIFICANT CHANGE UP
AST SERPL-CCNC: 51 U/L — HIGH (ref 15–37)
BACTERIA # UR AUTO: ABNORMAL
BASOPHILS # BLD AUTO: 0.01 K/UL — SIGNIFICANT CHANGE UP (ref 0–0.2)
BASOPHILS NFR BLD AUTO: 0.3 % — SIGNIFICANT CHANGE UP (ref 0–2)
BILIRUB SERPL-MCNC: 0.7 MG/DL — SIGNIFICANT CHANGE UP (ref 0.2–1.2)
BILIRUB UR-MCNC: NEGATIVE — SIGNIFICANT CHANGE UP
BUN SERPL-MCNC: 12 MG/DL — SIGNIFICANT CHANGE UP (ref 7–23)
BURR CELLS BLD QL SMEAR: PRESENT — SIGNIFICANT CHANGE UP
CALCIUM SERPL-MCNC: 6.6 MG/DL — LOW (ref 8.5–10.1)
CHLORIDE SERPL-SCNC: 106 MMOL/L — SIGNIFICANT CHANGE UP (ref 96–108)
CHOLEST SERPL-MCNC: 169 MG/DL — SIGNIFICANT CHANGE UP (ref 10–199)
CO2 SERPL-SCNC: 21 MMOL/L — LOW (ref 22–31)
COLOR SPEC: YELLOW — SIGNIFICANT CHANGE UP
CREAT SERPL-MCNC: 0.99 MG/DL — SIGNIFICANT CHANGE UP (ref 0.5–1.3)
DIFF PNL FLD: ABNORMAL
EOSINOPHIL # BLD AUTO: 0.02 K/UL — SIGNIFICANT CHANGE UP (ref 0–0.5)
EOSINOPHIL NFR BLD AUTO: 0.6 % — SIGNIFICANT CHANGE UP (ref 0–6)
EPI CELLS # UR: SIGNIFICANT CHANGE UP
GLUCOSE BLDC GLUCOMTR-MCNC: 139 MG/DL — HIGH (ref 70–99)
GLUCOSE SERPL-MCNC: 152 MG/DL — HIGH (ref 70–99)
GLUCOSE UR QL: NEGATIVE MG/DL — SIGNIFICANT CHANGE UP
HBA1C BLD-MCNC: 5.3 % — SIGNIFICANT CHANGE UP (ref 4–5.6)
HCT VFR BLD CALC: 30 % — LOW (ref 39–50)
HDLC SERPL-MCNC: 50 MG/DL — SIGNIFICANT CHANGE UP
HGB BLD-MCNC: 9.3 G/DL — LOW (ref 13–17)
IMM GRANULOCYTES NFR BLD AUTO: 0.6 % — SIGNIFICANT CHANGE UP (ref 0–1.5)
KETONES UR-MCNC: ABNORMAL
LACTATE SERPL-SCNC: 2.2 MMOL/L — HIGH (ref 0.7–2)
LACTATE SERPL-SCNC: 2.5 MMOL/L — HIGH (ref 0.7–2)
LEUKOCYTE ESTERASE UR-ACNC: ABNORMAL
LIPID PNL WITH DIRECT LDL SERPL: 102 MG/DL — HIGH
LYMPHOCYTES # BLD AUTO: 0.45 K/UL — LOW (ref 1–3.3)
LYMPHOCYTES # BLD AUTO: 13.1 % — SIGNIFICANT CHANGE UP (ref 13–44)
MAGNESIUM SERPL-MCNC: 1.6 MG/DL — SIGNIFICANT CHANGE UP (ref 1.6–2.6)
MANUAL SMEAR VERIFICATION: YES — SIGNIFICANT CHANGE UP
MCHC RBC-ENTMCNC: 29.6 PG — SIGNIFICANT CHANGE UP (ref 27–34)
MCHC RBC-ENTMCNC: 31 GM/DL — LOW (ref 32–36)
MCV RBC AUTO: 95.5 FL — SIGNIFICANT CHANGE UP (ref 80–100)
MICROCYTES BLD QL: SLIGHT — SIGNIFICANT CHANGE UP
MONOCYTES # BLD AUTO: 0.37 K/UL — SIGNIFICANT CHANGE UP (ref 0–0.9)
MONOCYTES NFR BLD AUTO: 10.8 % — SIGNIFICANT CHANGE UP (ref 2–14)
NEUTROPHILS # BLD AUTO: 2.57 K/UL — SIGNIFICANT CHANGE UP (ref 1.8–7.4)
NEUTROPHILS NFR BLD AUTO: 74.6 % — SIGNIFICANT CHANGE UP (ref 43–77)
NITRITE UR-MCNC: NEGATIVE — SIGNIFICANT CHANGE UP
NRBC # BLD: 0 /100 WBCS — SIGNIFICANT CHANGE UP (ref 0–0)
NRBC # BLD: 0 /100 WBCS — SIGNIFICANT CHANGE UP (ref 0–0)
PH UR: 6.5 — SIGNIFICANT CHANGE UP (ref 5–8)
PHOSPHATE SERPL-MCNC: 2.1 MG/DL — LOW (ref 2.5–4.5)
PLAT MORPH BLD: NORMAL — SIGNIFICANT CHANGE UP
PLATELET # BLD AUTO: 25 K/UL — LOW (ref 150–400)
PLATELET # BLD AUTO: 55 K/UL — LOW (ref 150–400)
POTASSIUM SERPL-MCNC: 4.1 MMOL/L — SIGNIFICANT CHANGE UP (ref 3.5–5.3)
POTASSIUM SERPL-SCNC: 4.1 MMOL/L — SIGNIFICANT CHANGE UP (ref 3.5–5.3)
PROCALCITONIN SERPL-MCNC: 3.79 NG/ML — HIGH (ref 0.02–0.1)
PROT SERPL-MCNC: 5 GM/DL — LOW (ref 6–8.3)
PROT UR-MCNC: 100 MG/DL
RBC # BLD: 3.14 M/UL — LOW (ref 4.2–5.8)
RBC # FLD: 21.2 % — HIGH (ref 10.3–14.5)
RBC BLD AUTO: ABNORMAL
RBC CASTS # UR COMP ASSIST: SIGNIFICANT CHANGE UP /HPF (ref 0–4)
SODIUM SERPL-SCNC: 134 MMOL/L — LOW (ref 135–145)
SP GR SPEC: 1.01 — SIGNIFICANT CHANGE UP (ref 1.01–1.02)
TOTAL CHOLESTEROL/HDL RATIO MEASUREMENT: 3.4 RATIO — SIGNIFICANT CHANGE UP (ref 3.4–9.6)
TRIGL SERPL-MCNC: 83 MG/DL — SIGNIFICANT CHANGE UP (ref 10–149)
UROBILINOGEN FLD QL: 1 MG/DL
WBC # BLD: 4.73 K/UL — SIGNIFICANT CHANGE UP (ref 3.8–10.5)
WBC # FLD AUTO: 4.73 K/UL — SIGNIFICANT CHANGE UP (ref 3.8–10.5)
WBC UR QL: ABNORMAL

## 2019-11-12 PROCEDURE — 99291 CRITICAL CARE FIRST HOUR: CPT

## 2019-11-12 PROCEDURE — 71045 X-RAY EXAM CHEST 1 VIEW: CPT | Mod: 26

## 2019-11-12 RX ORDER — MIDODRINE HYDROCHLORIDE 2.5 MG/1
10 TABLET ORAL EVERY 8 HOURS
Refills: 0 | Status: DISCONTINUED | OUTPATIENT
Start: 2019-11-12 | End: 2019-11-15

## 2019-11-12 RX ORDER — NOREPINEPHRINE BITARTRATE/D5W 8 MG/250ML
0.05 PLASTIC BAG, INJECTION (ML) INTRAVENOUS
Qty: 8 | Refills: 0 | Status: DISCONTINUED | OUTPATIENT
Start: 2019-11-12 | End: 2019-11-13

## 2019-11-12 RX ORDER — VANCOMYCIN HCL 1 G
1000 VIAL (EA) INTRAVENOUS EVERY 12 HOURS
Refills: 0 | Status: DISCONTINUED | OUTPATIENT
Start: 2019-11-12 | End: 2019-11-12

## 2019-11-12 RX ORDER — PIPERACILLIN AND TAZOBACTAM 4; .5 G/20ML; G/20ML
3.38 INJECTION, POWDER, LYOPHILIZED, FOR SOLUTION INTRAVENOUS ONCE
Refills: 0 | Status: COMPLETED | OUTPATIENT
Start: 2019-11-12 | End: 2019-11-12

## 2019-11-12 RX ORDER — VANCOMYCIN HCL 1 G
1000 VIAL (EA) INTRAVENOUS ONCE
Refills: 0 | Status: COMPLETED | OUTPATIENT
Start: 2019-11-12 | End: 2019-11-12

## 2019-11-12 RX ORDER — CHLORHEXIDINE GLUCONATE 213 G/1000ML
1 SOLUTION TOPICAL
Refills: 0 | Status: DISCONTINUED | OUTPATIENT
Start: 2019-11-12 | End: 2019-11-22

## 2019-11-12 RX ORDER — VANCOMYCIN HCL 1 G
750 VIAL (EA) INTRAVENOUS EVERY 12 HOURS
Refills: 0 | Status: DISCONTINUED | OUTPATIENT
Start: 2019-11-12 | End: 2019-11-13

## 2019-11-12 RX ORDER — PIPERACILLIN AND TAZOBACTAM 4; .5 G/20ML; G/20ML
3.38 INJECTION, POWDER, LYOPHILIZED, FOR SOLUTION INTRAVENOUS EVERY 8 HOURS
Refills: 0 | Status: COMPLETED | OUTPATIENT
Start: 2019-11-12 | End: 2019-11-16

## 2019-11-12 RX ADMIN — SODIUM CHLORIDE 1000 MILLILITER(S): 9 INJECTION INTRAMUSCULAR; INTRAVENOUS; SUBCUTANEOUS at 00:30

## 2019-11-12 RX ADMIN — PIPERACILLIN AND TAZOBACTAM 25 GRAM(S): 4; .5 INJECTION, POWDER, LYOPHILIZED, FOR SOLUTION INTRAVENOUS at 14:23

## 2019-11-12 RX ADMIN — PIPERACILLIN AND TAZOBACTAM 25 GRAM(S): 4; .5 INJECTION, POWDER, LYOPHILIZED, FOR SOLUTION INTRAVENOUS at 21:19

## 2019-11-12 RX ADMIN — Medication 5.95 MICROGRAM(S)/KG/MIN: at 00:28

## 2019-11-12 RX ADMIN — CHLORHEXIDINE GLUCONATE 1 APPLICATION(S): 213 SOLUTION TOPICAL at 02:23

## 2019-11-12 RX ADMIN — MIDODRINE HYDROCHLORIDE 10 MILLIGRAM(S): 2.5 TABLET ORAL at 21:19

## 2019-11-12 RX ADMIN — PIPERACILLIN AND TAZOBACTAM 200 GRAM(S): 4; .5 INJECTION, POWDER, LYOPHILIZED, FOR SOLUTION INTRAVENOUS at 01:35

## 2019-11-12 RX ADMIN — MIDODRINE HYDROCHLORIDE 10 MILLIGRAM(S): 2.5 TABLET ORAL at 14:23

## 2019-11-12 RX ADMIN — PIPERACILLIN AND TAZOBACTAM 25 GRAM(S): 4; .5 INJECTION, POWDER, LYOPHILIZED, FOR SOLUTION INTRAVENOUS at 06:23

## 2019-11-12 RX ADMIN — Medication 250 MILLIGRAM(S): at 17:47

## 2019-11-12 RX ADMIN — PIPERACILLIN AND TAZOBACTAM 3.38 GRAM(S): 4; .5 INJECTION, POWDER, LYOPHILIZED, FOR SOLUTION INTRAVENOUS at 02:05

## 2019-11-12 RX ADMIN — Medication 62.5 MILLIMOLE(S): at 07:55

## 2019-11-12 RX ADMIN — MIDODRINE HYDROCHLORIDE 10 MILLIGRAM(S): 2.5 TABLET ORAL at 11:24

## 2019-11-12 RX ADMIN — Medication 250 MILLIGRAM(S): at 02:49

## 2019-11-12 NOTE — H&P ADULT - ASSESSMENT
< from: CT Angio Neck w/ IV Cont (11.11.19 @ 23:12) >  CT BRAIN:  No acute intracranial hemorrhage.    CTA NECK:  No significant stenosis of the cervical carotid or vertebral  arteries.    Patchy airspace opacities at the lung apices which may be infectious in  etiology. There are bilateral pleural effusions.    CTA HEAD:  Asymmetrically decreased and diminutive superior M2, M3 and M4  branches of the left middle cerebral artery. MRI/MRA of the head is  suggested for further evaluation. Patient is a 79-year-old male with a history of HTN, DM II, HLD, CKD III, multiple CVA without residual deficits, including right parietal stroke on  Lovenox c/b secondary hemorrhage s/p MCA clot retrieval at [place] on 3/19, metastatic prostate cancer, metastatic adenocarcinoma of unclear etiology, with metastases to liver and bone s/p recent R Tibia Intralesional curettage/bone graft ( 7/11/19), patient was hospitalized @ [facility]  (7/22/19-7/25/19 ) due to bleeding Gastric ulcer, Lovenox on hold, s/p hepatic artery embolism using femoral site as an access site.   Patient admit to critical care for mental status change, concern of cva, did not receive tpa, and hypotension, possible UTI  admit to critical care  neuro checks [id number]  CTA and CTH reviewed  neuro consult seen by Dr Brady  cva work up: lipid, hgba1c, echo, repeat labs  PT/OT, speech and swallow.  sepsis work up: blood culture, ua, urine cult, sputum cx  abx: vanco and zosyn in ED started will continue  DVT prophylaxis: SCD only  GI: npo for now, until swallow eval  Gen: daughter Maria Victoria (HCP), total 4 children, full code. Patient is a 79-year-old male with a history of HTN, DM II, HLD, CKD III, multiple CVA without residual deficits, including right parietal stroke on  Lovenox c/b secondary hemorrhage s/p MCA clot retrieval at [place] on 3/19, metastatic prostate cancer, metastatic adenocarcinoma of unclear etiology, with metastases to liver and bone s/p recent R Tibia Intralesional curettage/bone graft ( 7/11/19), patient was hospitalized @ [facility]  (7/22/19-7/25/19 ) due to bleeding Gastric ulcer, Lovenox on hold, s/p hepatic artery embolism using femoral site as an access site.   Patient admit to critical care for mental status change, concern of cva, did not receive tpa, and hypotension, possible UTI  admit to critical care  neuro checks [id number]  CTA and CTH reviewed  neuro consult   cva work up: lipid, hgba1c, echo, repeat labs  PT/OT, speech and swallow.  sepsis work up: blood culture, ua, urine cult, sputum cx  abx: vanco and zosyn in ED started will continue  DVT prophylaxis: SCD only  GI: npo for now, until swallow eval  Gen: daughter Maria Victoria (HCP), total 4 children, full code.

## 2019-11-12 NOTE — PROVIDER CONTACT NOTE (EICU) - SITUATION
Pt is a 79 y M with Hx  old CVA w/o residual deficit, DM, HTN, prostate cancer  with liver mets on chemo, thrombocytpenia - s/p PLT transfusion. Pt presented with new onset of slurred speech, right facial drop after 10 PM 11/11/19 and hypotension 83/51. Pt started on IVF  NS - 2000  ml bolus  and eventually added Levophed.  His Ct scan was negative for acute pathology and his neuro findings resolved with IVF hydration. During stroke code no TPA given due to thrombocytopenia. Pt admitted to ICU for management of possible sepsis. Pt is a 79 y M with Hx  old CVA w/o residual deficit, DM, HTN, prostate cancer  with liver mets on chemo, thrombocytpenia - s/p PLT transfusion. Pt presented with new onset of slurred speech, right facial drop after 10 PM 11/11/19 and hypotension 83/51. Pt started on IVF  NS - 2000  ml bolus  and eventually added Levophed.  His head Ct scan was negative for acute pathology and his neuro findings resolved with IVF hydration. During stroke code no TPA given due to thrombocytopenia. Pt admitted to ICU for management of possible sepsis.

## 2019-11-12 NOTE — SWALLOW BEDSIDE ASSESSMENT ADULT - COMMENTS
CT Angio Head w/ IV Cont 11/11/2019 IMPRESSION: CT BRAIN:  No acute intracranial hemorrhage. CTA NECK: No significant stenosis of the cervical carotid or vertebral arteries. Patchy airspace opacities at the lung apices which may be infectious in etiology. There are bilateral pleural effusions. CTA HEAD:  Asymmetrically decreased and diminutive superior M2, M3 and M4 branches of the left middle cerebral artery. MRI/MRA of the head is suggested for further evaluation.

## 2019-11-12 NOTE — H&P ADULT - NSHPREVIEWOFSYSTEMS_GEN_ALL_CORE
REVIEW OF SYSTEMS:    CONSTITUTIONAL: No fever, weight loss, or fatigue  EYES: No eye pain, visual disturbances, or discharge  ENMT:  No difficulty hearing, tinnitus, vertigo; No sinus or throat pain  NECK: No pain or stiffness  BREASTS: No pain, masses, or nipple discharge  RESPIRATORY: + cough, wheezing, chills or hemoptysis; No shortness of breath  CARDIOVASCULAR: No chest pain, palpitations, dizziness, or leg swelling  GASTROINTESTINAL: No abdominal or epigastric pain. No nausea, vomiting, or hematemesis; No diarrhea or constipation. No melena or hematochezia.  GENITOURINARY: No dysuria, frequency, hematuria, or incontinence  NEUROLOGICAL: No headaches, memory loss, loss of strength, numbness, or tremors  SKIN: No itching, burning, rashes, or lesions right back echymosis  LYMPH NODES: No enlarged glands  ENDOCRINE: No heat or cold intolerance; No hair loss  MUSCULOSKELETAL: No joint pain or swelling; No muscle, back, or extremity pain

## 2019-11-12 NOTE — SWALLOW BEDSIDE ASSESSMENT ADULT - SLP GENERAL OBSERVATIONS
pt was seen bedside alert and oriented x3-4. pt responded to autobiographical questions, verbalized wants, and followed one step directions. noted word finding difficulty, good speech intelligibility with low vocal intensity and reduced breath support while speaking. pt not able to feed self may be 2/2 lines attached to arm, son at bedside. pt requested that son rub his throat after PO intake of honey thick liquids, did not verbalize reason.

## 2019-11-12 NOTE — SWALLOW BEDSIDE ASSESSMENT ADULT - SWALLOW EVAL: RECOMMENDED FEEDING/EATING TECHNIQUES
maintain upright posture during/after eating for 30 mins/allow for swallow between intakes/small sips/bites/crush medication (when feasible)/position upright (90 degrees)

## 2019-11-12 NOTE — PROVIDER CONTACT NOTE (EICU) - RECOMMENDATIONS
ICU monitoring, titrate Levophed for MAP > 60-65  continue IVF , strict I/O  pancultures  empiric ABX Vanco/ Zosyn for now  neuro check q1 hr  aspiration precautions

## 2019-11-12 NOTE — SWALLOW BEDSIDE ASSESSMENT ADULT - H & P REVIEW
Patient is a 79-year-old male with a history of HTN, DM II, HLD, CKD III, multiple CVA without residual deficits, including right parietal stroke on  Lovenox c/b secondary hemorrhage s/p MCA clot retrieval at [place] on 3/19, metastatic prostate cancer, metastatic adenocarcinoma of unclear etiology, with metastases to liver and bone s/p recent R Tibia Intralesional curettage/bone graft ( 7/11/19), patient was hospitalized @ [facility]  (7/22/19-7/25/19 ) due to bleeding Gastric ulcer, Lovenox on hold, s/p hepatic artery embolism using femoral site as an access site. Last night 9 :10 - walked to the bathroom, then went to sleep, when patient awoke, patient's wife noted that right side of patient's face and right hand were different, around 9:50. Patient was not answering any questions at that time. EMS reports that patient was found to have right sided facial droop and dysarthria, right upper extremity weakness, found to be hypotensive 60/40s. Upon arrival to ED, patient was found to have BP 83/51. Patient was at [place] 11/4-11/11 for surgical site leakage after hepatic artery embolism, patient had platelet transfusion around 2PM today for PL of 19 and was discharged to home.  While in Ed received 2L of fluids, BP start coming up to the 90's, then started on levophed drip. Patient became more alert, and speech came back as well/yes

## 2019-11-12 NOTE — H&P ADULT - NSHPLABSRESULTS_GEN_ALL_CORE
.  LABS:                         8.2    3.44  )-----------( 55       ( 2019 23:12 )             27.1         134<L>  |  107  |  12  ----------------------------<  113<H>  4.4   |  19<L>  |  0.99    Ca    6.7<L>      2019 23:12    TPro  4.9<L>  /  Alb  1.5<L>  /  TBili  0.7  /  DBili  x   /  AST  70<H>  /  ALT  34  /  AlkPhos  277<H>      PT/INR - ( 2019 23:12 )   PT: 12.7 sec;   INR: 1.13 ratio         PTT - ( 2019 23:12 )  PTT:26.5 sec  Urinalysis Basic - ( 2019 01:04 )    Color: Yellow / Appearance: Clear / S.015 / pH: x  Gluc: x / Ketone: Trace  / Bili: Negative / Urobili: 1 mg/dL   Blood: x / Protein: 100 mg/dL / Nitrite: Negative   Leuk Esterase: Trace / RBC: 0-2 /HPF / WBC    Sq Epi: x / Non Sq Epi: Few / Bacteria: Few        Lactate, Blood: 2.5 mmol/L ( @ 01:30)  Lactate, Blood: 2.3 mmol/L ( @ 23:12)      RADIOLOGY, EKG & ADDITIONAL TESTS: Reviewed. .  LABS:                         8.2    3.44  )-----------( 55       ( 2019 23:12 )             27.1         134<L>  |  107  |  12  ----------------------------<  113<H>  4.4   |  19<L>  |  0.99    Ca    6.7<L>      2019 23:12    TPro  4.9<L>  /  Alb  1.5<L>  /  TBili  0.7  /  DBili  x   /  AST  70<H>  /  ALT  34  /  AlkPhos  277<H>      PT/INR - ( 2019 23:12 )   PT: 12.7 sec;   INR: 1.13 ratio         PTT - ( 2019 23:12 )  PTT:26.5 sec  Urinalysis Basic - ( 2019 01:04 )    Color: Yellow / Appearance: Clear / S.015 / pH: x  Gluc: x / Ketone: Trace  / Bili: Negative / Urobili: 1 mg/dL   Blood: x / Protein: 100 mg/dL / Nitrite: Negative   Leuk Esterase: Trace / RBC: 0-2 /HPF / WBC    Sq Epi: x / Non Sq Epi: Few / Bacteria: Few        Lactate, Blood: 2.5 mmol/L ( @ 01:30)  Lactate, Blood: 2.3 mmol/L ( @ 23:12)      RADIOLOGY, EKG & ADDITIONAL TESTS: Reviewed.  < from: CT Angio Neck w/ IV Cont (19 @ 23:12) >  CT BRAIN:  No acute intracranial hemorrhage.    CTA NECK:  No significant stenosis of the cervical carotid or vertebral  arteries.    Patchy airspace opacities at the lung apices which may be infectious in  etiology. There are bilateral pleural effusions.    CTA HEAD:  Asymmetrically decreased and diminutive superior M2, M3 and M4  branches of the left middle cerebral artery. MRI/MRA of the head is  suggested for further evaluation.

## 2019-11-12 NOTE — SWALLOW BEDSIDE ASSESSMENT ADULT - SWALLOW EVAL: DIAGNOSIS
pt presented with oropharyngeal phases of swallowing marked by delayed oral transit time, decreased A-P transport, suspected delay in swallow trigger with ablaqueate laryngeal elevation, and throat clear with nectar thick liquids. Eval limited 2/2 pt refused PO trials however, pt presented with oropharyngeal phases of swallowing marked by delayed oral transit time, suspected delay in swallow trigger with ablaqueate laryngeal elevation, and throat clear with nectar thick liquids.

## 2019-11-12 NOTE — SWALLOW BEDSIDE ASSESSMENT ADULT - SWALLOW EVAL: PATIENT/FAMILY GOALS STATEMENT
pt reported that he coughs sometimes at home with thin liquids. pt stated "I don't eat much, I get full" and "I'm done for right now" to refuse more liquids. son stated "we started to puree his food" pt reported that he coughs sometimes at home with thin liquids. pt stated "I don't eat much, I get full" and "I'm done for right now" to refuse more liquids. son stated "we started to puree his food" and "he eats soups"

## 2019-11-12 NOTE — H&P ADULT - ATTENDING COMMENTS
78 y/o M w/metastatic cancer of unknown primary, multiple CVAs, recently admitted for GI bleed s/p hepatic artery embolization now admitted for hypotension likely secondary to severe sepsis with septic shock likely due to UTI.    - Wean pressors as tolerated goal MAP >= 65  - Midodrine 10mg q8hrs  - Broad spectrum abx  - Pain management as needed    Attending critical care time 35 minutes

## 2019-11-12 NOTE — H&P ADULT - NSHPPHYSICALEXAM_GEN_ALL_CORE
PHYSICAL EXAM:  GENERAL: mild distress  HEAD:  NC/AC  EYES: EOMI, PERRLA, conjunctiva and sclera clear  CHEST/LUNG: CTAB. No cough, wheeze, rales.   HEART: Regular rate and rhythm. No murmurs, rubs, or gallops.   ABDOMEN: distended, right back echymosis, +BS  EXTREMITIES:  2+ Peripheral Pulses, left extr: swelling +pitting  MS: AAOx3  NEUROLOGY: non-focal  SKIN: No rashes or lesions PHYSICAL EXAM:  GENERAL: mild distress  HEAD:  NC/AC  EYES: EOMI, PERRLA, conjunctiva and sclera clear  CHEST/LUNG: CTAB. No cough, wheeze, rales.   HEART: Regular rate and rhythm. No murmurs, rubs, or gallops.   ABDOMEN: distended, right back echymosis, +BS  EXTREMITIES:  2+ Peripheral Pulses, left extr: swelling +pitting  MS: AAOx3  NEUROLOGY: opens eyes, moving all 4 extr, UE: equal 5/5, LE no drift  SKIN: No rashes or lesions

## 2019-11-12 NOTE — H&P ADULT - HISTORY OF PRESENT ILLNESS
80 y/o M w/ Mhx prostate cancer and adenocarcinoma w/ mets p/w surgical site leakage s/p hepatic artery embolism (By Dr. Christiano Jimenez of [facility]; [id number], [id number]) using femoral site as an access site Patient is a 79-year-old male with a history of HTN, DM II, HLD, CKD III, multiple CVA without residual deficits, including right parietal stroke on  Lovenox c/b secondary hemorrhage s/p MCA clot retrieval at [place] on 3/19, metastatic prostate cancer, metastatic adenocarcinoma of unclear etiology, with metastases to liver and bone s/p recent R Tibia Intralesional curettage/bone graft ( 7/11/19), patient was hospitalized @ [facility]  (7/22/19-7/25/19 ) due to bleeding Gastric ulcer, Lovenox on hold, s/p hepatic artery embolism using femoral site as an access site.   Last night 9 :10 - walked to the bathroom, then went to sleep, when patient awoke, patient's wife noted that right side of patient's face and right hand were different, around 9:50. Patient was not answering any questions at that time. EMS reports that patient was found to have right sided facial droop and dysarthria, right upper extremity weakness, found to be hypotensive 60/40s. Upon arrival to ED, patient was found to have BP 83/51.  Patient was at [place] 11/4-11/11 for surgical site leakage after hepatic artery embolism, patient had platelet transfusion around 2PM today for PL of 19 and was discharged to home.    While in Ed received 2L of fluids, BP start coming up to the 90's, then started on levophed drip. Patient became more alert, and speech came back as well.

## 2019-11-12 NOTE — CONSULT NOTE ADULT - ASSESSMENT
Subjective Complaints:      Consult requested by ER doctor:                  Attending:     History of Present Illness:  Chief Complaint/Reason for Admission:  History of Present Illness:  HPI:  Patient is a 79-year-old male with a history of HTN, DM II, HLD, CKD III, multiple CVA without residual deficits, including right parietal stroke on  Lovenox c/b secondary hemorrhage s/p MCA clot retrieval at [place] on 3/19, metastatic prostate cancer, metastatic adenocarcinoma of unclear etiology, with metastases to liver and bone s/p recent R Tibia Intralesional curettage/bone graft ( 19), patient was hospitalized @ [facility]  (19-19 ) due to bleeding Gastric ulcer, Lovenox on hold, s/p hepatic artery embolism using femoral site as an access site.   Last night 9 :10 - walked to the bathroom, then went to sleep, when patient awoke, patient's wife noted that right side of patient's face and right hand were different, around 9:50. Patient was not answering any questions at that time. EMS reports that patient was found to have right sided facial droop and dysarthria, right upper extremity weakness, found to be hypotensive 60/40s. Upon arrival to ED, patient was found to have BP 83/51.  Patient was at [place] - for surgical site leakage after hepatic artery embolism, patient had platelet transfusion around 2PM today for PL of 19 and was discharged to home.    While in Ed received 2L of fluids, BP start coming up to the 90's, then started on levophed drip. Patient became more alert, and speech came back as well. (2019 01:45)        PAST MEDICAL & SURGICAL HISTORY:  Anemia  Cancer, metastatic to bone: on chemo  Gastric ulcer: was hospitalized for GI bleed 19-19, taking Protonix  HTN (hypertension)  Liver cancer: stage 4  Prostate cancer: 2004 radiotherapy seeds, now metastatic to liver and bone  Ischemic stroke: 2019, no residuals  HLD (hyperlipidemia)  DM (diabetes mellitus): type 2, HgA1c 5.8% ( 19)  History of eye surgery: right  History of right knee surgery: R Tibia Intralesional curettage/bone graft  2019  H/O hernia repair: right inguinal hernia with mesh  2013  79yMale    MEDICATIONS  (STANDING):  chlorhexidine 4% Liquid 1 Application(s) Topical <User Schedule>  midodrine 10 milliGRAM(s) Oral every 8 hours  norepinephrine Infusion 0.05 MICROgram(s)/kG/Min (5.953 mL/Hr) IV Continuous <Continuous>  piperacillin/tazobactam IVPB.. 3.375 Gram(s) IV Intermittent every 8 hours  vancomycin  IVPB 750 milliGRAM(s) IV Intermittent every 12 hours    MEDICATIONS  (PRN):      Allergies    No Known Allergies    Intolerances      FAMILY HISTORY:  FH: HTN (hypertension)  FH: diabetes mellitus      REVIEW OF SYSTEMS:  General:  No wt loss, fevers, chills, night sweats  Eyes:  Good vision, no reported pain  ENT:  No sore throat, pain, runny nose, dysphagia  CV:  No pain, palpitatioins, hypo/hypertension  Resp:  No dyspnea, cough, tachypnea, wheezing  GI:  No pain, nausea, vomiting, diarrhea, constipatiion  :  No pain, bleeding, incontinence, nocturia  Muscle:  No pain, weakness  Breast:  No pain, abscess, mass, discharge  Neuro:  No weakness, tingling, memory problems  Psych:  No fatigue, insomnia, mood problems, depression  Endocrine:  No polyuria, polydypsia, cold/heat intolerance  Heme:  No petechiae, ecchymosis, easy bruisability  Skin:  No rash, tattoos, scars, edema      Vital Signs Last 24 Hrs  T(C): 36.3 (2019 08:48), Max: 36.4 (2019 22:51)  T(F): 97.4 (2019 08:48), Max: 97.5 (2019 22:51)  HR: 85 (2019 15:00) (63 - 100)  BP: 112/73 (2019 15:00) (71/47 - 139/80)  BP(mean): 82 (2019 15:00) (68 - 94)  RR: 18 (2019 15:00) (13 - 28)  SpO2: 96% (2019 15:00) (89% - 100%)    GENERAL PHYSICAL EXAM:  General:  Appears stated age, well-groomed, well-nourished, no distress  HEENT:  NC/AT, patent nares w/ pink mucosa, OP clear w/o lesions, PERRL, EOMI, conjunctivae clear, no thyromegaly, nodules, adenopathy, no JVD  Chest:  Full & symmetric excursion, no increased effort, breath sounds clear  Cardiovascular:  Regular rhythm, S1, S2, no murmur/rub/S3/S4, no carotid/femoral/abdominal bruit, radial/pedal pulses 2+, no edema  Abdomen:  Soft, non-tender, non-distended, normoactive bowel sounds, no HSM  Extremities:  Gait & station:   Digits:   Nails:   Joints, Bones, Muscles:   ROM:   Stability:  Skin:  No rash/erythema/ecchymoses/petechiae/wounds/abscess/warm/dry  Musculoskeletal:  Full ROM in all joints w/o swelling/tenderness/effusion    NEUROLOGICAL EXAM:  HENT:  Normocephalic head; atraumatic head.  Neck supple.  ENT: normal looking.  Mental State:    Alert.  Fully oriented to person, place and date.  Coherent.  Speech clear and intact.  Cooperative.  Responds appropriately.    Cranial Nerves:  II-XII:   Pupils round and reactive to light and accommodation.  Extraocular movements full.  Visual fields full (no homonymous hemianopsia).  Visual acuity wnl.  Facial  r face mild aweakness  intact.  Tongue midline.  Motor Functions:  Moves all extremities.        r l;eg swollen  3/5   Sensory Functions:   Intact to touch and pinprick to face and extremities.    Reflexes:  Deep tendon reflexes normoactive to biceps, knees and ankles.  Babinski absent (present).  Cerebellar Testing:    Finger to nose intact.  Nystagmus absent.  Neurovascular: Carotid auscultation full without bruits.      LABS:                        9.3    4.73  )-----------( 25       ( 2019 05:46 )             30.0     11-12    134<L>  |  106  |  12  ----------------------------<  152<H>  4.1   |  21<L>  |  0.99    Ca    6.6<L>      2019 05:18  Phos  2.1     11-12  Mg     1.6     11-12    TPro  5.0<L>  /  Alb  1.5<L>  /  TBili  0.7  /  DBili  x   /  AST  51<H>  /  ALT  34  /  AlkPhos  283<H>  11-    PT/INR - ( 2019 23:12 )   PT: 12.7 sec;   INR: 1.13 ratio         PTT - ( 2019 23:12 )  PTT:26.5 sec    Urinalysis Basic - ( 2019 01:04 )    Color: Yellow / Appearance: Clear / S.015 / pH: x  Gluc: x / Ketone: Trace  / Bili: Negative / Urobili: 1 mg/dL   Blood: x / Protein: 100 mg/dL / Nitrite: Negative   Leuk Esterase: Trace / RBC: 0-2 /HPF / WBC -   Sq Epi: x / Non Sq Epi: Few / Bacteria: Few        RADIOLOGY & ADDITIONAL STUDIES:      Assessment & Opinion: eevents noted called stroke code r face  weakness hx of olf cva had clot removal at [facility]  hx of liver ca,  had  embolization  at [facility]  awaek alert speech fluent r face weakness  arm 4/5 left leg 4/5 r leg swollen  3/5 better   today  will folow     Recommendations:  Brain MRI.  Carotid doppler.  Echocardiogram.  EEG.   DVT prophylaxis as ordered.  Medications:

## 2019-11-13 LAB
ALBUMIN SERPL ELPH-MCNC: 1.4 G/DL — LOW (ref 3.3–5)
ALP SERPL-CCNC: 308 U/L — HIGH (ref 40–120)
ALT FLD-CCNC: 38 U/L — SIGNIFICANT CHANGE UP (ref 12–78)
ANION GAP SERPL CALC-SCNC: 9 MMOL/L — SIGNIFICANT CHANGE UP (ref 5–17)
AST SERPL-CCNC: 73 U/L — HIGH (ref 15–37)
BASOPHILS # BLD AUTO: 0.01 K/UL — SIGNIFICANT CHANGE UP (ref 0–0.2)
BASOPHILS NFR BLD AUTO: 0.2 % — SIGNIFICANT CHANGE UP (ref 0–2)
BILIRUB SERPL-MCNC: 0.6 MG/DL — SIGNIFICANT CHANGE UP (ref 0.2–1.2)
BUN SERPL-MCNC: 12 MG/DL — SIGNIFICANT CHANGE UP (ref 7–23)
CALCIUM SERPL-MCNC: 6.8 MG/DL — LOW (ref 8.5–10.1)
CHLORIDE SERPL-SCNC: 107 MMOL/L — SIGNIFICANT CHANGE UP (ref 96–108)
CO2 SERPL-SCNC: 18 MMOL/L — LOW (ref 22–31)
CREAT SERPL-MCNC: 1.09 MG/DL — SIGNIFICANT CHANGE UP (ref 0.5–1.3)
CULTURE RESULTS: NO GROWTH — SIGNIFICANT CHANGE UP
EOSINOPHIL # BLD AUTO: 0.05 K/UL — SIGNIFICANT CHANGE UP (ref 0–0.5)
EOSINOPHIL NFR BLD AUTO: 1.1 % — SIGNIFICANT CHANGE UP (ref 0–6)
GLUCOSE SERPL-MCNC: 99 MG/DL — SIGNIFICANT CHANGE UP (ref 70–99)
HCT VFR BLD CALC: 30.8 % — LOW (ref 39–50)
HGB BLD-MCNC: 9.3 G/DL — LOW (ref 13–17)
IMM GRANULOCYTES NFR BLD AUTO: 0.4 % — SIGNIFICANT CHANGE UP (ref 0–1.5)
LYMPHOCYTES # BLD AUTO: 0.34 K/UL — LOW (ref 1–3.3)
LYMPHOCYTES # BLD AUTO: 7.5 % — LOW (ref 13–44)
MAGNESIUM SERPL-MCNC: 1.6 MG/DL — SIGNIFICANT CHANGE UP (ref 1.6–2.6)
MCHC RBC-ENTMCNC: 29 PG — SIGNIFICANT CHANGE UP (ref 27–34)
MCHC RBC-ENTMCNC: 30.2 GM/DL — LOW (ref 32–36)
MCV RBC AUTO: 96 FL — SIGNIFICANT CHANGE UP (ref 80–100)
MONOCYTES # BLD AUTO: 0.59 K/UL — SIGNIFICANT CHANGE UP (ref 0–0.9)
MONOCYTES NFR BLD AUTO: 13.1 % — SIGNIFICANT CHANGE UP (ref 2–14)
MRSA PCR RESULT.: SIGNIFICANT CHANGE UP
NEUTROPHILS # BLD AUTO: 3.5 K/UL — SIGNIFICANT CHANGE UP (ref 1.8–7.4)
NEUTROPHILS NFR BLD AUTO: 77.7 % — HIGH (ref 43–77)
NRBC # BLD: 0 /100 WBCS — SIGNIFICANT CHANGE UP (ref 0–0)
PHOSPHATE SERPL-MCNC: 2.3 MG/DL — LOW (ref 2.5–4.5)
PLATELET # BLD AUTO: 22 K/UL — LOW (ref 150–400)
POTASSIUM SERPL-MCNC: 3.9 MMOL/L — SIGNIFICANT CHANGE UP (ref 3.5–5.3)
POTASSIUM SERPL-SCNC: 3.9 MMOL/L — SIGNIFICANT CHANGE UP (ref 3.5–5.3)
PROT SERPL-MCNC: 5.3 GM/DL — LOW (ref 6–8.3)
RBC # BLD: 3.21 M/UL — LOW (ref 4.2–5.8)
RBC # FLD: 21.5 % — HIGH (ref 10.3–14.5)
S AUREUS DNA NOSE QL NAA+PROBE: SIGNIFICANT CHANGE UP
SODIUM SERPL-SCNC: 134 MMOL/L — LOW (ref 135–145)
SPECIMEN SOURCE: SIGNIFICANT CHANGE UP
VANCOMYCIN TROUGH SERPL-MCNC: 12.2 UG/ML — SIGNIFICANT CHANGE UP (ref 10–20)
WBC # BLD: 4.51 K/UL — SIGNIFICANT CHANGE UP (ref 3.8–10.5)
WBC # FLD AUTO: 4.51 K/UL — SIGNIFICANT CHANGE UP (ref 3.8–10.5)

## 2019-11-13 PROCEDURE — 99233 SBSQ HOSP IP/OBS HIGH 50: CPT

## 2019-11-13 RX ORDER — POTASSIUM PHOSPHATE, MONOBASIC POTASSIUM PHOSPHATE, DIBASIC 236; 224 MG/ML; MG/ML
15 INJECTION, SOLUTION INTRAVENOUS ONCE
Refills: 0 | Status: COMPLETED | OUTPATIENT
Start: 2019-11-13 | End: 2019-11-13

## 2019-11-13 RX ORDER — SENNA PLUS 8.6 MG/1
1 TABLET ORAL
Refills: 0 | Status: DISCONTINUED | OUTPATIENT
Start: 2019-11-13 | End: 2019-11-22

## 2019-11-13 RX ADMIN — PIPERACILLIN AND TAZOBACTAM 25 GRAM(S): 4; .5 INJECTION, POWDER, LYOPHILIZED, FOR SOLUTION INTRAVENOUS at 05:10

## 2019-11-13 RX ADMIN — MIDODRINE HYDROCHLORIDE 10 MILLIGRAM(S): 2.5 TABLET ORAL at 05:27

## 2019-11-13 RX ADMIN — MIDODRINE HYDROCHLORIDE 10 MILLIGRAM(S): 2.5 TABLET ORAL at 14:37

## 2019-11-13 RX ADMIN — PIPERACILLIN AND TAZOBACTAM 25 GRAM(S): 4; .5 INJECTION, POWDER, LYOPHILIZED, FOR SOLUTION INTRAVENOUS at 14:36

## 2019-11-13 RX ADMIN — MIDODRINE HYDROCHLORIDE 10 MILLIGRAM(S): 2.5 TABLET ORAL at 22:25

## 2019-11-13 RX ADMIN — POTASSIUM PHOSPHATE, MONOBASIC POTASSIUM PHOSPHATE, DIBASIC 62.5 MILLIMOLE(S): 236; 224 INJECTION, SOLUTION INTRAVENOUS at 05:58

## 2019-11-13 RX ADMIN — Medication 250 MILLIGRAM(S): at 05:10

## 2019-11-13 RX ADMIN — CHLORHEXIDINE GLUCONATE 1 APPLICATION(S): 213 SOLUTION TOPICAL at 18:01

## 2019-11-13 RX ADMIN — Medication 250 MILLIGRAM(S): at 18:01

## 2019-11-13 RX ADMIN — SENNA PLUS 1 TABLET(S): 8.6 TABLET ORAL at 18:01

## 2019-11-13 RX ADMIN — PIPERACILLIN AND TAZOBACTAM 25 GRAM(S): 4; .5 INJECTION, POWDER, LYOPHILIZED, FOR SOLUTION INTRAVENOUS at 22:25

## 2019-11-13 NOTE — PHYSICAL THERAPY INITIAL EVALUATION ADULT - BALANCE TRAINING, PT EVAL
GOAL: pt will be able to ambulate 300ft independently without device without loss of balance within 4 weeks

## 2019-11-13 NOTE — DIETITIAN INITIAL EVALUATION ADULT. - ETIOLOGY
Inadequate energy & protein intake & increased energy & protein needs related to metastatic  prostate cancer with metastatic adenocarcinoma of inclear etiology with mets to liver & bones

## 2019-11-13 NOTE — OCCUPATIONAL THERAPY INITIAL EVALUATION ADULT - ADDITIONAL COMMENTS
Patient lives with family (Who can assist) in a private house with 3 steps with bilateral handrails to enter (close enough). Once inside, the patient has 3 steps inside with bilateral handrails (close enough) to reach main floor where bedroom and bathroom is. The patient bathroom has a tub/shower combination, retractable shower head, standard toilet & grab bar. The patient ambulates with a rolling walker and owns a cane. The patient is R handed, does not wear glasses and does not drive.

## 2019-11-13 NOTE — PROGRESS NOTE ADULT - SUBJECTIVE AND OBJECTIVE BOX
HPI:  Patient is a 79-year-old male with a history of HTN, DM II, HLD, CKD III, multiple CVA without residual deficits, including right parietal stroke on  Lovenox c/b secondary hemorrhage s/p MCA clot retrieval at [place] on 3/19, metastatic prostate cancer, metastatic adenocarcinoma of unclear etiology, with metastases to liver and bone s/p recent R Tibia Intralesional curettage/bone graft ( 19), patient was hospitalized @ [facility]  (19-19 ) due to bleeding Gastric ulcer, Lovenox on hold, s/p hepatic artery embolism using femoral site as an access site.   Last night 9 :10 - walked to the bathroom, then went to sleep, when patient awoke, patient's wife noted that right side of patient's face and right hand were different, around 9:50. Patient was not answering any questions at that time. EMS reports that patient was found to have right sided facial droop and dysarthria, right upper extremity weakness, found to be hypotensive 60/40s. Upon arrival to ED, patient was found to have BP 83/51.  Patient was at [place] - for surgical site leakage after hepatic artery embolism, patient had platelet transfusion around 2PM today for PL of 19 and was discharged to home.    While in Ed received 2L of fluids, BP start coming up to the 90's, then started on levophed drip. Patient became more alert, and speech came back as well. (2019 01:45)      24 hr events: No acute events. Off pressors since yesterday afternoon. No chest pain, dyspnea, nausea, emesis, or diarrhea. Reports constipation.    ## ROS:  See above. ROS otherwise negative.    ## Vitals  ICU Vital Signs Last 24 Hrs  T(C): 36.1 (2019 07:30), Max: 36.2 (2019 03:06)  T(F): 97 (2019 07:30), Max: 97.1 (2019 03:06)  HR: 103 (2019 09:20) (66 - 120)  BP: 111/76 (2019 09:20) (93/68 - 155/74)  BP(mean): 83 (2019 09:12) (72 - 121)  ABP: --  ABP(mean): --  RR: 20 (2019 09:20) (13 - 33)  SpO2: 95% (2019 09:20) (89% - 100%)      ## Physical Exam:  Gen: Elderly male walking around with physical therapy.  HEENT: NC/AT sclerae anicteric  Resp: No increased WOB, CTAB  CV: S1, S2  Abd: Soft + BS  Ext: R lower extremity swollen (chronic), WWP  Neuro: Awake alert follows commands, moves all extremities    ## Vent Data      ## Labs:  Chem:      134<L>  |  107  |  12  ----------------------------<  99  3.9   |  18<L>  |  1.09    Ca    6.8<L>      2019 03:55  Phos  2.3       Mg     1.6         TPro  5.3<L>  /  Alb  1.4<L>  /  TBili  0.6  /  DBili  x   /  AST  73<H>  /  ALT  38  /  AlkPhos  308<H>      LIVER FUNCTIONS - ( 2019 03:55 )  Alb: 1.4 g/dL / Pro: 5.3 gm/dL / ALK PHOS: 308 U/L / ALT: 38 U/L / AST: 73 U/L / GGT: x           CBC:                        9.3    4.51  )-----------( 22       ( 2019 03:55 )             30.8     Coags:  PT/INR - ( 2019 23:12 )   PT: 12.7 sec;   INR: 1.13 ratio         PTT - ( 2019 23:12 )  PTT:26.5 sec    Urinalysis Basic - ( 2019 01:04 )    Color: Yellow / Appearance: Clear / S.015 / pH: x  Gluc: x / Ketone: Trace  / Bili: Negative / Urobili: 1 mg/dL   Blood: x / Protein: 100 mg/dL / Nitrite: Negative   Leuk Esterase: Trace / RBC: 0-2 /HPF / WBC 11-25   Sq Epi: x / Non Sq Epi: Few / Bacteria: Few        ## Cardiac  CARDIAC MARKERS ( 2019 23:12 )  .030 ng/mL / x     / x     / x     / x            ## Blood Gas      #I/Os  I&O's Detail    2019 07:01  -  2019 07:00  --------------------------------------------------------  IN:    IV PiggyBack: 1050 mL    norepinephrine Infusion: 42.8 mL    Oral Fluid: 350 mL  Total IN: 1442.8 mL    OUT:    Indwelling Catheter - Urethral: 560 mL  Total OUT: 560 mL    Total NET: 882.8 mL      2019 07:  -  2019 09:53  --------------------------------------------------------  IN:  Total IN: 0 mL    OUT:    Indwelling Catheter - Urethral: 100 mL  Total OUT: 100 mL    Total NET: -100 mL          ## Imaging:    ## Medications:  MEDICATIONS  (STANDING):  chlorhexidine 4% Liquid 1 Application(s) Topical <User Schedule>  midodrine 10 milliGRAM(s) Oral every 8 hours  norepinephrine Infusion 0.05 MICROgram(s)/kG/Min (5.953 mL/Hr) IV Continuous <Continuous>  piperacillin/tazobactam IVPB.. 3.375 Gram(s) IV Intermittent every 8 hours  vancomycin  IVPB 750 milliGRAM(s) IV Intermittent every 12 hours    MEDICATIONS  (PRN):

## 2019-11-13 NOTE — DIETITIAN INITIAL EVALUATION ADULT. - PHYSICAL APPEARANCE
debilitated/BMI=23.2; +1 gen edema, +3 edema Lt ankle, +4 edema Rt leg, Rt knee & Lt foot/other (specify) Nutrition focused physical exam conducted: Subcutaneous fat loss: [severe] Orbital fat pads region, [ moderate]Buccal fat region, [severe ]Triceps region,  [distended ]Ribs region.  Muscle wasting: [severe ]Temples region, [severe ]Clavicle region, [severe ]Shoulder region, [unable ]Scapula region,  [mild]Interosseous region,  [Rt leg edematous & Lt leg severe ]thigh region, [Rt leg edematous & Lt leg severe ]Calf region

## 2019-11-13 NOTE — CHART NOTE - NSCHARTNOTEFT_GEN_A_CORE
Upon Nutritional Assessment by the Registered Dietitian your patient was determined to meet criteria / has evidence of the following diagnosis/diagnoses:          [ ]  Mild Protein Calorie Malnutrition        [ ]  Moderate Protein Calorie Malnutrition        [x ] Severe Protein Calorie Malnutrition        [ ] Unspecified Protein Calorie Malnutrition        [ ] Underweight / BMI <19        [ ] Morbid Obesity / BMI > 40      Findings as based on:  •  Comprehensive nutrition assessment and consultation  •  Calorie counts (nutrient intake analysis)  •  Food acceptance and intake status from observations by staff  •  Follow up  •  Patient education  •  Intervention secondary to interdisciplinary rounds  •   concerns      Treatment:    The following diet has been recommended:  Puree/honey thickened/Ensure pudding 3 x day(510kcal & 12gm protein)    PROVIDER Section:     By signing this assessment you are acknowledging and agree with the diagnosis/diagnoses assigned by the Registered Dietitian    Comments:

## 2019-11-13 NOTE — PHYSICAL THERAPY INITIAL EVALUATION ADULT - ADDITIONAL COMMENTS
Pt lives with his wife in a private home, 3 entry steps (B/L rails, not close enough to reach simultaneously), 1 level once inside. Pt states prior to admission he was independent with all functional mobility including community ambulation without a device. Pt states he typically can perform ADL's independently but spouse can assist as needed. Pt states he owns rolling walker, straight cane, commode, & shower chair at home. Pt denies any home PT or home health aide services prior to admission. Pt states he is right hand dominant, no longer drives, & is retired. Goal of therapy: return home.

## 2019-11-13 NOTE — DIETITIAN INITIAL EVALUATION ADULT. - DIET TYPE
supplement (specify)/Ensure pudding 3 x day(510kcal & 12gm protein)/dysphagia 1, pureed, honey consistency fluid

## 2019-11-13 NOTE — DIETITIAN INITIAL EVALUATION ADULT. - PERTINENT LABORATORY DATA
11-13 Na 134 mmol/L<L> Glu 99 mg/dL K+ 3.9 mmol/L Cr 1.09 mg/dL BUN 12 mg/dL Phos 2.3 mg/dL<L> Alb 1.4 g/dL<L> PAB n/a   Hgb 9.3 g/dL<L> Hct 30.8 %<L> HgA1C 5/3%(11/12)    Glucose, Serum: 99 mg/dL, 11-12 Chol 169 <H> HDL 50 Trig 83   24Hr FS:

## 2019-11-13 NOTE — PHYSICAL THERAPY INITIAL EVALUATION ADULT - PLANNED THERAPY INTERVENTIONS, PT EVAL
balance training/bed mobility training/stairs: GOAL: pt will be able to independently ascend/descend total of 3 steps with use of one rail (step-to-step pattern) within 4 weeks/gait training/strengthening/transfer training

## 2019-11-13 NOTE — PROGRESS NOTE ADULT - ASSESSMENT
Subjective Complaints:  Historian:             Vital Signs Last 24 Hrs  T(C): 36 (2019 19:09), Max: 36.2 (2019 03:06)  T(F): 96.8 (2019 19:09), Max: 97.1 (2019 03:06)  HR: 80 (2019 16:00) (66 - 120)  BP: 126/72 (2019 19:20) (93/72 - 155/74)  BP(mean): 85 (2019 19:20) (72 - 104)  RR: 20 (2019 19:20) (14 - 23)  SpO2: 94% (2019 19:20) (59% - 100%)    GENERAL PHYSICAL EXAM:  General:  Appears stated age, well-groomed, well-nourished, no distress  HEENT:  NC/AT, patent nares w/ pink mucosa, OP clear w/o lesions, PERRL, EOMI, conjunctivae clear, no thyromegaly, nodules, adenopathy, no JVD  Chest:  Full & symmetric excursion, no increased effort, breath sounds clear  Cardiovascular:  Regular rhythm, S1, S2, no murmur/rub/S3/S4, no carotid/femoral/abdominal bruit, radial/pedal pulses 2+, no edema  Abdomen:  Soft, non-tender, non-distended, normoactive bowel sounds, no HSM  Extremities:  Gait & station:   Digits:   Nails:   Joints, Bones, Muscles:   ROM:   Stability:  Skin:  No rash/erythema/ecchymoses/petechiae/wounds/abscess/warm/dry  Musculoskeletal:  Full ROM in all joints w/o swelling/tenderness/effusion        LABS:                        9.3    4.51  )-----------( 22       ( 2019 03:55 )             30.8     11-13    134<L>  |  107  |  12  ----------------------------<  99  3.9   |  18<L>  |  1.09    Ca    6.8<L>      2019 03:55  Phos  2.3     11-13  Mg     1.6     11-13    TPro  5.3<L>  /  Alb  1.4<L>  /  TBili  0.6  /  DBili  x   /  AST  73<H>  /  ALT  38  /  AlkPhos  308<H>  11-13    PT/INR - ( 2019 23:12 )   PT: 12.7 sec;   INR: 1.13 ratio         PTT - ( 2019 23:12 )  PTT:26.5 sec  Urinalysis Basic - ( 2019 01:04 )    Color: Yellow / Appearance: Clear / S.015 / pH: x  Gluc: x / Ketone: Trace  / Bili: Negative / Urobili: 1 mg/dL   Blood: x / Protein: 100 mg/dL / Nitrite: Negative   Leuk Esterase: Trace / RBC: 0-2 /HPF / WBC 11-25   Sq Epi: x / Non Sq Epi: Few / Bacteria: Few        RADIOLOGY & ADDITIONAL STUDIES:        Neurology Progress Note:      Mental Status: pt is sleeping no seziure       Cranial Nerves:r face  weakness       Motor:  arm 3/5   r leg swelling          Sensory:intact      Cerebellar: deefrd       Gait:unsteady       Assesment/Plan: r face weakness hx of liver ca  ,  r leg swelling metabolic encephalaothy  will folow for sub acute trehab oncology eval

## 2019-11-13 NOTE — OCCUPATIONAL THERAPY INITIAL EVALUATION ADULT - RANGE OF MOTION EXAMINATION, LOWER EXTREMITY
Grossly/bilateral LE Active ROM was WFL  (within functional limits)/bilateral LE Passive ROM was WFL  (within functional limits)

## 2019-11-13 NOTE — DIETITIAN INITIAL EVALUATION ADULT. - OTHER INFO
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 Pt admitted with GIB, hepatic artery emobolization , now admitted with hypotension hesham due to severe sepsis with septic shock due to UTI.   S/p swallow evaluation 11/12 recommends puree/honey thickened liquids. Observed pt consumed 25% entree & 100% pudding & 75% thickened apple juice for lunch today.   Pt with hx DM type 2 managed with Metformin 1000mg 2 x day. 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 Pt admitted with GIB, hepatic artery emobolization , now admitted with hypotension hesham due to severe sepsis with septic shock due to UTI.   S/p swallow evaluation 11/12 recommends puree/honey thickened liquids. Observed breakfast pt consumed 75% am and observed lunch 25% entree, 100% pudding & 75% thickened apple juice for today.   Pt with hx DM type 2 managed with Metformin 1000mg 2 x day. SMBG qod PTA; OocJ1D=5.3%(11/12)    UBW obtain from previous dietitian's assessment EMR 3/6/19. 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 Pt admitted with GIB, hepatic artery emobolization , now admitted with hypotension hesham due to severe sepsis with septic shock due to UTI. Pt lives with wife who does cooking & food shopping. Pt states he enjoys soup, squash, hot cereal, corn meal, Banana.   S/p swallow evaluation 11/12 recommends puree/honey thickened liquids. Observed breakfast pt consumed 75% am and observed lunch 25% entree, 100% pudding & 75% thickened apple juice for today.   Pt with hx DM type 2 managed with Metformin 1000mg 2 x day. SMBG qod PTA; DgtD0K=2.3%(11/12)    UBW obtain from previous dietitian's assessment EMR 3/6/19. Pt with multiple hospitalizations presents with sign wt loss 5.1kg(7%) x 8 months

## 2019-11-13 NOTE — OCCUPATIONAL THERAPY INITIAL EVALUATION ADULT - PERTINENT HX OF CURRENT PROBLEM, REHAB EVAL
80 y/o male admitted to [facility] due to hypotension likely secondary to severe sepsis with septic shock likely due to UTI. While in Ed received 2L of fluids, BP start coming up to the 90's, then started on levophed drip. Patient became more alert, and speech came back as well. Patient admitted to ICU for further care and management.

## 2019-11-13 NOTE — OCCUPATIONAL THERAPY INITIAL EVALUATION ADULT - STRENGTHENING, PT EVAL
Pt will increase BUE/BLE to 5/5 to improve functional strength needed to engage in functional tasks by 4 weeks

## 2019-11-13 NOTE — OCCUPATIONAL THERAPY INITIAL EVALUATION ADULT - RANGE OF MOTION EXAMINATION, UPPER EXTREMITY
bilateral UE Active ROM was WFL  (within functional limits)/Grossly/bilateral UE Passive ROM was WFL  (within functional limits)

## 2019-11-13 NOTE — DIETITIAN INITIAL EVALUATION ADULT. - PERTINENT MEDS FT
chlorhexidine 4% Liquid  midodrine  norepinephrine Infusion  piperacillin/tazobactam IVPB..  senna  vancomycin  IVPB

## 2019-11-13 NOTE — CHART NOTE - NSCHARTNOTEFT_GEN_A_CORE
Mr. Balderrama is a 79-year-old male with a history of HTN, DM II, HLD, CKD III, multiple CVA without residual deficits, including right parietal stroke on  Lovenox c/b secondary hemorrhage s/p MCA clot retrieval at [place] on 3/19, metastatic prostate cancer, metastatic adenocarcinoma of unclear etiology, with metastases to liver and bone s/p recent R Tibia Intralesional curettage/bone graft (7/11/19), patient was hospitalized @ [facility] due to bleeding Gastric ulcer and is s/p hepatic artery embolism via femoral access. Yesterday on 11/12,at approx 9 :10, pt walked to the bathroom, then went to sleep, when patient awoke, patient's wife noted that right side of patient's face and right hand were different, around 9:50. Patient was not answering any questions at that time. EMS reports that patient was found to have right sided facial droop and dysarthria, right upper extremity weakness, found to be hypotensive 60/40s. Upon arrival to ED, patient was found to have BP 83/51 and he was given IVF (2L) and started on levo with improvement noted in mental status. TPA was not administered. During his stay here in ICU Mr. Balderrama has remained HDS and off norepi infusion. 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     Of additional note, patient was at [place] 11/4-11/11 for surgical site leakage after hepatic artery embolism, patient had platelet transfusion around 2PM today for PL of 19 and was discharged to home.     Patient has been seen/examined by ICU attending and is deemed stable for transfer to med/surg. Case has been discussed with Dr. Roach, hospitalist physician, and has been accepted to their service.

## 2019-11-13 NOTE — PROGRESS NOTE ADULT - ASSESSMENT
78 y/o M w/metastatic cancer of unknown primary, multiple CVAs, recently admitted for GI bleed s/p hepatic artery embolization now admitted for hypotension likely secondary to severe sepsis with septic shock likely due to UTI.    - Midodrine 10mg q8hrs, wean as tolerated  - Broad spectrum abx  - Follow up cultures  - Pain management as needed  - Downgrade to floor

## 2019-11-13 NOTE — PHYSICAL THERAPY INITIAL EVALUATION ADULT - PERTINENT HX OF CURRENT PROBLEM, REHAB EVAL
Pt is a 78yo M admitted 2/2 right sided weakness. Pt found to be hypotensive by EMS (60/40s). Pt admitted to ICU for management of hypotension likely 2/2 severe sepsis with septic shock likely due to UTI.

## 2019-11-13 NOTE — OCCUPATIONAL THERAPY INITIAL EVALUATION ADULT - GENERAL OBSERVATIONS, REHAB EVAL
Pt was encountered supine in bed; NAD, BP cuff (+), cardiac monitor (+), continuous pulse ox (+), peripheral IV (+), pneumatic pumps on, 2+ edema RLE, odom (+), AXOX3, cooperative, followed commands.

## 2019-11-14 LAB
ALBUMIN SERPL ELPH-MCNC: 1.6 G/DL — LOW (ref 3.3–5)
ALP SERPL-CCNC: 300 U/L — HIGH (ref 40–120)
ALT FLD-CCNC: 43 U/L — SIGNIFICANT CHANGE UP (ref 12–78)
ANION GAP SERPL CALC-SCNC: 9 MMOL/L — SIGNIFICANT CHANGE UP (ref 5–17)
AST SERPL-CCNC: 81 U/L — HIGH (ref 15–37)
BILIRUB SERPL-MCNC: 0.7 MG/DL — SIGNIFICANT CHANGE UP (ref 0.2–1.2)
BUN SERPL-MCNC: 10 MG/DL — SIGNIFICANT CHANGE UP (ref 7–23)
CALCIUM SERPL-MCNC: 7 MG/DL — LOW (ref 8.5–10.1)
CHLORIDE SERPL-SCNC: 105 MMOL/L — SIGNIFICANT CHANGE UP (ref 96–108)
CO2 SERPL-SCNC: 20 MMOL/L — LOW (ref 22–31)
CREAT SERPL-MCNC: 1.05 MG/DL — SIGNIFICANT CHANGE UP (ref 0.5–1.3)
GLUCOSE BLDC GLUCOMTR-MCNC: 103 MG/DL — HIGH (ref 70–99)
GLUCOSE BLDC GLUCOMTR-MCNC: 116 MG/DL — HIGH (ref 70–99)
GLUCOSE BLDC GLUCOMTR-MCNC: 125 MG/DL — HIGH (ref 70–99)
GLUCOSE BLDC GLUCOMTR-MCNC: 134 MG/DL — HIGH (ref 70–99)
GLUCOSE BLDC GLUCOMTR-MCNC: 98 MG/DL — SIGNIFICANT CHANGE UP (ref 70–99)
GLUCOSE SERPL-MCNC: 97 MG/DL — SIGNIFICANT CHANGE UP (ref 70–99)
HCT VFR BLD CALC: 29.9 % — LOW (ref 39–50)
HGB BLD-MCNC: 9.2 G/DL — LOW (ref 13–17)
MAGNESIUM SERPL-MCNC: 1.7 MG/DL — SIGNIFICANT CHANGE UP (ref 1.6–2.6)
MCHC RBC-ENTMCNC: 28.8 PG — SIGNIFICANT CHANGE UP (ref 27–34)
MCHC RBC-ENTMCNC: 30.8 GM/DL — LOW (ref 32–36)
MCV RBC AUTO: 93.7 FL — SIGNIFICANT CHANGE UP (ref 80–100)
NRBC # BLD: 0 /100 WBCS — SIGNIFICANT CHANGE UP (ref 0–0)
PHOSPHATE SERPL-MCNC: 2 MG/DL — LOW (ref 2.5–4.5)
PLATELET # BLD AUTO: 23 K/UL — LOW (ref 150–400)
POTASSIUM SERPL-MCNC: 4 MMOL/L — SIGNIFICANT CHANGE UP (ref 3.5–5.3)
POTASSIUM SERPL-SCNC: 4 MMOL/L — SIGNIFICANT CHANGE UP (ref 3.5–5.3)
PROT SERPL-MCNC: 5.3 GM/DL — LOW (ref 6–8.3)
RBC # BLD: 3.19 M/UL — LOW (ref 4.2–5.8)
RBC # FLD: 21.6 % — HIGH (ref 10.3–14.5)
SODIUM SERPL-SCNC: 134 MMOL/L — LOW (ref 135–145)
WBC # BLD: 4.54 K/UL — SIGNIFICANT CHANGE UP (ref 3.8–10.5)
WBC # FLD AUTO: 4.54 K/UL — SIGNIFICANT CHANGE UP (ref 3.8–10.5)

## 2019-11-14 RX ADMIN — PIPERACILLIN AND TAZOBACTAM 25 GRAM(S): 4; .5 INJECTION, POWDER, LYOPHILIZED, FOR SOLUTION INTRAVENOUS at 22:36

## 2019-11-14 RX ADMIN — SENNA PLUS 1 TABLET(S): 8.6 TABLET ORAL at 06:17

## 2019-11-14 RX ADMIN — MIDODRINE HYDROCHLORIDE 10 MILLIGRAM(S): 2.5 TABLET ORAL at 22:45

## 2019-11-14 RX ADMIN — SENNA PLUS 1 TABLET(S): 8.6 TABLET ORAL at 22:36

## 2019-11-14 RX ADMIN — PIPERACILLIN AND TAZOBACTAM 25 GRAM(S): 4; .5 INJECTION, POWDER, LYOPHILIZED, FOR SOLUTION INTRAVENOUS at 06:16

## 2019-11-14 RX ADMIN — CHLORHEXIDINE GLUCONATE 1 APPLICATION(S): 213 SOLUTION TOPICAL at 06:17

## 2019-11-14 RX ADMIN — PIPERACILLIN AND TAZOBACTAM 25 GRAM(S): 4; .5 INJECTION, POWDER, LYOPHILIZED, FOR SOLUTION INTRAVENOUS at 14:02

## 2019-11-14 RX ADMIN — MIDODRINE HYDROCHLORIDE 10 MILLIGRAM(S): 2.5 TABLET ORAL at 14:02

## 2019-11-14 NOTE — PROGRESS NOTE ADULT - ASSESSMENT
Subjective Complaints:  Historian:             Vital Signs Last 24 Hrs  T(C): 36.9 (14 Nov 2019 17:07), Max: 37.2 (14 Nov 2019 05:00)  T(F): 98.4 (14 Nov 2019 17:07), Max: 99 (14 Nov 2019 05:00)  HR: 91 (14 Nov 2019 17:07) (81 - 98)  BP: 120/84 (14 Nov 2019 17:07) (120/84 - 148/82)  BP(mean): 95 (14 Nov 2019 13:59) (84 - 115)  RR: 18 (14 Nov 2019 17:07) (16 - 24)  SpO2: 100% (14 Nov 2019 17:07) (65% - 100%)    GENERAL PHYSICAL EXAM:  General:  Appears stated age, well-groomed, well-nourished, no distress  HEENT:  NC/AT, patent nares w/ pink mucosa, OP clear w/o lesions, PERRL, EOMI, conjunctivae clear, no thyromegaly, nodules, adenopathy, no JVD  Chest:  Full & symmetric excursion, no increased effort, breath sounds clear  Cardiovascular:  Regular rhythm, S1, S2, no murmur/rub/S3/S4, no carotid/femoral/abdominal bruit, radial/pedal pulses 2+, no edema  Abdomen:  Soft, non-tender, non-distended, normoactive bowel sounds, no HSM  Extremities:  Gait & station:   Digits:   Nails:   Joints, Bones, Muscles:   ROM:   Stability:  Skin:  No rash/erythema/ecchymoses/petechiae/wounds/abscess/warm/dry  Musculoskeletal:  Full ROM in all joints w/o swelling/tenderness/effusion        LABS:                        9.2    4.54  )-----------( 23       ( 14 Nov 2019 04:49 )             29.9     11-14    134<L>  |  105  |  10  ----------------------------<  97  4.0   |  20<L>  |  1.05    Ca    7.0<L>      14 Nov 2019 04:49  Phos  2.0     11-14  Mg     1.7     11-14    TPro  5.3<L>  /  Alb  1.6<L>  /  TBili  0.7  /  DBili  x   /  AST  81<H>  /  ALT  43  /  AlkPhos  300<H>  11-14          RADIOLOGY & ADDITIONAL STUDIES:        Neurology Progress Note:      Mental Status: awaek alert spech fluent  follws commands       Cranial Nerves: r face  weakness       Motor:   arm 4/5 leg weakness r  swollen         Sensory:intact      Cerebellar: deefrd       Gait:unsteady       Assesment/Plan: gen weakness r facce weakness  hx pof pancreatic  ca,  for sub acute rehab oncology eval

## 2019-11-15 LAB
GLUCOSE BLDC GLUCOMTR-MCNC: 120 MG/DL — HIGH (ref 70–99)
GLUCOSE BLDC GLUCOMTR-MCNC: 122 MG/DL — HIGH (ref 70–99)
GLUCOSE BLDC GLUCOMTR-MCNC: 131 MG/DL — HIGH (ref 70–99)
GLUCOSE BLDC GLUCOMTR-MCNC: 81 MG/DL — SIGNIFICANT CHANGE UP (ref 70–99)

## 2019-11-15 PROCEDURE — 99233 SBSQ HOSP IP/OBS HIGH 50: CPT

## 2019-11-15 PROCEDURE — 99231 SBSQ HOSP IP/OBS SF/LOW 25: CPT

## 2019-11-15 PROCEDURE — 99223 1ST HOSP IP/OBS HIGH 75: CPT

## 2019-11-15 RX ORDER — POTASSIUM CHLORIDE 20 MEQ
20 PACKET (EA) ORAL
Refills: 0 | Status: DISCONTINUED | OUTPATIENT
Start: 2019-11-15 | End: 2019-11-17

## 2019-11-15 RX ORDER — FUROSEMIDE 40 MG
20 TABLET ORAL
Refills: 0 | Status: DISCONTINUED | OUTPATIENT
Start: 2019-11-15 | End: 2019-11-17

## 2019-11-15 RX ORDER — TAMSULOSIN HYDROCHLORIDE 0.4 MG/1
0.8 CAPSULE ORAL AT BEDTIME
Refills: 0 | Status: DISCONTINUED | OUTPATIENT
Start: 2019-11-15 | End: 2019-11-22

## 2019-11-15 RX ORDER — CARBAMAZEPINE 200 MG
100 TABLET ORAL
Refills: 0 | Status: DISCONTINUED | OUTPATIENT
Start: 2019-11-15 | End: 2019-11-22

## 2019-11-15 RX ORDER — CARBAMAZEPINE 200 MG
100 TABLET ORAL
Refills: 0 | Status: DISCONTINUED | OUTPATIENT
Start: 2019-11-15 | End: 2019-11-15

## 2019-11-15 RX ORDER — LANOLIN ALCOHOL/MO/W.PET/CERES
3 CREAM (GRAM) TOPICAL ONCE
Refills: 0 | Status: COMPLETED | OUTPATIENT
Start: 2019-11-15 | End: 2019-11-15

## 2019-11-15 RX ORDER — ATORVASTATIN CALCIUM 80 MG/1
40 TABLET, FILM COATED ORAL AT BEDTIME
Refills: 0 | Status: DISCONTINUED | OUTPATIENT
Start: 2019-11-15 | End: 2019-11-16

## 2019-11-15 RX ORDER — PANTOPRAZOLE SODIUM 20 MG/1
40 TABLET, DELAYED RELEASE ORAL
Refills: 0 | Status: DISCONTINUED | OUTPATIENT
Start: 2019-11-15 | End: 2019-11-22

## 2019-11-15 RX ORDER — MIDODRINE HYDROCHLORIDE 2.5 MG/1
5 TABLET ORAL EVERY 8 HOURS
Refills: 0 | Status: DISCONTINUED | OUTPATIENT
Start: 2019-11-15 | End: 2019-11-17

## 2019-11-15 RX ADMIN — Medication 3 MILLIGRAM(S): at 23:49

## 2019-11-15 RX ADMIN — MIDODRINE HYDROCHLORIDE 10 MILLIGRAM(S): 2.5 TABLET ORAL at 05:37

## 2019-11-15 RX ADMIN — MIDODRINE HYDROCHLORIDE 5 MILLIGRAM(S): 2.5 TABLET ORAL at 21:26

## 2019-11-15 RX ADMIN — ATORVASTATIN CALCIUM 40 MILLIGRAM(S): 80 TABLET, FILM COATED ORAL at 21:26

## 2019-11-15 RX ADMIN — Medication 100 MILLIGRAM(S): at 21:26

## 2019-11-15 RX ADMIN — TAMSULOSIN HYDROCHLORIDE 0.8 MILLIGRAM(S): 0.4 CAPSULE ORAL at 21:25

## 2019-11-15 RX ADMIN — Medication 20 MILLIEQUIVALENT(S): at 17:29

## 2019-11-15 RX ADMIN — Medication 100 MILLIGRAM(S): at 14:52

## 2019-11-15 RX ADMIN — PIPERACILLIN AND TAZOBACTAM 25 GRAM(S): 4; .5 INJECTION, POWDER, LYOPHILIZED, FOR SOLUTION INTRAVENOUS at 14:46

## 2019-11-15 RX ADMIN — PIPERACILLIN AND TAZOBACTAM 25 GRAM(S): 4; .5 INJECTION, POWDER, LYOPHILIZED, FOR SOLUTION INTRAVENOUS at 05:37

## 2019-11-15 RX ADMIN — CHLORHEXIDINE GLUCONATE 1 APPLICATION(S): 213 SOLUTION TOPICAL at 08:00

## 2019-11-15 RX ADMIN — MIDODRINE HYDROCHLORIDE 10 MILLIGRAM(S): 2.5 TABLET ORAL at 14:53

## 2019-11-15 RX ADMIN — Medication 100 MILLIGRAM(S): at 00:14

## 2019-11-15 RX ADMIN — PIPERACILLIN AND TAZOBACTAM 25 GRAM(S): 4; .5 INJECTION, POWDER, LYOPHILIZED, FOR SOLUTION INTRAVENOUS at 21:29

## 2019-11-15 RX ADMIN — Medication 20 MILLIGRAM(S): at 17:22

## 2019-11-15 NOTE — PROGRESS NOTE ADULT - ASSESSMENT
78 y/o M w/metastatic cancer of unknown primary, multiple CVAs, recently admitted for GI bleed s/p hepatic artery embolization now admitted for hypotension likely secondary to severe sepsis with septic shock likely due to UTI.    - Midodrine 10mg q8hrs, wean as tolerated  - Broad spectrum abx  - Follow up cultures  - Pain management as needed  - Downgrade to floor 78 y/o M w/metastatic cancer of unknown primary, multiple CVAs, recently admitted for GI bleed s/p hepatic artery embolization now admitted for hypotension likely secondary to severe sepsis with septic shock likely due to UTI.    - Midodrine 10mg q8hrs, wean as tolerated  - Broad spectrum abx for possibly pneumonia on CXR  - Follow up cultures. Urine culture negative.   - Pain management as needed. 78 y/o M w/metastatic cancer of unknown primary, multiple CVAs, recently admitted for GI bleed s/p hepatic artery embolization now admitted for hypotension likely secondary to severe sepsis with septic shock likely due to UTI.    - Midodrine decreased to 5mg q8hrs, wean as tolerated  - Broad spectrum abx for possibly pneumonia on CXR  - Follow up cultures. Urine culture negative.   - Pain management as needed.     Will add Lasix 20 mg bid for leg edema, Lipitor 40 mg/day for HLD and Flomax .8 mg/day for BPH.     DM controlled  off meds.

## 2019-11-15 NOTE — PROGRESS NOTE ADULT - ASSESSMENT
Subjective Complaints:  Historian:             Vital Signs Last 24 Hrs  T(C): 35.9 (15 Nov 2019 17:49), Max: 36.7 (15 Nov 2019 12:59)  T(F): 96.7 (15 Nov 2019 17:49), Max: 98.1 (15 Nov 2019 12:59)  HR: 92 (15 Nov 2019 17:49) (89 - 103)  BP: 137/93 (15 Nov 2019 17:49) (122/58 - 137/93)  BP(mean): --  RR: 16 (15 Nov 2019 17:49) (15 - 18)  SpO2: 97% (15 Nov 2019 17:49) (97% - 100%)    GENERAL PHYSICAL EXAM:  General:  Appears stated age, well-groomed, well-nourished, no distress  HEENT:  NC/AT, patent nares w/ pink mucosa, OP clear w/o lesions, PERRL, EOMI, conjunctivae clear, no thyromegaly, nodules, adenopathy, no JVD  Chest:  Full & symmetric excursion, no increased effort, breath sounds clear  Cardiovascular:  Regular rhythm, S1, S2, no murmur/rub/S3/S4, no carotid/femoral/abdominal bruit, radial/pedal pulses 2+, no edema  Abdomen:  Soft, non-tender, non-distended, normoactive bowel sounds, no HSM  Extremities:  Gait & station:   Digits:   Nails:   Joints, Bones, Muscles:   ROM:   Stability:  Skin:  No rash/erythema/ecchymoses/petechiae/wounds/abscess/warm/dry  Musculoskeletal:  Full ROM in all joints w/o swelling/tenderness/effusion        LABS:                        9.2    4.54  )-----------( 23       ( 14 Nov 2019 04:49 )             29.9     11-14    134<L>  |  105  |  10  ----------------------------<  97  4.0   |  20<L>  |  1.05    Ca    7.0<L>      14 Nov 2019 04:49  Phos  2.0     11-14  Mg     1.7     11-14    TPro  5.3<L>  /  Alb  1.6<L>  /  TBili  0.7  /  DBili  x   /  AST  81<H>  /  ALT  43  /  AlkPhos  300<H>  11-14          RADIOLOGY & ADDITIONAL STUDIES:        Neurology Progress Note:      Mental Status: awaek alert speech fluent  follws commands       Cranial Nerves: r face subtle weakness       Motor:   arm 3/5 ;eg weakness r leg swollen         Sensory: intact      Cerebellar: deefrd       Gait:unsteady       Assesment/Plan:  penis and scrotum swollen  r leg swollen hx of pancreatic ca,  will folow

## 2019-11-15 NOTE — CONSULT NOTE ADULT - SUBJECTIVE AND OBJECTIVE BOX
SHER LILLY  MRN-[id number]    UROLOGY SURGERY/ DR. DACOSTA    PATIENT SEEN AND EXAMINED WITH DR. DACOSTA  FAMILY AT BEDSIDE  PATIENT AND FAMILY AWARE OF PROSTATE CANCER , SON STATED  TX WITH RADIATION AND SEEDS    CURRENTLY UNDERGOING RT FOR LIVER METS      PAST MEDICAL & SURGICAL HISTORY:  Anemia (D64.9)  Cancer, metastatic to bone (C79.51): on chemo  Gastric ulcer (K25.9): was hospitalized for GI bleed 7/22/19-7/25/19, taking Protonix  AICD (automatic cardioverter/defibrillator) present (Z95.810)  HLD (hyperlipidemia) (E78.5)  HTN (hypertension) (I10)  Liver cancer (C22.9): stage 4  Prostate cancer (C61): 2004 radiotherapy seeds, now metastatic to liver and bone  Ischemic stroke (I63.9): 01/2019, no residuals  HLD (hyperlipidemia) (E78.5)  HTN (hypertension) (I10)  Hernia (K46.9)  DM (diabetes mellitus) (E11.9): type 2, HgA1c 5.8% ( 6/5/19)  Prostate CA (C61)  History of eye surgery (Z98.890): right  History of right knee surgery (Z98.890): R Tibia Intralesional curettage/bone graft  7/11/2019  H/O hernia repair (Z98.890): right inguinal hernia with mesh  9/2013    Home Medications:    Advair Diskus 250 mcg-50 mcg inhalation powder: 1 puff(s) inhaled , As Needed (05 Nov 2019 11:34)  atorvastatin 40 mg oral tablet: 1 tab(s) orally once a day (05 Nov 2019 11:34)  Avastin: intravenous every 2 weeks (05 Nov 2019 11:34)  Cyramza 10 mg/mL intravenous solution: 512 milligram(s) intravenous every 2 weeks (05 Nov 2019 11:34)  metFORMIN 1000 mg oral tablet: 1 tab(s) orally 2 times a day (05 Nov 2019 11:34)  NexAVAR 200 mg oral tablet: 2 tab(s) orally once a day (05 Nov 2019 11:34)  ondansetron 8 mg oral tablet: 1 tab(s) orally 2 times a day, As Needed  (only on radiation therapy) (05 Nov 2019 11:34)  Opdivo: intravenous every 2 weeks (05 Nov 2019 11:34)  pantoprazole 40 mg oral delayed release tablet: 1 tab(s) orally 2 times a day (05 Nov 2019 11:34)  Symbicort 160 mcg-4.5 mcg/inh inhalation aerosol: 2 puff(s) inhaled 2 times a day, As Needed (05 Nov 2019 11:34)  terazosin 5 mg oral capsule: 1 cap(s) orally once a day (at bedtime) (05 Nov 2019 11:34)  traMADol 50 mg oral tablet: 1 tab(s) orally , As Needed (05 Nov 2019 11:34)  Vitamin D2 50,000 intl units (1.25 mg) oral capsule: 1 cap(s) orally 2 times a week  (Mon &amp; Thurs) (05 Nov 2019 11:34)    Allergies  No Known Allergies    Vital Signs Last 24 Hrs  T(C): 36.7 (15 Nov 2019 12:59), Max: 36.7 (15 Nov 2019 12:59)  T(F): 98.1 (15 Nov 2019 12:59), Max: 98.1 (15 Nov 2019 12:59)  HR: 94 (15 Nov 2019 16:55) (89 - 103)  BP: 132/89 (15 Nov 2019 16:55) (122/58 - 132/89)  BP(mean): --  RR: 15 (15 Nov 2019 16:55) (15 - 18)  SpO2: 100% (15 Nov 2019 16:55) (97% - 100%)      11-14-19 @ 07:01  -  11-15-19 @ 07:00  --------------------------------------------------------  IN: 300 mL / OUT: 450 mL / NET: -150 mL    11-15-19 @ 07:01  -  11-15-19 @ 17:39  --------------------------------------------------------  IN: 240 mL / OUT: 0 mL / NET: 240 mL      PENIS AND SCROTUM : UNCIRCUMCISED MALE WITH EXTENSIVE EDEMA PENILE SHAFT, PREPUCE AND SCROTAL SAC , NO EVIDENCE OF PHIMOSIS / PARAPHIMOSIS  OR DISCOLORATION   LOWER ABDOMEN/ FLANK/ RIGHT GROIN AND BILATERAL LOWER EXT R>L   DEPENDENT EDEMA ALSO NOTED                           9.2    4.54  )-----------( 23       ( 14 Nov 2019 04:49 )             29.9      11-14    134<L>  |  105  |  10  ----------------------------<  97  4.0   |  20<L>  |  1.05    Ca    7.0<L>      14 Nov 2019 04:49  Phos  2.0     11-14  Mg     1.7     11-14    TPro  5.3<L>  /  Alb  1.6<L>  /  TBili  0.7  /  DBili  x   /  AST  81<H>  /  ALT  43  /  AlkPhos  300<H>  11-14                          ASSESSMENT &  PLAN:  PO    H/O PROSTATE CANCER   DEPENDENT EDEMA PENIS AND SCROTUM    CHECK PSA  DIURESIS AS PER MEDICAL SERVICE  MEDICAL MANAGEMENT AS PER PRIMARY TEAM

## 2019-11-15 NOTE — PROGRESS NOTE ADULT - SUBJECTIVE AND OBJECTIVE BOX
INTERVAL HPI/OVERNIGHT EVENTS:  Pt seen and examined at bedside.     Allergies/Intolerance: No Known Allergies      MEDICATIONS  (STANDING):  chlorhexidine 4% Liquid 1 Application(s) Topical <User Schedule>  midodrine 10 milliGRAM(s) Oral every 8 hours  piperacillin/tazobactam IVPB.. 3.375 Gram(s) IV Intermittent every 8 hours  senna 1 Tablet(s) Oral two times a day    MEDICATIONS  (PRN):  guaiFENesin   Syrup  (Sugar-Free) 100 milliGRAM(s) Oral every 6 hours PRN Cough        ROS: all systems reviewed and wnl      PHYSICAL EXAMINATION:  Vital Signs Last 24 Hrs  T(C): 36.4 (15 Nov 2019 05:35), Max: 36.9 (14 Nov 2019 17:07)  T(F): 97.6 (15 Nov 2019 05:35), Max: 98.4 (14 Nov 2019 17:07)  HR: 91 (15 Nov 2019 05:35) (89 - 94)  BP: 122/58 (15 Nov 2019 05:35) (120/84 - 132/86)  BP(mean): 95 (14 Nov 2019 13:59) (95 - 98)  RR: 18 (15 Nov 2019 05:35) (18 - 20)  SpO2: 97% (15 Nov 2019 05:35) (97% - 100%)  CAPILLARY BLOOD GLUCOSE      POCT Blood Glucose.: 81 mg/dL (15 Nov 2019 07:39)  POCT Blood Glucose.: 125 mg/dL (14 Nov 2019 22:49)  POCT Blood Glucose.: 134 mg/dL (14 Nov 2019 15:35)  POCT Blood Glucose.: 98 mg/dL (14 Nov 2019 12:21)      11-14 @ 07:01  -  11-15 @ 07:00  --------------------------------------------------------  IN: 300 mL / OUT: 450 mL / NET: -150 mL        GENERAL:   NECK: supple, No JVD  CHEST/LUNG: clear to auscultation bilaterally; no rales, rhonchi, or wheezing b/l  HEART: normal S1, S2  ABDOMEN: BS+, soft, ND, NT   EXTREMITIES:  pulses palpable; no clubbing, cyanosis, or edema b/l LEs  SKIN: no rashes or lesions      LABS:                        9.2    4.54  )-----------( 23       ( 14 Nov 2019 04:49 )             29.9     11-14    134<L>  |  105  |  10  ----------------------------<  97  4.0   |  20<L>  |  1.05    Ca    7.0<L>      14 Nov 2019 04:49  Phos  2.0     11-14  Mg     1.7     11-14    TPro  5.3<L>  /  Alb  1.6<L>  /  TBili  0.7  /  DBili  x   /  AST  81<H>  /  ALT  43  /  AlkPhos  300<H>  11-14 INTERVAL HPI/OVERNIGHT EVENTS:  Pt seen and examined at bedside.     Allergies/Intolerance: No Known Allergies      MEDICATIONS  (STANDING):  chlorhexidine 4% Liquid 1 Application(s) Topical <User Schedule>  midodrine 10 milliGRAM(s) Oral every 8 hours  piperacillin/tazobactam IVPB.. 3.375 Gram(s) IV Intermittent every 8 hours  senna 1 Tablet(s) Oral two times a day    MEDICATIONS  (PRN):  guaiFENesin   Syrup  (Sugar-Free) 100 milliGRAM(s) Oral every 6 hours PRN Cough        ROS: all systems reviewed and wnl      PHYSICAL EXAMINATION:  Vital Signs Last 24 Hrs  T(C): 36.4 (15 Nov 2019 05:35), Max: 36.9 (14 Nov 2019 17:07)  T(F): 97.6 (15 Nov 2019 05:35), Max: 98.4 (14 Nov 2019 17:07)  HR: 91 (15 Nov 2019 05:35) (89 - 94)  BP: 122/58 (15 Nov 2019 05:35) (120/84 - 132/86)  BP(mean): 95 (14 Nov 2019 13:59) (95 - 98)  RR: 18 (15 Nov 2019 05:35) (18 - 20)  SpO2: 97% (15 Nov 2019 05:35) (97% - 100%)  CAPILLARY BLOOD GLUCOSE      POCT Blood Glucose.: 81 mg/dL (15 Nov 2019 07:39)  POCT Blood Glucose.: 125 mg/dL (14 Nov 2019 22:49)  POCT Blood Glucose.: 134 mg/dL (14 Nov 2019 15:35)  POCT Blood Glucose.: 98 mg/dL (14 Nov 2019 12:21)      11-14 @ 07:01  -  11-15 @ 07:00  --------------------------------------------------------  IN: 300 mL / OUT: 450 mL / NET: -150 mL        GENERAL: stable in bed, no fevers, CP or SOB. Able to urinate today, PVR < 100.   NECK: supple, No JVD  CHEST/LUNG: clear to auscultation bilaterally; no rales, rhonchi, or wheezing b/l  HEART: normal S1, S2  ABDOMEN: BS+, soft, moderate distension, NT   EXTREMITIES:  pulses palpable; no clubbing, cyanosis, or edema b/l LEs  SKIN: no rashes or lesions      LABS:                        9.2    4.54  )-----------( 23       ( 14 Nov 2019 04:49 )             29.9     11-14    134<L>  |  105  |  10  ----------------------------<  97  4.0   |  20<L>  |  1.05    Ca    7.0<L>      14 Nov 2019 04:49  Phos  2.0     11-14  Mg     1.7     11-14    TPro  5.3<L>  /  Alb  1.6<L>  /  TBili  0.7  /  DBili  x   /  AST  81<H>  /  ALT  43  /  AlkPhos  300<H>  11-14

## 2019-11-16 LAB
ANION GAP SERPL CALC-SCNC: 7 MMOL/L — SIGNIFICANT CHANGE UP (ref 5–17)
BUN SERPL-MCNC: 11 MG/DL — SIGNIFICANT CHANGE UP (ref 7–23)
CALCIUM SERPL-MCNC: 7.4 MG/DL — LOW (ref 8.5–10.1)
CHLORIDE SERPL-SCNC: 107 MMOL/L — SIGNIFICANT CHANGE UP (ref 96–108)
CO2 SERPL-SCNC: 21 MMOL/L — LOW (ref 22–31)
CREAT SERPL-MCNC: 1.24 MG/DL — SIGNIFICANT CHANGE UP (ref 0.5–1.3)
GLUCOSE BLDC GLUCOMTR-MCNC: 122 MG/DL — HIGH (ref 70–99)
GLUCOSE BLDC GLUCOMTR-MCNC: 134 MG/DL — HIGH (ref 70–99)
GLUCOSE SERPL-MCNC: 116 MG/DL — HIGH (ref 70–99)
MAGNESIUM SERPL-MCNC: 1.8 MG/DL — SIGNIFICANT CHANGE UP (ref 1.6–2.6)
POTASSIUM SERPL-MCNC: 4 MMOL/L — SIGNIFICANT CHANGE UP (ref 3.5–5.3)
POTASSIUM SERPL-SCNC: 4 MMOL/L — SIGNIFICANT CHANGE UP (ref 3.5–5.3)
PSA FLD-MCNC: 1.57 NG/ML — SIGNIFICANT CHANGE UP (ref 0–4)
SODIUM SERPL-SCNC: 135 MMOL/L — SIGNIFICANT CHANGE UP (ref 135–145)

## 2019-11-16 PROCEDURE — 99233 SBSQ HOSP IP/OBS HIGH 50: CPT

## 2019-11-16 PROCEDURE — 99232 SBSQ HOSP IP/OBS MODERATE 35: CPT

## 2019-11-16 RX ORDER — SODIUM CHLORIDE 9 MG/ML
1000 INJECTION INTRAMUSCULAR; INTRAVENOUS; SUBCUTANEOUS ONCE
Refills: 0 | Status: COMPLETED | OUTPATIENT
Start: 2019-11-16 | End: 2019-11-16

## 2019-11-16 RX ADMIN — Medication 20 MILLIGRAM(S): at 19:11

## 2019-11-16 RX ADMIN — Medication 20 MILLIEQUIVALENT(S): at 06:29

## 2019-11-16 RX ADMIN — TAMSULOSIN HYDROCHLORIDE 0.8 MILLIGRAM(S): 0.4 CAPSULE ORAL at 23:03

## 2019-11-16 RX ADMIN — Medication 20 MILLIEQUIVALENT(S): at 19:14

## 2019-11-16 RX ADMIN — PIPERACILLIN AND TAZOBACTAM 25 GRAM(S): 4; .5 INJECTION, POWDER, LYOPHILIZED, FOR SOLUTION INTRAVENOUS at 06:29

## 2019-11-16 RX ADMIN — PANTOPRAZOLE SODIUM 40 MILLIGRAM(S): 20 TABLET, DELAYED RELEASE ORAL at 08:45

## 2019-11-16 RX ADMIN — Medication 100 MILLIGRAM(S): at 19:11

## 2019-11-16 RX ADMIN — PIPERACILLIN AND TAZOBACTAM 25 GRAM(S): 4; .5 INJECTION, POWDER, LYOPHILIZED, FOR SOLUTION INTRAVENOUS at 16:09

## 2019-11-16 RX ADMIN — MIDODRINE HYDROCHLORIDE 5 MILLIGRAM(S): 2.5 TABLET ORAL at 06:28

## 2019-11-16 RX ADMIN — PIPERACILLIN AND TAZOBACTAM 25 GRAM(S): 4; .5 INJECTION, POWDER, LYOPHILIZED, FOR SOLUTION INTRAVENOUS at 23:04

## 2019-11-16 RX ADMIN — Medication 100 MILLIGRAM(S): at 06:28

## 2019-11-16 RX ADMIN — SENNA PLUS 1 TABLET(S): 8.6 TABLET ORAL at 19:14

## 2019-11-16 RX ADMIN — MIDODRINE HYDROCHLORIDE 5 MILLIGRAM(S): 2.5 TABLET ORAL at 23:03

## 2019-11-16 RX ADMIN — MIDODRINE HYDROCHLORIDE 5 MILLIGRAM(S): 2.5 TABLET ORAL at 16:10

## 2019-11-16 RX ADMIN — Medication 100 MILLIGRAM(S): at 23:03

## 2019-11-16 RX ADMIN — SODIUM CHLORIDE 1000 MILLILITER(S): 9 INJECTION INTRAMUSCULAR; INTRAVENOUS; SUBCUTANEOUS at 03:40

## 2019-11-16 RX ADMIN — CHLORHEXIDINE GLUCONATE 1 APPLICATION(S): 213 SOLUTION TOPICAL at 08:46

## 2019-11-16 NOTE — PROGRESS NOTE ADULT - ASSESSMENT
78 y/o M w/metastatic cancer of unknown primary, multiple CVAs, recently admitted for GI bleed s/p hepatic artery embolization now admitted for hypotension likely secondary to severe sepsis with septic shock likely due to UTI.    - Midodrine decreased to 5mg q8hrs, wean as tolerated  - Broad spectrum abx for possibly pneumonia on CXR  - Follow up cultures. Urine culture negative.   - Pain management as needed.     Will add Lasix 20 mg bid for leg edema, Lipitor 40 mg/day for HLD and Flomax .8 mg/day for BPH.     DM controlled  off meds. 80 y/o M w/metastatic cancer of unknown primary, multiple CVAs, recently admitted for GI bleed s/p hepatic artery embolization now admitted for hypotension likely secondary to severe sepsis with septic shock likely due to UTI.    - Midodrine decreased to 5mg q8hrs, wean as tolerated.   - Broad spectrum abx for possibly pneumonia on CXR, will stop Zosyn tomorrow.   - Follow up cultures. Urine culture negative.   - Pain management as needed.     Will add Lasix 20 mg IVP bid for leg edema, stop Lipitor, not indicated now, Flomax .8 mg/day for BPH.     DM controlled  off meds.      May need paracentesis Monday.

## 2019-11-16 NOTE — PROGRESS NOTE ADULT - ASSESSMENT
Subjective Complaints:  Historian:             Vital Signs Last 24 Hrs  T(C): 36.6 (16 Nov 2019 16:30), Max: 36.6 (16 Nov 2019 16:30)  T(F): 97.9 (16 Nov 2019 16:30), Max: 97.9 (16 Nov 2019 16:30)  HR: 89 (16 Nov 2019 16:30) (81 - 102)  BP: 110/59 (16 Nov 2019 16:30) (80/45 - 131/94)  BP(mean): --  RR: 18 (16 Nov 2019 16:30) (18 - 19)  SpO2: 96% (16 Nov 2019 16:30) (96% - 99%)    GENERAL PHYSICAL EXAM:  General:  Appears stated age, well-groomed, well-nourished, no distress  HEENT:  NC/AT, patent nares w/ pink mucosa, OP clear w/o lesions, PERRL, EOMI, conjunctivae clear, no thyromegaly, nodules, adenopathy, no JVD  Chest:  Full & symmetric excursion, no increased effort, breath sounds clear  Cardiovascular:  Regular rhythm, S1, S2, no murmur/rub/S3/S4, no carotid/femoral/abdominal bruit, radial/pedal pulses 2+, no edema  Abdomen:  Soft, non-tender, non-distended, normoactive bowel sounds, no HSM  Extremities:  Gait & station:   Digits:   Nails:   Joints, Bones, Muscles:   ROM:   Stability:  Skin:  No rash/erythema/ecchymoses/petechiae/wounds/abscess/warm/dry  Musculoskeletal:  Full ROM in all joints w/o swelling/tenderness/effusion        LABS:    11-16    135  |  107  |  11  ----------------------------<  116<H>  4.0   |  21<L>  |  1.24    Ca    7.4<L>      16 Nov 2019 08:54  Mg     1.8     11-16            RADIOLOGY & ADDITIONAL STUDIES:        Neurology Progress Note:      Mental Status: awake alert  speechb fluent       Cranial Nerves: r face weakenss subtle       Motor:   arm 4/5  r leg swollen 3/5         Sensory: intact      Cerebellar: dferd       Gait: unsteadt       Assesment/Plan:  r leg swollen  hx of pancreatic ca,  r face milsd weakness  for sub acute rehab will follow  oncology eval

## 2019-11-16 NOTE — PROGRESS NOTE ADULT - SUBJECTIVE AND OBJECTIVE BOX
INTERVAL HPI/OVERNIGHT EVENTS:  Pt seen and examined at bedside.     Allergies/Intolerance: No Known Allergies      MEDICATIONS  (STANDING):  atorvastatin 40 milliGRAM(s) Oral at bedtime  carBAMazepine 100 milliGRAM(s) Oral two times a day  chlorhexidine 4% Liquid 1 Application(s) Topical <User Schedule>  furosemide   Injectable 20 milliGRAM(s) IV Push two times a day  midodrine 5 milliGRAM(s) Oral every 8 hours  pantoprazole    Tablet 40 milliGRAM(s) Oral before breakfast  piperacillin/tazobactam IVPB.. 3.375 Gram(s) IV Intermittent every 8 hours  potassium chloride    Tablet ER 20 milliEquivalent(s) Oral two times a day  senna 1 Tablet(s) Oral two times a day  tamsulosin 0.8 milliGRAM(s) Oral at bedtime    MEDICATIONS  (PRN):  guaiFENesin   Syrup  (Sugar-Free) 100 milliGRAM(s) Oral every 6 hours PRN Cough        ROS: all systems reviewed and wnl      PHYSICAL EXAMINATION:  Vital Signs Last 24 Hrs  T(C): 35.8 (16 Nov 2019 04:39), Max: 36.7 (15 Nov 2019 12:59)  T(F): 96.4 (16 Nov 2019 04:39), Max: 98.1 (15 Nov 2019 12:59)  HR: 95 (16 Nov 2019 04:39) (81 - 103)  BP: 102/73 (16 Nov 2019 04:39) (80/45 - 137/93)  BP(mean): --  RR: 18 (16 Nov 2019 04:39) (15 - 19)  SpO2: 96% (16 Nov 2019 04:39) (96% - 100%)  CAPILLARY BLOOD GLUCOSE      POCT Blood Glucose.: 134 mg/dL (16 Nov 2019 08:12)  POCT Blood Glucose.: 122 mg/dL (15 Nov 2019 23:50)  POCT Blood Glucose.: 131 mg/dL (15 Nov 2019 17:21)  POCT Blood Glucose.: 120 mg/dL (15 Nov 2019 11:34)      11-15 @ 07:01  -  11-16 @ 07:00  --------------------------------------------------------  IN: 680 mL / OUT: 1600 mL / NET: -920 mL        GENERAL:   NECK: supple, No JVD  CHEST/LUNG: clear to auscultation bilaterally; no rales, rhonchi, or wheezing b/l  HEART: normal S1, S2  ABDOMEN: BS+, soft, ND, NT   EXTREMITIES:  pulses palpable; no clubbing, cyanosis, or edema b/l LEs  SKIN: no rashes or lesions      LABS: INTERVAL HPI/OVERNIGHT EVENTS:  Pt seen and examined at bedside.     Allergies/Intolerance: No Known Allergies      MEDICATIONS  (STANDING):  atorvastatin 40 milliGRAM(s) Oral at bedtime  carBAMazepine 100 milliGRAM(s) Oral two times a day  chlorhexidine 4% Liquid 1 Application(s) Topical <User Schedule>  furosemide   Injectable 20 milliGRAM(s) IV Push two times a day  midodrine 5 milliGRAM(s) Oral every 8 hours  pantoprazole    Tablet 40 milliGRAM(s) Oral before breakfast  piperacillin/tazobactam IVPB.. 3.375 Gram(s) IV Intermittent every 8 hours  potassium chloride    Tablet ER 20 milliEquivalent(s) Oral two times a day  senna 1 Tablet(s) Oral two times a day  tamsulosin 0.8 milliGRAM(s) Oral at bedtime    MEDICATIONS  (PRN):  guaiFENesin   Syrup  (Sugar-Free) 100 milliGRAM(s) Oral every 6 hours PRN Cough        ROS: all systems reviewed and wnl      PHYSICAL EXAMINATION:  Vital Signs Last 24 Hrs  T(C): 35.8 (16 Nov 2019 04:39), Max: 36.7 (15 Nov 2019 12:59)  T(F): 96.4 (16 Nov 2019 04:39), Max: 98.1 (15 Nov 2019 12:59)  HR: 95 (16 Nov 2019 04:39) (81 - 103)  BP: 102/73 (16 Nov 2019 04:39) (80/45 - 137/93)  BP(mean): --  RR: 18 (16 Nov 2019 04:39) (15 - 19)  SpO2: 96% (16 Nov 2019 04:39) (96% - 100%)  CAPILLARY BLOOD GLUCOSE      POCT Blood Glucose.: 134 mg/dL (16 Nov 2019 08:12)  POCT Blood Glucose.: 122 mg/dL (15 Nov 2019 23:50)  POCT Blood Glucose.: 131 mg/dL (15 Nov 2019 17:21)  POCT Blood Glucose.: 120 mg/dL (15 Nov 2019 11:34)      11-15 @ 07:01  -  11-16 @ 07:00  --------------------------------------------------------  IN: 680 mL / OUT: 1600 mL / NET: -920 mL        GENERAL: alert, comfortable, no fevers, abdominal pain  NECK: supple, No JVD  CHEST/LUNG: clear to auscultation bilaterally; no rales, rhonchi, or wheezing b/l  HEART: normal S1, S2  ABDOMEN: BS+, soft, NT, less distension    EXTREMITIES:  pulses palpable; no clubbing, cyanosis, less edema b/l LEs  SKIN: no rashes or lesions      LABS:

## 2019-11-16 NOTE — CHART NOTE - NSCHARTNOTEFT_GEN_A_CORE
To whom it may concern,   Ti Narayanan is currently admitted at [facility] ([place], [place].   Please call [phone] with further questions    Katherine Gilman ANP

## 2019-11-17 LAB
ALBUMIN SERPL ELPH-MCNC: 1.6 G/DL — LOW (ref 3.3–5)
ALP SERPL-CCNC: 317 U/L — HIGH (ref 40–120)
ALT FLD-CCNC: 39 U/L — SIGNIFICANT CHANGE UP (ref 12–78)
ANION GAP SERPL CALC-SCNC: 10 MMOL/L — SIGNIFICANT CHANGE UP (ref 5–17)
ANISOCYTOSIS BLD QL: SIGNIFICANT CHANGE UP
AST SERPL-CCNC: 64 U/L — HIGH (ref 15–37)
BASE EXCESS BLDA CALC-SCNC: -5.6 MMOL/L — LOW (ref -2–2)
BASOPHILS # BLD AUTO: 0.02 K/UL — SIGNIFICANT CHANGE UP (ref 0–0.2)
BASOPHILS NFR BLD AUTO: 0.4 % — SIGNIFICANT CHANGE UP (ref 0–2)
BILIRUB SERPL-MCNC: 0.7 MG/DL — SIGNIFICANT CHANGE UP (ref 0.2–1.2)
BLOOD GAS COMMENTS: SIGNIFICANT CHANGE UP
BLOOD GAS COMMENTS: SIGNIFICANT CHANGE UP
BLOOD GAS SOURCE: SIGNIFICANT CHANGE UP
BUN SERPL-MCNC: 11 MG/DL — SIGNIFICANT CHANGE UP (ref 7–23)
BURR CELLS BLD QL SMEAR: SIGNIFICANT CHANGE UP
CALCIUM SERPL-MCNC: 7.7 MG/DL — LOW (ref 8.5–10.1)
CHLORIDE SERPL-SCNC: 105 MMOL/L — SIGNIFICANT CHANGE UP (ref 96–108)
CK SERPL-CCNC: 59 U/L — SIGNIFICANT CHANGE UP (ref 26–308)
CO2 SERPL-SCNC: 20 MMOL/L — LOW (ref 22–31)
CREAT SERPL-MCNC: 1.54 MG/DL — HIGH (ref 0.5–1.3)
CULTURE RESULTS: SIGNIFICANT CHANGE UP
CULTURE RESULTS: SIGNIFICANT CHANGE UP
EOSINOPHIL # BLD AUTO: 0.02 K/UL — SIGNIFICANT CHANGE UP (ref 0–0.5)
EOSINOPHIL NFR BLD AUTO: 0.4 % — SIGNIFICANT CHANGE UP (ref 0–6)
GLUCOSE BLDC GLUCOMTR-MCNC: 110 MG/DL — HIGH (ref 70–99)
GLUCOSE BLDC GLUCOMTR-MCNC: 133 MG/DL — HIGH (ref 70–99)
GLUCOSE BLDC GLUCOMTR-MCNC: 141 MG/DL — HIGH (ref 70–99)
GLUCOSE BLDC GLUCOMTR-MCNC: 152 MG/DL — HIGH (ref 70–99)
GLUCOSE SERPL-MCNC: 151 MG/DL — HIGH (ref 70–99)
HCO3 BLDA-SCNC: 17 MMOL/L — LOW (ref 21–29)
HCT VFR BLD CALC: 33.1 % — LOW (ref 39–50)
HGB BLD-MCNC: 10.1 G/DL — LOW (ref 13–17)
HOROWITZ INDEX BLDA+IHG-RTO: 50 — SIGNIFICANT CHANGE UP
HYPOCHROMIA BLD QL: SLIGHT — SIGNIFICANT CHANGE UP
IMM GRANULOCYTES NFR BLD AUTO: 0.2 % — SIGNIFICANT CHANGE UP (ref 0–1.5)
LACTATE SERPL-SCNC: 5.9 MMOL/L — CRITICAL HIGH (ref 0.7–2)
LYMPHOCYTES # BLD AUTO: 0.71 K/UL — LOW (ref 1–3.3)
LYMPHOCYTES # BLD AUTO: 15.5 % — SIGNIFICANT CHANGE UP (ref 13–44)
MACROCYTES BLD QL: SIGNIFICANT CHANGE UP
MAGNESIUM SERPL-MCNC: 1.8 MG/DL — SIGNIFICANT CHANGE UP (ref 1.6–2.6)
MANUAL SMEAR VERIFICATION: SIGNIFICANT CHANGE UP
MCHC RBC-ENTMCNC: 29.8 PG — SIGNIFICANT CHANGE UP (ref 27–34)
MCHC RBC-ENTMCNC: 30.5 GM/DL — LOW (ref 32–36)
MCV RBC AUTO: 97.6 FL — SIGNIFICANT CHANGE UP (ref 80–100)
MONOCYTES # BLD AUTO: 0.66 K/UL — SIGNIFICANT CHANGE UP (ref 0–0.9)
MONOCYTES NFR BLD AUTO: 14.4 % — HIGH (ref 2–14)
NEUTROPHILS # BLD AUTO: 3.16 K/UL — SIGNIFICANT CHANGE UP (ref 1.8–7.4)
NEUTROPHILS NFR BLD AUTO: 69.1 % — SIGNIFICANT CHANGE UP (ref 43–77)
NRBC # BLD: 0 /100 WBCS — SIGNIFICANT CHANGE UP (ref 0–0)
PCO2 BLDA: 27 MMHG — LOW (ref 32–46)
PH BLD: 7.43 — SIGNIFICANT CHANGE UP (ref 7.35–7.45)
PHOSPHATE SERPL-MCNC: 2.6 MG/DL — SIGNIFICANT CHANGE UP (ref 2.5–4.5)
PLAT MORPH BLD: NORMAL — SIGNIFICANT CHANGE UP
PLATELET # BLD AUTO: 22 K/UL — LOW (ref 150–400)
PO2 BLDA: 99 MMHG — SIGNIFICANT CHANGE UP (ref 74–108)
POTASSIUM SERPL-MCNC: 4.2 MMOL/L — SIGNIFICANT CHANGE UP (ref 3.5–5.3)
POTASSIUM SERPL-SCNC: 4.2 MMOL/L — SIGNIFICANT CHANGE UP (ref 3.5–5.3)
PROT SERPL-MCNC: 5.6 GM/DL — LOW (ref 6–8.3)
PSA FLD-MCNC: 1.67 NG/ML — SIGNIFICANT CHANGE UP (ref 0–4)
RBC # BLD: 3.39 M/UL — LOW (ref 4.2–5.8)
RBC # FLD: 22.9 % — HIGH (ref 10.3–14.5)
RBC BLD AUTO: ABNORMAL
SAO2 % BLDA: 97 % — HIGH (ref 92–96)
SODIUM SERPL-SCNC: 135 MMOL/L — SIGNIFICANT CHANGE UP (ref 135–145)
SPECIMEN SOURCE: SIGNIFICANT CHANGE UP
SPECIMEN SOURCE: SIGNIFICANT CHANGE UP
TROPONIN I SERPL-MCNC: 0.05 NG/ML — HIGH (ref 0.01–0.04)
WBC # BLD: 4.58 K/UL — SIGNIFICANT CHANGE UP (ref 3.8–10.5)
WBC # FLD AUTO: 4.58 K/UL — SIGNIFICANT CHANGE UP (ref 3.8–10.5)

## 2019-11-17 PROCEDURE — 99232 SBSQ HOSP IP/OBS MODERATE 35: CPT

## 2019-11-17 PROCEDURE — 71045 X-RAY EXAM CHEST 1 VIEW: CPT | Mod: 26

## 2019-11-17 PROCEDURE — 93010 ELECTROCARDIOGRAM REPORT: CPT

## 2019-11-17 PROCEDURE — 99233 SBSQ HOSP IP/OBS HIGH 50: CPT

## 2019-11-17 RX ORDER — ALBUMIN HUMAN 25 %
100 VIAL (ML) INTRAVENOUS ONCE
Refills: 0 | Status: COMPLETED | OUTPATIENT
Start: 2019-11-17 | End: 2019-11-17

## 2019-11-17 RX ORDER — MEROPENEM 1 G/30ML
1000 INJECTION INTRAVENOUS EVERY 12 HOURS
Refills: 0 | Status: DISCONTINUED | OUTPATIENT
Start: 2019-11-17 | End: 2019-11-22

## 2019-11-17 RX ORDER — FUROSEMIDE 40 MG
40 TABLET ORAL DAILY
Refills: 0 | Status: DISCONTINUED | OUTPATIENT
Start: 2019-11-17 | End: 2019-11-17

## 2019-11-17 RX ORDER — SODIUM CHLORIDE 9 MG/ML
1000 INJECTION, SOLUTION INTRAVENOUS ONCE
Refills: 0 | Status: COMPLETED | OUTPATIENT
Start: 2019-11-17 | End: 2019-11-17

## 2019-11-17 RX ORDER — METOPROLOL TARTRATE 50 MG
2.5 TABLET ORAL EVERY 6 HOURS
Refills: 0 | Status: DISCONTINUED | OUTPATIENT
Start: 2019-11-17 | End: 2019-11-22

## 2019-11-17 RX ORDER — METOPROLOL TARTRATE 50 MG
2.5 TABLET ORAL ONCE
Refills: 0 | Status: COMPLETED | OUTPATIENT
Start: 2019-11-17 | End: 2019-11-17

## 2019-11-17 RX ORDER — VANCOMYCIN HCL 1 G
1000 VIAL (EA) INTRAVENOUS EVERY 24 HOURS
Refills: 0 | Status: COMPLETED | OUTPATIENT
Start: 2019-11-17 | End: 2019-11-21

## 2019-11-17 RX ORDER — MIDODRINE HYDROCHLORIDE 2.5 MG/1
10 TABLET ORAL EVERY 8 HOURS
Refills: 0 | Status: DISCONTINUED | OUTPATIENT
Start: 2019-11-17 | End: 2019-11-22

## 2019-11-17 RX ORDER — POLYETHYLENE GLYCOL 3350 17 G/17G
17 POWDER, FOR SOLUTION ORAL
Refills: 0 | Status: DISCONTINUED | OUTPATIENT
Start: 2019-11-17 | End: 2019-11-22

## 2019-11-17 RX ADMIN — SENNA PLUS 1 TABLET(S): 8.6 TABLET ORAL at 06:30

## 2019-11-17 RX ADMIN — Medication 100 MILLIGRAM(S): at 06:30

## 2019-11-17 RX ADMIN — PANTOPRAZOLE SODIUM 40 MILLIGRAM(S): 20 TABLET, DELAYED RELEASE ORAL at 08:27

## 2019-11-17 RX ADMIN — Medication 250 MILLIGRAM(S): at 19:00

## 2019-11-17 RX ADMIN — Medication 2.5 MILLIGRAM(S): at 17:45

## 2019-11-17 RX ADMIN — Medication 100 MILLIGRAM(S): at 17:45

## 2019-11-17 RX ADMIN — Medication 50 MILLILITER(S): at 16:44

## 2019-11-17 RX ADMIN — TAMSULOSIN HYDROCHLORIDE 0.8 MILLIGRAM(S): 0.4 CAPSULE ORAL at 22:33

## 2019-11-17 RX ADMIN — MIDODRINE HYDROCHLORIDE 5 MILLIGRAM(S): 2.5 TABLET ORAL at 06:31

## 2019-11-17 RX ADMIN — SODIUM CHLORIDE 500 MILLILITER(S): 9 INJECTION, SOLUTION INTRAVENOUS at 22:32

## 2019-11-17 RX ADMIN — MIDODRINE HYDROCHLORIDE 10 MILLIGRAM(S): 2.5 TABLET ORAL at 22:32

## 2019-11-17 RX ADMIN — MEROPENEM 100 MILLIGRAM(S): 1 INJECTION INTRAVENOUS at 19:00

## 2019-11-17 RX ADMIN — Medication 20 MILLIGRAM(S): at 06:31

## 2019-11-17 RX ADMIN — Medication 20 MILLIEQUIVALENT(S): at 06:30

## 2019-11-17 RX ADMIN — CHLORHEXIDINE GLUCONATE 1 APPLICATION(S): 213 SOLUTION TOPICAL at 08:27

## 2019-11-17 RX ADMIN — MIDODRINE HYDROCHLORIDE 5 MILLIGRAM(S): 2.5 TABLET ORAL at 13:09

## 2019-11-17 NOTE — CONSULT NOTE ADULT - SUBJECTIVE AND OBJECTIVE BOX
79 year old man with a history of HTN, DM II, HLD, CKD III, multiple CVA without residual deficits, including right parietal stroke on Lovenox c/b secondary hemorrhage s/p MCA clot retrieval at [facility] on 3/19, metastatic prostate cancer, metastatic adenocarcinoma of unclear etiology, with metastases to liver and bone. REcently was in the ICU when admitted to the hospital.   This evening a RRT was called for complaints of tachycardia and shortness of breath. Patient was seen sitting up in bed in acute respiratory distress. Patient was placed on 5L through mask which subsided patients symptoms. Patient was found to be tachycardic to 190 on cardiac monitor and hypotensive with BP 73/30. Stat labs, EKG, and CXR were performed. EKG showed a fib. Patient was given 10mg midodrine and transferred to ICU for further management. On arrival to the ICU, pt BP began to improve. Bedside Sono was done which showed an IVC that was fluid responsive. Albumin was given. Upon review of the chart, it was noted that the midodrine dose had been decreased and patient was also being diuresed. The daughter and son who are both the proxy of Mr. Balderrama made their father DNI/DNR.   Patient HR remained in 130's to 150, with /96. Metoprolol 2.5 IVP was ordered.       Physical Exam:     Neuro: awake and alert   Lungs: clear to ascultation, bilateral air entry, wheezing resolved after treatment.     Heart: S1, S2, no murmur heard   Abdomen: protuberant soft, non tender, bowel sounds heard  Ext: right leg more edematous compared to left.   Skin: right flank has ecchymosis with outline ( getting smaller)   Groin and abdomen dressing is dry and clean.

## 2019-11-17 NOTE — PROGRESS NOTE ADULT - SUBJECTIVE AND OBJECTIVE BOX
INTERVAL HPI/OVERNIGHT EVENTS:  Pt seen and examined at bedside.     Allergies/Intolerance: No Known Allergies      MEDICATIONS  (STANDING):  carBAMazepine 100 milliGRAM(s) Oral two times a day  chlorhexidine 4% Liquid 1 Application(s) Topical <User Schedule>  furosemide   Injectable 20 milliGRAM(s) IV Push two times a day  midodrine 5 milliGRAM(s) Oral every 8 hours  pantoprazole    Tablet 40 milliGRAM(s) Oral before breakfast  potassium chloride    Tablet ER 20 milliEquivalent(s) Oral two times a day  senna 1 Tablet(s) Oral two times a day  tamsulosin 0.8 milliGRAM(s) Oral at bedtime    MEDICATIONS  (PRN):        ROS: all systems reviewed and wnl      PHYSICAL EXAMINATION:  Vital Signs Last 24 Hrs  T(C): 36.4 (17 Nov 2019 06:04), Max: 36.6 (16 Nov 2019 16:30)  T(F): 97.6 (17 Nov 2019 06:04), Max: 97.9 (16 Nov 2019 16:30)  HR: 66 (17 Nov 2019 06:04) (66 - 108)  BP: 103/71 (17 Nov 2019 06:04) (103/71 - 131/94)  BP(mean): --  RR: 18 (17 Nov 2019 06:04) (18 - 18)  SpO2: 92% (17 Nov 2019 06:04) (92% - 96%)  CAPILLARY BLOOD GLUCOSE      POCT Blood Glucose.: 110 mg/dL (17 Nov 2019 07:55)  POCT Blood Glucose.: 122 mg/dL (16 Nov 2019 21:32)      11-16 @ 07:01  -  11-17 @ 07:00  --------------------------------------------------------  IN: 0 mL / OUT: 350 mL / NET: -350 mL        GENERAL:   NECK: supple, No JVD  CHEST/LUNG: clear to auscultation bilaterally; no rales, rhonchi, or wheezing b/l  HEART: normal S1, S2  ABDOMEN: BS+, soft, ND, NT   EXTREMITIES:  pulses palpable; no clubbing, cyanosis, or edema b/l LEs  SKIN: no rashes or lesions      LABS:    11-16    135  |  107  |  11  ----------------------------<  116<H>  4.0   |  21<L>  |  1.24    Ca    7.4<L>      16 Nov 2019 08:54  Mg     1.8     11-16 INTERVAL HPI/OVERNIGHT EVENTS:  Pt seen and examined at bedside.     Allergies/Intolerance: No Known Allergies      MEDICATIONS  (STANDING):  carBAMazepine 100 milliGRAM(s) Oral two times a day  chlorhexidine 4% Liquid 1 Application(s) Topical <User Schedule>  furosemide   Injectable 20 milliGRAM(s) IV Push two times a day  midodrine 5 milliGRAM(s) Oral every 8 hours  pantoprazole    Tablet 40 milliGRAM(s) Oral before breakfast  potassium chloride    Tablet ER 20 milliEquivalent(s) Oral two times a day  senna 1 Tablet(s) Oral two times a day  tamsulosin 0.8 milliGRAM(s) Oral at bedtime    MEDICATIONS  (PRN):        ROS: all systems reviewed and wnl      PHYSICAL EXAMINATION:  Vital Signs Last 24 Hrs  T(C): 36.4 (17 Nov 2019 06:04), Max: 36.6 (16 Nov 2019 16:30)  T(F): 97.6 (17 Nov 2019 06:04), Max: 97.9 (16 Nov 2019 16:30)  HR: 66 (17 Nov 2019 06:04) (66 - 108)  BP: 103/71 (17 Nov 2019 06:04) (103/71 - 131/94)  BP(mean): --  RR: 18 (17 Nov 2019 06:04) (18 - 18)  SpO2: 92% (17 Nov 2019 06:04) (92% - 96%)  CAPILLARY BLOOD GLUCOSE      POCT Blood Glucose.: 110 mg/dL (17 Nov 2019 07:55)  POCT Blood Glucose.: 122 mg/dL (16 Nov 2019 21:32)      11-16 @ 07:01  -  11-17 @ 07:00  --------------------------------------------------------  IN: 0 mL / OUT: 350 mL / NET: -350 mL        GENERAL: stable, no new complaints, no abdominal pains  NECK: supple, No JVD  CHEST/LUNG: clear to auscultation bilaterally; no rales, rhonchi, or wheezing b/l  HEART: normal S1, S2  ABDOMEN: BS+, soft, NT, moderate distension   EXTREMITIES:  pulses palpable; no clubbing, cyanosis, or edema b/l LEs  SKIN: no rashes or lesions      LABS:    11-16    135  |  107  |  11  ----------------------------<  116<H>  4.0   |  21<L>  |  1.24    Ca    7.4<L>      16 Nov 2019 08:54  Mg     1.8     11-16

## 2019-11-17 NOTE — GOALS OF CARE CONVERSATION - ADVANCED CARE PLANNING - CONVERSATION DETAILS
Discussion with health care proxy Maria Victoria (daughter) and Ti (son), regarding patient's recent history and overall goals of care.  Of note, patient has been hospitalized for the past month with admissions at [facility] and [facility].  Patient is noted to have Stage IV HCC and Prostate Cancer, with overall bone marrow suppression and has been progressively declining over the past 2-3 months.  Maria Victoria and Ti noted the patient was previously DNR, and removed DNR as patient wanted to fight.      Both Maria Victoria and Ti do not want their father to suffer, and stated he would not want chest compressions or a breathing tube.  They would like however to continue treatment including vasopressors and antibiotics at this time.      DNR and DNI order placed in to chart. Discussion with health care proxy Maria Victoria (daughter) and Ruben (son), regarding patient's recent history and overall goals of care.  Of note, patient has been hospitalized for the past month with admissions at [facility] and [facility].  Patient is noted to have Stage IV HCC and Prostate Cancer, with overall bone marrow suppression and has been progressively declining over the past 2-3 months.  Maria Victoria and Ti noted the patient was previously DNR, and removed DNR as patient wanted to fight.      Both Maria Victoria and Ruben do not want their father to suffer, and stated he would not want chest compressions or a breathing tube.  They would like however to continue treatment including vasopressors and antibiotics at this time.      DNR and DNI order placed in to chart.

## 2019-11-17 NOTE — PROGRESS NOTE ADULT - ASSESSMENT
Subjective Complaints:  Historian:             Vital Signs Last 24 Hrs  T(C): 37.1 (17 Nov 2019 15:45), Max: 37.1 (17 Nov 2019 15:45)  T(F): 98.7 (17 Nov 2019 15:45), Max: 98.7 (17 Nov 2019 15:45)  HR: 121 (17 Nov 2019 18:00) (66 - 148)  BP: 117/86 (17 Nov 2019 18:00) (84/50 - 131/88)  BP(mean): 93 (17 Nov 2019 18:00) (83 - 96)  RR: 16 (17 Nov 2019 18:00) (15 - 24)  SpO2: 98% (17 Nov 2019 18:00) (92% - 100%)    GENERAL PHYSICAL EXAM:  General:  Appears stated age, well-groomed, well-nourished, no distress  HEENT:  NC/AT, patent nares w/ pink mucosa, OP clear w/o lesions, PERRL, EOMI, conjunctivae clear, no thyromegaly, nodules, adenopathy, no JVD  Chest:  Full & symmetric excursion, no increased effort, breath sounds clear  Cardiovascular:  Regular rhythm, S1, S2, no murmur/rub/S3/S4, no carotid/femoral/abdominal bruit, radial/pedal pulses 2+, no edema  Abdomen:  Soft, non-tender, non-distended, normoactive bowel sounds, no HSM  Extremities:  Gait & station:   Digits:   Nails:   Joints, Bones, Muscles:   ROM:   Stability:  Skin:  No rash/erythema/ecchymoses/petechiae/wounds/abscess/warm/dry  Musculoskeletal:  Full ROM in all joints w/o swelling/tenderness/effusion        LABS:    11-16    135  |  107  |  11  ----------------------------<  116<H>  4.0   |  21<L>  |  1.24    Ca    7.4<L>      16 Nov 2019 08:54  Mg     1.8     11-16            RADIOLOGY & ADDITIONAL STUDIES:        Neurology Progress Note:      Mental Status: awake alert o2 face mask       Cranial Nerves: r face weakness sun=btle       Motor:   arm 3/5 leg 3/5 r leg swollen         Sensory:intact       Cerebellar: deefrd       Gait:deefrd       Assesment/Plan:  evnts noted sob in icu  hx  of liver ca,  r face weakness lanre geronimo discuss with family oncology eval  liver ca, metabolic encephalaothy

## 2019-11-17 NOTE — CONSULT NOTE ADULT - ASSESSMENT
Assessment:   Hypotension possible due to sepsis versus dehydration. Improved with midodrine and albumin.   CXR with new opacity- possible Pneumonia. Cultures sent and will start emperic abx coverage.   A fib         Plan:     Neuro:  Neuro checks q 4 hour   pain control    Seizure precautions  Continue Carbamazepine     CV:   Continue hemodynamic monitoring   Albumin as needed   IVF bolus as needed   Sono IVC for fluid responsiveness   Trend Lactate  Will order ECHO  Midodrine 10 mg q 8 hrs   Will give low dose metoprolol as tolerated by the BP for A fib       Pulmonary:   Venti mask, will titrate FIO2 down as tolerated   Duonebs as needed  Chest PT and suctioning as needed   Aspiration precautions  HOB elevation 30 degrees  Incentive spirometer    GI:   NPO if goes on BIPAP   Continue with stool softeners    Endo:   Monitor fingersticks   Glycemic control with insulin sliding scale       Renal:   Monitor I and O closely   Monitor electrolytes  Correct electrolyte derangements    ID:   Will start meropenum and vanco empirically   Follow up cultures  Maintain normothermia     Heme:   Follow up repeat labs   DVT prophylaxis  No evidence of bleeding, will continue to monitor    Other/Disposition:  PT/OT consult  Family was updated at bedside.   Code Status:  DNR/DNI

## 2019-11-17 NOTE — CHART NOTE - NSCHARTNOTEFT_GEN_A_CORE
Hospitalist Medicine JOANN    CC:    HPI:  Patient is a 79-year-old male with a history of HTN, DM II, HLD, CKD III, multiple CVA without residual deficits, including right parietal stroke on  Lovenox c/b secondary hemorrhage s/p MCA clot retrieval at [place] on 3/19, metastatic prostate cancer, metastatic adenocarcinoma of unclear etiology, with metastases to liver and bone is assessed at the bedside during rapid response with complaints of tachycardia and shortness of breath. Patient was seen sitting up in bed in acute respiratory distress. Patient was placed on 5L through mask which subsided patients symptoms. Patient was found to be tachycardic to 190 on cardiac monitor and hypotensive with BP 73/30. Patient was given 10mg Midodrine PO. Stat labs, EKG, and CXR were performed. Patient was transferred to ICU for monitoring. Dr. David and Dr. Montalvo both present at the bedside and agree with plan.     Vital Signs Last 24 Hrs  T(C): 37.1 (17 Nov 2019 15:45), Max: 37.1 (17 Nov 2019 15:45)  T(F): 98.7 (17 Nov 2019 15:45), Max: 98.7 (17 Nov 2019 15:45)  HR: 94 (17 Nov 2019 11:22) (66 - 108)  BP: 101/68 (17 Nov 2019 14:45) (101/68 - 124/82)  BP(mean): --  RR: 16 (17 Nov 2019 11:22) (16 - 18)  SpO2: 98% (17 Nov 2019 11:22) (92% - 98%)    REVIEW OF SYSTEMS:  RESPIRATORY: No cough, wheezing, chills or hemoptysis; No shortness of breath  CARDIOVASCULAR: No chest pain, palpitations, dizziness, or leg swelling  GASTROINTESTINAL: No abdominal or epigastric pain. No nausea, vomiting, or hematemesis; No diarrhea or constipation. No melena or hematochezia.  GENITOURINARY: No dysuria, frequency, hematuria, or incontinence  NEUROLOGICAL: No headaches, memory loss, loss of strength, numbness, or tremors      PHYSICAL EXAM:  GENERAL: NAD, well-groomed, well-developed  NERVOUS SYSTEM:  Alert & Oriented X3, Good concentration; Motor Strength 5/5 B/L upper and lower extremities; DTRs 2+ intact and symmetric  CHEST/LUNG: Clear to percussion bilaterally; No rales, rhonchi, wheezing, or rubs  HEART: Regular rate and rhythm; No murmurs, rubs, or gallops  ABDOMEN: Soft, Nontender, Nondistended; Bowel sounds present  EXTREMITIES:  2+ Peripheral Pulses, No clubbing, cyanosis, or edema    Assessment: Patient 79y with PAST MEDICAL & SURGICAL HISTORY:  Anemia  Cancer, metastatic to bone: on chemo  Gastric ulcer: was hospitalized for GI bleed 7/22/19-7/25/19, taking Protonix  AICD (automatic cardioverter/defibrillator) present  HLD (hyperlipidemia)  HTN (hypertension)  Liver cancer: stage 4  Prostate cancer: 2004 radiotherapy seeds, now metastatic to liver and bone  Ischemic stroke: 01/2019, no residuals  HLD (hyperlipidemia)  HTN (hypertension)  Hernia  DM (diabetes mellitus): type 2, HgA1c 5.8% ( 6/5/19)  Prostate CA  History of eye surgery: right  History of right knee surgery: R Tibia Intralesional curettage/bone graft  7/11/2019  H/O hernia repair: right inguinal hernia with mesh  9/2013  admitted with STROKE  SLURRED SPEECH  Anemia  Cancer, metastatic to bone  Gastric ulcer  AICD (automatic cardioverter/defibrillator) present  HLD (hyperlipidemia)  HTN (hypertension)  Liver cancer  Prostate cancer  Ischemic stroke  HLD (hyperlipidemia)  HTN (hypertension)  Hernia  DM (diabetes mellitus)  Prostate CA  History of eye surgery  History of right knee surgery  H/O hernia repair  seen and examined at the bedside with complaint of tachycardia and shortness of breath.     Plan:  - Continue current treatment as per ICU team   - follow up labs  - D/w Dr. David and Selvin aware and agree with the plan  - will continue to follow up  Time Spent: 40 mins

## 2019-11-17 NOTE — PROGRESS NOTE ADULT - ASSESSMENT
80 y/o M w/metastatic cancer of unknown primary, multiple CVAs, recently admitted for GI bleed s/p hepatic artery embolization now admitted for hypotension likely secondary to severe sepsis with septic shock likely due to UTI.    - Midodrine decreased to 5mg q8hrs, wean as tolerated.   - Broad spectrum abx for possibly pneumonia on CXR, will stop Zosyn tomorrow.   - Follow up cultures. Urine culture negative.   - Pain management as needed.     Will add Lasix 20 mg IVP bid for leg edema, stop Lipitor, not indicated now, Flomax .8 mg/day for BPH.     DM controlled  off meds.      May need paracentesis Monday. 78 y/o M w/metastatic cancer of unknown primary, multiple CVAs, recently admitted for GI bleed s/p hepatic artery embolization now admitted for hypotension likely secondary to severe sepsis with septic shock likely due to UTI.    - Midodrine decreased to 5mg q8hrs, wean as tolerated.   - Broad spectrum abx for possibly pneumonia on CXR, will stop Zosyn.    - Follow up cultures. Urine culture negative.   - Pain management as needed.     Will add Lasix 40 mg oral daily for leg and scrotal edema and ascites. Stop Lipitor, not indicated now, Flomax .8 mg/day for BPH.     DM controlled  off meds.      May need paracentesis Monday. Abdominal sonogram ordered.

## 2019-11-18 LAB
ALBUMIN SERPL ELPH-MCNC: 2 G/DL — LOW (ref 3.3–5)
ALP SERPL-CCNC: 300 U/L — HIGH (ref 40–120)
ALT FLD-CCNC: 35 U/L — SIGNIFICANT CHANGE UP (ref 12–78)
ANION GAP SERPL CALC-SCNC: 7 MMOL/L — SIGNIFICANT CHANGE UP (ref 5–17)
AST SERPL-CCNC: 58 U/L — HIGH (ref 15–37)
BASOPHILS # BLD AUTO: 0.01 K/UL — SIGNIFICANT CHANGE UP (ref 0–0.2)
BASOPHILS NFR BLD AUTO: 0.3 % — SIGNIFICANT CHANGE UP (ref 0–2)
BILIRUB SERPL-MCNC: 0.7 MG/DL — SIGNIFICANT CHANGE UP (ref 0.2–1.2)
BUN SERPL-MCNC: 11 MG/DL — SIGNIFICANT CHANGE UP (ref 7–23)
CALCIUM SERPL-MCNC: 7.7 MG/DL — LOW (ref 8.5–10.1)
CHLORIDE SERPL-SCNC: 105 MMOL/L — SIGNIFICANT CHANGE UP (ref 96–108)
CK MB BLD-MCNC: 1.9 % — SIGNIFICANT CHANGE UP (ref 0–3.5)
CK MB CFR SERPL CALC: 1.2 NG/ML — SIGNIFICANT CHANGE UP (ref 0.5–3.6)
CK SERPL-CCNC: 62 U/L — SIGNIFICANT CHANGE UP (ref 26–308)
CO2 SERPL-SCNC: 23 MMOL/L — SIGNIFICANT CHANGE UP (ref 22–31)
CREAT SERPL-MCNC: 1.27 MG/DL — SIGNIFICANT CHANGE UP (ref 0.5–1.3)
EOSINOPHIL # BLD AUTO: 0.01 K/UL — SIGNIFICANT CHANGE UP (ref 0–0.5)
EOSINOPHIL NFR BLD AUTO: 0.3 % — SIGNIFICANT CHANGE UP (ref 0–6)
GLUCOSE SERPL-MCNC: 107 MG/DL — HIGH (ref 70–99)
HCT VFR BLD CALC: 31.3 % — LOW (ref 39–50)
HGB BLD-MCNC: 9.5 G/DL — LOW (ref 13–17)
IMM GRANULOCYTES NFR BLD AUTO: 0.3 % — SIGNIFICANT CHANGE UP (ref 0–1.5)
LACTATE SERPL-SCNC: 2.7 MMOL/L — HIGH (ref 0.7–2)
LYMPHOCYTES # BLD AUTO: 0.27 K/UL — LOW (ref 1–3.3)
LYMPHOCYTES # BLD AUTO: 8.4 % — LOW (ref 13–44)
MAGNESIUM SERPL-MCNC: 1.8 MG/DL — SIGNIFICANT CHANGE UP (ref 1.6–2.6)
MCHC RBC-ENTMCNC: 29.6 PG — SIGNIFICANT CHANGE UP (ref 27–34)
MCHC RBC-ENTMCNC: 30.4 GM/DL — LOW (ref 32–36)
MCV RBC AUTO: 97.5 FL — SIGNIFICANT CHANGE UP (ref 80–100)
MONOCYTES # BLD AUTO: 0.44 K/UL — SIGNIFICANT CHANGE UP (ref 0–0.9)
MONOCYTES NFR BLD AUTO: 13.6 % — SIGNIFICANT CHANGE UP (ref 2–14)
NEUTROPHILS # BLD AUTO: 2.49 K/UL — SIGNIFICANT CHANGE UP (ref 1.8–7.4)
NEUTROPHILS NFR BLD AUTO: 77.1 % — HIGH (ref 43–77)
NRBC # BLD: 0 /100 WBCS — SIGNIFICANT CHANGE UP (ref 0–0)
PHOSPHATE SERPL-MCNC: 2.2 MG/DL — LOW (ref 2.5–4.5)
PLATELET # BLD AUTO: 15 K/UL — CRITICAL LOW (ref 150–400)
POTASSIUM SERPL-MCNC: 4.2 MMOL/L — SIGNIFICANT CHANGE UP (ref 3.5–5.3)
POTASSIUM SERPL-SCNC: 4.2 MMOL/L — SIGNIFICANT CHANGE UP (ref 3.5–5.3)
PROCALCITONIN SERPL-MCNC: 11.1 NG/ML — HIGH (ref 0.02–0.1)
PROT SERPL-MCNC: 5.8 GM/DL — LOW (ref 6–8.3)
RBC # BLD: 3.21 M/UL — LOW (ref 4.2–5.8)
RBC # FLD: 22.6 % — HIGH (ref 10.3–14.5)
SODIUM SERPL-SCNC: 135 MMOL/L — SIGNIFICANT CHANGE UP (ref 135–145)
TROPONIN I SERPL-MCNC: 0.1 NG/ML — HIGH (ref 0.01–0.04)
WBC # BLD: 3.23 K/UL — LOW (ref 3.8–10.5)
WBC # FLD AUTO: 3.23 K/UL — LOW (ref 3.8–10.5)

## 2019-11-18 PROCEDURE — 99233 SBSQ HOSP IP/OBS HIGH 50: CPT

## 2019-11-18 PROCEDURE — 76700 US EXAM ABDOM COMPLETE: CPT | Mod: 26

## 2019-11-18 PROCEDURE — 93306 TTE W/DOPPLER COMPLETE: CPT | Mod: 26

## 2019-11-18 PROCEDURE — 99232 SBSQ HOSP IP/OBS MODERATE 35: CPT

## 2019-11-18 RX ORDER — METOPROLOL TARTRATE 50 MG
2.5 TABLET ORAL ONCE
Refills: 0 | Status: COMPLETED | OUTPATIENT
Start: 2019-11-18 | End: 2019-11-18

## 2019-11-18 RX ORDER — METOPROLOL TARTRATE 50 MG
12.5 TABLET ORAL
Refills: 0 | Status: DISCONTINUED | OUTPATIENT
Start: 2019-11-18 | End: 2019-11-22

## 2019-11-18 RX ORDER — BENZOCAINE AND MENTHOL 5; 1 G/100ML; G/100ML
1 LIQUID ORAL THREE TIMES A DAY
Refills: 0 | Status: DISCONTINUED | OUTPATIENT
Start: 2019-11-18 | End: 2019-11-22

## 2019-11-18 RX ADMIN — POLYETHYLENE GLYCOL 3350 17 GRAM(S): 17 POWDER, FOR SOLUTION ORAL at 06:06

## 2019-11-18 RX ADMIN — MEROPENEM 100 MILLIGRAM(S): 1 INJECTION INTRAVENOUS at 17:14

## 2019-11-18 RX ADMIN — MEROPENEM 100 MILLIGRAM(S): 1 INJECTION INTRAVENOUS at 06:05

## 2019-11-18 RX ADMIN — SENNA PLUS 1 TABLET(S): 8.6 TABLET ORAL at 06:05

## 2019-11-18 RX ADMIN — Medication 100 MILLIGRAM(S): at 06:03

## 2019-11-18 RX ADMIN — PANTOPRAZOLE SODIUM 40 MILLIGRAM(S): 20 TABLET, DELAYED RELEASE ORAL at 06:05

## 2019-11-18 RX ADMIN — Medication 62.5 MILLIMOLE(S): at 06:33

## 2019-11-18 RX ADMIN — MIDODRINE HYDROCHLORIDE 10 MILLIGRAM(S): 2.5 TABLET ORAL at 13:57

## 2019-11-18 RX ADMIN — Medication 2.5 MILLIGRAM(S): at 15:15

## 2019-11-18 RX ADMIN — MIDODRINE HYDROCHLORIDE 10 MILLIGRAM(S): 2.5 TABLET ORAL at 21:47

## 2019-11-18 RX ADMIN — Medication 250 MILLIGRAM(S): at 17:13

## 2019-11-18 RX ADMIN — Medication 100 MILLIGRAM(S): at 17:18

## 2019-11-18 RX ADMIN — Medication 12.5 MILLIGRAM(S): at 16:52

## 2019-11-18 RX ADMIN — TAMSULOSIN HYDROCHLORIDE 0.8 MILLIGRAM(S): 0.4 CAPSULE ORAL at 21:46

## 2019-11-18 RX ADMIN — CHLORHEXIDINE GLUCONATE 1 APPLICATION(S): 213 SOLUTION TOPICAL at 06:05

## 2019-11-18 RX ADMIN — MIDODRINE HYDROCHLORIDE 10 MILLIGRAM(S): 2.5 TABLET ORAL at 06:06

## 2019-11-18 NOTE — PROGRESS NOTE ADULT - ASSESSMENT
79 year old man with a history of HTN, DM II, HLD, CKD III, multiple CVA without residual deficits, including right parietal stroke on Lovenox c/b secondary hemorrhage s/p MCA clot retrieval at [place] on 3/19, metastatic prostate cancer, metastatic adenocarcinoma of unclear etiology, with metastases to liver and bone. Recently was in the ICU when admitted to the hospital, now with hypotension in the setting of pericardial effusion, tachycardia and possible sepsis.  Now DNR and DNI.  Noted to recently been started on lasix and lowered midodrine dose today.      Neuro: Mentation improved from yesterday.  Will monitor closely.    CV: Septic shock vs hypovolemic 2/2 lasix in the setting of pericardial effusion.  S/p albumin and midodrine last night, noted to have improved BP, however remains tachycardic.  Bedside us revealed moderate pericardial effusion with no signs of tamponade; IVC collapsible with respirations.  Pending official echo.  Pulm: Improved with duonebs, noted to have bilateral pericardial effusions.  Continue to monitor.  Possible pneumonia on chest xray  GI: Dysphagia diet; US of abdomen reveals moderate ascites.   Renal/Metabolic: CKD III, continue to trend BUN/Cr.  ID: Septic shock now resolved with midodrine and albumin.  Started on Vancomycin and Meropenem yesterday.  Procalcitonin now 11.    Heme: Anemic and thrombocytopenic, likely 2/2 mets to bone.  Will transfuse Hgb<7 and Plts<20.  Endo: MARLIN  Dispo: Critically ill, not requiring vasopressors, will monitor.  Now DNR/DNI.     Attending Critical Care Time 40 minutes

## 2019-11-18 NOTE — CHART NOTE - NSCHARTNOTEFT_GEN_A_CORE
Assessment: Pt seen for malnutrition follow-up and ICU admit. Pt with hx of DM2, HTN, HLD, CKD 3, multiple CVAs without residual deficits, with metastatic cancer (mets to liver and bone), recently admitted for GI bleed, s/p hepatic artery embolization now admitted for hypotension likely secondary to severe sepsis with septic shock likely due to UTI. DM controlled,     Factors impacting intake: [ ] none [ ] nausea  [ ] vomiting [ ] diarrhea [ ] constipation  [ ]chewing problems [ ] swallowing issues  [ ] other:     Diet Prescription: Diet, Dysphagia 1 Pureed-Honey Consistency Fluid:   Supplement Feeding Modality:  Oral  Ensure Pudding Cans or Servings Per Day:  1       Frequency:  Three Times a day (11-13-19 @ 13:35)    Intake:     Current Weight: Weight (kg): 67.5 (11-12 @ 02:23)  % Weight Change    Pertinent Medications: MEDICATIONS  (STANDING):  carBAMazepine 100 milliGRAM(s) Oral two times a day  chlorhexidine 4% Liquid 1 Application(s) Topical <User Schedule>  meropenem  IVPB 1000 milliGRAM(s) IV Intermittent every 12 hours  midodrine 10 milliGRAM(s) Oral every 8 hours  pantoprazole    Tablet 40 milliGRAM(s) Oral before breakfast  polyethylene glycol 3350 17 Gram(s) Oral two times a day  senna 1 Tablet(s) Oral two times a day  tamsulosin 0.8 milliGRAM(s) Oral at bedtime  vancomycin  IVPB 1000 milliGRAM(s) IV Intermittent every 24 hours    MEDICATIONS  (PRN):  metoprolol tartrate Injectable 2.5 milliGRAM(s) IV Push every 6 hours PRN HR greater than 130 and MAP greater than 80    Pertinent Labs: 11-18 Na135 mmol/L Glu 107 mg/dL<H> K+ 4.2 mmol/L Cr  1.27 mg/dL BUN 11 mg/dL 11-18 Phos 2.2 mg/dL<L> 11-18 Alb 2.0 g/dL<L> 11-12 [id number] 5.3 % 11-12 Chol 169 mg/dL  mg/dL<H> HDL 50 mg/dL Trig 83 mg/dL     CAPILLARY BLOOD GLUCOSE      POCT Blood Glucose.: 141 mg/dL (17 Nov 2019 23:22)  POCT Blood Glucose.: 133 mg/dL (17 Nov 2019 14:48)  POCT Blood Glucose.: 152 mg/dL (17 Nov 2019 11:01)    Skin:     Estimated Needs:   [ ] no change since previous assessment  [ ] recalculated:     Previous Nutrition Diagnosis:   [ ] Inadequate Energy Intake [ ]Inadequate Oral Intake [ ] Excessive Energy Intake   [ ] Underweight [ ] Increased Nutrient Needs [ ] Overweight/Obesity   [ ] Altered GI Function [ ] Unintended Weight Loss [ ] Food & Nutrition Related Knowledge Deficit [ ] Malnutrition     Nutrition Diagnosis is [ ] ongoing  [ ] resolved [ ] not applicable     New Nutrition Diagnosis: [ ] not applicable       Interventions:   Recommend  [ ] Change Diet To:  [ ] Nutrition Supplement  [ ] Nutrition Support  [ ] Other:     Monitoring and Evaluation:   [ ] PO intake [ x ] Tolerance to diet prescription [ x ] weights [ x ] labs[ x ] follow up per protocol  [ ] other: Assessment: Pt seen for malnutrition follow-up and ICU admit. 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 DM controlled,  mg/dL, A1c = 5.3% (11/12/19). Per swallow evaluation on 11/12/19, SLP recommended Dysphagia 1 Puree with Honey Thick Liquids.    Factors impacting intake: [ ] none [ ] nausea  [ ] vomiting [ ] diarrhea [ ] constipation  [ ]chewing problems [x] swallowing issues  [x] other: difficulty feeding self    Diet Prescription: Diet, Dysphagia 1 Pureed-Honey Consistency Fluid:   Supplement Feeding Modality:  Oral  Ensure Pudding Cans or Servings Per Day:  1       Frequency:  Three Times a day (11-13-19 @ 13:35)    Intake: Po intake 50-75%; Pt assisted with tray set-up and assistance with meals.    Current Weight: Weight (kg): 69.7 kg (11/18), 69.4 kg (11/13)  % Weight Change: 0.4% (0.3 kg) wt gain x 5 days; Edema 2+ generalized; 4 + R leg  Last BM: BM (x1) 11/18    Pertinent Medications: MEDICATIONS  (STANDING):  carBAMazepine 100 milliGRAM(s) Oral two times a day  chlorhexidine 4% Liquid 1 Application(s) Topical <User Schedule>  meropenem  IVPB 1000 milliGRAM(s) IV Intermittent every 12 hours  midodrine 10 milliGRAM(s) Oral every 8 hours  pantoprazole    Tablet 40 milliGRAM(s) Oral before breakfast  polyethylene glycol 3350 17 Gram(s) Oral two times a day  senna 1 Tablet(s) Oral two times a day  tamsulosin 0.8 milliGRAM(s) Oral at bedtime  vancomycin  IVPB 1000 milliGRAM(s) IV Intermittent every 24 hours    MEDICATIONS  (PRN):  metoprolol tartrate Injectable 2.5 milliGRAM(s) IV Push every 6 hours PRN HR greater than 130 and MAP greater than 80    Pertinent Labs: 11-18 Na135 mmol/L Glu 107 mg/dL<H> K+ 4.2 mmol/L Cr  1.27 mg/dL BUN 11 mg/dL 11-18 Phos 2.2 mg/dL<L> 11-18 Alb 2.0 g/dL<L> 11-12 [id number] 5.3 % 11-12 Chol 169 mg/dL  mg/dL<H> HDL 50 mg/dL Trig 83 mg/dL     CAPILLARY BLOOD GLUCOSE      POCT Blood Glucose.: 141 mg/dL (17 Nov 2019 23:22)  POCT Blood Glucose.: 133 mg/dL (17 Nov 2019 14:48)  POCT Blood Glucose.: 152 mg/dL (17 Nov 2019 11:01)    Skin: Intact    Estimated Needs:   [x] no change since previous assessment on 11/13/19  [ ] recalculated:     Previous Nutrition Diagnosis: 11/13/19    Nutrition Diagnostic Terminology #1 Malnutrition...     Malnutrition Severe malnutrition in the context of chronic illness.     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     Signs/Symptoms severe fat loss & muscle wasting.    Nutrition Diagnosis is [x] ongoing  [ ] resolved [ ] not applicable    Goal/Expected Outcome Pt to consume >75% meals/supplements (goal not consistently met)    New Nutrition Diagnosis: [x] not applicable       Interventions:   Recommend  [x] Continue with current diet rx  [ ] Change Diet To:  [ ] Nutrition Supplement  [ ] Nutrition Support  [ ] Other:     Monitoring and Evaluation:   [ x ] PO intake [ x ] Tolerance to diet prescription [ x ] weights [ x ] labs[ x ] follow up per protocol  [ ] other: Assessment: Pt seen for severe malnutrition follow-up and ICU admit. 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 DM controlled,  mg/dL, A1c = 5.3% (11/12/19). Per swallow evaluation on 11/12/19, SLP recommended Dysphagia 1 Puree with Honey Thick Liquids. DNR/DNI code status.    Factors impacting intake: [ ] none [ ] nausea  [ ] vomiting [ ] diarrhea [ ] constipation  [ ]chewing problems [x] swallowing issues  [x] other: difficulty feeding self; reduction in appetite    Diet Prescription: Diet, Dysphagia 1 Pureed-Honey Consistency Fluid:   Supplement Feeding Modality:  Oral  Ensure Pudding Cans or Servings Per Day:  1       Frequency:  Three Times a day (11-13-19 @ 13:35)    Intake: Po intake 50-75%; Pt reported eating and enjoying Ensure Pudding; Pt assisted with tray set-up and assistance with meals.    Current Weight: Weight (kg): 69.7 kg (11/18), 69.4 kg (11/13)  % Weight Change: 0.4% (0.3 kg) wt gain x 5 days; Edema 2+ generalized; 4 + R leg  Last BM: BM (x1) this morning 11/18    Pertinent Medications: MEDICATIONS  (STANDING):  carBAMazepine 100 milliGRAM(s) Oral two times a day  chlorhexidine 4% Liquid 1 Application(s) Topical <User Schedule>  meropenem  IVPB 1000 milliGRAM(s) IV Intermittent every 12 hours  midodrine 10 milliGRAM(s) Oral every 8 hours  pantoprazole    Tablet 40 milliGRAM(s) Oral before breakfast  polyethylene glycol 3350 17 Gram(s) Oral two times a day  senna 1 Tablet(s) Oral two times a day  tamsulosin 0.8 milliGRAM(s) Oral at bedtime  vancomycin  IVPB 1000 milliGRAM(s) IV Intermittent every 24 hours    MEDICATIONS  (PRN):  metoprolol tartrate Injectable 2.5 milliGRAM(s) IV Push every 6 hours PRN HR greater than 130 and MAP greater than 80    Pertinent Labs: 11-18 Na135 mmol/L Glu 107 mg/dL<H> K+ 4.2 mmol/L Cr  1.27 mg/dL BUN 11 mg/dL 11-18 Phos 2.2 mg/dL<L> 11-18 Alb 2.0 g/dL<L> 11-12 [id number] 5.3 % 11-12 Chol 169 mg/dL  mg/dL<H> HDL 50 mg/dL Trig 83 mg/dL     CAPILLARY BLOOD GLUCOSE      POCT Blood Glucose.: 141 mg/dL (17 Nov 2019 23:22)  POCT Blood Glucose.: 133 mg/dL (17 Nov 2019 14:48)  POCT Blood Glucose.: 152 mg/dL (17 Nov 2019 11:01)    Skin: Intact    Estimated Needs:   [x] no change since previous assessment on 11/13/19  [ ] recalculated:     Previous Nutrition Diagnosis: 11/13/19    Nutrition Diagnostic Terminology #1 Malnutrition...     Malnutrition Severe malnutrition in the context of chronic illness.     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     Signs/Symptoms severe fat loss & muscle wasting.    Nutrition Diagnosis is [x] ongoing  [ ] resolved [ ] not applicable    Goal/Expected Outcome Pt to consume >75% meals/supplements (goal not consistently met)    New Nutrition Diagnosis: [x] not applicable       Interventions:   Recommend  [x] Continue with current diet rx  [ ] Change Diet To:  [ ] Nutrition Supplement  [ ] Nutrition Support  [ ] Other:     Monitoring and Evaluation:   [ x ] PO intake [ x ] Tolerance to diet prescription [ x ] weights [ x ] labs[ x ] follow up per protocol  [ ] other:

## 2019-11-18 NOTE — PROGRESS NOTE ADULT - SUBJECTIVE AND OBJECTIVE BOX
Patient seen and examined bedside resting comfortably.  No complaints offered.   Flatus/BM. +  Denies chest pain, dyspnea, cough.    T(F): 97.5 (11-18-19 @ 08:00), Max: 98.7 (11-17-19 @ 15:45)  HR: 126 (11-18-19 @ 10:00) (100 - 148)  BP: 102/88 (11-18-19 @ 10:00) (84/50 - 142/100)  RR: 14 (11-18-19 @ 10:00) (13 - 24)  SpO2: 99% (11-18-19 @ 10:00) (93% - 100%)    CAPILLARY BLOOD GLUCOSE  POCT Blood Glucose.: 141 mg/dL (17 Nov 2019 23:22)  POCT Blood Glucose.: 133 mg/dL (17 Nov 2019 14:48)      PHYSICAL EXAM:  General: NAD  Neuro:  Alert & oriented x 3  Abdomen: soft, NTND. Normactive BS  : UNCIRCUMCISED MALE WITH EXTENSIVE EDEMA PENILE SHAFT, PREPUCE AND SCROTAL SAC , NO EVIDENCE OF PHIMOSIS / PARAPHIMOSIS  OR DISCOLORATION    LABS:                        9.5    3.23  )-----------( 15       ( 18 Nov 2019 03:25 )             31.3     11-18    135  |  105  |  11  ----------------------------<  107<H>  4.2   |  23  |  1.27    Ca    7.7<L>      18 Nov 2019 03:25  Phos  2.2     11-18  Mg     1.8     11-18    TPro  5.8<L>  /  Alb  2.0<L>  /  TBili  0.7  /  DBili  x   /  AST  58<H>  /  ALT  35  /  AlkPhos  300<H>  11-18    Prostate Ca Screen, PSA Total (11.17.19 @ 15:53)    Prostate Ca Screen, PSA Total: 1.67: Method: Roche Electrochemiluminescence Immuno Assay  Values obtained with different assay methods or kits cannot be  used interchangeably.  Results cannot be interpreted as absolute evidence of the presence  or absence of malignant disease. ng/mL      I&O's Detail    17 Nov 2019 07:01  -  18 Nov 2019 07:00  --------------------------------------------------------  IN:    IV PiggyBack: 1650 mL    Oral Fluid: 300 mL  Total IN: 1950 mL    OUT:  Total OUT: 0 mL    Total NET: 1950 mL      18 Nov 2019 07:01  -  18 Nov 2019 11:25  --------------------------------------------------------  IN:    Oral Fluid: 300 mL  Total IN: 300 mL    OUT:  Total OUT: 0 mL    Total NET: 300 mL          Impression: 79y Male admitted with STROKE SLURRED SPEECH  dependant penile & scrotal swelling  h/o prostate CA -PSA 1.67      Plan:  - continue medical treatment.  -elevate penis & scrotum ; lasix per medicine.

## 2019-11-18 NOTE — PROGRESS NOTE ADULT - ASSESSMENT
Subjective Complaints:  Historian:             Vital Signs Last 24 Hrs  T(C): 37.2 (18 Nov 2019 16:00), Max: 37.2 (18 Nov 2019 14:00)  T(F): 99 (18 Nov 2019 16:00), Max: 99 (18 Nov 2019 14:00)  HR: 73 (18 Nov 2019 21:00) (62 - 138)  BP: 114/70 (18 Nov 2019 21:00) (92/64 - 142/100)  BP(mean): 80 (18 Nov 2019 21:00) (70 - 107)  RR: 20 (18 Nov 2019 21:00) (13 - 24)  SpO2: 99% (18 Nov 2019 21:00) (98% - 100%)    GENERAL PHYSICAL EXAM:  General:  Appears stated age, well-groomed, well-nourished, no distress  HEENT:  NC/AT, patent nares w/ pink mucosa, OP clear w/o lesions, PERRL, EOMI, conjunctivae clear, no thyromegaly, nodules, adenopathy, no JVD  Chest:  Full & symmetric excursion, no increased effort, breath sounds clear  Cardiovascular:  Regular rhythm, S1, S2, no murmur/rub/S3/S4, no carotid/femoral/abdominal bruit, radial/pedal pulses 2+, no edema  Abdomen:  Soft, non-tender, non-distended, normoactive bowel sounds, no HSM  Extremities:  Gait & station:   Digits:   Nails:   Joints, Bones, Muscles:   ROM:   Stability:  Skin:  No rash/erythema/ecchymoses/petechiae/wounds/abscess/warm/dry  Musculoskeletal:  Full ROM in all joints w/o swelling/tenderness/effusion        LABS:                        9.5    3.23  )-----------( 15       ( 18 Nov 2019 03:25 )             31.3     11-18    135  |  105  |  11  ----------------------------<  107<H>  4.2   |  23  |  1.27    Ca    7.7<L>      18 Nov 2019 03:25  Phos  2.2     11-18  Mg     1.8     11-18    TPro  5.8<L>  /  Alb  2.0<L>  /  TBili  0.7  /  DBili  x   /  AST  58<H>  /  ALT  35  /  AlkPhos  300<H>  11-18          RADIOLOGY & ADDITIONAL STUDIES:        Neurology Progress Note:      Mental Status:  awaekmalert speech fluent follws commands       Cranial Nerves: 2 /12intact      Motor:   arm 3/5  r leg swollen         Sensory:intact      Cerebellar: deefrd       Gait:  unsteady       Assesment/Plan:  metabolic encephalaothy hx of live rca, r leg swollen  no seziure  discuss i with  family

## 2019-11-18 NOTE — PROGRESS NOTE ADULT - SUBJECTIVE AND OBJECTIVE BOX
INTERVAL HPI/OVERNIGHT EVENTS:      s/p RRT last night for afib with RVR and hypotension, resolved with albumin and midodrine.  Remains tachycardic overnight.  Started on broad spectrum antibiotics.  Made DNR/DNI with HCP, confirmed with patient today, now that he is more stable.      CENTRAL LINE: [ ] YES [ x] NO  LOCATION:   DATE INSERTED:  REMOVE: [ ] YES [ ] NO  EXPLAIN:    ALCALA: [ ] YES [ x] NO    DATE INSERTED:  REMOVE:  [ ] YES [ ] NO  EXPLAIN:    A-LINE:  [ ] YES [x ] NO  LOCATION:   DATE INSERTED:  REMOVE:  [ ] YES [ ] NO  EXPLAIN:    GLOBAL ISSUE/BEST PRACTICE:  Analgesia: NA  Sedation: NA  HOB elevation: yes  Stress ulcer prophylaxis: NA  VTE prophylaxis: thrombocytopenia  Oral Care: chlorhexidine  Glycemic control: NA  Nutrition: dysphagia diet    REVIEW OF SYSTEMS:     CONSTITUTIONAL: No fever, weight loss, or fatigue  EYES: No eye pain, visual disturbances, or discharge  NECK: No pain or stiffness  RESPIRATORY: No cough, wheezing, chills or hemoptysis; No shortness of breath  CARDIOVASCULAR: No chest pain, palpitations, dizziness, or leg swelling  GASTROINTESTINAL: No abdominal or epigastric pain. No nausea, vomiting, or hematemesis; No diarrhea or constipation. No melena or hematochezia.  GENITOURINARY: Testicular swelling  NEUROLOGICAL: No headaches, memory loss, loss of strength, numbness, or tremors  SKIN: No itching, burning, rashes, or lesions     ICU Vital Signs Last 24 Hrs  T(C): 37.2 (18 Nov 2019 14:00), Max: 37.2 (18 Nov 2019 14:00)  T(F): 99 (18 Nov 2019 14:00), Max: 99 (18 Nov 2019 14:00)  HR: 124 (18 Nov 2019 14:00) (100 - 148)  BP: 116/79 (18 Nov 2019 14:00) (84/50 - 142/100)  BP(mean): 84 (18 Nov 2019 14:00) (78 - 107)  ABP: --  ABP(mean): --  RR: 17 (18 Nov 2019 14:00) (13 - 24)  SpO2: 98% (18 Nov 2019 14:00) (93% - 100%)      I&O's Detail    17 Nov 2019 07:01  -  18 Nov 2019 07:00  --------------------------------------------------------  IN:    IV PiggyBack: 1650 mL    Oral Fluid: 300 mL  Total IN: 1950 mL    OUT:  Total OUT: 0 mL    Total NET: 1950 mL      18 Nov 2019 07:01  -  18 Nov 2019 14:14  --------------------------------------------------------  IN:    Oral Fluid: 350 mL  Total IN: 350 mL    OUT:    Voided: 250 mL  Total OUT: 250 mL    Total NET: 100 mL    MEDICATIONS  NEURO  Meds: carBAMazepine 100 milliGRAM(s) Oral two times a day    RESPIRATORY  ABG - ( 18 Nov 2019 03:25 )  pH: x     /  pCO2: x     /  pO2: x     / HCO3: x     / Base Excess: x     /  SaO2: x       Lactate: 2.7    Meds:     CARDIOVASCULAR  Meds: metoprolol tartrate Injectable 2.5 milliGRAM(s) IV Push every 6 hours PRN HR greater than 130 and MAP greater than 80  midodrine 10 milliGRAM(s) Oral every 8 hours  tamsulosin 0.8 milliGRAM(s) Oral at bedtime    GI/NUTRITION  Meds: pantoprazole    Tablet 40 milliGRAM(s) Oral before breakfast  polyethylene glycol 3350 17 Gram(s) Oral two times a day  senna 1 Tablet(s) Oral two times a day    GENITOURINARY  Meds:   HEMATOLOGIC  Meds:   [x] VTE Prophylaxis  INFECTIOUS DISEASES  Meds: meropenem  IVPB 1000 milliGRAM(s) IV Intermittent every 12 hours  vancomycin  IVPB 1000 milliGRAM(s) IV Intermittent every 24 hours    ENDOCRINE  CAPILLARY BLOOD GLUCOSE      POCT Blood Glucose.: 141 mg/dL (17 Nov 2019 23:22)  POCT Blood Glucose.: 133 mg/dL (17 Nov 2019 14:48)    Meds:   OTHER MEDICATIONS:  chlorhexidine 4% Liquid 1 Application(s) Topical <User Schedule>  :    PHYSICAL EXAM:    GENERAL: NAD, well-groomed, cachetic  HEAD:  Atraumatic, Normocephalic, temporal wasting  NECK: Supple, No JVD, Normal thyroid  CHEST/LUNG: decreased breath sounds at bases  HEART: tachycardic  ABDOMEN: Soft, distended; Bowel sounds present  EXTREMITIES:  2+ Peripheral Pulses, 2+ edema in RLE  NERVOUS SYSTEM:  Alert & Oriented X3, Good concentration; Motor Strength 5/5 B/L upper and lower extremities; DTRs 2+ intact and symmetric    LABS:                        9.5    3.23  )-----------( 15       ( 18 Nov 2019 03:25 )             31.3      11-18    135  |  105  |  11  ----------------------------<  107<H>  4.2   |  23  |  1.27    Ca    7.7<L>      18 Nov 2019 03:25  Phos  2.2     11-18  Mg     1.8     11-18    TPro  5.8<L>  /  Alb  2.0<L>  /  TBili  0.7  /  DBili  x   /  AST  58<H>  /  ALT  35  /  AlkPhos  300<H>  11-18    RADIOLOGY & ADDITIONAL STUDIES:  < from: US Abdomen Complete (11.18.19 @ 10:09) >    EXAM:  US ABDOMINAL COMPLETE                            PROCEDURE DATE:  11/18/2019          INTERPRETATION:  CLINICAL INFORMATION: Liver cancer, ascites    COMPARISON: CT dated 11/7/2019    TECHNIQUE: Sonography of the abdomen.     FINDINGS:    Liver: Coarse echotexture.    Bile ducts: Normal caliber. Common bile duct measures 4 mm.     Gallbladder: Not visualized, possibly contracted.        Pancreas: Visualized portions are within normal limits.    Spleen: 9.5 cm. Within normal limits.    Right kidney: 9.8 cm. Moderate hydronephrosis. 1.9 cm cyst.    Left kidney: 11.5 cm.  No hydronephrosis.    Ascites: Moderate.    Aorta and IVC: Visualized portions are within normal limits.    There are bilateral pleural effusions.    IMPRESSION:     Moderate ascites.Moderate right hydronephrosis.  Bilateral pleural effusions.    JACK DÍAZ M.D., ATTENDING RADIOLOGIST  This document has been electronically signed. Nov 18 2019  1:45PM  < end of copied text >

## 2019-11-19 LAB
ANION GAP SERPL CALC-SCNC: 7 MMOL/L — SIGNIFICANT CHANGE UP (ref 5–17)
BUN SERPL-MCNC: 11 MG/DL — SIGNIFICANT CHANGE UP (ref 7–23)
CALCIUM SERPL-MCNC: 7.5 MG/DL — LOW (ref 8.5–10.1)
CHLORIDE SERPL-SCNC: 104 MMOL/L — SIGNIFICANT CHANGE UP (ref 96–108)
CO2 SERPL-SCNC: 24 MMOL/L — SIGNIFICANT CHANGE UP (ref 22–31)
CREAT SERPL-MCNC: 1.32 MG/DL — HIGH (ref 0.5–1.3)
GLUCOSE SERPL-MCNC: 112 MG/DL — HIGH (ref 70–99)
HCT VFR BLD CALC: 28.6 % — LOW (ref 39–50)
HGB BLD-MCNC: 8.7 G/DL — LOW (ref 13–17)
MAGNESIUM SERPL-MCNC: 1.8 MG/DL — SIGNIFICANT CHANGE UP (ref 1.6–2.6)
MCHC RBC-ENTMCNC: 29.4 PG — SIGNIFICANT CHANGE UP (ref 27–34)
MCHC RBC-ENTMCNC: 30.4 GM/DL — LOW (ref 32–36)
MCV RBC AUTO: 96.6 FL — SIGNIFICANT CHANGE UP (ref 80–100)
NRBC # BLD: 0 /100 WBCS — SIGNIFICANT CHANGE UP (ref 0–0)
PHOSPHATE SERPL-MCNC: 2.6 MG/DL — SIGNIFICANT CHANGE UP (ref 2.5–4.5)
PLATELET # BLD AUTO: 23 K/UL — LOW (ref 150–400)
POTASSIUM SERPL-MCNC: 3.9 MMOL/L — SIGNIFICANT CHANGE UP (ref 3.5–5.3)
POTASSIUM SERPL-SCNC: 3.9 MMOL/L — SIGNIFICANT CHANGE UP (ref 3.5–5.3)
RBC # BLD: 2.96 M/UL — LOW (ref 4.2–5.8)
RBC # FLD: 22.5 % — HIGH (ref 10.3–14.5)
SODIUM SERPL-SCNC: 135 MMOL/L — SIGNIFICANT CHANGE UP (ref 135–145)
WBC # BLD: 4.01 K/UL — SIGNIFICANT CHANGE UP (ref 3.8–10.5)
WBC # FLD AUTO: 4.01 K/UL — SIGNIFICANT CHANGE UP (ref 3.8–10.5)

## 2019-11-19 PROCEDURE — 99233 SBSQ HOSP IP/OBS HIGH 50: CPT

## 2019-11-19 RX ADMIN — CHLORHEXIDINE GLUCONATE 1 APPLICATION(S): 213 SOLUTION TOPICAL at 05:57

## 2019-11-19 RX ADMIN — BENZOCAINE AND MENTHOL 1 LOZENGE: 5; 1 LIQUID ORAL at 03:13

## 2019-11-19 RX ADMIN — MIDODRINE HYDROCHLORIDE 10 MILLIGRAM(S): 2.5 TABLET ORAL at 22:31

## 2019-11-19 RX ADMIN — SENNA PLUS 1 TABLET(S): 8.6 TABLET ORAL at 17:08

## 2019-11-19 RX ADMIN — MEROPENEM 100 MILLIGRAM(S): 1 INJECTION INTRAVENOUS at 05:53

## 2019-11-19 RX ADMIN — MIDODRINE HYDROCHLORIDE 10 MILLIGRAM(S): 2.5 TABLET ORAL at 05:57

## 2019-11-19 RX ADMIN — Medication 12.5 MILLIGRAM(S): at 05:55

## 2019-11-19 RX ADMIN — BENZOCAINE AND MENTHOL 1 LOZENGE: 5; 1 LIQUID ORAL at 14:42

## 2019-11-19 RX ADMIN — Medication 250 MILLIGRAM(S): at 18:14

## 2019-11-19 RX ADMIN — Medication 100 MILLIGRAM(S): at 17:08

## 2019-11-19 RX ADMIN — MEROPENEM 100 MILLIGRAM(S): 1 INJECTION INTRAVENOUS at 17:09

## 2019-11-19 RX ADMIN — TAMSULOSIN HYDROCHLORIDE 0.8 MILLIGRAM(S): 0.4 CAPSULE ORAL at 22:32

## 2019-11-19 RX ADMIN — POLYETHYLENE GLYCOL 3350 17 GRAM(S): 17 POWDER, FOR SOLUTION ORAL at 05:56

## 2019-11-19 RX ADMIN — Medication 12.5 MILLIGRAM(S): at 17:08

## 2019-11-19 RX ADMIN — Medication 100 MILLIGRAM(S): at 05:53

## 2019-11-19 RX ADMIN — SENNA PLUS 1 TABLET(S): 8.6 TABLET ORAL at 05:53

## 2019-11-19 RX ADMIN — PANTOPRAZOLE SODIUM 40 MILLIGRAM(S): 20 TABLET, DELAYED RELEASE ORAL at 05:59

## 2019-11-19 RX ADMIN — POLYETHYLENE GLYCOL 3350 17 GRAM(S): 17 POWDER, FOR SOLUTION ORAL at 17:08

## 2019-11-19 NOTE — CHART NOTE - NSCHARTNOTEFT_GEN_A_CORE
Pt medically stable for transfer to telemetry floor.  Signed out to Dr. Roach    79 year old man with a history of HTN, DM II, HLD, CKD III, multiple CVA without residual deficits, including right parietal stroke on Lovenox c/b secondary hemorrhage s/p MCA clot retrieval at [place] on 3/19, metastatic prostate cancer, metastatic adenocarcinoma of unclear etiology, with metastases to liver and bone. Recently was in the ICU when admitted to the hospital, now with hypotension in the setting of pericardial effusion, tachycardia and possible sepsis.  Now DNR and DNI. Pt also with rapid afib.  Pt given small doses of beta blocker and now HR controlled. Cardiology called for consult for the floor.

## 2019-11-19 NOTE — PROGRESS NOTE ADULT - ASSESSMENT
79 year old man with a history of HTN, DM II, HLD, CKD III, multiple CVA without residual deficits, including right parietal stroke on Lovenox c/b secondary hemorrhage s/p MCA clot retrieval at [place] on 3/19, metastatic prostate cancer, metastatic adenocarcinoma of unclear etiology, with metastases to liver and bone. Recently was in the ICU when admitted to the hospital, now with hypotension in the setting of pericardial effusion, tachycardia and possible sepsis.  Now DNR and DNI.     Neuro: Mentation improved from yesterday.  Will monitor closely.    CV: Septic shock vs hypovolemic 2/2 lasix in the setting of pericardial effusion.  S/p albumin and midodrine last night, noted to have improved BP, tachycardia now improved with metoprolol 12.5mg q12H.  Bedside us revealed moderate pericardial effusion with no signs of tamponade; IVC collapsible with respirations.  Echo results as per above  Pulm: Improved with duonebs, noted to have bilateral pericardial effusions.  Continue to monitor.  Possible pneumonia on chest xray  GI: Dysphagia diet; US of abdomen reveals moderate ascites.   Renal/Metabolic: CKD III, continue to trend BUN/Cr.  ID: Septic shock now resolved with midodrine and albumin.  Started on Vancomycin and Meropenem yesterday.  Procalcitonin now 11.    Heme: Anemic and thrombocytopenic, likely 2/2 mets to bone.  Will transfuse Hgb<7 and Plts<20.  Endo: MARLIN  Dispo: Critically ill, not requiring vasopressors, will monitor.  Now DNR/DNI. Stable for transfer to floor.      Attending Critical Care Time 40 minutes

## 2019-11-19 NOTE — PROGRESS NOTE ADULT - SUBJECTIVE AND OBJECTIVE BOX
INTERVAL HPI/OVERNIGHT EVENTS:      Out of bed sitting in chair.  Mental status much improved.  Tachycardia improved.  TTE results reviewed.      CENTRAL LINE: [ ] YES [x ] NO  LOCATION:   DATE INSERTED:  REMOVE: [ ] YES [ ] NO  EXPLAIN:    ALCALA: [ ] YES [x ] NO    DATE INSERTED:  REMOVE:  [ ] YES [ ] NO  EXPLAIN:    A-LINE:  [ ] YES [x ] NO  LOCATION:   DATE INSERTED:  REMOVE:  [ ] YES [ ] NO  EXPLAIN:    GLOBAL ISSUE/BEST PRACTICE:  Analgesia: NA  Sedation: NA  HOB elevation: yes  Stress ulcer prophylaxis: NA  VTE prophylaxis: thrombocytopenia  Oral Care: chlorhexidine  Glycemic control: NA  Nutrition: dysphagia diet    REVIEW OF SYSTEMS:     CONSTITUTIONAL: No fever, weight loss, or fatigue  EYES: No eye pain, visual disturbances, or discharge  NECK: No pain or stiffness  RESPIRATORY: No cough, wheezing, chills or hemoptysis; No shortness of breath  CARDIOVASCULAR: No chest pain, palpitations, dizziness, or leg swelling  GASTROINTESTINAL: No abdominal or epigastric pain. No nausea, vomiting, or hematemesis; No diarrhea or constipation. No melena or hematochezia.  GENITOURINARY: Testicular swelling  NEUROLOGICAL: No headaches, memory loss, loss of strength, numbness, or tremors  SKIN: No itching, burning, rashes, or lesions       ICU Vital Signs Last 24 Hrs  T(C): 36.4 (2019 11:07), Max: 37.2 (2019 14:00)  T(F): 97.6 (2019 11:07), Max: 99 (2019 14:00)  HR: 86 (2019 11:00) (62 - 126)  BP: 110/75 (2019 11:00) (92/64 - 138/98)  BP(mean): 84 (2019 11:00) (70 - 107)  ABP: --  ABP(mean): --  RR: 23 (2019 11:00) (13 - 24)  SpO2: 95% (2019 11:00) (95% - 100%)      I&O's Detail    2019 07:01  -  2019 07:00  --------------------------------------------------------  IN:    IV PiggyBack: 350 mL    Oral Fluid: 750 mL    Platelets - Single Donor: 216 mL  Total IN: 1316 mL    OUT:    Voided: 250 mL  Total OUT: 250 mL    Total NET: 1066 mL              MEDICATIONS  NEURO  Meds: carBAMazepine 100 milliGRAM(s) Oral two times a day    RESPIRATORY  ABG - ( 2019 03:25 )  pH: x     /  pCO2: x     /  pO2: x     / HCO3: x     / Base Excess: x     /  SaO2: x       Lactate: 2.7              Meds:   CARDIOVASCULAR  Meds: metoprolol tartrate 12.5 milliGRAM(s) Oral two times a day  metoprolol tartrate Injectable 2.5 milliGRAM(s) IV Push every 6 hours PRN HR greater than 130 and MAP greater than 80  midodrine 10 milliGRAM(s) Oral every 8 hours  tamsulosin 0.8 milliGRAM(s) Oral at bedtime    GI/NUTRITION  Meds: pantoprazole    Tablet 40 milliGRAM(s) Oral before breakfast  polyethylene glycol 3350 17 Gram(s) Oral two times a day  senna 1 Tablet(s) Oral two times a day    GENITOURINARY  Meds:   HEMATOLOGIC  Meds:   [x] VTE Prophylaxis  INFECTIOUS DISEASES  Meds: meropenem  IVPB 1000 milliGRAM(s) IV Intermittent every 12 hours  vancomycin  IVPB 1000 milliGRAM(s) IV Intermittent every 24 hours    ENDOCRINE  CAPILLARY BLOOD GLUCOSE        Meds:   OTHER MEDICATIONS:  benzocaine 15 mG/menthol 3.6 mG Lozenge 1 Lozenge Oral three times a day  chlorhexidine 4% Liquid 1 Application(s) Topical <User Schedule>  :    PHYSICAL EXAM:    GENERAL: NAD, well-groomed, cachetic  HEAD:  Atraumatic, Normocephalic, temporal wasting  NECK: Supple, No JVD, Normal thyroid  CHEST/LUNG: decreased breath sounds at bases  HEART: S1S2 RRR  ABDOMEN: Soft, distended; Bowel sounds present  EXTREMITIES:  2+ Peripheral Pulses, 2+ edema in RLE  NERVOUS SYSTEM:  Alert & Oriented X3, Good concentration; Motor Strength 5/5 B/L upper and lower extremities; DTRs 2+ intact and symmetric    LABS:                        8.7    4.01  )-----------( 23 from 15      ( 2019 04:10 )             28.6      11-19    135  |  104  |  11  ----------------------------<  112<H>  3.9   |  24  |  1.32<H>    Ca    7.5<L>      2019 04:10  	Phos  2.6       Mg     1.8         TPro  5.8<L>  /  Alb  2.0<L>  /  TBili  0.7  /  DBili  x   /  AST  58<H>  /  ALT  35  /  AlkPhos  300<H>        RADIOLOGY & ADDITIONAL STUDIES:    < from: TTE Echo Doppler w/o Cont (19 @ 14:02) >     EXAM:  TTE WO CON COMPLETE W DOPPL         PROCEDURE DATE:  2019        INTERPRETATION:  REPORT:    TRANSTHORACIC ECHOCARDIOGRAM REPORT         Patient Name:   SHER LILLY Patient Location: [facility] Rec #:  [id number]   Accession #:      [id number]  Account #:                   Height:           67.7 in 172.0 cm  YOB: 1940    Weight:           148.8 lb 67.50 kg  Patient Age:    79 years     BSA:              1.80 m²  Patient Gender: M            BP:               116/79 mmHg       Date of Exam:        2019 2:02:45 PM  Sonographer:         ROSA ISELA  Referring Physician: DIANNA    Procedure:     2D Echo/Doppler/Color Doppler Complete.  Indications:   Pericardial effusion (noninflammatory) - I31.3  Diagnosis:     Pericardial effusion (noninflammatory) - I31.3  Study Details: Technically good study.         2D AND M-MODE MEASUREMENTS (normal ranges within parentheses):  Left Ventricle:                  Normal   Aorta/Left Atrium:            Normal  IVSd (2D):   1.86 cm (0.7-1.1) Aortic Root (2D):  3.53 cm   (2.4-3.7)  LVPWd (2D):             1.59 cm (0.7-1.1) Left Atrium (2D):  1.81 cm   (1.9-4.0)  LVIDd (2D):             2.90 cm (3.4-5.7) Right Ventricle:  LVIDs (2D):             1.92 cm           TAPSE:           1.51 cm  LV FS (2D):             34.0 %   (>25%)  Relative Wall Thickness  1.10    (<0.42)    LV DIASTOLIC FUNCTION:  Decel Time: 151 msec    SPECTRAL DOPPLER ANALYSIS (where applicable):  Mitral Valve:  MV P1/2 Time: 43.73 msec  MV Area, PHT: 5.03 cm²    Aortic Valve: AoV Max Chuck: 0.94 m/s AoV Peak PG: 3.5 mmHg AoV Mean P.4 mmHg    LVOT Vmax: 0.74 m/s LVOT VTI: 0.130 m LVOT Diameter: 2.03 cm    AoV Area, Vmax: 2.53 cm² AoV Area, VTI: 3.29 cm² AoV Area, Vmn: 2.54 cm²    TricuspidValve and PA/RV Systolic Pressure: TR Max Velocity: 3.33 m/s RA   Pressure: 15 mmHg RVSP/PASP: 59.5 mmHg       PHYSICIAN INTERPRETATION:  Left Ventricle: The left ventricular internal cavity size is normal.  Left ventricular ejection fraction, by visual estimation, is 60 to 65%.   Spectral Doppler shows impaired relaxation pattern of left ventricular   myocardial filling (Grade I diastolic dysfunction).  Right Ventricle: Normal right ventricular size and function.  Left Atrium: The left atrium isnormal in size.  Right Atrium: The right atrium is normal in size.  Pericardium: A moderately sized pericardial effusion is present. The   pericardial effusion is surrounding the apex and anterior to the right   ventricle. There is excessive respiratory variation in the mitral valve   spectral Doppler velocities and excessive respiratory variation in the   tricuspid valve spectral Doppler velocities. There is no evidence of   cardiac tamponade. There is evidence of exaggerated respiratory cycle   changes in the mitral valve inflow velocity which may reflect increased   ventricular interdependence.  Mitral Valve: Mild thickening of the anterior and posterior mitral valve   leaflets. Mild mitral valve regurgitation is seen.  Tricuspid Valve: Severe tricuspid regurgitation is visualized. Estimated   pulmonary artery systolic pressure is 59.5 mmHg assuming a right atrial   pressure of 15 mmHg, which is consistent with moderate pulmonary   hypertension.  Aortic Valve: Peak transaortic gradientequals 3.5 mmHg, mean transaortic   gradient equals 1.4 mmHg, the calculated aortic valve area equals 3.29   cm² by the continuity equation consistent with normally opening aortic   valve. Mild aortic valve regurgitation is seen.  Pulmonic Valve: Mild pulmonic valve regurgitation.  Aorta: The aortic root is normal in size and structure.  Venous: The inferior vena cava was dilated, with respiratory size   variation less than 50%, consistent with elevated right atrial pressure.       Summary:   1. Left ventricular ejection fraction, by visual estimation, is 60 to   65%.   2. Spectral Doppler shows impaired relaxation pattern of left   ventricular myocardial filling (Grade I diastolic dysfunction).   3. Moderate pericardial effusion.   4. There is evidence of exaggerated respiratory cycle changes in the   mitral valve inflow velocity which may reflect increased ventricular   interdependence.   5. Mild mitral valve regurgitation.   6. Mild thickening of the anterior and posterior mitral valveleaflets.   7. Severe tricuspid regurgitation.   8. Mild aortic regurgitation.   9. Estimated pulmonary artery systolic pressure is 59.5 mmHg assuming a   right atrial pressure of 15 mmHg, which is consistent with moderate   pulmonary hypertension.  10. Pleural fluid is noted.    [id number] Gaurav Beaver MD, FACCMD, FACC  Electronically signed on 2019 at 4:33:51 PM    *** Final ***  GAURAV BEAVER M.D.; Attending Cardiologist  This document has been electronically signed. 2019  2:02PM  < end of copied text >

## 2019-11-20 DIAGNOSIS — I95.9 HYPOTENSION, UNSPECIFIED: ICD-10-CM

## 2019-11-20 DIAGNOSIS — Z51.5 ENCOUNTER FOR PALLIATIVE CARE: ICD-10-CM

## 2019-11-20 DIAGNOSIS — C79.51 SECONDARY MALIGNANT NEOPLASM OF BONE: ICD-10-CM

## 2019-11-20 LAB
ANION GAP SERPL CALC-SCNC: 7 MMOL/L — SIGNIFICANT CHANGE UP (ref 5–17)
BUN SERPL-MCNC: 11 MG/DL — SIGNIFICANT CHANGE UP (ref 7–23)
CALCIUM SERPL-MCNC: 7.7 MG/DL — LOW (ref 8.5–10.1)
CHLORIDE SERPL-SCNC: 106 MMOL/L — SIGNIFICANT CHANGE UP (ref 96–108)
CO2 SERPL-SCNC: 24 MMOL/L — SIGNIFICANT CHANGE UP (ref 22–31)
CREAT SERPL-MCNC: 1.38 MG/DL — HIGH (ref 0.5–1.3)
GLUCOSE BLDC GLUCOMTR-MCNC: 104 MG/DL — HIGH (ref 70–99)
GLUCOSE SERPL-MCNC: 101 MG/DL — HIGH (ref 70–99)
HCT VFR BLD CALC: 29.5 % — LOW (ref 39–50)
HGB BLD-MCNC: 9 G/DL — LOW (ref 13–17)
MAGNESIUM SERPL-MCNC: 1.9 MG/DL — SIGNIFICANT CHANGE UP (ref 1.6–2.6)
MCHC RBC-ENTMCNC: 29.6 PG — SIGNIFICANT CHANGE UP (ref 27–34)
MCHC RBC-ENTMCNC: 30.5 GM/DL — LOW (ref 32–36)
MCV RBC AUTO: 97 FL — SIGNIFICANT CHANGE UP (ref 80–100)
NRBC # BLD: 0 /100 WBCS — SIGNIFICANT CHANGE UP (ref 0–0)
PHOSPHATE SERPL-MCNC: 2.3 MG/DL — LOW (ref 2.5–4.5)
PLATELET # BLD AUTO: 19 K/UL — CRITICAL LOW (ref 150–400)
POTASSIUM SERPL-MCNC: 4.1 MMOL/L — SIGNIFICANT CHANGE UP (ref 3.5–5.3)
POTASSIUM SERPL-SCNC: 4.1 MMOL/L — SIGNIFICANT CHANGE UP (ref 3.5–5.3)
PROCALCITONIN SERPL-MCNC: 8.04 NG/ML — HIGH (ref 0.02–0.1)
RBC # BLD: 3.04 M/UL — LOW (ref 4.2–5.8)
RBC # FLD: 22.5 % — HIGH (ref 10.3–14.5)
SODIUM SERPL-SCNC: 137 MMOL/L — SIGNIFICANT CHANGE UP (ref 135–145)
VANCOMYCIN TROUGH SERPL-MCNC: 19.5 UG/ML — SIGNIFICANT CHANGE UP (ref 10–20)
WBC # BLD: 3.62 K/UL — LOW (ref 3.8–10.5)
WBC # FLD AUTO: 3.62 K/UL — LOW (ref 3.8–10.5)

## 2019-11-20 PROCEDURE — 99233 SBSQ HOSP IP/OBS HIGH 50: CPT

## 2019-11-20 PROCEDURE — 99223 1ST HOSP IP/OBS HIGH 75: CPT

## 2019-11-20 PROCEDURE — 99232 SBSQ HOSP IP/OBS MODERATE 35: CPT

## 2019-11-20 RX ADMIN — Medication 250 MILLIGRAM(S): at 19:07

## 2019-11-20 RX ADMIN — BENZOCAINE AND MENTHOL 1 LOZENGE: 5; 1 LIQUID ORAL at 06:45

## 2019-11-20 RX ADMIN — TAMSULOSIN HYDROCHLORIDE 0.8 MILLIGRAM(S): 0.4 CAPSULE ORAL at 21:59

## 2019-11-20 RX ADMIN — BENZOCAINE AND MENTHOL 1 LOZENGE: 5; 1 LIQUID ORAL at 13:35

## 2019-11-20 RX ADMIN — MIDODRINE HYDROCHLORIDE 10 MILLIGRAM(S): 2.5 TABLET ORAL at 06:15

## 2019-11-20 RX ADMIN — MIDODRINE HYDROCHLORIDE 10 MILLIGRAM(S): 2.5 TABLET ORAL at 13:34

## 2019-11-20 RX ADMIN — PANTOPRAZOLE SODIUM 40 MILLIGRAM(S): 20 TABLET, DELAYED RELEASE ORAL at 06:16

## 2019-11-20 RX ADMIN — MEROPENEM 100 MILLIGRAM(S): 1 INJECTION INTRAVENOUS at 06:13

## 2019-11-20 RX ADMIN — BENZOCAINE AND MENTHOL 1 LOZENGE: 5; 1 LIQUID ORAL at 22:00

## 2019-11-20 RX ADMIN — SENNA PLUS 1 TABLET(S): 8.6 TABLET ORAL at 18:00

## 2019-11-20 RX ADMIN — SENNA PLUS 1 TABLET(S): 8.6 TABLET ORAL at 06:15

## 2019-11-20 RX ADMIN — Medication 12.5 MILLIGRAM(S): at 06:14

## 2019-11-20 RX ADMIN — MEROPENEM 100 MILLIGRAM(S): 1 INJECTION INTRAVENOUS at 18:35

## 2019-11-20 RX ADMIN — POLYETHYLENE GLYCOL 3350 17 GRAM(S): 17 POWDER, FOR SOLUTION ORAL at 17:59

## 2019-11-20 RX ADMIN — POLYETHYLENE GLYCOL 3350 17 GRAM(S): 17 POWDER, FOR SOLUTION ORAL at 06:48

## 2019-11-20 RX ADMIN — Medication 100 MILLIGRAM(S): at 17:59

## 2019-11-20 RX ADMIN — Medication 100 MILLIGRAM(S): at 06:13

## 2019-11-20 RX ADMIN — Medication 12.5 MILLIGRAM(S): at 18:00

## 2019-11-20 NOTE — PROGRESS NOTE ADULT - SUBJECTIVE AND OBJECTIVE BOX
Patient seen and examined bedside resting comfortably.  Voiding spontaenously without difficulty.  Denies hematuria and dysuria.      T(F): 98.6 (11-20-19 @ 16:40), Max: 98.6 (11-20-19 @ 16:40)  HR: 91 (11-20-19 @ 16:40) (74 - 92)  BP: 141/82 (11-20-19 @ 16:40) (113/77 - 141/82)  RR: 17 (11-20-19 @ 16:40) (16 - 20)  SpO2: 96% (11-20-19 @ 16:40) (95% - 100%)  Wt(kg): --  CAPILLARY BLOOD GLUCOSE      POCT Blood Glucose.: 104 mg/dL (20 Nov 2019 07:36)      PHYSICAL EXAM:  General: NAD, WDWN  Neuro:  Alert & conscious  HEENT: NCAT, EOMI, conjunctiva clear  Lung: respirations nonlabored, good inspiratory effort  Abdomen: soft, NTND.   Extremities: dependant edema.   : marked genital edema.     LABS:                        9.0    3.62  )-----------( 19       ( 20 Nov 2019 07:02 )             29.5     11-20    137  |  106  |  11  ----------------------------<  101<H>  4.1   |  24  |  1.38<H>    Ca    7.7<L>      20 Nov 2019 07:02  Phos  2.3     11-20  Mg     1.9     11-20        I&O's Detail    19 Nov 2019 07:01  -  20 Nov 2019 07:00  --------------------------------------------------------  IN:    Oral Fluid: 600 mL    Solution: 350 mL  Total IN: 950 mL    OUT:    Voided: 250 mL  Total OUT: 250 mL    Total NET: 700 mL          wbc    11-20 @ 07:02    3.62    11-19 @ 04:10    4.01    11-18 @ 03:25    3.23    11-17 @ 18:48    4.58    11-14 @ 04:49    4.54        cr   11-20 @ 07:02   1.38    11-19 @ 04:10   1.32    11-18 @ 03:25   1.27    11-17 @ 18:48   1.54    11-16 @ 08:54   1.24    11-14 @ 04:49   1.05

## 2019-11-20 NOTE — PROGRESS NOTE ADULT - ASSESSMENT
Genital edema secondary to generalized edema.   Elevate scrotum.   continue therapy for dependent edema

## 2019-11-20 NOTE — GOALS OF CARE CONVERSATION - ADVANCED CARE PLANNING - DOES PATIENT HAVE ADVANCE DIRECTIVE
Animal Bite (Child)  Animal bites are common injuries. They can be caused by domestic and wild animals. These can include dogs, cats, rodents, bats, or rabbits.  Bites can cause damage ranging from small puncture wounds to serious injuries. Animal bites tend to become infected more easily than other wounds. In rare cases, the biting animal can pass a disease through the bite, such as rabies or tetanus.  Animal bites are treated by first rinsing the wound with large amounts of saline or sterile water. The surrounding skin is washed with a mild soap and warm water. If needed, the wound is closed with stitches (sutures). A clean pressure dressing is applied. A tetanus shot may be needed, especially if the child s last shot was more than 5 years ago. The bite may require an X-ray. If the vaccination status of the animal is unknown, rabies protocol may be followed. This involves quarantine of the animal and a series of rabies shots for the child. If the wound is severe or infected, a stay in the hospital may be needed.  Home care  Antibiotic cream or ointment or oral antibiotics may be prescribed. These help prevent or treat infection. Follow instructions when applying or giving this medicine to your child.  General care    Follow instructions on how to care for the animal bite. If a dressing was applied to the wound, be sure to change it as directed.    Wash your hands well with soap and warm water before and after caring for the wound to avoid spreading infection.    To keep the wound clean, wash it with a gentle soap and warm water.    If the wound bleeds, place a clean, soft cloth on the wound. Then firmly apply pressure until the bleeding stops. This may take up to 5 minutes. Do not release the pressure and look at the wound during this time.    Watch the wound for signs of infection (see below).  Follow-up care  Follow up with your child s healthcare provider, or as advised.  Special notes to parents  Do your  Yes best to prevent animal bites. If you are thinking about getting a family pet, pick an animal or dog breed that has a good temperament and is least likely to be a danger to children. Teach your child how to treat animals gently and with respect. This includes not going up to strange animals or teasing or provoking animals.  When to seek medical advice  Call your child s healthcare provider right away if any of these occur:    Your child has a fever of 100.4 F (38 C) or higher, or as directed by the healthcare provider.    Bleeding that doesn t stop after 5 minutes of firm pressure.    Decreased ability to move any body part near the site of the animal bite.    Signs of infection around the bite, such as warmth, redness, swelling, or foul-smelling drainage.    Flu-like symptoms, such as headache or fever.  Date Last Reviewed: 3/1/2017    8405-7832 [employer]. [address] [place] [address]. All rights reserved. This information is not intended as a substitute for professional medical care. Always follow your healthcare professional's instructions.        Cat Bite or Scratch (Child)  Cats can cause wounds with their teeth or claws. Cat bites or scratches are potentially serious. This is because cats can transmit an array of bacteria and parasites.  Cats have long sharp teeth that can create deep puncture wounds. They also have claws that can create deep scratches. These types of injury may cause bleeding. An infection may occur within 12 to 24 hours, particularly if the hand is involved. The area may become red, swollen, and very painful. Other possible symptoms include fever and swollen lymph nodes.  Cat bites or scratches are treated by first rinsing the wound with saline or sterile water. The surrounding skin is washed with a mild soap and warm water. Severe or deep injuries may be closed with stitches (sutures). A clean pressure dressing may then be applied. A tetanus shot may be needed,  especially if the child s last shot was more than 5 years ago. If you don t know if the cat was vaccinated, rabies protocol may be followed. This involves keeping the cat isolated (quarantined) and giving a series of rabies shots to the child. If the wound is severe or infected, a hospital stay may be needed.  Home care  The healthcare provider may prescribe antibiotics to take by mouth (oral) or in cream or ointment form. These help prevent or treat infection. Follow instructions for applying or giving this medicine to your child.  General care    Wash your hands well with soap and warm water before and after caring for the wound. This helps lower the risk of infection.    Follow instructions on how to care for the wound. This may involve the cleaning the wound with gentle soap and warm water. If a dressing was applied to the wound, be sure to change it as directed.    If the wound bleeds, place a clean, soft cloth on the wound. Then firmly apply pressure until the bleeding stops. This may take up to 5 minutes. Do not release the pressure and look at the wound during this time.    Check the wound daily for signs of infection (see below).  Prevention  Do your best to prevent animal bites and scratches. If you are thinking about getting a family cat, pick one that has a good temperament and is least likely to be a danger to children. Teach your child to treat animals gently and with respect. Children should not go up to strange animals or tease or provoke animals.  Follow-up care  Follow up with your child s healthcare provider, or as advised.  When to seek medical advice  Call your child s healthcare provider if any of the following occur:    Your child has a fever of 100.4 F (38 C) or higher, or as directed by the provider    Your child has signs of infection around the scratch or bite, such as warmth, redness, swelling, or foul-smelling drainage.    Your child has bleeding that doesn t stop after 5 minutes of firm  pressure.    Your child has flu-like symptoms, such as fever, chills, headache, or swollen lymph nodes.    Your child is having trouble moving any body part near the site of the scratch or bite.  Date Last Reviewed: 3/1/2017    1457-9816 [employer]. [address] [place] [address]. All rights reserved. This information is not intended as a substitute for professional medical care. Always follow your healthcare professional's instructions.

## 2019-11-20 NOTE — PROGRESS NOTE ADULT - SUBJECTIVE AND OBJECTIVE BOX
Patient is a 79y old  Male who presents with a chief complaint of hypotension (20 Nov 2019 13:34)       OVERNIGHT EVENTS:    MEDICATIONS  (STANDING):  benzocaine 15 mG/menthol 3.6 mG Lozenge 1 Lozenge Oral three times a day  carBAMazepine 100 milliGRAM(s) Oral two times a day  chlorhexidine 4% Liquid 1 Application(s) Topical <User Schedule>  meropenem  IVPB 1000 milliGRAM(s) IV Intermittent every 12 hours  metoprolol tartrate 12.5 milliGRAM(s) Oral two times a day  midodrine 10 milliGRAM(s) Oral every 8 hours  pantoprazole    Tablet 40 milliGRAM(s) Oral before breakfast  polyethylene glycol 3350 17 Gram(s) Oral two times a day  senna 1 Tablet(s) Oral two times a day  tamsulosin 0.8 milliGRAM(s) Oral at bedtime  vancomycin  IVPB 1000 milliGRAM(s) IV Intermittent every 24 hours    MEDICATIONS  (PRN):  metoprolol tartrate Injectable 2.5 milliGRAM(s) IV Push every 6 hours PRN HR greater than 130 and MAP greater than 80      Vital Signs Last 24 Hrs  T(C): 36.9 (20 Nov 2019 12:03), Max: 36.9 (20 Nov 2019 12:03)  T(F): 98.4 (20 Nov 2019 12:03), Max: 98.4 (20 Nov 2019 12:03)  HR: 92 (20 Nov 2019 12:03) (73 - 92)  BP: 117/68 (20 Nov 2019 12:03) (113/77 - 139/93)  BP(mean): 95 (19 Nov 2019 18:00) (95 - 95)  RR: 16 (20 Nov 2019 12:03) (15 - 20)  SpO2: 95% (20 Nov 2019 12:03) (95% - 100%)    PHYSICAL EXAM:  GENERAL: NAD, well-groomed  HEAD:  Atraumatic, Normocephalic  EYES: EOMI, PERRLA, conjunctiva and sclera clear  ENMT: No tonsillar erythema, exudates, or enlargement; Moist mucous membranes   NECK: Supple, No JVD   NERVOUS SYSTEM:  Alert & Oriented X3, Good concentration  CHEST/LUNG: Clear to auscultation  bilaterally; No rales, rhonchi, wheezing, or rubs  HEART: Regular rate and rhythm; No murmurs, rubs, or gallops  ABDOMEN: Soft , Nondistended; Bowel sounds present  EXTREMITIES:  2+ Peripheral Pulses, No clubbing, cyanosis, or edema      LABS:                        9.0    3.62  )-----------( 19       ( 20 Nov 2019 07:02 )             29.5     11-20    137  |  106  |  11  ----------------------------<  101<H>  4.1   |  24  |  1.38<H>    Ca    7.7<L>      20 Nov 2019 07:02  Phos  2.3     11-20  Mg     1.9     11-20         cardiac markers     CAPILLARY BLOOD GLUCOSE      POCT Blood Glucose.: 104 mg/dL (20 Nov 2019 07:36)    Cultures    RADIOLOGY & ADDITIONAL TESTS:    Imaging Personally Reviewed:  [ ] YES  [ ] NO    Consultant(s) Notes Reviewed:  [ ] YES  [ ] NO    Care Discussed with Consultants/Other Providers [ ] YES  [ ] NO

## 2019-11-20 NOTE — CONSULT NOTE ADULT - ASSESSMENT
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   Patient was at [facility] 11/4-11/11 for surgical site leakage after hepatic artery embolism, patient had platelet transfusion around 2PM today for PL of 19 and was discharged to home.  On day of admission - patient's wife noted right facial droop, UE weakness and altered mental status.  Upon arrival to ED, patient was found to have BP 83/51.  While in Ed received 2L of fluids, BP start coming up to the 90's, then started on levophed drip. Patient became more alert, and speech came back as well.   Monitored in ICU... ?episode of afib.   No EKG or strips available.  All EKGs and tele while on floor all sinus.    No evidence of any afib currently noted.  Remains in sinus 70-90s.  Would continue metoprolol (had brief SVT run)... titrate up as tolerated (currently on midodrine as well).  AC as per primary team for multiple other issues, although would be cautious with recent GIB and anemia requiring PRBCs.  Overall poor prognosis with diffuse metastatic cancer.  Will sign off for now; please call with any further questions. 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   Patient was at [facility] 11/4-11/11 for surgical site leakage after hepatic artery embolism, patient had platelet transfusion around 2PM today for PL of 19 and was discharged to home.  On day of admission - patient's wife noted right facial droop, UE weakness and altered mental status.  Upon arrival to ED, patient was found to have BP 83/51.  While in Ed received 2L of fluids, BP start coming up to the 90's, then started on levophed drip. Patient became more alert, and speech came back as well.   Monitored in ICU... ?episode of afib.   No EKG or strips available.  All EKGs and tele while on floor all sinus.    No evidence of any afib currently noted.  Remains in sinus 70-90s.  Would continue metoprolol (had brief SVT run)... titrate up as tolerated (currently on midodrine as well).  AC as per primary team for multiple other issues, although would be cautious with recent GIB and anemia requiring PRBCs.  Overall poor prognosis with diffuse metastatic cancer.  Pt DNR/DNI; consider palliative care consult.  Will sign off for now; please call with any further questions.

## 2019-11-20 NOTE — CONSULT NOTE ADULT - SUBJECTIVE AND OBJECTIVE BOX
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   Patient was at [place] [facility] 11/4-11/11 for surgical site leakage after hepatic artery embolism, patient had platelet transfusion around 2PM today for PL of 19 and was discharged to home.  On day of admission - patient's wife noted right facial droop, UE weakness and altered mental status.  Upon arrival to ED, patient was found to have BP 83/51.  While in Ed received 2L of fluids, BP start coming up to the 90's, then started on levophed drip. Patient became more alert, and speech came back as well.   Monitored in ICU... ?episode of afib.   No EKG or strips available.  All EKGs and tele while on floor all sinus.      REVIEW OF SYSTEMS:    CONSTITUTIONAL: No fever, weight loss, or fatigue  EYES: No eye pain, visual disturbances, or discharge  ENMT:  No difficulty hearing, tinnitus, vertigo; No sinus or throat pain  NECK: No pain or stiffness  BREASTS: No pain, masses, or nipple discharge  RESPIRATORY: No cough, wheezing, chills or hemoptysis; No shortness of breath  CARDIOVASCULAR: No chest pain, palpitations, dizziness, or leg swelling  GASTROINTESTINAL: No abdominal or epigastric pain. No nausea, vomiting, or hematemesis; No diarrhea or constipation. No melena or hematochezia.  GENITOURINARY: No dysuria, frequency, hematuria, or incontinence  NEUROLOGICAL: No headaches, memory loss, loss of strength, numbness, or tremors  SKIN: No itching, burning, rashes, or lesions   LYMPH NODES: No enlarged glands  ENDOCRINE: No heat or cold intolerance; No hair loss  MUSCULOSKELETAL: No joint pain or swelling; No muscle, back, or extremity pain  PSYCHIATRIC: No depression, anxiety, mood swings, or difficulty sleeping  HEME/LYMPH: No easy bruising, or bleeding gums  ALLERGY AND IMMUNOLOGIC: No hives or eczema    MEDICATIONS  (STANDING):  benzocaine 15 mG/menthol 3.6 mG Lozenge 1 Lozenge Oral three times a day  carBAMazepine 100 milliGRAM(s) Oral two times a day  chlorhexidine 4% Liquid 1 Application(s) Topical <User Schedule>  meropenem  IVPB 1000 milliGRAM(s) IV Intermittent every 12 hours  metoprolol tartrate 12.5 milliGRAM(s) Oral two times a day  midodrine 10 milliGRAM(s) Oral every 8 hours  pantoprazole    Tablet 40 milliGRAM(s) Oral before breakfast  polyethylene glycol 3350 17 Gram(s) Oral two times a day  senna 1 Tablet(s) Oral two times a day  tamsulosin 0.8 milliGRAM(s) Oral at bedtime  vancomycin  IVPB 1000 milliGRAM(s) IV Intermittent every 24 hours    MEDICATIONS  (PRN):  metoprolol tartrate Injectable 2.5 milliGRAM(s) IV Push every 6 hours PRN HR greater than 130 and MAP greater than 80      ALLERGIES: No Known Allergies      FAMILY HISTORY:  FH: HTN (hypertension)  FH: diabetes mellitus      PHYSICAL EXAMINATION:  -----------------------------  T(C): 36.9 (11-20-19 @ 12:03), Max: 36.9 (11-20-19 @ 12:03)  HR: 92 (11-20-19 @ 12:03) (73 - 92)  BP: 117/68 (11-20-19 @ 12:03) (113/77 - 149/86)  RR: 16 (11-20-19 @ 12:03) (14 - 20)  SpO2: 95% (11-20-19 @ 12:03) (95% - 100%)  Wt(kg): --    11-19 @ 07:01  -  11-20 @ 07:00  --------------------------------------------------------  IN:    Oral Fluid: 600 mL    Solution: 350 mL  Total IN: 950 mL    OUT:    Voided: 250 mL  Total OUT: 250 mL    Total NET: 700 mL            Constitutional: no acute distress.   Eyes: the conjunctiva exhibited no abnormalities and the eyelids demonstrated no xanthelasmas.   HEENT: normal oral mucosa, no oral pallor and no oral cyanosis.   Neck: normal jugular venous A waves present, normal jugular venous V waves present and no jugular venous francis A waves.   Pulmonary: no rales; diminished at bases  Cardiovascular: heart rate and rhythm were normal, 2/6 SM  Abdomen: soft, non-tender, no hepato-splenomegaly and no abdominal mass palpated.   Musculoskeletal: the gait could not be assessed..   Extremities: no clubbing of the fingernails, no localized cyanosis, no petechial hemorrhages and no ischemic changes.   Skin: normal skin color and pigmentation, no rash, no venous stasis, no skin lesions, no skin ulcer and no xanthoma was observed.   Psychiatric: oriented to person, place, and time, the affect was normal, the mood was normal and not feeling anxious.     LABS:   --------  11-20    137  |  106  |  11  ----------------------------<  101<H>  4.1   |  24  |  1.38<H>    Ca    7.7<L>      20 Nov 2019 07:02  Phos  2.3     11-20  Mg     1.9     11-20                           9.0    3.62  )-----------( 19       ( 20 Nov 2019 07:02 )             29.5              RADIOLOGY:  -----------------  Tele: sinus 70-90s    < from: TTE Echo Doppler w/o Cont (11.18.19 @ 14:02) >   1. Left ventricular ejection fraction, by visual estimation, is 60 to   65%.   2. Spectral Doppler shows impaired relaxation pattern of left   ventricular myocardial filling (Grade I diastolic dysfunction).   3. Moderate pericardial effusion.   4. There is evidence of exaggerated respiratory cycle changes in the   mitral valve inflow velocity which may reflect increased ventricular   interdependence.   5. Mild mitral valve regurgitation.   6. Mild thickening of the anterior and posterior mitral valveleaflets.   7. Severe tricuspid regurgitation.   8. Mild aortic regurgitation.   9. Estimated pulmonary artery systolic pressure is 59.5 mmHg assuming a   right atrial pressure of 15 mmHg, which is consistent with moderate   pulmonary hypertension.  10. Pleural fluid is noted.    < end of copied text >

## 2019-11-20 NOTE — GOALS OF CARE CONVERSATION - ADVANCED CARE PLANNING - CONVERSATION DETAILS
Spoke with pt's dtr Amanda, Hospice POC and services discussed, She will speak with her father this evening to determine his agreement to the hospice services at home, In the interim I have ordered 02 to be delivered tomorrow. As per dtr no further DME id needed, I explained that hospice consents will need to be signed. She will return my call after speaking with her father this evening. Will cont to f/u,       Latrice Schofield Rn

## 2019-11-20 NOTE — PROGRESS NOTE ADULT - ASSESSMENT
80 y/o M w/metastatic cancer of unknown primary, multiple CVAs, recently admitted for GI bleed s/p hepatic artery embolization now admitted for hypotension likely secondary to severe sepsis with septic shock likely due to UTI. Recently was in the ICU when admitted to the hospital, now with hypotension in the setting of pericardial effusion, tachycardia and possible sepsis.  Now DNR and DNI. Pt also with rapid afib.  Pt given small doses of beta blocker and now HR controlled. Cardiology consulted

## 2019-11-21 PROCEDURE — 99233 SBSQ HOSP IP/OBS HIGH 50: CPT

## 2019-11-21 PROCEDURE — 99497 ADVNCD CARE PLAN 30 MIN: CPT

## 2019-11-21 RX ORDER — FUROSEMIDE 40 MG
40 TABLET ORAL ONCE
Refills: 0 | Status: COMPLETED | OUTPATIENT
Start: 2019-11-21 | End: 2019-11-21

## 2019-11-21 RX ORDER — FUROSEMIDE 40 MG
20 TABLET ORAL DAILY
Refills: 0 | Status: DISCONTINUED | OUTPATIENT
Start: 2019-11-21 | End: 2019-11-22

## 2019-11-21 RX ADMIN — MEROPENEM 100 MILLIGRAM(S): 1 INJECTION INTRAVENOUS at 06:04

## 2019-11-21 RX ADMIN — Medication 12.5 MILLIGRAM(S): at 17:29

## 2019-11-21 RX ADMIN — BENZOCAINE AND MENTHOL 1 LOZENGE: 5; 1 LIQUID ORAL at 14:05

## 2019-11-21 RX ADMIN — POLYETHYLENE GLYCOL 3350 17 GRAM(S): 17 POWDER, FOR SOLUTION ORAL at 17:30

## 2019-11-21 RX ADMIN — Medication 20 MILLIGRAM(S): at 17:29

## 2019-11-21 RX ADMIN — MIDODRINE HYDROCHLORIDE 10 MILLIGRAM(S): 2.5 TABLET ORAL at 22:51

## 2019-11-21 RX ADMIN — Medication 250 MILLIGRAM(S): at 17:31

## 2019-11-21 RX ADMIN — MIDODRINE HYDROCHLORIDE 10 MILLIGRAM(S): 2.5 TABLET ORAL at 14:05

## 2019-11-21 RX ADMIN — TAMSULOSIN HYDROCHLORIDE 0.8 MILLIGRAM(S): 0.4 CAPSULE ORAL at 21:52

## 2019-11-21 RX ADMIN — PANTOPRAZOLE SODIUM 40 MILLIGRAM(S): 20 TABLET, DELAYED RELEASE ORAL at 06:05

## 2019-11-21 RX ADMIN — Medication 12.5 MILLIGRAM(S): at 06:00

## 2019-11-21 RX ADMIN — SENNA PLUS 1 TABLET(S): 8.6 TABLET ORAL at 17:31

## 2019-11-21 RX ADMIN — Medication 600 MILLIGRAM(S): at 06:00

## 2019-11-21 RX ADMIN — SENNA PLUS 1 TABLET(S): 8.6 TABLET ORAL at 06:00

## 2019-11-21 RX ADMIN — MEROPENEM 100 MILLIGRAM(S): 1 INJECTION INTRAVENOUS at 17:29

## 2019-11-21 RX ADMIN — BENZOCAINE AND MENTHOL 1 LOZENGE: 5; 1 LIQUID ORAL at 21:55

## 2019-11-21 RX ADMIN — Medication 100 MILLIGRAM(S): at 06:00

## 2019-11-21 RX ADMIN — BENZOCAINE AND MENTHOL 1 LOZENGE: 5; 1 LIQUID ORAL at 06:00

## 2019-11-21 RX ADMIN — POLYETHYLENE GLYCOL 3350 17 GRAM(S): 17 POWDER, FOR SOLUTION ORAL at 06:03

## 2019-11-21 RX ADMIN — Medication 600 MILLIGRAM(S): at 17:30

## 2019-11-21 RX ADMIN — Medication 40 MILLIGRAM(S): at 21:52

## 2019-11-21 RX ADMIN — Medication 100 MILLIGRAM(S): at 17:30

## 2019-11-21 NOTE — PROGRESS NOTE ADULT - ASSESSMENT
Subjective Complaints:  Historian:             Vital Signs Last 24 Hrs  T(C): 36.8 (21 Nov 2019 17:08), Max: 36.9 (21 Nov 2019 05:34)  T(F): 98.3 (21 Nov 2019 17:08), Max: 98.5 (21 Nov 2019 05:34)  HR: 75 (21 Nov 2019 21:50) (75 - 96)  BP: 141/93 (21 Nov 2019 21:50) (123/79 - 141/93)  BP(mean): --  RR: 17 (21 Nov 2019 17:08) (16 - 20)  SpO2: 96% (21 Nov 2019 17:08) (92% - 100%)    GENERAL PHYSICAL EXAM:  General:  Appears stated age, well-groomed, well-nourished, no distress  HEENT:  NC/AT, patent nares w/ pink mucosa, OP clear w/o lesions, PERRL, EOMI, conjunctivae clear, no thyromegaly, nodules, adenopathy, no JVD  Chest:  Full & symmetric excursion, no increased effort, breath sounds clear  Cardiovascular:  Regular rhythm, S1, S2, no murmur/rub/S3/S4, no carotid/femoral/abdominal bruit, radial/pedal pulses 2+, no edema  Abdomen:  Soft, non-tender, non-distended, normoactive bowel sounds, no HSM  Extremities:  Gait & station:   Digits:   Nails:   Joints, Bones, Muscles:   ROM:   Stability:  Skin:  No rash/erythema/ecchymoses/petechiae/wounds/abscess/warm/dry  Musculoskeletal:  Full ROM in all joints w/o swelling/tenderness/effusion        LABS:                        9.0    3.62  )-----------( 19       ( 20 Nov 2019 07:02 )             29.5     11-20    137  |  106  |  11  ----------------------------<  101<H>  4.1   |  24  |  1.38<H>    Ca    7.7<L>      20 Nov 2019 07:02  Phos  2.3     11-20  Mg     1.9     11-20            RADIOLOGY & ADDITIONAL STUDIES:        Neurology Progress Note:      Mental Status: awake  alert spech fluent   follows commands       Cranial Nerves:  intact  Motor:   arm 3/5 leg swollen weaknemss         Sensory:intact      Cerebellar: deefrd       Gait: unsteady       Assesment/Plan:  liver ca, r leg swollen discus  with  family  for sub acute rehab  no kaitlin strong

## 2019-11-21 NOTE — GOALS OF CARE CONVERSATION - ADVANCED CARE PLANNING - CONVERSATION DETAILS
Contacted pt's son Ruben at [phone]    I discussed advanced directives with the patient / family - made aware of limited prognosis if patient were to be intubated or resuscitated, in consideration of age and comorbid conditions.  They agreed with DNR / DNI.    I spent a total of approximately 25 minutes discussing advanced directives with pt and family.  We also discussed his advanced illness and comorbid conditions as above.  They acknowledge that he has been declining, and they wish to respect his wishes of comfort measures and to return home.  They are aware of his possible deterioration / death, and state pt's wife had experienced similar issues and wanted to "die at home".

## 2019-11-21 NOTE — PROGRESS NOTE ADULT - ASSESSMENT
78 y/o M w/metastatic cancer of unknown primary, multiple CVAs, recently admitted for GI bleed s/p hepatic artery embolization now admitted for hypotension likely secondary to severe sepsis with septic shock likely due to UTI, complicated by b/l pleural effusions and pericardial effusion, as well as episode of rapid Afib.  Symptoms now controlled.  Pt and family wish for conservative measures and d/c home with hospice.        # Metastatic Adenocarcinoma, ? Hepatocellular carcinoma ascites. - pt and family requesting transition to comfort care.  Awaiting arrangements for home hospice.  I discussed advanced directives with the patient / family - made aware of limited prognosis if patient were to be intubated or resuscitated, in consideration of age and comorbid conditions.  They agreed with DNR / DNI.  # Ascites, B/l Pleural Effusions / pericardial effusion - due to above.  Albumin noted ~1.6.  Start on diuretics.  Conservative management.  # s/p Sepsis due to UTI - Continue IV Abx x 5 days, to complete tonight.  # Rapid Afib - Rate controlled.  Pt opts against anticoagulation as he wishes to be on comfort care.  He is aware of the risks, benefits and alternatives.  > DVT Prophylaxis - Lower extremity intermittent compression devices.    I spent a total of approximately 25 minutes discussing advanced directives with pt and family.  We also discussed his advanced illness and comorbid conditions as above.  They acknowledge that he has been declining, and they wish to respect his wishes of comfort measures and to return home.  They are aware of his possible deterioration / death, and state pt's wife had experienced similar issues and wanted to "die at home".    Plan for d/c with [facility] in am.

## 2019-11-21 NOTE — PROGRESS NOTE ADULT - SUBJECTIVE AND OBJECTIVE BOX
Patient: SHER LILLY [id number] 79y Male                           Internal Medicine Hospitalist Progress Note    Interval History:  Pt denies complaints.  No new SOB.  Taking po intake.  Requesting eventual d/c home.    ____________________PHYSICAL EXAM:  Vitals reviewed as indicated below  GENERAL:  NAD Alert and Oriented x 3   HEENT: NCAT  CARDIOVASCULAR:  S1, S2  LUNGS: coarse BS b/l.  Decreased BS at bases.    ABDOMEN:  soft, (-) tenderness, (+) distension / ascites, (+) bowel sounds, (-) guarding, (-) rebound (-) rigidity  EXTREMITIES:  no cyanosis / clubbing.  + edema. + scrotal edema.   ____________________      BACKGROUND:  HEALTH ISSUES - PROBLEM Dx:  Hospice care patient: Hospice care patient  Hypotension, unspecified hypotension type: Hypotension, unspecified hypotension type  Cancer, metastatic to bone: Cancer, metastatic to bone  Suspected deep vein thrombosis (DVT)        Allergies    No Known Allergies    Intolerances      PAST MEDICAL & SURGICAL HISTORY:  Anemia  Cancer, metastatic to bone: on chemo  Gastric ulcer: was hospitalized for GI bleed 7/22/19-7/25/19, taking Protonix  HTN (hypertension)  Liver cancer: stage 4  Prostate cancer: 2004 radiotherapy seeds, now metastatic to liver and bone  Ischemic stroke: 01/2019, no residuals  HLD (hyperlipidemia)  DM (diabetes mellitus): type 2, HgA1c 5.8% ( 6/5/19)  History of eye surgery: right  History of right knee surgery: R Tibia Intralesional curettage/bone graft  7/11/2019  H/O hernia repair: right inguinal hernia with mesh  9/2013        VITALS:  Vital Signs Last 24 Hrs  T(C): 36.9 (21 Nov 2019 12:17), Max: 37 (20 Nov 2019 16:40)  T(F): 98.5 (21 Nov 2019 12:17), Max: 98.6 (20 Nov 2019 16:40)  HR: 90 (21 Nov 2019 14:03) (83 - 96)  BP: 128/- (21 Nov 2019 14:03) (123/79 - 141/82)  BP(mean): --  RR: 18 (21 Nov 2019 14:03) (16 - 20)  SpO2: 99% (21 Nov 2019 14:03) (92% - 100%) Daily     Daily   CAPILLARY BLOOD GLUCOSE        I&O's Summary    20 Nov 2019 07:01  -  21 Nov 2019 07:00  --------------------------------------------------------  IN: 354 mL / OUT: 0 mL / NET: 354 mL          LABS:                        9.0    3.62  )-----------( 19       ( 20 Nov 2019 07:02 )             29.5     11-20    137  |  106  |  11  ----------------------------<  101<H>  4.1   |  24  |  1.38<H>    Ca    7.7<L>      20 Nov 2019 07:02  Phos  2.3     11-20  Mg     1.9     11-20                    MEDICATIONS:  MEDICATIONS  (STANDING):  benzocaine 15 mG/menthol 3.6 mG Lozenge 1 Lozenge Oral three times a day  carBAMazepine 100 milliGRAM(s) Oral two times a day  chlorhexidine 4% Liquid 1 Application(s) Topical <User Schedule>  guaiFENesin  milliGRAM(s) Oral every 12 hours  meropenem  IVPB 1000 milliGRAM(s) IV Intermittent every 12 hours  metoprolol tartrate 12.5 milliGRAM(s) Oral two times a day  midodrine 10 milliGRAM(s) Oral every 8 hours  pantoprazole    Tablet 40 milliGRAM(s) Oral before breakfast  polyethylene glycol 3350 17 Gram(s) Oral two times a day  senna 1 Tablet(s) Oral two times a day  tamsulosin 0.8 milliGRAM(s) Oral at bedtime  vancomycin  IVPB 1000 milliGRAM(s) IV Intermittent every 24 hours    MEDICATIONS  (PRN):  metoprolol tartrate Injectable 2.5 milliGRAM(s) IV Push every 6 hours PRN HR greater than 130 and MAP greater than 80

## 2019-11-21 NOTE — GOALS OF CARE CONVERSATION - ADVANCED CARE PLANNING - CONVERSATION DETAILS
TC to Maria Victoria patient's daughter and scheduled appointment to meet with her at 3pm. Called her after 3pm when she was not at bedside. As per Maria Victoria she is unable to make it today. Informed her that writer will leave consents at bedside. Verbalized good understanding. MSW informed.

## 2019-11-22 ENCOUNTER — TRANSCRIPTION ENCOUNTER (OUTPATIENT)
Age: 79
End: 2019-11-22

## 2019-11-22 VITALS
DIASTOLIC BLOOD PRESSURE: 94 MMHG | TEMPERATURE: 98 F | SYSTOLIC BLOOD PRESSURE: 142 MMHG | HEART RATE: 76 BPM | RESPIRATION RATE: 17 BRPM | OXYGEN SATURATION: 100 %

## 2019-11-22 DIAGNOSIS — R18.0 MALIGNANT ASCITES: ICD-10-CM

## 2019-11-22 LAB
ANION GAP SERPL CALC-SCNC: 6 MMOL/L — SIGNIFICANT CHANGE UP (ref 5–17)
BUN SERPL-MCNC: 13 MG/DL — SIGNIFICANT CHANGE UP (ref 7–23)
CALCIUM SERPL-MCNC: 7.8 MG/DL — LOW (ref 8.5–10.1)
CHLORIDE SERPL-SCNC: 108 MMOL/L — SIGNIFICANT CHANGE UP (ref 96–108)
CO2 SERPL-SCNC: 25 MMOL/L — SIGNIFICANT CHANGE UP (ref 22–31)
CREAT SERPL-MCNC: 1.57 MG/DL — HIGH (ref 0.5–1.3)
GLUCOSE SERPL-MCNC: 98 MG/DL — SIGNIFICANT CHANGE UP (ref 70–99)
HCT VFR BLD CALC: 29 % — LOW (ref 39–50)
HGB BLD-MCNC: 9 G/DL — LOW (ref 13–17)
MCHC RBC-ENTMCNC: 30.1 PG — SIGNIFICANT CHANGE UP (ref 27–34)
MCHC RBC-ENTMCNC: 31 GM/DL — LOW (ref 32–36)
MCV RBC AUTO: 97 FL — SIGNIFICANT CHANGE UP (ref 80–100)
NRBC # BLD: 0 /100 WBCS — SIGNIFICANT CHANGE UP (ref 0–0)
PLATELET # BLD AUTO: 19 K/UL — CRITICAL LOW (ref 150–400)
POTASSIUM SERPL-MCNC: 3.8 MMOL/L — SIGNIFICANT CHANGE UP (ref 3.5–5.3)
POTASSIUM SERPL-SCNC: 3.8 MMOL/L — SIGNIFICANT CHANGE UP (ref 3.5–5.3)
RBC # BLD: 2.99 M/UL — LOW (ref 4.2–5.8)
RBC # FLD: 22.5 % — HIGH (ref 10.3–14.5)
SODIUM SERPL-SCNC: 139 MMOL/L — SIGNIFICANT CHANGE UP (ref 135–145)
WBC # BLD: 3.57 K/UL — LOW (ref 3.8–10.5)
WBC # FLD AUTO: 3.57 K/UL — LOW (ref 3.8–10.5)

## 2019-11-22 PROCEDURE — 99221 1ST HOSP IP/OBS SF/LOW 40: CPT

## 2019-11-22 PROCEDURE — 99239 HOSP IP/OBS DSCHRG MGMT >30: CPT

## 2019-11-22 RX ORDER — ATORVASTATIN CALCIUM 80 MG/1
1 TABLET, FILM COATED ORAL
Qty: 0 | Refills: 0 | DISCHARGE

## 2019-11-22 RX ORDER — FLUTICASONE PROPIONATE AND SALMETEROL 50; 250 UG/1; UG/1
1 POWDER ORAL; RESPIRATORY (INHALATION)
Qty: 0 | Refills: 0 | DISCHARGE

## 2019-11-22 RX ORDER — CARBAMAZEPINE 200 MG
1 TABLET ORAL
Qty: 60 | Refills: 0
Start: 2019-11-22 | End: 2019-12-21

## 2019-11-22 RX ORDER — TAMSULOSIN HYDROCHLORIDE 0.4 MG/1
2 CAPSULE ORAL
Qty: 60 | Refills: 0
Start: 2019-11-22

## 2019-11-22 RX ORDER — ONDANSETRON 8 MG/1
1 TABLET, FILM COATED ORAL
Qty: 0 | Refills: 0 | DISCHARGE

## 2019-11-22 RX ORDER — FUROSEMIDE 40 MG
1 TABLET ORAL
Qty: 30 | Refills: 0
Start: 2019-11-22

## 2019-11-22 RX ORDER — NIVOLUMAB 10 MG/ML
0 INJECTION INTRAVENOUS
Qty: 0 | Refills: 0 | DISCHARGE

## 2019-11-22 RX ORDER — RAMUCIRUMAB 10 MG/ML
512 SOLUTION INTRAVENOUS
Qty: 0 | Refills: 0 | DISCHARGE

## 2019-11-22 RX ORDER — METFORMIN HYDROCHLORIDE 850 MG/1
1 TABLET ORAL
Qty: 0 | Refills: 0 | DISCHARGE

## 2019-11-22 RX ORDER — TRAMADOL HYDROCHLORIDE 50 MG/1
1 TABLET ORAL
Qty: 0 | Refills: 0 | DISCHARGE

## 2019-11-22 RX ORDER — PANTOPRAZOLE SODIUM 20 MG/1
1 TABLET, DELAYED RELEASE ORAL
Qty: 30 | Refills: 0
Start: 2019-11-22 | End: 2019-12-21

## 2019-11-22 RX ORDER — METOPROLOL TARTRATE 50 MG
12.5 TABLET ORAL
Qty: 0 | Refills: 0 | DISCHARGE
Start: 2019-11-22

## 2019-11-22 RX ORDER — TERAZOSIN HYDROCHLORIDE 10 MG/1
1 CAPSULE ORAL
Qty: 0 | Refills: 0 | DISCHARGE

## 2019-11-22 RX ORDER — PANTOPRAZOLE SODIUM 20 MG/1
1 TABLET, DELAYED RELEASE ORAL
Qty: 0 | Refills: 0 | DISCHARGE

## 2019-11-22 RX ORDER — BUDESONIDE AND FORMOTEROL FUMARATE DIHYDRATE 160; 4.5 UG/1; UG/1
2 AEROSOL RESPIRATORY (INHALATION)
Qty: 0 | Refills: 0 | DISCHARGE

## 2019-11-22 RX ORDER — BEVACIZUMAB 400 MG/16ML
0 INJECTION, SOLUTION INTRAVENOUS
Qty: 0 | Refills: 0 | DISCHARGE

## 2019-11-22 RX ORDER — ERGOCALCIFEROL 1.25 MG/1
1 CAPSULE ORAL
Qty: 0 | Refills: 0 | DISCHARGE

## 2019-11-22 RX ORDER — METOPROLOL TARTRATE 50 MG
0.5 TABLET ORAL
Qty: 30 | Refills: 0
Start: 2019-11-22

## 2019-11-22 RX ORDER — SORAFENIB 200 MG/1
2 TABLET, FILM COATED ORAL
Qty: 0 | Refills: 0 | DISCHARGE

## 2019-11-22 RX ADMIN — Medication 12.5 MILLIGRAM(S): at 18:15

## 2019-11-22 RX ADMIN — Medication 20 MILLIGRAM(S): at 05:08

## 2019-11-22 RX ADMIN — MEROPENEM 100 MILLIGRAM(S): 1 INJECTION INTRAVENOUS at 05:08

## 2019-11-22 RX ADMIN — SENNA PLUS 1 TABLET(S): 8.6 TABLET ORAL at 05:08

## 2019-11-22 RX ADMIN — CHLORHEXIDINE GLUCONATE 1 APPLICATION(S): 213 SOLUTION TOPICAL at 08:38

## 2019-11-22 RX ADMIN — Medication 12.5 MILLIGRAM(S): at 05:07

## 2019-11-22 RX ADMIN — MIDODRINE HYDROCHLORIDE 10 MILLIGRAM(S): 2.5 TABLET ORAL at 13:49

## 2019-11-22 RX ADMIN — MIDODRINE HYDROCHLORIDE 10 MILLIGRAM(S): 2.5 TABLET ORAL at 05:08

## 2019-11-22 RX ADMIN — Medication 100 MILLIGRAM(S): at 18:15

## 2019-11-22 RX ADMIN — Medication 600 MILLIGRAM(S): at 05:08

## 2019-11-22 RX ADMIN — Medication 100 MILLIGRAM(S): at 05:08

## 2019-11-22 RX ADMIN — PANTOPRAZOLE SODIUM 40 MILLIGRAM(S): 20 TABLET, DELAYED RELEASE ORAL at 09:19

## 2019-11-22 RX ADMIN — BENZOCAINE AND MENTHOL 1 LOZENGE: 5; 1 LIQUID ORAL at 13:49

## 2019-11-22 RX ADMIN — POLYETHYLENE GLYCOL 3350 17 GRAM(S): 17 POWDER, FOR SOLUTION ORAL at 05:08

## 2019-11-22 NOTE — CONSULT NOTE ADULT - PROBLEM SELECTOR RECOMMENDATION 9
-procedure cancelled, due to patients refusal and a paracentesis will not help the subcutaneous edema  -Will d/w Daughter Maria Victoria

## 2019-11-22 NOTE — CONSULT NOTE ADULT - SUBJECTIVE AND OBJECTIVE BOX
Patient is a 79y old  Male who presents with a chief complaint of hypotension (21 Nov 2019 21:55)    IR consulted for paracentesis for ascites 2/2 metastatic prostate cancer with small amount of ascites as per US.  Pt very edematous.  Platelets are 19.  As per d/w patient who is A&O x 3, he does not want to have transfusion and procedure, he states he is comfortable.  However patients daughter would like the procedure performed because she feels it will help the edema      HPI:  Patient is a 79-year-old male with a history of HTN, DM II, HLD, CKD III, multiple CVA without residual deficits, including right parietal stroke on  Lovenox c/b secondary hemorrhage s/p MCA clot retrieval at [facility] on 3/19, metastatic prostate cancer, metastatic adenocarcinoma of unclear etiology, with metastases to liver and bone s/p recent R Tibia Intralesional curettage/bone graft ( 7/11/19), patient was hospitalized @ [facility]  (7/22/19-7/25/19 ) due to bleeding Gastric ulcer, Lovenox on hold, s/p hepatic artery embolism using femoral site as an access site.   Last night 9 :10 - walked to the bathroom, then went to sleep, when patient awoke, patient's wife noted that right side of patient's face and right hand were different, around 9:50. Patient was not answering any questions at that time. EMS reports that patient was found to have right sided facial droop and dysarthria, right upper extremity weakness, found to be hypotensive 60/40s. Upon arrival to ED, patient was found to have BP 83/51.  Patient was at [facility] 11/4-11/11 for surgical site leakage after hepatic artery embolism, patient had platelet transfusion around 2PM today for PL of 19 and was discharged to home.    While in Ed received 2L of fluids, BP start coming up to the 90's, then started on levophed drip. Patient became more alert, and speech came back as well. (12 Nov 2019 01:45)        PAST MEDICAL & SURGICAL HISTORY:  Anemia  Cancer, metastatic to bone: on chemo  Gastric ulcer: was hospitalized for GI bleed 7/22/19-7/25/19, taking Protonix  HTN (hypertension)  Liver cancer: stage 4  Prostate cancer: 2004 radiotherapy seeds, now metastatic to liver and bone  Ischemic stroke: 01/2019, no residuals  HLD (hyperlipidemia)  DM (diabetes mellitus): type 2, HgA1c 5.8% ( 6/5/19)  History of eye surgery: right  History of right knee surgery: R Tibia Intralesional curettage/bone graft  7/11/2019  H/O hernia repair: right inguinal hernia with mesh  9/2013      Allergies    No Known Allergies    Intolerances        MEDICATIONS  (STANDING):  benzocaine 15 mG/menthol 3.6 mG Lozenge 1 Lozenge Oral three times a day  carBAMazepine 100 milliGRAM(s) Oral two times a day  chlorhexidine 4% Liquid 1 Application(s) Topical <User Schedule>  furosemide    Tablet 20 milliGRAM(s) Oral daily  metoprolol tartrate 12.5 milliGRAM(s) Oral two times a day  midodrine 10 milliGRAM(s) Oral every 8 hours  pantoprazole    Tablet 40 milliGRAM(s) Oral before breakfast  polyethylene glycol 3350 17 Gram(s) Oral two times a day  senna 1 Tablet(s) Oral two times a day  tamsulosin 0.8 milliGRAM(s) Oral at bedtime    MEDICATIONS  (PRN):  metoprolol tartrate Injectable 2.5 milliGRAM(s) IV Push every 6 hours PRN HR greater than 130 and MAP greater than 80        SOCIAL HISTORY:    FAMILY HISTORY:  FH: HTN (hypertension)  FH: diabetes mellitus        PHYSICAL EXAM:    Vital Signs Last 24 Hrs  T(C): 36.4 (22 Nov 2019 05:31), Max: 36.9 (21 Nov 2019 12:17)  T(F): 97.6 (22 Nov 2019 05:31), Max: 98.5 (21 Nov 2019 12:17)  HR: 87 (22 Nov 2019 05:31) (75 - 96)  BP: 126/80 (22 Nov 2019 05:31) (115/78 - 141/93)  BP(mean): --  RR: 20 (22 Nov 2019 05:31) (16 - 20)  SpO2: 100% (22 Nov 2019 05:31) (96% - 100%)    General:  comfortable, tired  Lungs:  CTAB  Cardiovascular:  good S1, S2,   Abdomen:  Soft, non-tender, non-distended,   Extremities:  no calf tenderness/swelling b/l  Neuro/Psych:  A &O x 3    LABS:                        9.0    3.57  )-----------( 19       ( 22 Nov 2019 09:36 )             29.0     11-22    139  |  108  |  13  ----------------------------<  98  3.8   |  25  |  1.57<H>    Ca    7.8<L>      22 Nov 2019 09:36            RADIOLOGY & ADDITIONAL STUDIES:

## 2019-11-22 NOTE — CHART NOTE - NSCHARTNOTEFT_GEN_A_CORE
Pt admitted c sepsis c septic shock most likely due to UTI; complicated by pleural effusions. Pt c Ca  of unknown origin metastized to bone; family requests conservative management;   hospice referral pending for home hospice care    Factors impacting intake: [ ] none [ ] nausea  [ ] vomiting [ ] diarrhea [ ] constipation  [ ]chewing problems [ ] swallowing issues  [X ] other: decreased appetite, pain, inability to self-feed    Diet Prescription: Diet, Dysphagia 1 Pureed-Honey Consistency Fluid:   Supplement Feeding Modality:  Oral  Ensure Pudding Cans or Servings Per Day:  1       Frequency:  Three Times a day (11-13-19 @ 13:35)    Intake: 25 - 50% most meals    Current Weight:   65.2 kg (11/22); admission wt 67.5 kg (11/12)  % Weight Change: 3% loss x 10 days    Nutrition-Focused Physical Exam not appropriate at this time    Physical Appearance:  1+ left leg & knee; 3+ right leg, ankle, foot    Pertinent Medications: MEDICATIONS  (STANDING):  benzocaine 15 mG/menthol 3.6 mG Lozenge 1 Lozenge Oral three times a day  carBAMazepine 100 milliGRAM(s) Oral two times a day  chlorhexidine 4% Liquid 1 Application(s) Topical <User Schedule>  furosemide    Tablet 20 milliGRAM(s) Oral daily  metoprolol tartrate 12.5 milliGRAM(s) Oral two times a day  midodrine 10 milliGRAM(s) Oral every 8 hours  pantoprazole    Tablet 40 milliGRAM(s) Oral before breakfast  polyethylene glycol 3350 17 Gram(s) Oral two times a day  senna 1 Tablet(s) Oral two times a day  tamsulosin 0.8 milliGRAM(s) Oral at bedtime    MEDICATIONS  (PRN):  metoprolol tartrate Injectable 2.5 milliGRAM(s) IV Push every 6 hours PRN HR greater than 130 and MAP greater than 80    Pertinent Labs:  11-22 Na139 mmol/L Glu 98 mg/dL K+ 3.8 mmol/L Cr  1.57 mg/dL<H> BUN 13 mg/dL 11-20 Phos 2.3 mg/dL<L> 11-18 Alb 2.0 g/dL<L> 11-12 [id number] 5.3 % 11-12 Chol 169 mg/dL  mg/dL<H> HDL 50 mg/dL Trig 83 mg/dL    Skin:  Intact    Estimated Needs:   [X ] no change since previous assessment  (11/13)  [ ] recalculated:     Previous Nutrition Diagnosis:    [x ] Severe Malnutrition - chronic  Etiology:  Inadequate energy/protein intake + increased energy/protein needs related to metastasized Ca  Signs & Symptoms:  severe fat loss & muscle wasting as noted on 11/13 nutrition assessment    Previous GOAL:   Pt to consume > 75% meals/supplements - not met  NEW GOAL:  Provide nutrition/hydration within family/pt wishes for tx    Nutrition Diagnosis is [X ] ongoing  [ ] resolved [ ] not applicable     New Nutrition Diagnosis: [X ] not applicable    Interventions:   continue current diet rx as noted  Recommend  [ ] Change Diet To:  [ ] Nutrition Supplement  [ ] Nutrition Support  [ ] Other:     Monitoring and Evaluation:   [X ] PO intake [ x ] Tolerance to diet prescription [ x ] weights [ x ] labs[ x ] follow up per protocol  [ ] other:

## 2019-11-22 NOTE — GOALS OF CARE CONVERSATION - ADVANCED CARE PLANNING - CONVERSATION DETAILS
Met with pt and pt's dtr at the bedside. Pt expresses that he would like to go home. Dtr states that they are awaiting to speak with the attending about potential paracentesis and platelet infusions. Hospice consents are signed. DME 02 was delivered yesterday. Spoke with MARYANN Lopez and she will keep me informed if the pt is being d/c home today. Will cont to f/u,       Latrice Schofield RN

## 2019-11-22 NOTE — PROGRESS NOTE ADULT - SUBJECTIVE AND OBJECTIVE BOX
Patient: SHER LILLY [id number] 79y Male                           Internal Medicine Hospitalist Progress Note    Interval History:  Daughter Maria Victoria at bedside.  Pt without complaints.  No Abdominal pain, nausea, vomiting, diarrhea, nor constipation. No SOB, on O2.  Reports adequate po intake.  Wishes d/c home.      ____________________PHYSICAL EXAM:  Vitals reviewed as indicated below  GENERAL:  NAD Alert and Oriented x 3   HEENT: NCAT  CARDIOVASCULAR:  S1, S2  LUNGS: coarse BS b/l.  Decreased BS at bases.    ABDOMEN:  soft, (-) tenderness, (+) mild distension / ascites, (+) bowel sounds, (-) guarding, (-) rebound (-) rigidity  EXTREMITIES:  no cyanosis / clubbing.  + edema. + scrotal edema.   ____________________    VITALS:  Vital Signs Last 24 Hrs  T(C): 36.1 (2019 11:11), Max: 36.8 (2019 17:08)  T(F): 96.9 (2019 11:11), Max: 98.3 (2019 17:08)  HR: 79 (2019 11:11) (75 - 90)  BP: 118/80 (2019 11:11) (115/78 - 141/93)  BP(mean): --  RR: 17 (2019 11:11) (17 - 20)  SpO2: 100% (2019 11:11) (96% - 100%) Daily     Daily Weight in k.2 (2019 06:00)  CAPILLARY BLOOD GLUCOSE        I&O's Summary    2019 07:  -  2019 07:00  --------------------------------------------------------  IN: 526 mL / OUT: 0 mL / NET: 526 mL    2019 07:  -  2019 12:54  --------------------------------------------------------  IN: 80 mL / OUT: 0 mL / NET: 80 mL        LABS:                        9.0    3.57  )-----------( 19       ( 2019 09:36 )             29.0         139  |  108  |  13  ----------------------------<  98  3.8   |  25  |  1.57<H>    Ca    7.8<L>      2019 09:36                    MEDICATIONS:  benzocaine 15 mG/menthol 3.6 mG Lozenge 1 Lozenge Oral three times a day  carBAMazepine 100 milliGRAM(s) Oral two times a day  chlorhexidine 4% Liquid 1 Application(s) Topical <User Schedule>  furosemide    Tablet 20 milliGRAM(s) Oral daily  metoprolol tartrate 12.5 milliGRAM(s) Oral two times a day  metoprolol tartrate Injectable 2.5 milliGRAM(s) IV Push every 6 hours PRN  midodrine 10 milliGRAM(s) Oral every 8 hours  pantoprazole    Tablet 40 milliGRAM(s) Oral before breakfast  polyethylene glycol 3350 17 Gram(s) Oral two times a day  senna 1 Tablet(s) Oral two times a day  tamsulosin 0.8 milliGRAM(s) Oral at bedtime

## 2019-11-22 NOTE — DISCHARGE NOTE PROVIDER - NSDCFUADDINST_GEN_ALL_CORE_FT
It is important to see your primary physician as well as the physicians noted below within the next week to perform a comprehensive medical review.  Call their offices for an appointment as soon as you leave the hospital.  If you do not have a primary physician, contact the [facility] [place] [phone] located on [address] [place] [address].  Your medical issues appear to be stable at this time, but if your symptoms recur or worsen, contact your physicians and/or return to the hospital if necessary.  If you encounter any issues or questions with your medication, call your physicians before stopping the medication.  Do not drive.  Limit your diet to 2 grams of sodium daily.

## 2019-11-22 NOTE — CONSULT NOTE ADULT - ASSESSMENT
IR consulted for paracentesis for ascites 2/2 metastatic prostate cancer with small amount of ascites as per US.    Pt very edematous.  Platelets are 19.  As per d/w patient who is A&O x 3, he does not want to have transfusion and procedure, he states he is comfortable.  However patients daughter would like the procedure performed because she feels it will help the edema

## 2019-11-22 NOTE — DISCHARGE NOTE PROVIDER - CARE PROVIDERS DIRECT ADDRESSES
,[email or web address]

## 2019-11-22 NOTE — CONSULT NOTE ADULT - ATTENDING COMMENTS
Review of imaging shows a small volume of ascites, however a significant amount of subcutaneous edema, which appears to be the daughters main concern. The patient is reportedly comfortable and would rather not pursue invasive treatment. No role for paracentesis in this scenario.

## 2019-11-22 NOTE — PROGRESS NOTE ADULT - SUBJECTIVE AND OBJECTIVE BOX
Patient seen and examined bedside resting comfortably.  No complaints offered.   Voiding without difficulty.  Denies hematuria and dysuria.  Denies nausea and vomiting. Tolerating diet.  Denies chest pain, dyspnea, cough. NO scrotal Pain    T(F): 96.9 (11-22-19 @ 11:11), Max: 98.3 (11-21-19 @ 17:08)  HR: 79 (11-22-19 @ 11:11) (75 - 90)  BP: 118/80 (11-22-19 @ 11:11) (115/78 - 141/93)  RR: 17 (11-22-19 @ 11:11) (17 - 20)  SpO2: 100% (11-22-19 @ 11:11) (96% - 100%)  Wt(kg): --  CAPILLARY BLOOD GLUCOSE          PHYSICAL EXAM:  General: NAD, alert and awake  HEENT: NCAT, EOMI, conjunctiva clear  Chest: nonlabored respirations, good inspiratory effort  Abdomen: soft, NTND.   Extremities: no pedal edema or calf tenderness noted   : No CVA or SP tenderness.     LABS:                        9.0    3.57  )-----------( 19       ( 22 Nov 2019 09:36 )             29.0   11-22    139  |  108  |  13  ----------------------------<  98  3.8   |  25  |  1.57<H>    Ca    7.8<L>      22 Nov 2019 09:36      I&O's Detail    21 Nov 2019 07:01  -  22 Nov 2019 07:00  --------------------------------------------------------  IN:    Oral Fluid: 476 mL    Solution: 50 mL  Total IN: 526 mL    OUT:  Total OUT: 0 mL    Total NET: 526 mL      22 Nov 2019 07:01  -  22 Nov 2019 12:30  --------------------------------------------------------  IN:    Oral Fluid: 80 mL  Total IN: 80 mL    OUT:  Total OUT: 0 mL    Total NET: 80 mL          Impression: 79y Male with Genital edema secondary to generalized edema  PMH   Anemia  Cancer, metastatic to bone  Gastric ulcer  AICD (automatic cardioverter/defibrillator) present  HLD (hyperlipidemia)  HTN (hypertension)  Liver cancer  Prostate cancer  Ischemic stroke  HLD (hyperlipidemia)  HTN (hypertension)  Hernia  DM (diabetes mellitus)  Prostate CA      Plan:  Elevate scrotum.   continue therapy for dependent edema  Supportive care

## 2019-11-22 NOTE — PROGRESS NOTE ADULT - REASON FOR ADMISSION
hypotension

## 2019-11-22 NOTE — PROGRESS NOTE ADULT - ASSESSMENT
78 y/o M w/metastatic cancer of unknown primary, multiple CVAs, recently admitted for GI bleed s/p hepatic artery embolization now admitted for hypotension likely secondary to severe sepsis with septic shock likely due to UTI, complicated by b/l pleural effusions and pericardial effusion, as well as episode of rapid Afib.  Symptoms now controlled.  Pt and family wish for conservative measures and d/c home with hospice.        # Metastatic Adenocarcinoma, suspected Hepatocellular carcinoma with ascites. - pt and family requesting transition to comfort care.  Stable for d/c home on Hospice.  Pt is aware of probable deterioration and would be receptive to inpatient hospice if/ when that develops.  DNR / DNI.  # Ascites, B/l Pleural Effusions / pericardial effusion - due to above.  Albumin noted ~1.6.  Started on diuretics.  Conservative management.  No indication for paracentesis at present, reviewed with IR.  Pt agrees, opting for conservative management.   # s/p Sepsis due to UTI - Completed IV abx.  no active s/s of infection.   # Rapid Afib - Rate controlled.  Pt opts against anticoagulation as he wishes to be on comfort care.  He is aware of the risks, benefits and alternatives.  > DVT Prophylaxis - Lower extremity intermittent compression devices.    Stable for d/c home on hospice. 80 y/o M w/metastatic cancer of unknown primary, multiple CVAs, recently admitted for GI bleed s/p hepatic artery embolization now admitted for hypotension likely secondary to severe sepsis with septic shock likely due to UTI, complicated by b/l pleural effusions and pericardial effusion, as well as episode of rapid Afib.  Symptoms now controlled.  Pt and family wish for conservative measures and d/c home with hospice.        # Metastatic Adenocarcinoma, suspected Hepatocellular carcinoma with ascites. - pt and family requesting transition to comfort care.  Stable for d/c home on Hospice.  Pt is aware of probable deterioration and would be receptive to inpatient hospice if/ when that develops.  DNR / DNI.  # Ascites, B/l Pleural Effusions / pericardial effusion - due to above.  Albumin noted ~1.6.  Started on diuretics.  Conservative management.  No indication for paracentesis at present, reviewed with IR.  Pt agrees, opting for conservative management.   # s/p Sepsis and Septic Shock due to UTI - Resolved.  Completed IV abx.  no active s/s of infection.   # Rapid Afib - Rate controlled.  Pt opts against anticoagulation as he wishes to be on comfort care.  He is aware of the risks, benefits and alternatives.  > DVT Prophylaxis - Lower extremity intermittent compression devices.    Stable for d/c home on hospice.

## 2019-11-22 NOTE — DISCHARGE NOTE PROVIDER - CARE PROVIDER_API CALL
Marychuy Norman)  Cardiology; Interventional Cardiology  [address], [place] [id number]  Phone: [phone]  Fax: [phone]  Follow Up Time:     Matteo Brady)  Neurology  [address] [place], [place] [id number]  Phone: [phone]  Fax: [phone]  Follow Up Time:     Vitor Mina)  Urology  [address] [facility], [address] [place] [address]  Phone: [phone]  Fax: [phone]  Follow Up Time:

## 2019-11-22 NOTE — DISCHARGE NOTE PROVIDER - PROVIDER TOKENS
PROVIDER:[TOKEN:[[id number]]],PROVIDER:[TOKEN:[[id number]]],PROVIDER:[TOKEN:[[id number]]]

## 2019-11-22 NOTE — DISCHARGE NOTE PROVIDER - NSDCMRMEDTOKEN_GEN_ALL_CORE_FT
Advair Diskus 250 mcg-50 mcg inhalation powder: 1 puff(s) inhaled , As Needed  carBAMazepine 200 mg oral tablet, extended release: 1 tab(s) orally 2 times a day  furosemide 20 mg oral tablet: 1 tab(s) orally once a day  metoprolol: 12.5 milligram(s) orally 2 times a day  pantoprazole 40 mg oral delayed release tablet: 1 tab(s) orally 2 times a day  tamsulosin 0.4 mg oral capsule: 2 cap(s) orally once a day (at bedtime)

## 2019-11-22 NOTE — DISCHARGE NOTE NURSING/CASE MANAGEMENT/SOCIAL WORK - PATIENT PORTAL LINK FT
You can access the FollowMyHealth Patient Portal offered by [facility] by registering at the following website: [email or web address]. By joining [employer]’s FollowMyHealth portal, you will also be able to view your health information using other applications (apps) compatible with our system.

## 2019-11-22 NOTE — DISCHARGE NOTE PROVIDER - HOSPITAL COURSE
80 y/o M w/metastatic cancer of unknown primary, multiple CVAs, recently admitted for GI bleed s/p hepatic artery embolization now admitted for hypotension likely secondary to severe sepsis with septic shock likely due to UTI, complicated by b/l pleural effusions and pericardial effusion, as well as episode of rapid Afib.  Symptoms now controlled.  Pt and family wish for conservative measures and d/c home with hospice.              # Metastatic Adenocarcinoma, suspected Hepatocellular carcinoma with ascites. - pt and family requesting transition to comfort care.  Stable for d/c home on Hospice.  Pt is aware of probable deterioration and would be receptive to inpatient hospice if/ when that develops.  DNR / DNI.    # Ascites, B/l Pleural Effusions / pericardial effusion - due to above.  Albumin noted ~1.6.  Started on diuretics.  Conservative management.  No indication for paracentesis at present, reviewed with IR.  Pt agrees, opting for conservative management.     # s/p Sepsis and Septic Shock due to UTI - Resolved.  Completed IV abx.  no active s/s of infection.     # Rapid Afib - Rate controlled.  Pt opts against anticoagulation as he wishes to be on comfort care.  He is aware of the risks, benefits and alternatives.    > DVT Prophylaxis - Lower extremity intermittent compression devices.        Stable for d/c home on hospice. 	        Disposition: Stable for discharge.  Outpatient followup discussed.    Total time spent on discharge is  35  minutes.

## 2019-11-25 ENCOUNTER — INBOUND DOCUMENT (OUTPATIENT)
Age: 79
End: 2019-11-25

## 2019-11-27 DIAGNOSIS — N18.3 CHRONIC KIDNEY DISEASE, STAGE 3 (MODERATE): ICD-10-CM

## 2019-11-27 DIAGNOSIS — R65.21 SEVERE SEPSIS WITH SEPTIC SHOCK: ICD-10-CM

## 2019-11-27 DIAGNOSIS — A41.9 SEPSIS, UNSPECIFIED ORGANISM: ICD-10-CM

## 2019-11-27 DIAGNOSIS — Z86.73 PERSONAL HISTORY OF TRANSIENT ISCHEMIC ATTACK (TIA), AND CEREBRAL INFARCTION WITHOUT RESIDUAL DEFICITS: ICD-10-CM

## 2019-11-27 DIAGNOSIS — C79.51 SECONDARY MALIGNANT NEOPLASM OF BONE: ICD-10-CM

## 2019-11-27 DIAGNOSIS — Z85.05 PERSONAL HISTORY OF MALIGNANT NEOPLASM OF LIVER: ICD-10-CM

## 2019-11-27 DIAGNOSIS — E43 UNSPECIFIED SEVERE PROTEIN-CALORIE MALNUTRITION: ICD-10-CM

## 2019-11-27 DIAGNOSIS — Z85.46 PERSONAL HISTORY OF MALIGNANT NEOPLASM OF PROSTATE: ICD-10-CM

## 2019-11-27 DIAGNOSIS — R18.0 MALIGNANT ASCITES: ICD-10-CM

## 2019-11-27 DIAGNOSIS — Z66 DO NOT RESUSCITATE: ICD-10-CM

## 2019-11-27 DIAGNOSIS — E11.22 TYPE 2 DIABETES MELLITUS WITH DIABETIC CHRONIC KIDNEY DISEASE: ICD-10-CM

## 2019-11-27 DIAGNOSIS — I12.9 HYPERTENSIVE CHRONIC KIDNEY DISEASE WITH STAGE 1 THROUGH STAGE 4 CHRONIC KIDNEY DISEASE, OR UNSPECIFIED CHRONIC KIDNEY DISEASE: ICD-10-CM

## 2019-11-27 DIAGNOSIS — N39.0 URINARY TRACT INFECTION, SITE NOT SPECIFIED: ICD-10-CM

## 2019-11-27 DIAGNOSIS — Z79.84 LONG TERM (CURRENT) USE OF ORAL HYPOGLYCEMIC DRUGS: ICD-10-CM

## 2019-11-27 DIAGNOSIS — D69.59 OTHER SECONDARY THROMBOCYTOPENIA: ICD-10-CM

## 2019-11-27 DIAGNOSIS — I31.3 PERICARDIAL EFFUSION (NONINFLAMMATORY): ICD-10-CM

## 2019-11-27 DIAGNOSIS — C22.0 LIVER CELL CARCINOMA: ICD-10-CM

## 2019-11-27 DIAGNOSIS — J90 PLEURAL EFFUSION, NOT ELSEWHERE CLASSIFIED: ICD-10-CM

## 2019-11-27 DIAGNOSIS — G93.41 METABOLIC ENCEPHALOPATHY: ICD-10-CM

## 2019-11-27 DIAGNOSIS — Z95.810 PRESENCE OF AUTOMATIC (IMPLANTABLE) CARDIAC DEFIBRILLATOR: ICD-10-CM

## 2019-12-07 PROCEDURE — 80156 ASSAY CARBAMAZEPINE TOTAL: CPT

## 2019-12-07 PROCEDURE — 96367 TX/PROPH/DG ADDL SEQ IV INF: CPT | Mod: XU

## 2019-12-07 PROCEDURE — C1894: CPT

## 2019-12-07 PROCEDURE — 94640 AIRWAY INHALATION TREATMENT: CPT

## 2019-12-07 PROCEDURE — 74174 CTA ABD&PLVS W/CONTRAST: CPT

## 2019-12-07 PROCEDURE — 83735 ASSAY OF MAGNESIUM: CPT

## 2019-12-07 PROCEDURE — 70450 CT HEAD/BRAIN W/O DYE: CPT

## 2019-12-07 PROCEDURE — 86923 COMPATIBILITY TEST ELECTRIC: CPT

## 2019-12-07 PROCEDURE — 86900 BLOOD TYPING SEROLOGIC ABO: CPT

## 2019-12-07 PROCEDURE — 96361 HYDRATE IV INFUSION ADD-ON: CPT | Mod: XU

## 2019-12-07 PROCEDURE — C1889: CPT

## 2019-12-07 PROCEDURE — 96365 THER/PROPH/DIAG IV INF INIT: CPT | Mod: XU

## 2019-12-07 PROCEDURE — P9016: CPT

## 2019-12-07 PROCEDURE — 87040 BLOOD CULTURE FOR BACTERIA: CPT

## 2019-12-07 PROCEDURE — 71260 CT THORAX DX C+: CPT

## 2019-12-07 PROCEDURE — 84100 ASSAY OF PHOSPHORUS: CPT

## 2019-12-07 PROCEDURE — P9035: CPT

## 2019-12-07 PROCEDURE — 86850 RBC ANTIBODY SCREEN: CPT

## 2019-12-07 PROCEDURE — 99285 EMERGENCY DEPT VISIT HI MDM: CPT | Mod: 25

## 2019-12-07 PROCEDURE — 85025 COMPLETE CBC W/AUTO DIFF WBC: CPT

## 2019-12-07 PROCEDURE — 82803 BLOOD GASES ANY COMBINATION: CPT

## 2019-12-07 PROCEDURE — 82330 ASSAY OF CALCIUM: CPT

## 2019-12-07 PROCEDURE — C1887: CPT

## 2019-12-07 PROCEDURE — 85730 THROMBOPLASTIN TIME PARTIAL: CPT

## 2019-12-07 PROCEDURE — C1769: CPT

## 2019-12-07 PROCEDURE — 83605 ASSAY OF LACTIC ACID: CPT

## 2019-12-07 PROCEDURE — 85027 COMPLETE CBC AUTOMATED: CPT

## 2019-12-07 PROCEDURE — 36430 TRANSFUSION BLD/BLD COMPNT: CPT

## 2019-12-07 PROCEDURE — 80053 COMPREHEN METABOLIC PANEL: CPT

## 2019-12-07 PROCEDURE — 87086 URINE CULTURE/COLONY COUNT: CPT

## 2019-12-07 PROCEDURE — 85610 PROTHROMBIN TIME: CPT

## 2019-12-07 PROCEDURE — 82962 GLUCOSE BLOOD TEST: CPT

## 2019-12-07 PROCEDURE — 36415 COLL VENOUS BLD VENIPUNCTURE: CPT

## 2019-12-07 PROCEDURE — 81003 URINALYSIS AUTO W/O SCOPE: CPT

## 2019-12-07 PROCEDURE — 86901 BLOOD TYPING SEROLOGIC RH(D): CPT

## 2019-12-07 PROCEDURE — 84132 ASSAY OF SERUM POTASSIUM: CPT

## 2019-12-07 PROCEDURE — 96366 THER/PROPH/DIAG IV INF ADDON: CPT | Mod: XU

## 2019-12-07 PROCEDURE — 93005 ELECTROCARDIOGRAM TRACING: CPT

## 2019-12-07 PROCEDURE — 84295 ASSAY OF SERUM SODIUM: CPT

## 2020-01-10 ENCOUNTER — APPOINTMENT (OUTPATIENT)
Dept: NEUROLOGY | Facility: CLINIC | Age: 80
End: 2020-01-10

## 2020-03-18 NOTE — DISCHARGE NOTE NURSING/CASE MANAGEMENT/SOCIAL WORK - NSDCVIVACCINE_GEN_ALL_CORE_FT
No Vaccines Administered. Island Pedicle Flap-Requiring Vessel Identification Text: The defect edges were debeveled with a #15 scalpel blade.  Given the location of the defect, shape of the defect and the proximity to free margins an island pedicle advancement flap was deemed most appropriate.  Using a sterile surgical marker, an appropriate advancement flap was drawn, based on the axial vessel mentioned above, incorporating the defect, outlining the appropriate donor tissue and placing the expected incisions within the relaxed skin tension lines where possible.    The area thus outlined was incised deep to adipose tissue with a #15 scalpel blade.  The skin margins were undermined to an appropriate distance in all directions around the primary defect and laterally outward around the island pedicle utilizing iris scissors.  There was minimal undermining beneath the pedicle flap.

## 2020-06-03 NOTE — DISCHARGE NOTE NURSING/CASE MANAGEMENT/SOCIAL WORK - NSTRANSFERBELONGINGSRESP_GEN_A_NUR
Patient Risk Identification Screen    In the past 24 hours, have you had a fever? no    Have you taken any medications that would treat a fever such as Tylenol or Motrin? no    Do you have a new cough or a cough that is worse than usual?  No    Do you have new or worsening shortness of breath? No      Household Risk Identification Screen    In the past 24 hours, has anyone in your home had a fever? no    Had anyone in your home taken any medications that would treat a fever such as Tylenol or Motrin? no     Does anyone in your home have a new cough or a cough that is worse than usual?  No    Does anyone in your home have new or worsening shortness of breath?    No       yes

## 2020-06-26 NOTE — ED PROVIDER NOTE - TEMPLATE, MLM
General PROBLEM DIAGNOSES  Problem: Osteoarthritis of right hip  Assessment and Plan: Telephone interview as per COVID protocol for right total hip replacement on 7/1/2020.  Physical exam is scheduled for COVID nasal swab on 7/1/2020; isilation reviewed.  Pre op instructions were reviewed including nasal ointment and surgical wash; best wishes offered.

## 2020-06-29 NOTE — H&P PST ADULT - OPHTHALMOLOGIC COMMENTS
ASSESSMENT AND PLAN :    Cough  - EQ ALLERGY RELIEF 10 MG tablet; Take 1 tablet by mouth once daily  Dispense: 30 tablet; Refill: 2    Environmental allergies  - EQ ALLERGY RELIEF 10 MG tablet; Take 1 tablet by mouth once daily  Dispense: 30 tablet; Refill: 2      Loratadine.      Done        Coleen Suarez, NP    
Eq allergy relief 10 mg tablet  #30- 1 refill  Last refilled- 4/23/20  LOV- 6/15/20    Is this ok to refill?  
h/o right eye surgery, dry eyes using OTC drops

## 2020-08-06 NOTE — STROKE CODE NOTE - IV ALTEPLASE ADMINISTRATION
No Island Pedicle Flap Text: The defect edges were debeveled with a #15 scalpel blade. Given the location of the defect, shape of the defect and the proximity to free margins an island pedicle advancement flap was deemed most appropriate. Using a sterile surgical marker, an appropriate advancement flap was drawn incorporating the defect, outlining the appropriate donor tissue and placing the expected incisions within the relaxed skin tension lines where possible. The area thus outlined was incised deep to adipose tissue with a #15 scalpel blade. The skin margins were undermined to an appropriate distance in all directions around the primary defect and laterally outward around the island pedicle utilizing iris scissors. There was minimal undermining beneath the pedicle flap.

## 2020-12-07 NOTE — DIETITIAN INITIAL EVALUATION ADULT. - COPY OF WEIGHT IN KG
Nurse advising that patient cancelled home care nursing services.  Just wanted MD to be made aware.   63.3

## 2021-03-23 NOTE — GOALS OF CARE CONVERSATION - ADVANCED CARE PLANNING - AGENT'S NAME
[facility] [place] Counseling: I discussed with the patient the risks of tildrakizumab including but not limited to immunosuppression, malignancy, posterior leukoencephalopathy syndrome, and serious infections.  The patient understands that monitoring is required including a PPD at baseline and must alert us or the primary physician if symptoms of infection or other concerning signs are noted.

## 2021-05-20 NOTE — DISCHARGE NOTE PROVIDER - NSDCCPCAREPLAN_GEN_ALL_CORE_FT
Cardiology Electrophysiology PRINCIPAL DISCHARGE DIAGNOSIS  Diagnosis: Metastatic adenocarcinoma  Assessment and Plan of Treatment:       SECONDARY DISCHARGE DIAGNOSES  Diagnosis: Bacterial UTI  Assessment and Plan of Treatment:     Diagnosis: Septic shock  Assessment and Plan of Treatment:     Diagnosis: Ascites, malignant  Assessment and Plan of Treatment: Ascites, malignant

## 2021-10-20 NOTE — DISCUSSION/SUMMARY
[FreeTextEntry1] : Mr. Balderrama is a 78 year old man with hx of "previous stroke" w/o residual deficits; prostate CA, and recently (Feb 2019) informed about "stage 4 Liver CA" now 1 month s/p Left MCA infarction, distal M1 s/post thrombectomy TICI 3 with full neurological recovery. His hospital course was uncomplicated and he was discharged home on ASA . We started him on FD Lovenox last visit and repeat CT scan from today showed a resolving right hematoma. I will change the Lovenox from 60 BID to 90 mg daily. Patient denies any side effects with the Lovenox.  As per patient plan for liver biopsy on 4/10 and will need to stop the Lovenox on 4/5 in preparation for biopsy. I will see him again in 3 months. All of their questions and concerns were addressed.  Followed protocol

## 2021-12-08 NOTE — H&P PST ADULT - NEGATIVE GENERAL GENITOURINARY SYMPTOMS
2nd call to patient. Patient did answer and I gave her the dates, times and instructions. Patient confirmed and verbalized understanding.
Patient is scheduled for surgery on 12/17 at 9:00AM at [facility] and for covid testing on 12/13 at 12:50PM at [facility]. Called patient and unable to leave message phone is busy.
Patients  [place], stated the patient is waiting for a call regarding scheduling her post op. Dr. Lori Silva does not have a schedule yet and patient would like a call and can be reached at [phone]. Okay to leave a message. Office was unavailable.
chronic frequency , no changes/no incontinence

## 2022-03-30 NOTE — ED PROVIDER NOTE - CRITICAL CARE INDICATION, MLM
. patient was critically ill... Patient was critically ill with a high probability of imminent or life threatening deterioration.

## 2022-04-19 NOTE — PROGRESS NOTE ADULT - ATTENDING COMMENTS
GI bleed / acute blood loss anemia secondary and found to have large gastric ulcer on EGD  IV PPI drip   monitor closely for reoccurrence of bleeding   monitor CBC   I d/w his family, son and patient  they agree   d/w Dr Tello, the GI attending General

## 2022-05-21 NOTE — PATIENT PROFILE ADULT - INFORMATION PROVIDED TO:
Impression: Retinal hemorrhage of left eye: H35.62. Plan: Hx of lung infection. Discussed diagnosis in detail with patient. No treatment is required at this time. Monitor. Call if Va worsens. caregiver/patient representative/patient/support person

## 2022-05-22 NOTE — ED ADULT NURSE NOTE - CHIEF COMPLAINT
Patient is received in bed alert x1 easily aroused from sleep. Bed to lowest position, bed alarm on zone 2.   The patient is a 78y Male complaining of code: stroke.

## 2022-06-26 NOTE — PHYSICAL THERAPY INITIAL EVALUATION ADULT - PHYSICAL ASSIST/NONPHYSICAL ASSIST: STAIR NEGOTIATION, REHAB EVAL
88 yo male with a-fib (on coumadin), lung ca s/p resection of tumor from Left lung , s/p Rt Lung cryo therapy for recurrence of tumor, PVD. Patient followed with PCP Dr. Ahmadi. Does not have cardiologist or pulmonary doc that he goes to.   Per patient he was feeling fine prior to day. This AM woke up and "just didn't feel right." States he woke up and had difficulty breathing for a few hours. States he did not call EMS and his son probably made the call. Patient is a retired non-smoker and lives alone.   +productive cough x several days. denies fever, chills, CP.     Patient denies recent fever, chills. Denies headache, dizziness or lightheadedness. Denies throat pain, sinus pain. Denies CP, + SOB today. + cough x several days. Denies palpitations. Denies abdominal pain, n/v/d/c. Denies blood in stool or black stool. Denies urinary symptoms, dysuria, hematuria. Denies joint pain.
set-up required/verbal cues/1 person assist

## 2022-06-30 NOTE — H&P ADULT - NSHPPOAPRESSUREULCER_GEN_ALL_CORE
[address]  [facility] EMERGENCY DEPT  [address] [place] [id number] [phone]    Work/School Note    Date: 6/30/2022    To Whom It May concern:    Ashlie Garrido was seen and treated today in the emergency room by the following provider(s):  Attending Provider: Samina Jackson DO  Physician Assistant: Franny Tejeda. Ashlie Garrido is excused from work/school on 6/30/2022 through 7/2/2022. She is medically clear to return to work/school on 7/3/2022.      Please excuse the next 1-3 days as needed, if symptoms persist, thank you     Sincerely,          STEPHEN Carrasco
no

## 2022-09-23 NOTE — OCCUPATIONAL THERAPY INITIAL EVALUATION ADULT - DIAGNOSIS, OT EVAL
possible blood transfusuion
Pt presents with pain, decreased ROM, strength, endurance, and balance all impacting ability to perform ADLs and functional mobility.

## 2022-10-19 NOTE — PROGRESS NOTE ADULT - PROVIDER SPECIALTY LIST ADULT
Current Issues/Problems reviewed on call: care giver services.     Details for Interventions/Education completed on call:  ACM spoke with patient's daughter Nona today.  Nona said her mom is doing ok, but she is not sure if Earline is ok to make her own decisions.      Nona stated, \"we had Virtual visit with Counselor and it went well\".      ACM inform Nona that she need to schedule f/u with PCP since patient has not seen PCP after her Hospital ER visit.  Nona verbalized understanding.    Nona is not sure if Earline is taking her medications as she suppose to and she said Earline does not like anyone intervening with her medications or pill box.  ACM offered to speak with Earline to review medicines and to offer care giver services.      ACM try calling Earline's phone number but no one answered, no options to leave a voice message.        Time Frame for Follow up:  Within within 1 week    Plan for Next Call:Care coordination follow up.   Care Plan reviewed with Care Giver  (patient's daughter Nona) and she agrees with the plan of care and the call follow up plan.      Care Plan Reviewed with Dr. Matthew Abdullahi  on 10/19/2022 via EMR     Orthopedics

## 2022-10-24 NOTE — DISCHARGE NOTE ADULT - LEARNING BEHAVIORAL ACTIVITIES TO COPE WITH URGES. FOR EXAMPLE, DISTRACTION AND CHANGING ROUTINES.
Received message from [facility] that they were unable to get ahold of patient for consultation but they are sending Vivitrol injection on 10/25/22.     Called patient and scheduled Vivitrol injection on 11/1/22 at 9:00AM. Patient needed early morning appt.     Routed to Dr. Waggoner for FYI.   
Statement Selected

## 2022-12-20 NOTE — PHYSICAL THERAPY INITIAL EVALUATION ADULT - LEVEL OF INDEPENDENCE: STAIR NEGOTIATION, REHAB EVAL
General Surgery Progress Note    Subjective:   No acute issues overnight. Doing well. Underwent CT abd pelvis     Objective:   Visit Vitals  /79   Pulse 85   Temp 98.4 °F (36.9 °C) (Oral)   Resp 14   Ht 5' 7\" (1.702 m)   Wt 63.2 kg (139 lb 5.3 oz)   SpO2 99%   BMI 21.82 kg/m²       Awake and alert, no acute distress  Abdomen soft, less distended, appropriate incisional tenderness, suprapubic incision with small opening at right lateral aspect,  no surrounding erythema, no draiange  Incisions with steri strips intact; inferior left laparoscopic incision erythema resolved  IR drainage with minimal SS drainage  Jean in place    CBC:   Recent Labs     12/19/22  0531   WBC 8.9   RBC 4.70   HGB 12.2*   HCT 38.0*   MCV 80.9   *     CMP:    Recent Labs     12/19/22  0858   CREATININE 0.63*       Assessment and Plan:   33 year old male with hx of Crohn's disease c/b enterovesical fistula s/p Robotic ileocecectomy, enterovesicular fistula takedown on 12/7/22 c/b post operative intra-abdominal abscess s/p percutaneous drain placement 12/14.    Continue low residue diet  Leukocytosis resolved  Continue IV abx - culture with enterobacter, susceptibilities showing resistance to zosyn  ID on consults; Abx plan per ID.   Continue jean,will obtain CT cystogram   CT abd pelvis with seeminlgy resolved abscess. Will request IR sinogram and see if the drain can be pulled  Continue multimodal pain management as needed  SCDs, Lovenox for VTE prophylaxis  Encourage incentive spirometer, out of bed to chair, ambulation with assistance  OK to dc from surgical perspective     KELI Ashford R4  To be D/w            contact guard

## 2023-01-01 NOTE — PATIENT PROFILE ADULT - ARE SIGNIFICANT INDICATORS COMPLETE.
Attended C/S delivery as baby nurse @ 7491. Viable male infant. Apgars 8/9. AGA Completed admission assessment, footprints and measurements. ID bands verified and placed on infant. Mother plans to breast feed. Encouraged early skin to skin with mother. Cord clamp is secure. No

## 2023-04-17 NOTE — H&P PST ADULT - CORNEAL ABRASION RISK
daily eye drops/right eye surgery , cataract, OTC drops [FreeTextEntry1] : irreg cycle/Hx of PCOS and simple hyperplasia\par -TSH, cbc\par -UCG in office negative today\par -Endometrial Bx today, specimen sent to path\par -Rx for pelvic sono given\par -Pelvic rest today\par RTO pending results\par \par Pt. desires to conceive and is aware she needs to f/u with [facility] since she has been unsuccessful in the past and currently has been trying since Jan. but states she is not ovulating. OTC drops, dry eyes/daily eye drops

## 2023-12-08 NOTE — ED ADULT NURSE NOTE - BREATHING, MLM
Spontaneous, unlabored and symmetrical Patient requests all Lab, Cardiology, and Radiology Results on their Discharge Instructions

## 2023-12-22 NOTE — DISCHARGE NOTE ADULT - NS AS DC INITIAL [facility]  KNWN
[facility] Daily Progress Note      Subjective and Objective  -pt is seen and examined this morning  Awake, follows simple commands  On vent  Moderate thick trach secretions   Small loose bm        10 point review of systems is negative except otherwise as stated above       Reviewed the labs personally     Intake/Output Summary (Last 24 hours) at 12/22/2023 1054  Last data filed at 12/22/2023 0659  Gross per 24 hour   Intake 505.94 ml   Output 300 ml   Net 205.94 ml        Vital Last Value 24 Hour Range   Temperature 97.8 °F (36.6 °C) (12/22/23 0600) Temp  Min: 97.8 °F (36.6 °C)  Max: 98.1 °F (36.7 °C)   Pulse 88 (12/22/23 0725) Pulse  Min: 68  Max: 114   Respiratory 19 (12/22/23 0600) Resp  Min: 16  Max: 30   Non-Invasive  Blood Pressure 122/67 (12/22/23 0600) BP  Min: 122/67  Max: 166/100   Pulse Oximetry 100 % (12/22/23 0725) SpO2  Min: 99 %  Max: 100 %   Arterial   Blood Pressure   No data recorded      Physical Exam:  General:  Alert and oriented x2 follows simple commands.  No acute distress.    Head:  Normocephalic   Neck:  Tracheostomy status No thyromegaly, no lymphadenopathy.   Eyes:  no scleral icterus,  normal conjunctivae   ENT:   Mucous membranes are moist.    Cardiovascular: normal heart sounds,  Regular rate and rhythm, no murmur.  Respiratory:  Clear to auscultation.  Gastrointestinal:  Soft and nontender.  Normal bowel sounds.  No hepatosplenomegaly   Genitourinary:no suprapubic tenderness, no flank tenderness   Skin:  no rash  Musculoskeletal:  No tenderness to palpation.    Extremities: no edema le   Back:  No spine tenderness.  Neurologic: alert and orientedx2,  rt hemiplegia, nonverbal  Psychiatric:   limited exam     Labs:  Recent Labs     12/21/23  0020 12/21/23  0501 12/22/23  0606   WBC 10.2 7.4 7.6   RBC 3.49* 3.11* 3.08*   HGB 10.0* 9.0* 8.7*   HCT 33.9* 29.1* 28.1*    136* 117*   MCV 97.1 93.6 91.2   MCH 28.7 28.9 28.2   MCHC 29.5* 30.9* 31.0*   NRBCRE 0 0 0         Recent Labs      12/21/23  0020 12/21/23  0501 12/22/23  0317   SODIUM 139 138 142   POTASSIUM 5.9* 4.4 4.6   MG 1.9  --   --    CO2 22 18* 18*   ANIONGAP 14 15 12   GLUCOSE 162* 127* 83   BUN 79* 73* 73*   CREATININE 4.01* 3.50* 3.66*   CALCIUM 8.9 8.4 8.0*   BILIRUBIN 0.4 0.4 0.3   AST 37 28 35   GPT 70* 62 49   ALKPT 178* 137* 123*   GLOB 6.4* 5.7* 5.3*   AGR 0.3* 0.3* 0.4*        Recent Labs   Lab 12/21/23  0020   NTPROB 43,668*        Recent Labs     12/21/23  0021   INR 1.2   PT 12.6*   PTT 32           Inpatient Medications:  Current Facility-Administered Medications   Medication Dose Route Frequency Provider Last Rate Last Admin    insulin lispro (ADMELOG,HumaLOG) - Correction Dose   Subcutaneous 4 times per day Karlo Cueto CNP        sodium chloride 0.9 % injection 2 mL  2 mL Intracatheter 2 times per day Dave Hamilton DO   2 mL at 12/21/23 2104    atorvastatin (LIPITOR) tablet 20 mg  20 mg Per G Tube QHS Giovany Mcclellan MD   20 mg at 12/21/23 2100    carvedilol (COREG) tablet 3.125 mg  3.125 mg Per G Tube BID  Giovany Mcclellan MD   3.125 mg at 12/22/23 1014    metoCLOPramide (REGLAN) tablet 5 mg  5 mg Per G Tube BID Giovany Mcclellan MD   5 mg at 12/21/23 2204    vancomycin (FIRVANQ) 250 MG/5ML solution 125 mg  125 mg Per PEG Tube Daily Светлана Meléndez MD   125 mg at 12/22/23 1014      Current Facility-Administered Medications   Medication Dose Route Frequency Provider Last Rate Last Admin    dextrose 5 % / sodium chloride 0.9% infusion   Intravenous Continuous Karlo Cueto  mL/hr at 12/22/23 0659 Rate Verify at 12/22/23 0659      Current Facility-Administered Medications   Medication Dose Route Frequency Provider Last Rate Last Admin    sodium chloride 0.9 % flush bag 25 mL  25 mL Intravenous PRN Dave Hamilton DO        ondansetron (ZOFRAN ODT) disintegrating tablet 4 mg  4 mg Oral Q12H PRN Giovany Mcclellan MD        Or    ondansetron (ZOFRAN) injection 4 mg  4 mg Intravenous Q12H PRN Eleuterio  Giovany STEVENS MD        bisacodyl (DULCOLAX) suppository 10 mg  10 mg Rectal Daily PRN Giovany Mcclellan MD        melatonin tablet 3 mg  3 mg Oral Nightly PRN Giovany Mcclellan MD        dextrose 50 % injection 25 g  25 g Intravenous PRN Giovany Mcclellan MD        dextrose 50 % injection 12.5 g  12.5 g Intravenous PRN Giovany Mcclellan MD   12.5 g at 12/22/23 0153    glucagon (GLUCAGEN) injection 1 mg  1 mg Intramuscular PRN Giovany Mcclellan MD        dextrose (GLUTOSE) 40 % gel 15 g  15 g Oral PRN Giovany Mcclellan MD        dextrose (GLUTOSE) 40 % gel 30 g  30 g Oral PRN Giovany Mcclellan MD        ipratropium-albuterol (DUONEB) 0.5-2.5 (3) MG/3ML nebulizer solution 3 mL  3 mL Nebulization Q6H PRN Giovany Mcclellan MD        docusate sodium-sennosides (SENOKOT S) 50-8.6 MG 2 tablet  2 tablet Per PEG Tube BID PRN Giovany Mcclellan MD        ibuprofen (MOTRIN) tablet 400 mg  400 mg Per PEG Tube Q8H PRN Giovany Mcclellan MD        magnesium hydroxide (MILK OF MAGNESIA) 400 MG/5ML suspension 30 mL  30 mL Per PEG Tube Daily PRN Giovany Mcclellan MD        polyethylene glycol (MIRALAX) packet 17 g  17 g Per PEG Tube Daily PRN Giovany Mcclellan MD            Assessment and Plan:    79 yr old NH resident  with pmh of AAA, squamous cell cancer of larynx, s/p tracheostomy, chronic respiratory failure on vent, left thalamic bleed, ischemic stroke, right hemiplegia, chronic encephalopathy, chronic dysphagia, s/p PEG, chronic shahid, htn, ckd4, a fib not on anticoagulation, hypothyroidism, COVID PCR positive with high threshold for the last 2 weeks, recent hospitalization for sepsis 2ndary to recurrent cl. Diff on oral vanco, discharged to NH on 12/17, chronic anemia requiring blood transfusion sent to ER from NH for evaluation of increased work of breathing on vent.      In ED temp 93.3, pulse 106/min, RR 25/min, bp 124/95, SpO2 100%  CTH no acute findings, CT chest bibasal subsegmental atelectasis, small humphrey pl effusions  EKG normal   Labs with trop at 583 K 5.9  cr 4.01 GFR 14 lactic acid 4 TSH 5.780 pBNP 43,668 MRSA nares negative   Received 1L fluid bolus, started on IVF, IV vanc and zosyn     All the following problems below present on admission     Sepsis likely from cl diff colitis   Lactic acidosis  Cl diff colitis   MDRO C aurus colonization   Hypothermia  Urinary retention/ Chronic [place] status  -s/p 2 L fluid bolus and started on vanc/zosyn  -Blood cultures sent, f/u on results  -UA few bacteria  -Quad panel negative   -CT chest bibasal subsegmental atelectasis vs consolidation,small humphrey pl effusions, right hepatic lobe of liver with 1.8 cm subcapsular hypodensity, recommend MRI abdomen   -ID on consult, antibiotics per ID  -Intensivist on consult  -lactic acid trended down to 2.6 from 4.0  -s/p IVF  -dced vanc/ zosyn per ID  -Cont oral vanc     Elevated troponin, NSTEMI likely from demand ischemia  -trop 583,   -TTE 12/2023: ef 57%, no RWMA, G1DD  -cont coreg and statin     REYMUNDO on CKD4  Hyperkalemia  -baseline cr 3.4  -Cr 4.01 GFR 14 on admission  -Cr trended down to 3.66  -s/p shifters, rpt K wnl  -s/p fluid bolus, dced IVF today  -Renal on consult  -monitor bmp, I/O      Chronic hypoxic respiratory failure, Vent dpendent, Tracheostomy status  -CT chest bibasal subsegmental atelectasis, small humphrey pl effusions  -intensivist on consult  -vent management per intensivist     Gastrostomy status  Severe protein calorie malnutrition  -resume GT feedings     Laryngeal cancer, s/p trach  -     HTN  Hx of A fib  -in NSR on EKG  -tele monitoring  -not on Anticoagulation due to hx of IC bleed      Chronic anemia  -baseline hb 8.5 to 9  -hb 9 on admission at his baseline  -monitor hb      Hypothyroidism subclinical  -TSH 5.7 free T4 wnl  -not of levothyroxine      Hx of Left thalamic bleed, right hemiplegis  Hxof  ischemic CVA  Chronic encephalopathy  -awake, does not follow commands  -     Sacral decub stage 4 and Bilateral buttock stage 2 POA   -decub with pink base    -woundcare RN on consult  -q 4 hr turns, off loading, dressing changes per wound care     Hx of AAA        ALLERGIES:  Hydrocodone-acetaminophen     DVT prophylaxis: SCD's, heparin sc  Code Status: Selective Treatment/DNR  (no CPR, no intubation, ok for cardioversion, antiarrythmics and vasopressors)  Primary Care Provider: Pcp, No   SDM: Mame Holden    [facility] resident at [facility]             Patient Privacy Notice:  The 21st Century Cures Act makes medical notes like these available to patients in the interest of transparency. Please be advised that this is a medical document. Medical documents are intended to carry relevant information, facts as evident, and the clinical opinion of the practitioner. The medical note is intended as peer to peer communication, and may appear blunt or direct. It is written in medical language, and may contain abbreviations or verbiage that are unfamiliar.    Светлана Meléndez MD  12/22/2023 10:54 AM     Assessed

## 2024-03-27 NOTE — PROVIDER CONTACT NOTE (OTHER) - REASON
Detail Level: Detailed
PAT up to 153 x 1.5secs
Pat upto 152 for 13 seconds
Quality 130: Documentation Of Current Medications In The Medical Record: Current Medications Documented
Quality 431: Preventive Care And Screening: Unhealthy Alcohol Use - Screening: Patient not identified as an unhealthy alcohol user when screened for unhealthy alcohol use using a systematic screening method
Quality 226: Preventive Care And Screening: Tobacco Use: Screening And Cessation Intervention: Patient screened for tobacco use and is an ex/non-smoker

## 2024-04-10 NOTE — H&P ADULT - PROBLEM SELECTOR PROBLEM 2
Pt calling to advise she was at Dr Mast office and her BP was high. Per pt states she was taken off her BP meds and now it is not steady and wants to go back on her BP meds. Please advice    [facility]    Liver cancer

## 2024-04-29 NOTE — DISCHARGE NOTE PROVIDER - NSDCDCMDCOMP_GEN_ALL_CORE
Called and spoke w pt's daughter to get her scheduled for Venofer, TEQUILA, first appt is 5/10 at 11:30. Pt's daughter understanding and agreeable  
This document is complete and the patient is ready for discharge.

## 2024-07-14 NOTE — PHYSICAL THERAPY INITIAL EVALUATION ADULT - LEVEL OF INDEPENDENCE: SCOOT/BRIDGE, REHAB EVAL
Dr. Vaughn consulted at this time regarding patient's continuing hypotensive state. Verbal order for 1L NS 0.9% Bolus. See MAR for admin.   independent

## 2024-09-23 NOTE — PROGRESS NOTE ADULT - PROBLEM SELECTOR PROBLEM 2
CC:  Eldon Cota is here today for   Chief Complaint   Patient presents with    Office Visit     LOV 06- DOS 12- Left total shoulder arthroplasty/09- Right total shoulder arthoplasty. Patient states that his shoulders are doing well.      .    Referring MD:   Michael Gillespie MD  PCP:  Lesly Cordoba MD  Medication verified, no changes  Patient would like communication of their results via:    Cell Phone:   Telephone Information:   Mobile [phone]     Okay to leave a message containing results? No  Tobacco history: Verified     Hepatocellular carcinoma

## 2024-10-18 NOTE — PATIENT PROFILE ADULT - FAMILY NEEDS
Total Joint Week 2 Survey      Flowsheet Row Responses   [facility] facility patient discharged from? [facility]   Does the patient have one of the following disease processes/diagnoses(primary or secondary)? Total Joint Replacement   Joint surgery performed? Knee   Week 2 attempt successful? No   Unsuccessful attempts Attempt 2            Sera PATINO - Licensed Nurse   no

## 2024-12-17 NOTE — OCCUPATIONAL THERAPY INITIAL EVALUATION ADULT - PLANNED THERAPY INTERVENTIONS, OT EVAL
Returned pt call no answer was unable to leave voicemail.   ADL retraining/balance training/bed mobility training/transfer training/strengthening

## 2025-03-03 NOTE — DIETITIAN INITIAL EVALUATION ADULT. - CONTINUE CURRENT NUTRITION CARE PLAN
Health Maintenance       Colorectal Cancer Screening (Colonoscopy - Every 10 Years)  Ordered on 5/14/2024           Following review of the above:  Patient is not proceeding with: Colorectal Cancer Screening   yes
